# Patient Record
Sex: FEMALE | Race: WHITE | NOT HISPANIC OR LATINO | Employment: FULL TIME | URBAN - METROPOLITAN AREA
[De-identification: names, ages, dates, MRNs, and addresses within clinical notes are randomized per-mention and may not be internally consistent; named-entity substitution may affect disease eponyms.]

---

## 2017-09-25 ENCOUNTER — TELEPHONE (OUTPATIENT)
Dept: FAMILY MEDICINE | Facility: CLINIC | Age: 32
End: 2017-09-25

## 2017-09-25 NOTE — TELEPHONE ENCOUNTER
----- Message from Terese Ochoa sent at 9/25/2017  1:52 PM CDT -----  Contact: self. call [phone]  New patient who need to come in anytime after 3:00 pm for her physical. She said she knows you.

## 2017-09-25 NOTE — TELEPHONE ENCOUNTER
Left message that annual was scheduled 11/16/17 at 3:40 pm and if this did not work Please call to reschedule.

## 2017-11-16 ENCOUNTER — PATIENT MESSAGE (OUTPATIENT)
Dept: ADMINISTRATIVE | Facility: OTHER | Age: 32
End: 2017-11-16

## 2017-11-16 ENCOUNTER — OFFICE VISIT (OUTPATIENT)
Dept: FAMILY MEDICINE | Facility: CLINIC | Age: 32
End: 2017-11-16
Payer: COMMERCIAL

## 2017-11-16 VITALS
WEIGHT: 155.44 LBS | HEART RATE: 84 BPM | BODY MASS INDEX: 27.54 KG/M2 | SYSTOLIC BLOOD PRESSURE: 104 MMHG | TEMPERATURE: 98 F | HEIGHT: 63 IN | DIASTOLIC BLOOD PRESSURE: 60 MMHG

## 2017-11-16 DIAGNOSIS — E66.3 OVERWEIGHT (BMI 25.0-29.9): ICD-10-CM

## 2017-11-16 DIAGNOSIS — J98.9 REACTIVE AIRWAY DISEASE THAT IS NOT ASTHMA: ICD-10-CM

## 2017-11-16 DIAGNOSIS — R12 HEARTBURN: ICD-10-CM

## 2017-11-16 DIAGNOSIS — B00.1 RECURRENT COLD SORES: ICD-10-CM

## 2017-11-16 DIAGNOSIS — Z00.00 ANNUAL PHYSICAL EXAM: Primary | ICD-10-CM

## 2017-11-16 PROCEDURE — 99385 PREV VISIT NEW AGE 18-39: CPT | Mod: S$GLB,,, | Performed by: INTERNAL MEDICINE

## 2017-11-16 PROCEDURE — 99999 PR PBB SHADOW E&M-EST. PATIENT-LVL III: CPT | Mod: PBBFAC,,, | Performed by: INTERNAL MEDICINE

## 2017-11-16 RX ORDER — ETONOGESTREL AND ETHINYL ESTRADIOL .12; .015 MG/D; MG/D
0.12 INSERT, EXTENDED RELEASE VAGINAL
Refills: 7 | COMMUNITY
Start: 2017-09-14 | End: 2019-05-17

## 2017-11-16 RX ORDER — VALACYCLOVIR HYDROCHLORIDE 500 MG/1
500 TABLET, FILM COATED ORAL DAILY
Refills: 8 | COMMUNITY
Start: 2017-11-07 | End: 2020-10-12

## 2017-11-16 RX ORDER — ALBUTEROL SULFATE 90 UG/1
1-2 AEROSOL, METERED RESPIRATORY (INHALATION) EVERY 6 HOURS PRN
Qty: 18 G | Refills: 3 | Status: SHIPPED | OUTPATIENT
Start: 2017-11-16 | End: 2019-05-17

## 2017-11-16 NOTE — PROGRESS NOTES
Subjective:        Patient ID: Deneen Jay is a 32 y.o. female.    Chief Complaint: Annual Exam    HPI   Deneen Jay presents for annual exam and to establish care.    Review of Systems   Constitutional: Positive for fatigue and unexpected weight change (weight gain). Negative for activity change (regular exercise).   HENT: Negative for hearing loss and trouble swallowing.    Eyes: Negative for visual disturbance (glasses).   Respiratory: Negative for chest tightness and shortness of breath.    Cardiovascular: Negative for chest pain, palpitations and leg swelling (only after working long shifts).   Gastrointestinal: Negative for abdominal pain, constipation and diarrhea.        - heartburn, worse with EtOH, tomato sauce   Genitourinary: Negative for menstrual problem.   Musculoskeletal: Negative for arthralgias and back pain.   Skin: Negative for rash.   Allergic/Immunologic: Positive for environmental allergies.   Neurological: Negative for dizziness and headaches.   Hematological: Does not bruise/bleed easily.   Psychiatric/Behavioral: Negative for dysphoric mood and sleep disturbance. The patient is not nervous/anxious.            Objective:        Vitals:    11/16/17 1542   BP: 104/60   Pulse: 84   Temp: 97.8 °F (36.6 °C)     Physical Exam   Constitutional: She is oriented to person, place, and time. She appears well-developed and well-nourished. No distress.   HENT:   Head: Normocephalic and atraumatic.   Right Ear: External ear normal.   Left Ear: External ear normal.   Nose: Nose normal.   Mouth/Throat: Oropharynx is clear and moist.   - bilateral ear canals clear, tympanic membranes visualized - normal color and light reflex   Eyes: Conjunctivae and EOM are normal.   Neck: Normal range of motion. Neck supple. No thyromegaly present.   Cardiovascular: Normal rate, regular rhythm and normal heart sounds.    No murmur heard.  Pulmonary/Chest: Effort normal and breath sounds normal. No respiratory distress.    Abdominal: Soft. Bowel sounds are normal. She exhibits no distension and no mass. There is no tenderness. There is no guarding.   Musculoskeletal: She exhibits no edema.   Neurological: She is alert and oriented to person, place, and time.   Skin: Skin is warm and dry. No rash noted. No erythema.   Psychiatric: She has a normal mood and affect. Her behavior is normal. Judgment and thought content normal.   Vitals reviewed.          Assessment:         1. Annual physical exam    2. Overweight (BMI 25.0-29.9)    3. Recurrent cold sores    4. Heartburn    5. Reactive airway disease that is not asthma              Plan:         Deneen was seen today for annual exam.    Diagnoses and all orders for this visit:    Annual physical exam  - pt sees outside gyn, pap smear up to date  - return for fasting labs  -     Comprehensive metabolic panel; Future  -     CBC auto differential; Future  -     Hemoglobin A1c; Future  -     Lipid panel; Future  -     TSH; Future    Overweight (BMI 25.0-29.9): Pt exercises almost daily, weight training, works with [employer].  Generally healthy diet, EtOH only on weekends.  Check thyroid and A1c.  Referral to nutrition.  Discussed medication options including Metformin, Qsymia, Contrave.  Further management pending lab results.  -     Ambulatory consult to Nutrition Services    Recurrent cold sores: Valacyclovir qd    Heartburn: Generally controlled w Ranitidine daily.  Follow up if worse or alarm sx, will get EGD.    Reactive airway disease that is not asthma: No acute issues; occasionally sx if URI or exercising in cold weather.  -     albuterol 90 mcg/actuation inhaler; Inhale 1-2 puffs into the lungs every 6 (six) hours as needed for Wheezing.        Follow up pending lab results.

## 2017-11-20 ENCOUNTER — PATIENT MESSAGE (OUTPATIENT)
Dept: FAMILY MEDICINE | Facility: CLINIC | Age: 32
End: 2017-11-20

## 2017-11-20 ENCOUNTER — LAB VISIT (OUTPATIENT)
Dept: LAB | Facility: HOSPITAL | Age: 32
End: 2017-11-20
Attending: INTERNAL MEDICINE
Payer: COMMERCIAL

## 2017-11-20 DIAGNOSIS — Z00.00 ANNUAL PHYSICAL EXAM: ICD-10-CM

## 2017-11-20 DIAGNOSIS — E66.3 OVERWEIGHT (BMI 25.0-29.9): Primary | ICD-10-CM

## 2017-11-20 LAB
ALBUMIN SERPL BCP-MCNC: 3.7 G/DL
ALP SERPL-CCNC: 60 U/L
ALT SERPL W/O P-5'-P-CCNC: 13 U/L
ANION GAP SERPL CALC-SCNC: 8 MMOL/L
AST SERPL-CCNC: 13 U/L
BASOPHILS # BLD AUTO: 0.02 K/UL
BASOPHILS NFR BLD: 0.3 %
BILIRUB SERPL-MCNC: 0.3 MG/DL
BUN SERPL-MCNC: 9 MG/DL
CALCIUM SERPL-MCNC: 9.2 MG/DL
CHLORIDE SERPL-SCNC: 110 MMOL/L
CHOLEST SERPL-MCNC: 165 MG/DL
CHOLEST/HDLC SERPL: 2.1 {RATIO}
CO2 SERPL-SCNC: 22 MMOL/L
CREAT SERPL-MCNC: 0.8 MG/DL
DIFFERENTIAL METHOD: ABNORMAL
EOSINOPHIL # BLD AUTO: 0.1 K/UL
EOSINOPHIL NFR BLD: 1.9 %
ERYTHROCYTE [DISTWIDTH] IN BLOOD BY AUTOMATED COUNT: 11.7 %
EST. GFR  (AFRICAN AMERICAN): >60 ML/MIN/1.73 M^2
EST. GFR  (NON AFRICAN AMERICAN): >60 ML/MIN/1.73 M^2
ESTIMATED AVG GLUCOSE: 88 MG/DL
GLUCOSE SERPL-MCNC: 96 MG/DL
HBA1C MFR BLD HPLC: 4.7 %
HCT VFR BLD AUTO: 41.1 %
HDLC SERPL-MCNC: 78 MG/DL
HDLC SERPL: 47.3 %
HGB BLD-MCNC: 14.3 G/DL
LDLC SERPL CALC-MCNC: 72 MG/DL
LYMPHOCYTES # BLD AUTO: 2.5 K/UL
LYMPHOCYTES NFR BLD: 36.3 %
MCH RBC QN AUTO: 33.8 PG
MCHC RBC AUTO-ENTMCNC: 34.8 G/DL
MCV RBC AUTO: 97 FL
MONOCYTES # BLD AUTO: 0.8 K/UL
MONOCYTES NFR BLD: 11.3 %
NEUTROPHILS # BLD AUTO: 3.4 K/UL
NEUTROPHILS NFR BLD: 50.1 %
NONHDLC SERPL-MCNC: 87 MG/DL
PLATELET # BLD AUTO: 260 K/UL
PMV BLD AUTO: 10.3 FL
POTASSIUM SERPL-SCNC: 4.3 MMOL/L
PROT SERPL-MCNC: 6.9 G/DL
RBC # BLD AUTO: 4.23 M/UL
SODIUM SERPL-SCNC: 140 MMOL/L
TRIGL SERPL-MCNC: 75 MG/DL
TSH SERPL DL<=0.005 MIU/L-ACNC: 2.32 UIU/ML
WBC # BLD AUTO: 6.81 K/UL

## 2017-11-20 PROCEDURE — 84443 ASSAY THYROID STIM HORMONE: CPT

## 2017-11-20 PROCEDURE — 80061 LIPID PANEL: CPT

## 2017-11-20 PROCEDURE — 83036 HEMOGLOBIN GLYCOSYLATED A1C: CPT

## 2017-11-20 PROCEDURE — 80053 COMPREHEN METABOLIC PANEL: CPT

## 2017-11-20 PROCEDURE — 36415 COLL VENOUS BLD VENIPUNCTURE: CPT

## 2017-11-20 PROCEDURE — 85025 COMPLETE CBC W/AUTO DIFF WBC: CPT

## 2017-12-18 ENCOUNTER — PATIENT MESSAGE (OUTPATIENT)
Dept: FAMILY MEDICINE | Facility: CLINIC | Age: 32
End: 2017-12-18

## 2017-12-18 DIAGNOSIS — E66.3 OVERWEIGHT (BMI 25.0-29.9): ICD-10-CM

## 2018-01-08 ENCOUNTER — OFFICE VISIT (OUTPATIENT)
Dept: FAMILY MEDICINE | Facility: CLINIC | Age: 33
End: 2018-01-08
Payer: COMMERCIAL

## 2018-01-08 VITALS
DIASTOLIC BLOOD PRESSURE: 72 MMHG | BODY MASS INDEX: 26.9 KG/M2 | SYSTOLIC BLOOD PRESSURE: 116 MMHG | TEMPERATURE: 98 F | WEIGHT: 149.5 LBS

## 2018-01-08 DIAGNOSIS — E66.3 OVERWEIGHT (BMI 25.0-29.9): Primary | ICD-10-CM

## 2018-01-08 PROCEDURE — 99213 OFFICE O/P EST LOW 20 MIN: CPT | Mod: S$GLB,,, | Performed by: INTERNAL MEDICINE

## 2018-01-08 PROCEDURE — 99999 PR PBB SHADOW E&M-EST. PATIENT-LVL III: CPT | Mod: PBBFAC,,, | Performed by: INTERNAL MEDICINE

## 2018-01-10 ENCOUNTER — PATIENT MESSAGE (OUTPATIENT)
Dept: FAMILY MEDICINE | Facility: CLINIC | Age: 33
End: 2018-01-10

## 2018-01-10 DIAGNOSIS — E66.3 OVERWEIGHT (BMI 25.0-29.9): ICD-10-CM

## 2018-01-11 ENCOUNTER — PATIENT MESSAGE (OUTPATIENT)
Dept: ADMINISTRATIVE | Facility: HOSPITAL | Age: 33
End: 2018-01-11

## 2018-04-10 ENCOUNTER — OFFICE VISIT (OUTPATIENT)
Dept: FAMILY MEDICINE | Facility: CLINIC | Age: 33
End: 2018-04-10
Payer: COMMERCIAL

## 2018-04-10 VITALS
WEIGHT: 141.75 LBS | HEIGHT: 63 IN | SYSTOLIC BLOOD PRESSURE: 102 MMHG | BODY MASS INDEX: 25.12 KG/M2 | DIASTOLIC BLOOD PRESSURE: 64 MMHG | TEMPERATURE: 98 F

## 2018-04-10 DIAGNOSIS — E66.3 OVERWEIGHT (BMI 25.0-29.9): Primary | ICD-10-CM

## 2018-04-10 DIAGNOSIS — G43.109 OCULAR MIGRAINE: ICD-10-CM

## 2018-04-10 PROCEDURE — 99999 PR PBB SHADOW E&M-EST. PATIENT-LVL III: CPT | Mod: PBBFAC,,, | Performed by: INTERNAL MEDICINE

## 2018-04-10 PROCEDURE — 99213 OFFICE O/P EST LOW 20 MIN: CPT | Mod: S$GLB,,, | Performed by: INTERNAL MEDICINE

## 2018-05-08 ENCOUNTER — PATIENT MESSAGE (OUTPATIENT)
Dept: FAMILY MEDICINE | Facility: CLINIC | Age: 33
End: 2018-05-08

## 2018-07-17 ENCOUNTER — OFFICE VISIT (OUTPATIENT)
Dept: FAMILY MEDICINE | Facility: CLINIC | Age: 33
End: 2018-07-17
Payer: COMMERCIAL

## 2018-07-17 VITALS
TEMPERATURE: 97 F | DIASTOLIC BLOOD PRESSURE: 74 MMHG | RESPIRATION RATE: 16 BRPM | BODY MASS INDEX: 23.48 KG/M2 | SYSTOLIC BLOOD PRESSURE: 110 MMHG | WEIGHT: 132.5 LBS | HEIGHT: 63 IN

## 2018-07-17 DIAGNOSIS — L30.9 DERMATITIS: Primary | ICD-10-CM

## 2018-07-17 DIAGNOSIS — R63.4 WEIGHT LOSS: ICD-10-CM

## 2018-07-17 PROBLEM — E66.3 OVERWEIGHT (BMI 25.0-29.9): Status: RESOLVED | Noted: 2017-11-16 | Resolved: 2018-07-17

## 2018-07-17 PROCEDURE — 99214 OFFICE O/P EST MOD 30 MIN: CPT | Mod: S$GLB,,, | Performed by: INTERNAL MEDICINE

## 2018-07-17 PROCEDURE — 99999 PR PBB SHADOW E&M-EST. PATIENT-LVL III: CPT | Mod: PBBFAC,,, | Performed by: INTERNAL MEDICINE

## 2018-07-17 PROCEDURE — 3008F BODY MASS INDEX DOCD: CPT | Mod: CPTII,S$GLB,, | Performed by: INTERNAL MEDICINE

## 2018-07-17 NOTE — PROGRESS NOTES
Subjective:        Patient ID: Deneen Jay is a 33 y.o. female.    Chief Complaint: Weight Check and Rash (right leg)    HPI   Deneen Jay presents for the followin. Rash: Pt c/o rash on front of right ankle/lower leg.  Rash appeared suddenly, red with small bumps, a little itchy.  There is no swelling, pain or vesicles/pustules.  Pt applied OTC hydrocortisone cream.  She cannot think of a new topical or exposure to any new environment or substances.    2. Weight loss: Pt stopped taking Qsymia 3.75/23 a few days ago.  She has reached her goal weight and is going to try maintaining current weight with lifestyle.  She has started intermittent fasting, 2 days per week.  Feels good afterwards.  Continues to exercise regularly.  Checks daily weight at home.  Anxiety level has decreased since discontinuing the medication.    Review of Systems   Constitutional: Negative for activity change and unexpected weight change.   HENT: Negative for hearing loss, rhinorrhea and trouble swallowing.    Eyes: Negative for discharge and visual disturbance.   Respiratory: Negative for chest tightness and wheezing.    Cardiovascular: Negative for chest pain and palpitations.   Gastrointestinal: Negative for blood in stool, constipation, diarrhea and vomiting.   Endocrine: Negative for polydipsia and polyuria.   Genitourinary: Negative for difficulty urinating, dysuria, hematuria and menstrual problem.   Musculoskeletal: Negative for arthralgias, joint swelling and neck pain.   Neurological: Negative for weakness and headaches.   Psychiatric/Behavioral: Negative for confusion and dysphoric mood.           Objective:        Vitals:    18 1438   BP: 110/74   Resp: 16   Temp: 97.3 °F (36.3 °C)     Physical Exam   Constitutional: She is oriented to person, place, and time. She appears well-developed and well-nourished. No distress.   Neurological: She is alert and oriented to person, place, and time.   Skin: Skin is warm.   L  anterior lower leg: blanching erythematous papular rash with mild excoriations; skin intact; no edema or induration; no vesicles or pustules; rash stops at the top of pt's ankle socks   Psychiatric: She has a normal mood and affect. Her behavior is normal.   Vitals reviewed.          Assessment:         1. Dermatitis    2. Weight loss              Plan:         Deneen was seen today for weight check and rash.    Diagnoses and all orders for this visit:    Dermatitis: Does not appear c/w infection.  Recommend continuing topical steroids, avoid any other irritants or new topicals until rash resolves.    Weight loss: Pt is happy with the weight she is at, BMI WNL.  Continue off medication and maintain with lifestyle.  Pt will f/u if she has weight gain.        Follow-up in about 5 months (around 12/17/2018) for annual exam or sooner as needed.

## 2019-02-11 ENCOUNTER — PATIENT MESSAGE (OUTPATIENT)
Dept: FAMILY MEDICINE | Facility: CLINIC | Age: 34
End: 2019-02-11

## 2019-02-11 DIAGNOSIS — M54.12 CERVICAL RADICULITIS: Primary | ICD-10-CM

## 2019-02-12 ENCOUNTER — PATIENT MESSAGE (OUTPATIENT)
Dept: FAMILY MEDICINE | Facility: CLINIC | Age: 34
End: 2019-02-12

## 2019-02-18 ENCOUNTER — TELEPHONE (OUTPATIENT)
Dept: FAMILY MEDICINE | Facility: CLINIC | Age: 34
End: 2019-02-18

## 2019-02-18 ENCOUNTER — HOSPITAL ENCOUNTER (OUTPATIENT)
Dept: RADIOLOGY | Facility: OTHER | Age: 34
Discharge: HOME | End: 2019-02-18
Attending: INTERNAL MEDICINE
Payer: COMMERCIAL

## 2019-02-18 DIAGNOSIS — M25.422 SWELLING OF LEFT ELBOW: ICD-10-CM

## 2019-02-18 DIAGNOSIS — Z00.00 ROUTINE GENERAL MEDICAL EXAMINATION AT A HEALTH CARE FACILITY: Primary | ICD-10-CM

## 2019-02-18 DIAGNOSIS — G56.22 CUBITAL TUNNEL SYNDROME ON LEFT: Primary | ICD-10-CM

## 2019-02-18 DIAGNOSIS — M54.12 CERVICAL RADICULITIS: ICD-10-CM

## 2019-02-18 PROCEDURE — 72141 MRI NECK SPINE W/O DYE: CPT | Mod: 26,,, | Performed by: RADIOLOGY

## 2019-02-18 PROCEDURE — 72141 MRI NECK SPINE W/O DYE: CPT | Mod: TC

## 2019-02-18 PROCEDURE — 72141 MRI CERVICAL SPINE WITHOUT CONTRAST: ICD-10-PCS | Mod: 26,,, | Performed by: RADIOLOGY

## 2019-02-18 NOTE — TELEPHONE ENCOUNTER
Called and reviewed MRI results with patient.  Shoulder symptoms are resolved.  Pain now localized around the elbow and radiating into the pinky.  There is mild swelling of the elbow.  She has stopped doing upper body weight training since sx started 2 weeks ago.  Things are very gradually getting better but she has been taking Ibuprofen 800mg TID.  Recommend referral to ortho to discuss steroid injection of cubital tunnel.  Pt has hx of poor reaction to oral steroids.

## 2019-04-16 ENCOUNTER — TELEPHONE (OUTPATIENT)
Dept: FAMILY MEDICINE | Facility: CLINIC | Age: 34
End: 2019-04-16

## 2019-04-16 NOTE — TELEPHONE ENCOUNTER
----- Message from Suni Rock sent at 4/16/2019  3:06 PM CDT -----  Doctor appointment and lab have been scheduled.  Please link lab orders to the lab appointment.  Date of doctor appointment:  5/17  Date of lab appointment:  5/13  Physical or EP: Physical   Comments:

## 2019-05-13 ENCOUNTER — LAB VISIT (OUTPATIENT)
Dept: LAB | Facility: OTHER | Age: 34
End: 2019-05-13
Payer: COMMERCIAL

## 2019-05-13 DIAGNOSIS — Z00.00 ROUTINE GENERAL MEDICAL EXAMINATION AT A HEALTH CARE FACILITY: ICD-10-CM

## 2019-05-13 LAB
ALBUMIN SERPL BCP-MCNC: 4.3 G/DL (ref 3.5–5.2)
ALP SERPL-CCNC: 47 U/L (ref 55–135)
ALT SERPL W/O P-5'-P-CCNC: 21 U/L (ref 10–44)
ANION GAP SERPL CALC-SCNC: 5 MMOL/L (ref 8–16)
AST SERPL-CCNC: 17 U/L (ref 10–40)
BASOPHILS # BLD AUTO: 0.02 K/UL (ref 0–0.2)
BASOPHILS NFR BLD: 0.4 % (ref 0–1.9)
BILIRUB SERPL-MCNC: 0.8 MG/DL (ref 0.1–1)
BUN SERPL-MCNC: 8 MG/DL (ref 6–20)
CALCIUM SERPL-MCNC: 9.4 MG/DL (ref 8.7–10.5)
CHLORIDE SERPL-SCNC: 107 MMOL/L (ref 95–110)
CHOLEST SERPL-MCNC: 171 MG/DL (ref 120–199)
CHOLEST/HDLC SERPL: 1.9 {RATIO} (ref 2–5)
CO2 SERPL-SCNC: 30 MMOL/L (ref 23–29)
CREAT SERPL-MCNC: 0.7 MG/DL (ref 0.5–1.4)
DIFFERENTIAL METHOD: ABNORMAL
EOSINOPHIL # BLD AUTO: 0.1 K/UL (ref 0–0.5)
EOSINOPHIL NFR BLD: 1.9 % (ref 0–8)
ERYTHROCYTE [DISTWIDTH] IN BLOOD BY AUTOMATED COUNT: 12.1 % (ref 11.5–14.5)
EST. GFR  (AFRICAN AMERICAN): >60 ML/MIN/1.73 M^2
EST. GFR  (NON AFRICAN AMERICAN): >60 ML/MIN/1.73 M^2
GLUCOSE SERPL-MCNC: 89 MG/DL (ref 70–110)
HCT VFR BLD AUTO: 38.7 % (ref 37–48.5)
HDLC SERPL-MCNC: 91 MG/DL (ref 40–75)
HDLC SERPL: 53.2 % (ref 20–50)
HGB BLD-MCNC: 13.4 G/DL (ref 12–16)
LDLC SERPL CALC-MCNC: 70.2 MG/DL (ref 63–159)
LYMPHOCYTES # BLD AUTO: 2 K/UL (ref 1–4.8)
LYMPHOCYTES NFR BLD: 37.5 % (ref 18–48)
MCH RBC QN AUTO: 34.9 PG (ref 27–31)
MCHC RBC AUTO-ENTMCNC: 34.6 G/DL (ref 32–36)
MCV RBC AUTO: 101 FL (ref 82–98)
MONOCYTES # BLD AUTO: 0.6 K/UL (ref 0.3–1)
MONOCYTES NFR BLD: 10.3 % (ref 4–15)
NEUTROPHILS # BLD AUTO: 2.7 K/UL (ref 1.8–7.7)
NEUTROPHILS NFR BLD: 49.7 % (ref 38–73)
NONHDLC SERPL-MCNC: 80 MG/DL
PLATELET # BLD AUTO: 264 K/UL (ref 150–350)
PMV BLD AUTO: 9.9 FL (ref 9.2–12.9)
POTASSIUM SERPL-SCNC: 4.3 MMOL/L (ref 3.5–5.1)
PROT SERPL-MCNC: 7 G/DL (ref 6–8.4)
RBC # BLD AUTO: 3.84 M/UL (ref 4–5.4)
SODIUM SERPL-SCNC: 142 MMOL/L (ref 136–145)
TRIGL SERPL-MCNC: 49 MG/DL (ref 30–150)
WBC # BLD AUTO: 5.34 K/UL (ref 3.9–12.7)

## 2019-05-13 PROCEDURE — 80053 COMPREHEN METABOLIC PANEL: CPT

## 2019-05-13 PROCEDURE — 36415 COLL VENOUS BLD VENIPUNCTURE: CPT

## 2019-05-13 PROCEDURE — 85025 COMPLETE CBC W/AUTO DIFF WBC: CPT

## 2019-05-13 PROCEDURE — 80061 LIPID PANEL: CPT

## 2019-05-17 ENCOUNTER — OFFICE VISIT (OUTPATIENT)
Dept: PRIMARY CARE CLINIC | Facility: CLINIC | Age: 34
End: 2019-05-17
Payer: COMMERCIAL

## 2019-05-17 ENCOUNTER — TELEPHONE (OUTPATIENT)
Dept: PRIMARY CARE CLINIC | Facility: CLINIC | Age: 34
End: 2019-05-17

## 2019-05-17 VITALS
WEIGHT: 138.69 LBS | TEMPERATURE: 99 F | HEIGHT: 63 IN | BODY MASS INDEX: 24.57 KG/M2 | HEART RATE: 77 BPM | DIASTOLIC BLOOD PRESSURE: 68 MMHG | SYSTOLIC BLOOD PRESSURE: 100 MMHG

## 2019-05-17 DIAGNOSIS — B00.1 RECURRENT COLD SORES: ICD-10-CM

## 2019-05-17 DIAGNOSIS — J98.9 REACTIVE AIRWAY DISEASE THAT IS NOT ASTHMA: ICD-10-CM

## 2019-05-17 DIAGNOSIS — R12 HEARTBURN: ICD-10-CM

## 2019-05-17 DIAGNOSIS — Z00.00 ANNUAL PHYSICAL EXAM: Primary | ICD-10-CM

## 2019-05-17 PROCEDURE — 99999 PR PBB SHADOW E&M-EST. PATIENT-LVL III: ICD-10-PCS | Mod: PBBFAC,,, | Performed by: INTERNAL MEDICINE

## 2019-05-17 PROCEDURE — 99999 PR PBB SHADOW E&M-EST. PATIENT-LVL III: CPT | Mod: PBBFAC,,, | Performed by: INTERNAL MEDICINE

## 2019-05-17 PROCEDURE — 99395 PREV VISIT EST AGE 18-39: CPT | Mod: S$GLB,,, | Performed by: INTERNAL MEDICINE

## 2019-05-17 PROCEDURE — 99395 PR PREVENTIVE VISIT,EST,18-39: ICD-10-PCS | Mod: S$GLB,,, | Performed by: INTERNAL MEDICINE

## 2019-05-17 NOTE — TELEPHONE ENCOUNTER
----- Message from Falguni Alva sent at 5/17/2019 12:13 PM CDT -----  Contact: self   Patient would like to come in earlier today if a slot becomes available

## 2019-05-17 NOTE — TELEPHONE ENCOUNTER
Left message that patient could come earlier and we can try to work here in but I can't promise she will be seen earlier than her appointment time.

## 2019-05-17 NOTE — PROGRESS NOTES
Subjective:        Patient ID: Deneen Jay is a 34 y.o. female.    Chief Complaint: Annual Exam    HPI   Deneen Jay presents for annual exam.    Doing well, no specific concerns today.  Exercising regularly.  She has been able to maintain her weight loss.  GERD has essentially resolved with weight loss, only flares 2/2 certain foods like red sauce.    DCed Nuvaring 2 months ago; she and her  are planning to try to get pregnant later this year.    Cubital tunnel syndrome resolved.  Wears a L elbow brace at night when sleeping to keep arm straight.    Review of Systems   Constitutional: Negative for activity change and unexpected weight change.   HENT: Negative for hearing loss, rhinorrhea and trouble swallowing.    Eyes: Negative for discharge and visual disturbance.   Respiratory: Negative for chest tightness and wheezing.    Cardiovascular: Negative for chest pain and palpitations.   Gastrointestinal: Negative for abdominal pain, blood in stool, constipation, diarrhea and vomiting.   Endocrine: Negative for polydipsia and polyuria.   Genitourinary: Negative for difficulty urinating, dysuria, hematuria and menstrual problem.   Musculoskeletal: Negative for arthralgias, joint swelling and neck pain.   Skin: Negative for rash.   Neurological: Negative for weakness and headaches.   Psychiatric/Behavioral: Negative for confusion and dysphoric mood.           Objective:        Vitals:    05/17/19 1518   BP: 100/68   Pulse: 77   Temp: 98.5 °F (36.9 °C)     Physical Exam   Constitutional: She is oriented to person, place, and time. She appears well-developed and well-nourished. No distress.   HENT:   Head: Normocephalic and atraumatic.   Right Ear: External ear normal.   Left Ear: External ear normal.   Nose: Nose normal.   - bilateral ear canals clear, tympanic membranes visualized - normal color and light reflex   Eyes: Conjunctivae and EOM are normal.   Neck: Normal range of motion. Neck supple. No  thyromegaly present.   Cardiovascular: Normal rate, regular rhythm and normal heart sounds.   No murmur heard.  Pulmonary/Chest: Effort normal and breath sounds normal. No respiratory distress.   Abdominal: Soft. She exhibits no distension and no mass. There is no tenderness. There is no guarding.   Musculoskeletal: She exhibits no edema.   Lymphadenopathy:     She has no cervical adenopathy.   Neurological: She is alert and oriented to person, place, and time.   Skin: Skin is warm and dry.   Psychiatric: She has a normal mood and affect. Her behavior is normal. Judgment and thought content normal.   Vitals reviewed.        Most recent lab results reviewed with patient.    Assessment:         1. Annual physical exam    2. Recurrent cold sores    3. Heartburn    4. Reactive airway disease that is not asthma              Plan:         Deneen was seen today for annual exam.    Diagnoses and all orders for this visit:    Annual physical exam  - had pap smear, hpv this year that were normal    Recurrent cold sores: valtrex daily suppressive therapy; f/u derm    Heartburn: only occasionally now, zantac prn    Reactive airway disease that is not asthma: has not been an issue, has not needed albuterol           Follow up in about 1 year (around 5/17/2020) for annual exam or sooner as needed.

## 2019-08-12 ENCOUNTER — TELEPHONE (OUTPATIENT)
Dept: OBSTETRICS AND GYNECOLOGY | Facility: CLINIC | Age: 34
End: 2019-08-12

## 2019-08-12 DIAGNOSIS — Z34.90 PREGNANCY, UNSPECIFIED GESTATIONAL AGE: Primary | ICD-10-CM

## 2019-08-13 ENCOUNTER — PATIENT MESSAGE (OUTPATIENT)
Dept: OBSTETRICS AND GYNECOLOGY | Facility: CLINIC | Age: 34
End: 2019-08-13

## 2019-08-17 ENCOUNTER — PATIENT MESSAGE (OUTPATIENT)
Dept: OBSTETRICS AND GYNECOLOGY | Facility: CLINIC | Age: 34
End: 2019-08-17

## 2019-08-17 ENCOUNTER — HOSPITAL ENCOUNTER (EMERGENCY)
Facility: OTHER | Age: 34
Discharge: HOME | End: 2019-08-17
Attending: EMERGENCY MEDICINE
Payer: COMMERCIAL

## 2019-08-17 VITALS
OXYGEN SATURATION: 97 % | TEMPERATURE: 98 F | DIASTOLIC BLOOD PRESSURE: 65 MMHG | SYSTOLIC BLOOD PRESSURE: 105 MMHG | RESPIRATION RATE: 16 BRPM | HEART RATE: 76 BPM | WEIGHT: 138.69 LBS | BODY MASS INDEX: 24.57 KG/M2 | HEIGHT: 63 IN

## 2019-08-17 DIAGNOSIS — O03.9 SPONTANEOUS MISCARRIAGE: Primary | ICD-10-CM

## 2019-08-17 LAB
ABO GROUP BLD: NORMAL
ALBUMIN SERPL BCP-MCNC: 4.1 G/DL (ref 3.5–5.2)
ALP SERPL-CCNC: 47 U/L (ref 55–135)
ALT SERPL W/O P-5'-P-CCNC: 12 U/L (ref 10–44)
ANION GAP SERPL CALC-SCNC: 9 MMOL/L (ref 8–16)
AST SERPL-CCNC: 14 U/L (ref 10–40)
B-HCG UR QL: NEGATIVE
BACTERIA #/AREA URNS HPF: ABNORMAL /HPF
BASOPHILS # BLD AUTO: 0.04 K/UL (ref 0–0.2)
BASOPHILS NFR BLD: 0.5 % (ref 0–1.9)
BILIRUB SERPL-MCNC: 0.5 MG/DL (ref 0.1–1)
BILIRUB UR QL STRIP: NEGATIVE
BUN SERPL-MCNC: 10 MG/DL (ref 6–20)
CALCIUM SERPL-MCNC: 9.1 MG/DL (ref 8.7–10.5)
CHLORIDE SERPL-SCNC: 109 MMOL/L (ref 95–110)
CLARITY UR: ABNORMAL
CO2 SERPL-SCNC: 24 MMOL/L (ref 23–29)
COLOR UR: ABNORMAL
CREAT SERPL-MCNC: 0.7 MG/DL (ref 0.5–1.4)
CTP QC/QA: YES
DIFFERENTIAL METHOD: ABNORMAL
EOSINOPHIL # BLD AUTO: 0.1 K/UL (ref 0–0.5)
EOSINOPHIL NFR BLD: 1.2 % (ref 0–8)
ERYTHROCYTE [DISTWIDTH] IN BLOOD BY AUTOMATED COUNT: 11.4 % (ref 11.5–14.5)
EST. GFR  (AFRICAN AMERICAN): >60 ML/MIN/1.73 M^2
EST. GFR  (NON AFRICAN AMERICAN): >60 ML/MIN/1.73 M^2
GLUCOSE SERPL-MCNC: 94 MG/DL (ref 70–110)
GLUCOSE UR QL STRIP: NEGATIVE
HCG INTACT+B SERPL-ACNC: 4.7 MIU/ML
HCT VFR BLD AUTO: 43.4 % (ref 37–48.5)
HGB BLD-MCNC: 15.2 G/DL (ref 12–16)
HGB UR QL STRIP: ABNORMAL
HYALINE CASTS #/AREA URNS LPF: 0 /LPF
IMM GRANULOCYTES # BLD AUTO: 0.02 K/UL (ref 0–0.04)
IMM GRANULOCYTES NFR BLD AUTO: 0.3 % (ref 0–0.5)
KETONES UR QL STRIP: NEGATIVE
LEUKOCYTE ESTERASE UR QL STRIP: NEGATIVE
LYMPHOCYTES # BLD AUTO: 1.6 K/UL (ref 1–4.8)
LYMPHOCYTES NFR BLD: 21.1 % (ref 18–48)
MCH RBC QN AUTO: 35.3 PG (ref 27–31)
MCHC RBC AUTO-ENTMCNC: 35 G/DL (ref 32–36)
MCV RBC AUTO: 101 FL (ref 82–98)
MICROSCOPIC COMMENT: ABNORMAL
MONOCYTES # BLD AUTO: 0.6 K/UL (ref 0.3–1)
MONOCYTES NFR BLD: 8.6 % (ref 4–15)
NEUTROPHILS # BLD AUTO: 5.1 K/UL (ref 1.8–7.7)
NEUTROPHILS NFR BLD: 68.3 % (ref 38–73)
NITRITE UR QL STRIP: NEGATIVE
NRBC BLD-RTO: 0 /100 WBC
PH UR STRIP: 7 [PH] (ref 5–8)
PLATELET # BLD AUTO: 248 K/UL (ref 150–350)
PMV BLD AUTO: 9.6 FL (ref 9.2–12.9)
POTASSIUM SERPL-SCNC: 4.5 MMOL/L (ref 3.5–5.1)
PROT SERPL-MCNC: 6.7 G/DL (ref 6–8.4)
PROT UR QL STRIP: ABNORMAL
RBC # BLD AUTO: 4.31 M/UL (ref 4–5.4)
RBC #/AREA URNS HPF: >100 /HPF (ref 0–4)
RH BLD: NORMAL
SODIUM SERPL-SCNC: 142 MMOL/L (ref 136–145)
SP GR UR STRIP: 1.01 (ref 1–1.03)
SQUAMOUS #/AREA URNS HPF: 1 /HPF
URN SPEC COLLECT METH UR: ABNORMAL
UROBILINOGEN UR STRIP-ACNC: NEGATIVE EU/DL
WBC # BLD AUTO: 7.44 K/UL (ref 3.9–12.7)
WBC #/AREA URNS HPF: 5 /HPF (ref 0–5)

## 2019-08-17 PROCEDURE — 99284 EMERGENCY DEPT VISIT MOD MDM: CPT | Mod: 25

## 2019-08-17 PROCEDURE — 96361 HYDRATE IV INFUSION ADD-ON: CPT

## 2019-08-17 PROCEDURE — 86901 BLOOD TYPING SEROLOGIC RH(D): CPT

## 2019-08-17 PROCEDURE — 85025 COMPLETE CBC W/AUTO DIFF WBC: CPT

## 2019-08-17 PROCEDURE — 81000 URINALYSIS NONAUTO W/SCOPE: CPT

## 2019-08-17 PROCEDURE — 63600175 PHARM REV CODE 636 W HCPCS: Performed by: EMERGENCY MEDICINE

## 2019-08-17 PROCEDURE — 80053 COMPREHEN METABOLIC PANEL: CPT

## 2019-08-17 PROCEDURE — 84702 CHORIONIC GONADOTROPIN TEST: CPT

## 2019-08-17 PROCEDURE — 81025 URINE PREGNANCY TEST: CPT | Performed by: EMERGENCY MEDICINE

## 2019-08-17 PROCEDURE — 96360 HYDRATION IV INFUSION INIT: CPT

## 2019-08-17 RX ADMIN — SODIUM CHLORIDE 1000 ML: 0.9 INJECTION, SOLUTION INTRAVENOUS at 07:08

## 2019-08-17 NOTE — ED NOTES
"Pt to ED via spouse with c/o possible miscarriage. Pt reporting positive UPT at home at beginning of the week. Pt now c/o heavy intermittent vaginal bleeding and lower abdominal cramping this AM. Pt describes s/s as a "more severe period". Bowel sounds present and normoactive x4 quadrants, TTP to bilateral lower abdomen. Pt denies SOB, dyspnea with minimal exertion, dizziness, weakness, CP, N/V/D. Pt connected to continuous cardiac monitoring, pulse ox, BP cycled. Will continue to monitor.     Two patient identifiers have been checked and are correct.      Appearance: Pt awake, alert & oriented to person, place & time. Pt in no acute distress at present time. Pt is clean and well groomed with clothes appropriately fastened.   Skin: Skin warm, dry & intact. Color consistent with ethnicity. Mucous membranes moist. No breakdown or brusing noted.   Musculoskeletal: Patient moving all extremities well, no obvious swelling or deformities noted.   Respiratory: Respirations spontaneous, even, and non-labored. Visible chest rise noted. Airway is open and patent. No accessory muscle use noted.   Neurologic: Sensation is intact. Speech is clear and appropriate. Eyes open spontaneously, behavior appropriate to situation, follows commands, facial expression symmetrical, bilateral hand grasp equal and even, purposeful motor response noted.  Cardiac: All peripheral pulses present. No Bilateral lower extremity edema. Cap refill is <3 seconds.  Abdomen: SEE HPI.    : Pt reports no dysuria or hematuria.            "

## 2019-08-17 NOTE — ED PROVIDER NOTES
Encounter Date: 2019    SCRIBE #1 NOTE: I, Melinda Avina, am scribing for, and in the presence of,  Dr. Starr. I have scribed the entire note.       History     Chief Complaint   Patient presents with    Miscarriage     35y/o F to ED for possible miscarriage with heavy bleeding mostly this morning. denies dizziness or weakness.      Time patient was seen by the provider: 7:17 AM      The patient is a 34 y.o. female  with no known past medical history who presents to the ED with a complaint of a miscarriage. She states that she found out she was pregnant from 2 home tests on Monday and last night noticed some red vaginal discharge. This morning she states she had bleeding similar in flow to menstrual period. She also reports some right sided pelvic pain which feels like menstrual cramps. Her last menstrual period was on July 10 and was normal.  Denies urinary symptoms or problems with bowel movements.  Nothing makes the pain better or worse.  The pain is mild. Patient has established OB care but has not had 1st appointment yet.  Patient has been attempting to get pregnant.  She is on prenatal vitamins.  Of note, patient is an anesthesiologist.  She denies any other symptoms.      The history is provided by the patient.     Review of patient's allergies indicates:  No Known Allergies  Past Medical History:   Diagnosis Date    Overweight (BMI 25.0-29.9) 2017    Recurrent cold sores 2017     No past surgical history on file.  Family History   Problem Relation Age of Onset    Parkinsonism Father     Hypertension Maternal Grandmother     Cancer Maternal Grandfather         mouth    Diabetes Mellitus Paternal Grandmother     Hypertension Paternal Grandfather      Social History     Tobacco Use    Smoking status: Never Smoker    Smokeless tobacco: Never Used   Substance Use Topics    Alcohol use: Yes    Drug use: Not on file     Review of Systems   Constitutional: Negative for chills and  fever.   HENT: Negative for nosebleeds and sore throat.    Eyes: Negative for photophobia and visual disturbance.   Respiratory: Negative for cough and shortness of breath.    Cardiovascular: Negative for chest pain.   Gastrointestinal: Negative for nausea and vomiting.   Genitourinary: Positive for pelvic pain and vaginal bleeding. Negative for difficulty urinating and dysuria.   Musculoskeletal: Negative for back pain.   Skin: Negative for color change, rash and wound.   Neurological: Negative for weakness.   Hematological: Does not bruise/bleed easily.   All other systems reviewed and are negative.      Physical Exam     Initial Vitals [08/17/19 0652]   BP Pulse Resp Temp SpO2   126/79 78 16 98.1 °F (36.7 °C) 98 %      MAP       --         Physical Exam    Nursing note and vitals reviewed.  Constitutional: She appears well-developed and well-nourished. No distress.   HENT:   Head: Normocephalic and atraumatic.   Right Ear: External ear normal.   Left Ear: External ear normal.   Eyes: Conjunctivae and EOM are normal. Pupils are equal, round, and reactive to light. Right eye exhibits no discharge. Left eye exhibits no discharge. No scleral icterus.   Neck: Normal range of motion. Neck supple.   Cardiovascular: Normal rate, regular rhythm and normal heart sounds. Exam reveals no gallop and no friction rub.    No murmur heard.  Pulmonary/Chest: Breath sounds normal. No stridor. No respiratory distress. She has no wheezes. She has no rhonchi. She has no rales.   Abdominal: Soft. Bowel sounds are normal. She exhibits no distension and no mass. There is no tenderness. There is no rebound and no guarding.   Musculoskeletal: She exhibits no edema.   Neurological: She is alert and oriented to person, place, and time. She has normal strength. No cranial nerve deficit or sensory deficit. GCS score is 15. GCS eye subscore is 4. GCS verbal subscore is 5. GCS motor subscore is 6.   Skin: Skin is warm and dry. Capillary refill  takes less than 2 seconds.   Psychiatric: She has a normal mood and affect. Her behavior is normal. Judgment and thought content normal.         ED Course   Procedures  Labs Reviewed   URINALYSIS, REFLEX TO URINE CULTURE - Abnormal; Notable for the following components:       Result Value    Color, UA Red (*)     Appearance, UA Cloudy (*)     Protein, UA 1+ (*)     Occult Blood UA 3+ (*)     All other components within normal limits    Narrative:     Preferred Collection Type->Urine, Clean Catch   CBC W/ AUTO DIFFERENTIAL - Abnormal; Notable for the following components:    Mean Corpuscular Volume 101 (*)     Mean Corpuscular Hemoglobin 35.3 (*)     RDW 11.4 (*)     All other components within normal limits   COMPREHENSIVE METABOLIC PANEL - Abnormal; Notable for the following components:    Alkaline Phosphatase 47 (*)     All other components within normal limits    Narrative:     Recoll. [id number] by [facility] at 2019 07:57, reason: hemolyzed   notified nany mcleod rn er @0756   URINALYSIS MICROSCOPIC - Abnormal; Notable for the following components:    RBC, UA >100 (*)     All other components within normal limits    Narrative:     Preferred Collection Type->Urine, Clean Catch   HCG, QUANTITATIVE, PREGNANCY    Narrative:     Recoll. [id number] by [facility] at 2019 07:57, reason: hemolyzed   notified nany mcleod rn er @0756   POCT URINE PREGNANCY   GROUP & RH          Imaging Results    None          Medical Decision Making:   History:   Old Medical Records: I decided to obtain old medical records.  Initial Assessment:   35 y/o F with early pregnancy, pelvic cramping and bleeding.  Plan labs, possible ultrasound, check Rh type  Differential Diagnosis:   Threatened , spontaneous , implantation bleeding, placental abnormality including placenta previa or abruptio, vaginitis, UTI, rectal bleeding    Independently Interpreted Test(s):   I have ordered and independently interpreted X-rays - see summary  below.  Clinical Tests:   Lab Tests: Ordered and Reviewed  Radiological Study: Ordered and Reviewed  ED Management:  Very low b-hcg.  Probable spontaneous ab of very early pregnancy.  Patient declined pelvic exam and ultrasound, will f/u with OB for further eval and testing.  Advised patient we usually track B-HCG to zero as no IUP was established.             Scribe Attestation:   Scribe #1: I performed the above scribed service and the documentation accurately describes the services I performed. I attest to the accuracy of the note.               Clinical Impression:       ICD-10-CM ICD-9-CM   1. Spontaneous miscarriage O03.9 634.90         Disposition:   Disposition: Discharged  Condition: Stable                        Jacki Starr MD  08/19/19 0787

## 2019-09-06 ENCOUNTER — OFFICE VISIT (OUTPATIENT)
Dept: OBSTETRICS AND GYNECOLOGY | Facility: CLINIC | Age: 34
End: 2019-09-06
Payer: COMMERCIAL

## 2019-09-06 VITALS — SYSTOLIC BLOOD PRESSURE: 120 MMHG | BODY MASS INDEX: 25.15 KG/M2 | WEIGHT: 142 LBS | DIASTOLIC BLOOD PRESSURE: 78 MMHG

## 2019-09-06 DIAGNOSIS — O02.81 CHEMICAL PREGNANCY: Primary | ICD-10-CM

## 2019-09-06 PROCEDURE — 99203 OFFICE O/P NEW LOW 30 MIN: CPT | Mod: S$GLB,,, | Performed by: NURSE PRACTITIONER

## 2019-09-06 PROCEDURE — 3008F PR BODY MASS INDEX (BMI) DOCUMENTED: ICD-10-PCS | Mod: CPTII,S$GLB,, | Performed by: NURSE PRACTITIONER

## 2019-09-06 PROCEDURE — 99203 PR OFFICE/OUTPT VISIT, NEW, LEVL III, 30-44 MIN: ICD-10-PCS | Mod: S$GLB,,, | Performed by: NURSE PRACTITIONER

## 2019-09-06 PROCEDURE — 3008F BODY MASS INDEX DOCD: CPT | Mod: CPTII,S$GLB,, | Performed by: NURSE PRACTITIONER

## 2019-09-06 PROCEDURE — 99999 PR PBB SHADOW E&M-EST. PATIENT-LVL III: CPT | Mod: PBBFAC,,, | Performed by: NURSE PRACTITIONER

## 2019-09-06 PROCEDURE — 99999 PR PBB SHADOW E&M-EST. PATIENT-LVL III: ICD-10-PCS | Mod: PBBFAC,,, | Performed by: NURSE PRACTITIONER

## 2019-09-06 NOTE — PROGRESS NOTES
CC: F/U missed AB    HPI: Pt is a 34 y.o.  female who presents for a f/u missed AB.  She went to the ED on 8/17/19 with vaginal bleeding and then passed all products of conception.  Reports cessation of vaginal bleeding has occurred.  Repots she is still experiencing some cramping.  Denies any fever or abdominal pain. Hcg level was 4.7.            ROS:  GENERAL: Feeling well overall. Denies fever or chills.   SKIN: Denies rash or lesions.   HEAD: Denies head injury or headache.   NODES: Denies enlarged lymph nodes.   CHEST: Denies chest pain or shortness of breath.   CARDIOVASCULAR: Denies palpitations or left sided chest pain.   ABDOMEN: No abdominal pain, constipation, diarrhea, nausea, vomiting or rectal bleeding.   URINARY: No dysuria, hematuria, or burning on urination.  REPRODUCTIVE: See HPI.   BREASTS: Denies pain, lumps, or nipple discharge.   HEMATOLOGIC: No easy bruisability or excessive bleeding.   MUSCULOSKELETAL: Denies joint pain or swelling.   NEUROLOGIC: Denies syncope or weakness.   PSYCHIATRIC: Denies depression, anxiety or mood swings.    PE:   APPEARANCE: Well nourished, well developed, female in no acute distress.  PELVIS: Deferred         Diagnosis:  1. Chemical pregnancy        Plan:   Discussed chemical pregnancy- early pregnancy loss prior to pregnancy implanting in the uterus  Discussed ok to try again now  Hcg level back to a pre-pregnancy state- no need to do f/u level at this time   Preconceptional counseling/folic acid recommendation/mentrual calendar/mid-cycle relations discussed             Follow-up PRN no resolution of symptoms.      DEEJAY Smith

## 2019-09-17 ENCOUNTER — LAB VISIT (OUTPATIENT)
Dept: LAB | Facility: OTHER | Age: 34
End: 2019-09-17
Payer: COMMERCIAL

## 2019-09-17 ENCOUNTER — PATIENT MESSAGE (OUTPATIENT)
Dept: OBSTETRICS AND GYNECOLOGY | Facility: CLINIC | Age: 34
End: 2019-09-17

## 2019-09-17 DIAGNOSIS — N91.2 AMENORRHEA: ICD-10-CM

## 2019-09-17 DIAGNOSIS — Z32.01 POSITIVE PREGNANCY TEST: Primary | ICD-10-CM

## 2019-09-17 DIAGNOSIS — Z32.01 POSITIVE PREGNANCY TEST: ICD-10-CM

## 2019-09-17 LAB — HCG INTACT+B SERPL-ACNC: 44 MIU/ML

## 2019-09-17 PROCEDURE — 84702 CHORIONIC GONADOTROPIN TEST: CPT

## 2019-09-17 PROCEDURE — 84144 ASSAY OF PROGESTERONE: CPT

## 2019-09-17 PROCEDURE — 36415 COLL VENOUS BLD VENIPUNCTURE: CPT

## 2019-09-18 ENCOUNTER — TELEPHONE (OUTPATIENT)
Dept: OBSTETRICS AND GYNECOLOGY | Facility: CLINIC | Age: 34
End: 2019-09-18

## 2019-09-18 LAB — PROGEST SERPL-MCNC: 22.3 NG/ML

## 2019-09-18 NOTE — TELEPHONE ENCOUNTER
Notified pt her of her hcg and progesterone results.  Discussed will repeat hcg level again 48 hours after the first lab draw for comparison. Discussed will scheduled confirmation and dating US at 8 WGA.  Pt verbalized understanding.

## 2019-09-19 ENCOUNTER — LAB VISIT (OUTPATIENT)
Dept: LAB | Facility: OTHER | Age: 34
End: 2019-09-19
Attending: NURSE PRACTITIONER
Payer: COMMERCIAL

## 2019-09-19 ENCOUNTER — PATIENT MESSAGE (OUTPATIENT)
Dept: OBSTETRICS AND GYNECOLOGY | Facility: CLINIC | Age: 34
End: 2019-09-19

## 2019-09-19 DIAGNOSIS — Z32.01 POSITIVE PREGNANCY TEST: ICD-10-CM

## 2019-09-19 DIAGNOSIS — N91.2 AMENORRHEA: ICD-10-CM

## 2019-09-19 LAB — HCG INTACT+B SERPL-ACNC: 116 MIU/ML

## 2019-09-19 PROCEDURE — 36415 COLL VENOUS BLD VENIPUNCTURE: CPT

## 2019-09-19 PROCEDURE — 84702 CHORIONIC GONADOTROPIN TEST: CPT

## 2019-09-20 ENCOUNTER — PATIENT MESSAGE (OUTPATIENT)
Dept: OBSTETRICS AND GYNECOLOGY | Facility: CLINIC | Age: 34
End: 2019-09-20

## 2019-09-25 ENCOUNTER — PATIENT MESSAGE (OUTPATIENT)
Dept: OBSTETRICS AND GYNECOLOGY | Facility: CLINIC | Age: 34
End: 2019-09-25

## 2019-09-26 ENCOUNTER — PATIENT MESSAGE (OUTPATIENT)
Dept: OBSTETRICS AND GYNECOLOGY | Facility: CLINIC | Age: 34
End: 2019-09-26

## 2019-09-26 ENCOUNTER — LAB VISIT (OUTPATIENT)
Dept: LAB | Facility: OTHER | Age: 34
End: 2019-09-26
Payer: COMMERCIAL

## 2019-09-26 ENCOUNTER — TELEPHONE (OUTPATIENT)
Dept: OBSTETRICS AND GYNECOLOGY | Facility: CLINIC | Age: 34
End: 2019-09-26

## 2019-09-26 DIAGNOSIS — R39.9 UTI SYMPTOMS: Primary | ICD-10-CM

## 2019-09-26 DIAGNOSIS — R39.9 UTI SYMPTOMS: ICD-10-CM

## 2019-09-26 LAB
BILIRUB UR QL STRIP: NEGATIVE
CLARITY UR: CLEAR
COLOR UR: YELLOW
GLUCOSE UR QL STRIP: NEGATIVE
HGB UR QL STRIP: NEGATIVE
KETONES UR QL STRIP: ABNORMAL
LEUKOCYTE ESTERASE UR QL STRIP: NEGATIVE
NITRITE UR QL STRIP: NEGATIVE
PH UR STRIP: 6 [PH] (ref 5–8)
PROT UR QL STRIP: NEGATIVE
SP GR UR STRIP: <=1.005 (ref 1–1.03)
URN SPEC COLLECT METH UR: ABNORMAL
UROBILINOGEN UR STRIP-ACNC: NEGATIVE EU/DL

## 2019-09-26 PROCEDURE — 87086 URINE CULTURE/COLONY COUNT: CPT

## 2019-09-26 PROCEDURE — 81003 URINALYSIS AUTO W/O SCOPE: CPT

## 2019-09-26 NOTE — TELEPHONE ENCOUNTER
Per patient, I think I have a UTI - I am having an increase in frequency and lower abdominal pain/cramping after urinating.

## 2019-09-27 ENCOUNTER — PATIENT MESSAGE (OUTPATIENT)
Dept: OBSTETRICS AND GYNECOLOGY | Facility: CLINIC | Age: 34
End: 2019-09-27

## 2019-09-28 LAB — BACTERIA UR CULT: NO GROWTH

## 2019-10-04 ENCOUNTER — PATIENT MESSAGE (OUTPATIENT)
Dept: OBSTETRICS AND GYNECOLOGY | Facility: CLINIC | Age: 34
End: 2019-10-04

## 2019-10-16 ENCOUNTER — OFFICE VISIT (OUTPATIENT)
Dept: OBSTETRICS AND GYNECOLOGY | Facility: CLINIC | Age: 34
End: 2019-10-16
Payer: COMMERCIAL

## 2019-10-16 ENCOUNTER — PROCEDURE VISIT (OUTPATIENT)
Dept: OBSTETRICS AND GYNECOLOGY | Facility: CLINIC | Age: 34
End: 2019-10-16
Payer: COMMERCIAL

## 2019-10-16 ENCOUNTER — LAB VISIT (OUTPATIENT)
Dept: LAB | Facility: OTHER | Age: 34
End: 2019-10-16
Attending: NURSE PRACTITIONER
Payer: COMMERCIAL

## 2019-10-16 VITALS
WEIGHT: 138 LBS | SYSTOLIC BLOOD PRESSURE: 106 MMHG | DIASTOLIC BLOOD PRESSURE: 68 MMHG | HEIGHT: 63 IN | BODY MASS INDEX: 24.45 KG/M2

## 2019-10-16 DIAGNOSIS — Z13.228 CYSTIC FIBROSIS SCREENING: ICD-10-CM

## 2019-10-16 DIAGNOSIS — Z32.01 POSITIVE PREGNANCY TEST: ICD-10-CM

## 2019-10-16 DIAGNOSIS — O98.311: ICD-10-CM

## 2019-10-16 DIAGNOSIS — O20.0 THREATENED ABORTION: ICD-10-CM

## 2019-10-16 DIAGNOSIS — N91.5 OLIGOMENORRHEA, UNSPECIFIED TYPE: Primary | ICD-10-CM

## 2019-10-16 DIAGNOSIS — Z34.90 PREGNANCY, UNSPECIFIED GESTATIONAL AGE: ICD-10-CM

## 2019-10-16 DIAGNOSIS — A63.0: ICD-10-CM

## 2019-10-16 DIAGNOSIS — O36.80X0 ENCOUNTER TO DETERMINE FETAL VIABILITY OF PREGNANCY, SINGLE OR UNSPECIFIED FETUS: ICD-10-CM

## 2019-10-16 LAB
B-HCG UR QL: POSITIVE
C TRACH DNA SPEC QL NAA+PROBE: NOT DETECTED
CTP QC/QA: YES
HCG INTACT+B SERPL-ACNC: NORMAL MIU/ML
N GONORRHOEA DNA SPEC QL NAA+PROBE: NOT DETECTED

## 2019-10-16 PROCEDURE — 84702 CHORIONIC GONADOTROPIN TEST: CPT

## 2019-10-16 PROCEDURE — 76801 PR US, OB <14WKS, TRANSABD, SINGLE GESTATION: ICD-10-PCS | Mod: S$GLB,,, | Performed by: OBSTETRICS & GYNECOLOGY

## 2019-10-16 PROCEDURE — 87086 URINE CULTURE/COLONY COUNT: CPT

## 2019-10-16 PROCEDURE — 99999 PR PBB SHADOW E&M-EST. PATIENT-LVL III: CPT | Mod: PBBFAC,,, | Performed by: NURSE PRACTITIONER

## 2019-10-16 PROCEDURE — 99999 PR PBB SHADOW E&M-EST. PATIENT-LVL III: ICD-10-PCS | Mod: PBBFAC,,, | Performed by: NURSE PRACTITIONER

## 2019-10-16 PROCEDURE — 57061 PR DESTRUCT,VAGINAL LESION(S),SIMPLE: ICD-10-PCS | Mod: S$GLB,,, | Performed by: NURSE PRACTITIONER

## 2019-10-16 PROCEDURE — 76801 OB US < 14 WKS SINGLE FETUS: CPT | Mod: S$GLB,,, | Performed by: OBSTETRICS & GYNECOLOGY

## 2019-10-16 PROCEDURE — 99214 PR OFFICE/OUTPT VISIT, EST, LEVL IV, 30-39 MIN: ICD-10-PCS | Mod: 25,S$GLB,, | Performed by: NURSE PRACTITIONER

## 2019-10-16 PROCEDURE — 87491 CHLMYD TRACH DNA AMP PROBE: CPT

## 2019-10-16 PROCEDURE — 81025 POCT URINE PREGNANCY: ICD-10-PCS | Mod: S$GLB,,, | Performed by: NURSE PRACTITIONER

## 2019-10-16 PROCEDURE — 57061 DESTRUCTION VAG LESIONS SMPL: CPT | Mod: S$GLB,,, | Performed by: NURSE PRACTITIONER

## 2019-10-16 PROCEDURE — 99214 OFFICE O/P EST MOD 30 MIN: CPT | Mod: 25,S$GLB,, | Performed by: NURSE PRACTITIONER

## 2019-10-16 PROCEDURE — 81025 URINE PREGNANCY TEST: CPT | Mod: S$GLB,,, | Performed by: NURSE PRACTITIONER

## 2019-10-16 PROCEDURE — 36415 COLL VENOUS BLD VENIPUNCTURE: CPT

## 2019-10-16 NOTE — PROGRESS NOTES
"  CC: Positive Pregnancy Test    HISTORY OF PRESENT ILLNESS:    Deneen Jay is a 34 y.o. female, No obstetric history on file.,  Presents today for a routine exam complaining of amenorrhea and positive home urine pregnancy test.  Patient's last menstrual period was 08/17/2019 (exact date).   She is not currently on any contraception.  Reports nausea. Reports breast tenderness. Denies vaginal bleeding and pelvic pain.  Prior chemical pregnancy X 1.  Medical h/o cold sores and genital warts.  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       ROS:  GENERAL: No weight changes. No swelling. No fatigue. No fever.  CARDIOVASCULAR: No chest pain. No shortness of breath. No leg cramps.   NEUROLOGICAL: No headaches. No vision changes.  BREASTS: No pain. No lumps. No discharge.  ABDOMEN: No pain. No diarrhea. No constipation.  REPRODUCTIVE: No abnormal bleeding.   VULVA: No pain. No lesions. No itching.  VAGINA: No relaxation. No itching. No odor. No discharge. No lesions.  URINARY: No incontinence. No nocturia. No frequency. No dysuria.    MEDICATIONS AND ALLERGIES:  Reviewed        COMPREHENSIVE GYN HISTORY:  PAP History: Denies abnormal Paps.  Infection History: Denies STDs. Denies PID.  Benign History: Denies uterine fibroids. Denies ovarian cysts. Denies endometriosis. Denies other conditions.  Cancer History: Denies cervical cancer. Denies uterine cancer or hyperplasia. Denies ovarian cancer. Denies vulvar cancer or pre-cancer. Denies vaginal cancer or pre-cancer. Denies breast cancer. Denies colon cancer.  Sexual Activity History: Reports currently being sexually active  Menstrual History: None.  Contraception: None    /68   Ht 5' 3" (1.6 m)   Wt 62.6 kg (138 lb 0.1 oz)   LMP 08/17/2019 (Exact Date)   BMI 24.45 kg/m²     PE:  AFFECT: Calm, alert and oriented X 3. Interactive during exam  GENERAL: Appears well-nourished, well-developed, in no acute distress.  HEAD: Normocephalic, atruamatic  TEETH: Good " dentition.  THYROID: No thyromegally   BREASTS: No masses, skin changes, nipple discharge or adenopathy bilaterally.  SKIN: Normal for race, warm, & dry. No lesions or rashes.  LUNGS: Easy and unlabored, clear to auscultation bilaterally.  HEART: Regular rate and rhythm   ABDOMEN: Soft and nontender without masses or organomegally.  VULVA: No lesions, masses or tenderness. + condyloma to right labia majora- treated with TCA  VAGINA: Moist and well rugated without lesions or discharge.  CERVIX: Moist and pink without lesions, discharge or tenderness.      UTERUS SIZE: 8 week size, nontender and without masses.  ADNEXA: No masses or tenderness.  ESTIMATE OF PELVIC CAPACITY: Adequate  EXTREMITIES: No cyanosis, clubbing or edema. No calf tenderness.  LYMPH NODES: No axillary or inguinal adenopathy.    PROCEDURES:  UPT Positive  Genprobe  Pap- deferred per pt last pap 2019 WNL      ASSESSMENT/PLAN:  Amenorrhea  Positive urine pregnancy test (FERNY: 19, EGA: 8w3d based on LMP)    -  Routine prenatal care    Nausea and vomiting in pregnancy    -  Education regarding lifestyle and dietary modifications    -  Advised use of B6/Unisom. Pt will notify us if no relief/worsening symptoms, will consider Zofran if needed.      1st TRIMESTER COUNSELING: Discussed all, booklet provided:  Common complaints of pregnancy  HIV and other routine prenatal tests including  genetic screening  Risk factors identified by prenatal history  Oriented to practice - discussed anticipated course of prenatal care & indications for Ultrasound  Childbirth classes/Hospital facilities   Nutrition and weight gain counseling  Toxoplasmosis precautions (Cats/Raw Meat)  Sexual activity and exercise  Environmental/Work hazards  Travel  Tobacco (Ask, Advise, Assess, Assist, and Arrange), as well as alcohol and drug use  Use of any medications (Including supplements, Vitamins, Herbs, or OTC Drugs)  Domestic violence  Seat belt use      TERATOLOGY  COUNSELING:   Discussed indications and options for aneuploidy screening - pamphlets given    -  Pt desires [id number] and info for [employer]  given to determine cost/ coverage  TCA applied to labia majora condyloma.   Dating US revealed an intrauterine gestational sac with a mean sac diameter of 16.8 mm is identified. A yolk sac is seen within the gestational sac. An embryo is not visible within the  gestational sac; however, at a mean sac diameter of less than 25 mm, the absence of a visible embryo is not pathologic.  Today's findings are consistent with a pregnancy of uncertain viability.  Repeat dating scan scheduled for 10 days  hcg today  ED precautions discussed - for pain, bleeding, fever and potent foul vaginal DC    FOLLOW-UP in 4 weeks with Dr. Mario Metz, [facility]    OB/GYN

## 2019-10-17 ENCOUNTER — PATIENT MESSAGE (OUTPATIENT)
Dept: OBSTETRICS AND GYNECOLOGY | Facility: CLINIC | Age: 34
End: 2019-10-17

## 2019-10-17 ENCOUNTER — TELEPHONE (OUTPATIENT)
Dept: OBSTETRICS AND GYNECOLOGY | Facility: CLINIC | Age: 34
End: 2019-10-17

## 2019-10-17 LAB — BACTERIA UR CULT: NO GROWTH

## 2019-10-17 NOTE — TELEPHONE ENCOUNTER
Notified pt her hcg level increased to 332[address] from 116 4 weeks ago.  Discussed will do a f/u hcg level again on 10/18/19- 48 hrs after last blood draw.  ED precautions reviewed. Pt verbalized understanding.

## 2019-10-18 ENCOUNTER — LAB VISIT (OUTPATIENT)
Dept: LAB | Facility: OTHER | Age: 34
End: 2019-10-18
Payer: COMMERCIAL

## 2019-10-18 DIAGNOSIS — O20.0 THREATENED ABORTION: ICD-10-CM

## 2019-10-18 LAB — HCG INTACT+B SERPL-ACNC: NORMAL MIU/ML

## 2019-10-18 PROCEDURE — 36415 COLL VENOUS BLD VENIPUNCTURE: CPT

## 2019-10-18 PROCEDURE — 84702 CHORIONIC GONADOTROPIN TEST: CPT

## 2019-10-19 ENCOUNTER — TELEPHONE (OUTPATIENT)
Dept: OBSTETRICS AND GYNECOLOGY | Facility: CLINIC | Age: 34
End: 2019-10-19

## 2019-10-19 ENCOUNTER — NURSE TRIAGE (OUTPATIENT)
Dept: ADMINISTRATIVE | Facility: CLINIC | Age: 34
End: 2019-10-19

## 2019-10-19 NOTE — TELEPHONE ENCOUNTER
Reason for Disposition   Lab result questions   [1] Follow-up call from patient regarding patient's clinical status AND [2] information urgent    Protocols used: INFORMATION ONLY CALL-A-AH, PCP CALL - NO TRIAGE-A-    Deneen requesting lab results for hcg test. She states it would be very hard for her to wait the weekend given the nature of the situation. Spoke to NP Sarah Moya who states it's okay to let the patient know the lab results and to tell her she is happy to see the level going up but she would like to have another lab drawn on Monday 10/21. Advised patient.

## 2019-10-19 NOTE — TELEPHONE ENCOUNTER
Called pt to discuss her hcg level increased 42027 form 27129 3 days ago.  Discussed level did increase - so will need to repeat again on Monday 10/21/19 for comparison.  ED precautions reviewed. Pt verbalized understanding.

## 2019-10-21 ENCOUNTER — PATIENT MESSAGE (OUTPATIENT)
Dept: OBSTETRICS AND GYNECOLOGY | Facility: CLINIC | Age: 34
End: 2019-10-21

## 2019-10-21 ENCOUNTER — LAB VISIT (OUTPATIENT)
Dept: LAB | Facility: OTHER | Age: 34
End: 2019-10-21
Attending: NURSE PRACTITIONER
Payer: COMMERCIAL

## 2019-10-21 DIAGNOSIS — O20.0 THREATENED ABORTION: ICD-10-CM

## 2019-10-21 LAB — HCG INTACT+B SERPL-ACNC: NORMAL MIU/ML

## 2019-10-21 PROCEDURE — 36415 COLL VENOUS BLD VENIPUNCTURE: CPT

## 2019-10-21 PROCEDURE — 84702 CHORIONIC GONADOTROPIN TEST: CPT

## 2019-10-21 NOTE — TELEPHONE ENCOUNTER
Called pt to discuss her hcg level is 43,420- which increased from 42,863 3 days ago.  Discussed concern for missed AB since level is not rising appropriately.  Discussed recommend to keep repeat dating scan as scheduled on 10/29/19 for viability. ED precaution reviewed.  Pt verbalized understanding.

## 2019-10-29 ENCOUNTER — PROCEDURE VISIT (OUTPATIENT)
Dept: OBSTETRICS AND GYNECOLOGY | Facility: CLINIC | Age: 34
End: 2019-10-29
Payer: COMMERCIAL

## 2019-10-29 ENCOUNTER — OFFICE VISIT (OUTPATIENT)
Dept: OBSTETRICS AND GYNECOLOGY | Facility: CLINIC | Age: 34
End: 2019-10-29
Payer: COMMERCIAL

## 2019-10-29 VITALS
SYSTOLIC BLOOD PRESSURE: 112 MMHG | DIASTOLIC BLOOD PRESSURE: 62 MMHG | WEIGHT: 137.81 LBS | BODY MASS INDEX: 24.42 KG/M2 | HEIGHT: 63 IN

## 2019-10-29 DIAGNOSIS — O02.1 MISSED ABORTION: Primary | ICD-10-CM

## 2019-10-29 DIAGNOSIS — O20.0 THREATENED ABORTION: ICD-10-CM

## 2019-10-29 DIAGNOSIS — O02.89 NONVIABLE PREGNANCY: ICD-10-CM

## 2019-10-29 PROCEDURE — 3008F PR BODY MASS INDEX (BMI) DOCUMENTED: ICD-10-PCS | Mod: CPTII,S$GLB,, | Performed by: OBSTETRICS & GYNECOLOGY

## 2019-10-29 PROCEDURE — 99999 PR PBB SHADOW E&M-EST. PATIENT-LVL III: CPT | Mod: PBBFAC,,, | Performed by: OBSTETRICS & GYNECOLOGY

## 2019-10-29 PROCEDURE — 99214 PR OFFICE/OUTPT VISIT, EST, LEVL IV, 30-39 MIN: ICD-10-PCS | Mod: S$GLB,,, | Performed by: OBSTETRICS & GYNECOLOGY

## 2019-10-29 PROCEDURE — 99214 OFFICE O/P EST MOD 30 MIN: CPT | Mod: S$GLB,,, | Performed by: OBSTETRICS & GYNECOLOGY

## 2019-10-29 PROCEDURE — 76817 PR US, OB, TRANSVAG APPROACH: ICD-10-PCS | Mod: S$GLB,,, | Performed by: OBSTETRICS & GYNECOLOGY

## 2019-10-29 PROCEDURE — 99999 PR PBB SHADOW E&M-EST. PATIENT-LVL III: ICD-10-PCS | Mod: PBBFAC,,, | Performed by: OBSTETRICS & GYNECOLOGY

## 2019-10-29 PROCEDURE — 76817 TRANSVAGINAL US OBSTETRIC: CPT | Mod: S$GLB,,, | Performed by: OBSTETRICS & GYNECOLOGY

## 2019-10-29 PROCEDURE — 3008F BODY MASS INDEX DOCD: CPT | Mod: CPTII,S$GLB,, | Performed by: OBSTETRICS & GYNECOLOGY

## 2019-10-29 RX ORDER — HYDROCODONE BITARTRATE AND ACETAMINOPHEN 5; 325 MG/1; MG/1
1 TABLET ORAL EVERY 6 HOURS PRN
Qty: 12 TABLET | Refills: 0 | Status: SHIPPED | OUTPATIENT
Start: 2019-10-29 | End: 2020-04-07

## 2019-10-29 RX ORDER — MISOPROSTOL 200 UG/1
TABLET ORAL
Qty: 8 TABLET | Refills: 0 | Status: SHIPPED | OUTPATIENT
Start: 2019-10-29 | End: 2020-04-07

## 2019-10-29 NOTE — PROGRESS NOTES
HISTORY OF PRESENT ILLNESS:    Deneen Jay is a 34 y.o. female, No obstetric history on file., No LMP recorded.,  presents for a problem visit, complaining of MISSED AB ON USG TODAY:  10/29/2019  There is no evidence of a yolk sac or an embryo within the gestational sac. A small hyperechoic structure is seen within the gestational sac, but its appearance is  not typical for an embryo, and no cardiac activity is seen within this structure.  Per time-based criteria, findings are diagnostic of a nonviable IUP.  DISCUSSED MGMT OPTIONS AND PREFERS CYTOTEC; WILL ALSO RX A FEW VICODIN.  A LITTLE CRAMPING LAST WEEKEND, NO BLEEDING.  PLANS TO TRY AGAIN AT THE BEGINNING OF THE NEW YEAR.  CONTINUING PNV  HERE TODAY WITH , WORKS FILM AND TV  REFERRED BY CURT AVALOS, COWORKER AND ANOTHER ANESTH    LAST VISIT NP 10/2019:  Deneen Jay is a 34 y.o. female, No obstetric history on file.,  Presents today for a routine exam complaining of amenorrhea and positive home urine pregnancy test.  Patient's last menstrual period was 08/17/2019 (exact date).   She is not currently on any contraception.  Reports nausea. Reports breast tenderness. Denies vaginal bleeding and pelvic pain.  Prior chemical pregnancy X 1.  Medical h/o cold sores and genital warts.  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 No pertinent surgical history.    MEDICATIONS AND ALLERGIES:      Current Outpatient Medications:     HYDROcodone-acetaminophen (NORCO) 5-325 mg per tablet, Take 1 tablet by mouth every 6 (six) hours as needed for Pain., Disp: 12 tablet, Rfl: 0    miSOPROStol (CYTOTEC) 200 MCG Tab, INSERT 4 TABLETS VAGINALLY AND REPEAT DOSE IN 12 HOURS, Disp: 8 tablet, Rfl: 0    valACYclovir (VALTREX) 500 MG tablet, Take 500 mg by mouth once daily., Disp: , Rfl: 8    Review of patient's allergies  indicates:  No Known Allergies    Family History   Problem Relation Age of Onset    Parkinsonism Father     Hypertension Maternal Grandmother     Cancer Maternal Grandfather         mouth    Diabetes Mellitus Paternal Grandmother     Hypertension Paternal Grandfather        Social History     Socioeconomic History    Marital status:      Spouse name: Not on file    Number of children: Not on file    Years of education: Not on file    Highest education level: Not on file   Occupational History    Not on file   Social Needs    Financial resource strain: Not hard at all    Food insecurity:     Worry: Never true     Inability: Never true    Transportation needs:     Medical: No     Non-medical: No   Tobacco Use    Smoking status: Never Smoker    Smokeless tobacco: Never Used   Substance and Sexual Activity    Alcohol use: Not Currently    Drug use: Never    Sexual activity: Yes     Partners: Male     Birth control/protection: Inserts   Lifestyle    Physical activity:     Days per week: 6 days     Minutes per session: 30 min    Stress: Only a little   Relationships    Social connections:     Talks on phone: Three times a week     Gets together: Three times a week     Attends Congregational service: Not on file     Active member of club or organization: No     Attends meetings of clubs or organizations: More than 4 times per year     Relationship status:    Other Topics Concern    Not on file   Social History Narrative    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  Infection History: Denies STDs. Denies PID.  Benign History: Denies uterine fibroids. Denies ovarian cysts. Denies endometriosis. Denies other conditions.  Cancer History: Denies cervical cancer. Denies uterine cancer or hyperplasia. Denies ovarian cancer. Denies vulvar  "cancer or pre-cancer. Denies vaginal cancer or pre-cancer. Denies breast cancer. Denies colon cancer.    ROS:  GENERAL: No weight changes. No swelling. No fatigue. No fever.  CARDIOVASCULAR: No chest pain. No shortness of breath. No leg cramps.   NEUROLOGICAL: No headaches. No vision changes.  BREASTS: No pain. No lumps. No discharge.  ABDOMEN: No pain. No nausea. No vomiting. No diarrhea. No constipation.  REPRODUCTIVE: No abnormal bleeding.   VULVA: No pain. No lesions. No itching.  VAGINA: No relaxation. No itching. No odor. No discharge. No lesions.  URINARY: No incontinence. No nocturia. No frequency. No dysuria.    /62   Ht 5' 3" (1.6 m)   Wt 62.5 kg (137 lb 12.6 oz)   BMI 24.41 kg/m²     PE:  DEFERRED    PROCEDURES:    DIAGNOSIS:  1. Missed          LABS AND TESTS ORDERED:    MEDICATIONS PRESCRIBED:    COUNSELING:   The patient was counseled on the options for treatment of MISSED AB AS ABOVE    FOLLOW-UP with me routine visit.       "

## 2019-10-30 ENCOUNTER — TELEPHONE (OUTPATIENT)
Dept: OBSTETRICS AND GYNECOLOGY | Facility: CLINIC | Age: 34
End: 2019-10-30

## 2019-10-30 NOTE — TELEPHONE ENCOUNTER
----- Message from Rona Sun sent at 10/30/2019  8:51 AM CDT -----  Contact: CHAR GUZMAN [[id number]]  Name of Who is Calling: CHAR GUZMAN [[id number]]      What is the request in detail: patient would like a call back to discuss miSOPROStol (CYTOTEC) 200 MCG Tab medication. Please call     Can the clinic reply by [facility]: no      What Number to Call Back if not in [facility]: [phone]

## 2019-10-30 NOTE — TELEPHONE ENCOUNTER
Called patient back, all questions and concerns were addressed.. Patient will follow up with next scheduled visit..

## 2019-11-03 ENCOUNTER — TELEPHONE (OUTPATIENT)
Dept: OBSTETRICS AND GYNECOLOGY | Facility: HOSPITAL | Age: 34
End: 2019-11-03

## 2019-11-04 ENCOUNTER — TELEPHONE (OUTPATIENT)
Dept: OBSTETRICS AND GYNECOLOGY | Facility: CLINIC | Age: 34
End: 2019-11-04

## 2019-11-04 ENCOUNTER — HOSPITAL ENCOUNTER (OUTPATIENT)
Dept: RADIOLOGY | Facility: OTHER | Age: 34
Discharge: HOME | End: 2019-11-04
Attending: OBSTETRICS & GYNECOLOGY
Payer: COMMERCIAL

## 2019-11-04 ENCOUNTER — ROUTINE PRENATAL (OUTPATIENT)
Dept: OBSTETRICS AND GYNECOLOGY | Facility: CLINIC | Age: 34
End: 2019-11-04
Payer: COMMERCIAL

## 2019-11-04 DIAGNOSIS — O03.5: Primary | ICD-10-CM

## 2019-11-04 DIAGNOSIS — O03.5: ICD-10-CM

## 2019-11-04 PROCEDURE — 76817 TRANSVAGINAL US OBSTETRIC: CPT | Mod: 26,,, | Performed by: RADIOLOGY

## 2019-11-04 PROCEDURE — 76817 US OB LESS THAN 14 WKS WITH TRANSVAGINAL (XPD): ICD-10-PCS | Mod: 26,,, | Performed by: RADIOLOGY

## 2019-11-04 PROCEDURE — 76801 OB US < 14 WKS SINGLE FETUS: CPT | Mod: 26,,, | Performed by: RADIOLOGY

## 2019-11-04 PROCEDURE — 99999 PR PBB SHADOW E&M-EST. PATIENT-LVL II: ICD-10-PCS | Mod: PBBFAC,,, | Performed by: OBSTETRICS & GYNECOLOGY

## 2019-11-04 PROCEDURE — 99215 PR OFFICE/OUTPT VISIT, EST, LEVL V, 40-54 MIN: ICD-10-PCS | Mod: S$GLB,,, | Performed by: OBSTETRICS & GYNECOLOGY

## 2019-11-04 PROCEDURE — 76801 US OB LESS THAN 14 WKS WITH TRANSVAGINAL (XPD): ICD-10-PCS | Mod: 26,,, | Performed by: RADIOLOGY

## 2019-11-04 PROCEDURE — 76801 OB US < 14 WKS SINGLE FETUS: CPT | Mod: TC

## 2019-11-04 PROCEDURE — 99215 OFFICE O/P EST HI 40 MIN: CPT | Mod: S$GLB,,, | Performed by: OBSTETRICS & GYNECOLOGY

## 2019-11-04 PROCEDURE — 99999 PR PBB SHADOW E&M-EST. PATIENT-LVL II: CPT | Mod: PBBFAC,,, | Performed by: OBSTETRICS & GYNECOLOGY

## 2019-11-04 RX ORDER — AZITHROMYCIN 250 MG/1
TABLET, FILM COATED ORAL
Qty: 6 TABLET | Refills: 0 | Status: SHIPPED | OUTPATIENT
Start: 2019-11-04 | End: 2020-04-07

## 2019-11-04 RX ORDER — DOXYCYCLINE 100 MG/1
100 CAPSULE ORAL EVERY 12 HOURS
Qty: 20 CAPSULE | Refills: 1 | Status: SHIPPED | OUTPATIENT
Start: 2019-11-04 | End: 2019-11-14

## 2019-11-04 NOTE — TELEPHONE ENCOUNTER
----- Message from Ananya Rendon MA sent at 11/4/2019  4:11 PM CST -----      ----- Message -----  From: Ronal Bose  Sent: 11/4/2019   2:58 PM CST  To: Mario PLATT Staff    Please call pt she has a question [phone]

## 2019-11-04 NOTE — TELEPHONE ENCOUNTER
Patient called weekend emergency line to discuss continued abdominal pain and bleeding after treatment for missed AB with cytotec. The patient reports that she took vaginal Cytotec on 10/29 and believes that she passed POC that evening. She continued to have moderate to heavy vaginal bleeding for the next 36 hours which resolved on 10/31. On 11/1 she began to have more bleeding and cramping abdominal pain; this improved somewhat on 11/2.     Today, she reports worsening abdominal pain/tenderness. She has been bleeding through 1 pad every 2-3 hours. Explained that continued cramping with vaginal bleeding can be common after medical management. Amount of bleeding is appropriate and pain is controlled with scheduled Ibuprofen and Vicodin for BTP. The patient denies any fever or chills; also denies N/V, CP, or SOB. Very low suspicion for septic AB; however, I did explain that there is always a possibility for retained POC after medical management for missed AB. Patient advised to monitor closely for S/S of septic AB; advised to present to ED for any symptoms. All questions answered.     Message sent to Dr. Martin and clinic staff to schedule possible follow up/further assessment.    Brooke Santillan M.D.  OB/GYN PGY-1

## 2019-11-04 NOTE — PROGRESS NOTES
HISTORY OF PRESENT ILLNESS:    Deneen Jay is a 34 y.o. female, No obstetric history on file., No LMP recorded.,  presents for a problem visit, complaining of INCREASED CRAMPING, PAIN AFTER TAKING CYTOTEC AND PASSING TISSUE 10/29.  HAD FELT MUCH BETTER 10/30 AND 10/31 BUT THEN EXPERIENCED EPISODES OF SIGNIFICANTLY WORSE CRAMPING OVER THE WEEKEND.  HAD NOTED PAIN WITH BOWEL MOVEMENT AND URINATION.  ON EXAM SIGNIFICANT CERVICAL MOTION TENDERNESS WITH CLOSED OS.  ABDOMINAL EXAM WITH SIGNIFICANT GUARDING.  STAT ULTRASOUND NOW AND EMPIRIC TREATMENT FOR POSSIBLE ENDOMETRITIS WITH AZITHROMYCIN AND DOXYCYCLINE    LAST VISIT 10/29:  MISSED AB ON USG TODAY:  10/29/2019  There is no evidence of a yolk sac or an embryo within the gestational sac. A small hyperechoic structure is seen within the gestational sac, but its appearance is  not typical for an embryo, and no cardiac activity is seen within this structure.  Per time-based criteria, findings are diagnostic of a nonviable IUP.  DISCUSSED MGMT OPTIONS AND PREFERS CYTOTEC; WILL ALSO RX A FEW VICODIN.  A LITTLE CRAMPING LAST WEEKEND, NO BLEEDING.  PLANS TO TRY AGAIN AT THE BEGINNING OF THE NEW YEAR.  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 No pertinent surgical history.    MEDICATIONS AND ALLERGIES:      Current Outpatient Medications:     azithromycin (Z-JUAN) 250 MG tablet, Take 2 tablets by mouth on day 1; Take 1 tablet by mouth on days 2-5, Disp: 6 tablet, Rfl: 0    doxycycline (MONODOX) 100 MG capsule, Take 1 capsule (100 mg total) by mouth every 12 (twelve) hours. for 10 days, Disp: 20 capsule, Rfl: 1    HYDROcodone-acetaminophen (NORCO) 5-325 mg per tablet, Take 1 tablet by mouth every 6 (six) hours as needed for Pain., Disp: 12 tablet, Rfl: 0     miSOPROStol (CYTOTEC) 200 MCG Tab, INSERT 4 TABLETS VAGINALLY AND REPEAT DOSE IN 12 HOURS, Disp: 8 tablet, Rfl: 0    valACYclovir (VALTREX) 500 MG tablet, Take 500 mg by mouth once daily., Disp: , Rfl: 8    Review of patient's allergies indicates:  No Known Allergies    Family History   Problem Relation Age of Onset    Parkinsonism Father     Hypertension Maternal Grandmother     Cancer Maternal Grandfather         mouth    Diabetes Mellitus Paternal Grandmother     Hypertension Paternal Grandfather        Social History     Socioeconomic History    Marital status:      Spouse name: Not on file    Number of children: Not on file    Years of education: Not on file    Highest education level: Not on file   Occupational History    Not on file   Social Needs    Financial resource strain: Not hard at all    Food insecurity:     Worry: Never true     Inability: Never true    Transportation needs:     Medical: No     Non-medical: No   Tobacco Use    Smoking status: Never Smoker    Smokeless tobacco: Never Used   Substance and Sexual Activity    Alcohol use: Not Currently    Drug use: Never    Sexual activity: Yes     Partners: Male     Birth control/protection: Inserts   Lifestyle    Physical activity:     Days per week: 6 days     Minutes per session: 30 min    Stress: Only a little   Relationships    Social connections:     Talks on phone: Three times a week     Gets together: Three times a week     Attends Jainism service: Not on file     Active member of club or organization: No     Attends meetings of clubs or organizations: More than 4 times per year     Relationship status:    Other Topics Concern    Not on file   Social History Narrative    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  Infection History: Denies STDs.  Denies PID.  Benign History: Denies uterine fibroids. Denies ovarian cysts. Denies endometriosis. Denies other conditions.  Cancer History: Denies cervical cancer. Denies uterine cancer or hyperplasia. Denies ovarian cancer. Denies vulvar cancer or pre-cancer. Denies vaginal cancer or pre-cancer. Denies breast cancer. Denies colon cancer.    ROS:  GENERAL: No weight changes. No swelling. No fatigue. No fever.  CARDIOVASCULAR: No chest pain. No shortness of breath. No leg cramps.   NEUROLOGICAL: No headaches. No vision changes.  BREASTS: No pain. No lumps. No discharge.  ABDOMEN:  No nausea. No vomiting. No diarrhea. No constipation.  VULVA: No pain. No lesions. No itching.  VAGINA: No relaxation. No itching. No odor. No discharge. No lesions.  URINARY: No incontinence. No nocturia. No frequency. No dysuria.    There were no vitals taken for this visit.    PE:  APPEARANCE: Well nourished, well developed, in no acute distress.  ABDOMEN: Soft. No tenderness or masses. No hepatosplenomegaly. No hernias. +VOL GUARDING  BREASTS, FUNDOSCOPIC, RECTAL DEFERRED  PELVIC: External female genitalia without lesions.  Female hair distribution. Adequate perineal body, Normal urethral meatus. Vagina moist and well rugated without lesions or discharge.  No significant cystocele or rectocele present. Cervix pink without lesions, discharge or tenderness, CLOSED +CMT. Uterus is normal size, regular, mobile and nontender. Adnexa without masses or tenderness.  EXTREMITIES: No edema    PROCEDURES:    DIAGNOSIS:  1. Spontaneous  with endometritis  US Pelvis Comp with Transvag NON-OB (xpd       LABS AND TESTS ORDERED:    MEDICATIONS PRESCRIBED:    COUNSELING:   The patient was counseled on POSS RUPTURED OV CYST, POSS POST-AB ENDOMETRITIS AND BENEFIT OF EMPIRIC ABX.    FOLLOW-UP with me routine visit.

## 2019-11-04 NOTE — TELEPHONE ENCOUNTER
DISCUSSED 2 CM CLOT VERSUS EM/POC IN UTERUS - WOULD CONT EMPIRIC TX EMITIS AND IF MORE SIGNIF CRAMPING/BLEEDING CONSIDER D&C AT THAT TIME.  IF ENDOMETRITIS, TREATING EARLY AND AGGRESSIVELY, SO WOULD NOT ANTICIPATE SIGNIF PROBLEMS FROM IT IN THE FUTURE, AND WOULD JUST SEE HER EARLY IN PREGNANCY FOR USG TO CONFIRM VIABILITY

## 2019-11-04 NOTE — TELEPHONE ENCOUNTER
Called patient back, patient C/O heavy bleeding and a pelvic pain and would like to be seen today.. appt given to patient for today..

## 2019-11-08 ENCOUNTER — PATIENT MESSAGE (OUTPATIENT)
Dept: OBSTETRICS AND GYNECOLOGY | Facility: CLINIC | Age: 34
End: 2019-11-08

## 2019-11-13 ENCOUNTER — OFFICE VISIT (OUTPATIENT)
Dept: OBSTETRICS AND GYNECOLOGY | Facility: CLINIC | Age: 34
End: 2019-11-13
Payer: COMMERCIAL

## 2019-11-13 VITALS
WEIGHT: 134.5 LBS | DIASTOLIC BLOOD PRESSURE: 66 MMHG | BODY MASS INDEX: 23.83 KG/M2 | SYSTOLIC BLOOD PRESSURE: 106 MMHG | HEIGHT: 63 IN

## 2019-11-13 DIAGNOSIS — O03.4 INEVITABLE ABORTION: Primary | ICD-10-CM

## 2019-11-13 PROCEDURE — 99999 PR PBB SHADOW E&M-EST. PATIENT-LVL III: CPT | Mod: PBBFAC,,, | Performed by: OBSTETRICS & GYNECOLOGY

## 2019-11-13 PROCEDURE — 3008F BODY MASS INDEX DOCD: CPT | Mod: CPTII,S$GLB,, | Performed by: OBSTETRICS & GYNECOLOGY

## 2019-11-13 PROCEDURE — 99213 OFFICE O/P EST LOW 20 MIN: CPT | Mod: S$GLB,,, | Performed by: OBSTETRICS & GYNECOLOGY

## 2019-11-13 PROCEDURE — 3008F PR BODY MASS INDEX (BMI) DOCUMENTED: ICD-10-PCS | Mod: CPTII,S$GLB,, | Performed by: OBSTETRICS & GYNECOLOGY

## 2019-11-13 PROCEDURE — 99999 PR PBB SHADOW E&M-EST. PATIENT-LVL III: ICD-10-PCS | Mod: PBBFAC,,, | Performed by: OBSTETRICS & GYNECOLOGY

## 2019-11-13 PROCEDURE — 99213 PR OFFICE/OUTPT VISIT, EST, LEVL III, 20-29 MIN: ICD-10-PCS | Mod: S$GLB,,, | Performed by: OBSTETRICS & GYNECOLOGY

## 2019-11-13 NOTE — PROGRESS NOTES
"POSTOPERATIVE FOLLOW-UP:    The patient presents today following MISSED AB MANAGED WITH CYTOTEC.  There are no complaints.    LAST VISIT 11/4/2019:  complaining of INCREASED CRAMPING, PAIN AFTER TAKING CYTOTEC AND PASSING TISSUE 10/29.  HAD FELT MUCH BETTER 10/30 AND 10/31 BUT THEN EXPERIENCED EPISODES OF SIGNIFICANTLY WORSE CRAMPING OVER THE WEEKEND.  HAD NOTED PAIN WITH BOWEL MOVEMENT AND URINATION.  ON EXAM SIGNIFICANT CERVICAL MOTION TENDERNESS WITH CLOSED OS.  ABDOMINAL EXAM WITH SIGNIFICANT GUARDING.  STAT ULTRASOUND NOW AND EMPIRIC TREATMENT FOR POSSIBLE ENDOMETRITIS WITH AZITHROMYCIN AND DOXYCYCLINE  "ANESTH @ [facility], F/U USG 10/29 FOR ?VIABILITY WITH SLOW RISING BHCG  HX ORAL HSV.   O POS  10/29 CYTOTEC FOR INEV AB, AND SEEN AFTER WITH ?EMITIS - RX'D AZITH/DOXY"    PHYSICAL EXAM:  Appearance: Well developed, well nourished, in no acute distress.  Skin: Normal turgor and no visible lesions.  Abdomen:  Soft, non tender, non distended, without hepatosplenomegaly, No masses appreciated.    Pelvic: Normal female external genitalia without lesions.  Normal hair distribution.  Adequate perineal body and normal urethral meatus.  Vagina moist and well-ruga domingo.  Cervix pink and without lesions, EXTERNAL OS CLOSED AND NO SIGNIF CMT.  No significant cystocele or rectocele.  Bimanual examination shows uterus normal size, normal position, regular shape, mobile, and non tender.    ASSESSMENT:  Doing well following her procedure.  She is following the anticipated course of recovery, and was advised regarding future wound care, activity, and need for follow up.      PLAN:  Follow up next routine visit.  The patient is advised to call if she has fever greater than 101.0F, increased bleeding/discharge, new-onset or increased pain, deviation from the anticipated subsequent course of recovery, questions, or concerns.      "

## 2020-01-24 ENCOUNTER — TELEPHONE (OUTPATIENT)
Dept: OBSTETRICS AND GYNECOLOGY | Facility: CLINIC | Age: 35
End: 2020-01-24

## 2020-01-24 ENCOUNTER — LAB VISIT (OUTPATIENT)
Dept: LAB | Facility: OTHER | Age: 35
End: 2020-01-24
Attending: NURSE PRACTITIONER
Payer: COMMERCIAL

## 2020-01-24 DIAGNOSIS — R39.9 UTI SYMPTOMS: ICD-10-CM

## 2020-01-24 DIAGNOSIS — R39.9 UTI SYMPTOMS: Primary | ICD-10-CM

## 2020-01-24 LAB
BILIRUB UR QL STRIP: NEGATIVE
CLARITY UR: CLEAR
COLOR UR: YELLOW
GLUCOSE UR QL STRIP: NEGATIVE
HGB UR QL STRIP: NEGATIVE
KETONES UR QL STRIP: ABNORMAL
LEUKOCYTE ESTERASE UR QL STRIP: NEGATIVE
NITRITE UR QL STRIP: NEGATIVE
PH UR STRIP: 7 [PH] (ref 5–8)
PROT UR QL STRIP: NEGATIVE
SP GR UR STRIP: <=1.005 (ref 1–1.03)
URN SPEC COLLECT METH UR: ABNORMAL
UROBILINOGEN UR STRIP-ACNC: NEGATIVE EU/DL

## 2020-01-24 PROCEDURE — 81003 URINALYSIS AUTO W/O SCOPE: CPT

## 2020-01-24 PROCEDURE — 87086 URINE CULTURE/COLONY COUNT: CPT

## 2020-01-24 NOTE — TELEPHONE ENCOUNTER
----- Message from Pat Sorensen sent at 1/24/2020  8:20 AM CST -----  Contact: Pt     Name of Who is Calling:CHAR GUZMAN [[id number]]    What is the request in detail: Patient states she has a UTI and would like an antibiotics for uti called into pharmacy [id number] - [place], [place] - [address] Please contact to further discuss and advise      Can the clinic reply by [facility]: Yes    What Number to Call Back if not in [facility]: [phone]

## 2020-01-24 NOTE — TELEPHONE ENCOUNTER
Patient was contacted and advised to have a urine sample done at any of our [facility]. Orders were placed.

## 2020-01-26 ENCOUNTER — TELEPHONE (OUTPATIENT)
Dept: OBSTETRICS AND GYNECOLOGY | Facility: CLINIC | Age: 35
End: 2020-01-26

## 2020-01-26 LAB — BACTERIA UR CULT: NO GROWTH

## 2020-01-26 RX ORDER — NITROFURANTOIN 25; 75 MG/1; MG/1
100 CAPSULE ORAL 2 TIMES DAILY
Qty: 14 CAPSULE | Refills: 0 | Status: SHIPPED | OUTPATIENT
Start: 2020-01-26 | End: 2020-02-02

## 2020-01-26 NOTE — TELEPHONE ENCOUNTER
MACROBID TO PHARMACY    ----- Message from Ananya Rendon MA sent at 1/24/2020  8:59 AM CST -----  Contact: pt       ----- Message -----  From: Pat Sorensen  Sent: 1/24/2020   8:24 AM CST  To: Mario [facility] Staff      Name of Who is Calling:CHAR GUZMAN [[id number]]    What is the request in detail: Patient states she has a UTI and would like an antibiotics for uti called into pharmacy [id number] - [place], [place] - [address] Please contact to further discuss and advise      Can the clinic reply by [facility]: Yes    What Number to Call Back if not in [facility]: [phone]

## 2020-03-08 ENCOUNTER — PATIENT MESSAGE (OUTPATIENT)
Dept: OBSTETRICS AND GYNECOLOGY | Facility: CLINIC | Age: 35
End: 2020-03-08

## 2020-03-09 ENCOUNTER — TELEPHONE (OUTPATIENT)
Dept: OBSTETRICS AND GYNECOLOGY | Facility: CLINIC | Age: 35
End: 2020-03-09

## 2020-03-09 NOTE — TELEPHONE ENCOUNTER
----- Message from Kristal Farias sent at 3/9/2020  1:51 PM CDT -----  Contact: CRISTIAN FUENTES   Name of Who is Calling: CRISTIAN FUENTES       What is the request in detail: pt is requesting a call back requesting orders for a blood hcg test she states her last 02/09/20      Can the clinic reply by [facility]: no      What Number to Call Back if not in [facility]: [phone]

## 2020-03-09 NOTE — TELEPHONE ENCOUNTER
Patient consulted with another provider, patient was advised through Dr. Martin's medical assistant.

## 2020-03-10 ENCOUNTER — TELEPHONE (OUTPATIENT)
Dept: OBSTETRICS AND GYNECOLOGY | Facility: CLINIC | Age: 35
End: 2020-03-10

## 2020-03-10 ENCOUNTER — LAB VISIT (OUTPATIENT)
Dept: LAB | Facility: OTHER | Age: 35
End: 2020-03-10
Attending: NURSE PRACTITIONER
Payer: COMMERCIAL

## 2020-03-10 DIAGNOSIS — O20.0 THREATENED ABORTION: ICD-10-CM

## 2020-03-10 DIAGNOSIS — Z36.3 ENCOUNTER FOR ANTENATAL SCREENING FOR MALFORMATION USING ULTRASOUND: Primary | ICD-10-CM

## 2020-03-10 LAB — HCG INTACT+B SERPL-ACNC: 199 MIU/ML

## 2020-03-10 PROCEDURE — 36415 COLL VENOUS BLD VENIPUNCTURE: CPT

## 2020-03-10 PROCEDURE — 84702 CHORIONIC GONADOTROPIN TEST: CPT

## 2020-03-12 ENCOUNTER — LAB VISIT (OUTPATIENT)
Dept: LAB | Facility: OTHER | Age: 35
End: 2020-03-12
Attending: NURSE PRACTITIONER
Payer: COMMERCIAL

## 2020-03-12 DIAGNOSIS — Z32.01 POSITIVE PREGNANCY TEST: ICD-10-CM

## 2020-03-12 DIAGNOSIS — N91.2 AMENORRHEA: ICD-10-CM

## 2020-03-12 LAB — HCG INTACT+B SERPL-ACNC: 489 MIU/ML

## 2020-03-12 PROCEDURE — 84702 CHORIONIC GONADOTROPIN TEST: CPT

## 2020-03-12 PROCEDURE — 36415 COLL VENOUS BLD VENIPUNCTURE: CPT

## 2020-03-16 ENCOUNTER — PATIENT MESSAGE (OUTPATIENT)
Dept: OBSTETRICS AND GYNECOLOGY | Facility: CLINIC | Age: 35
End: 2020-03-16

## 2020-03-17 ENCOUNTER — TELEPHONE (OUTPATIENT)
Dept: OBSTETRICS AND GYNECOLOGY | Facility: CLINIC | Age: 35
End: 2020-03-17

## 2020-03-17 NOTE — TELEPHONE ENCOUNTER
----- Message from Kristal Farias sent at 3/17/2020  2:16 PM CDT -----  Contact: CRISTIAN FUENTES   Name of Who is Calling: CRISTIAN FUENTES       What is the request in detail: Patient is requesting orders for her dating ultrasound she states no one paula called her back yet its been over a week        Can the clinic reply by [facility]: no      What Number to Call Back if not in [facility]: [phone]

## 2020-03-19 ENCOUNTER — TELEPHONE (OUTPATIENT)
Dept: OBSTETRICS AND GYNECOLOGY | Facility: CLINIC | Age: 35
End: 2020-03-19

## 2020-03-19 RX ORDER — NITROFURANTOIN 25; 75 MG/1; MG/1
100 CAPSULE ORAL 2 TIMES DAILY
Qty: 14 CAPSULE | Refills: 0 | Status: SHIPPED | OUTPATIENT
Start: 2020-03-19 | End: 2020-03-26

## 2020-03-20 NOTE — TELEPHONE ENCOUNTER
CALLED TO TOUCH BASE - DISCUSSED EXPOSURES OF HER INTUBATING COVID POSITIVE PATIENTS, DATA FOR PREGNANT PTS AND PEDIATRIC PATIENTS.  WILL LIMIT EXPOSURE AS MUCH AS IS POSSIBLE (ON-CALL TONIGHT).  CONTINUED CRAMPING WITH FULL BLADDER, AND NO OTHER SPECIFIC UTI SX.  DISCUSSED POCT URINE DIP, IF AVAILABLE, BUT EMPIRIC TX WITH MACROBID ALSO REASONABLE.  RXN TO PHARMACY. HAS APPT FOR INITIAL EVAL AND DATING [facility].

## 2020-03-23 ENCOUNTER — PATIENT MESSAGE (OUTPATIENT)
Dept: OBSTETRICS AND GYNECOLOGY | Facility: CLINIC | Age: 35
End: 2020-03-23

## 2020-03-23 RX ORDER — DOXYLAMINE SUCCINATE AND PYRIDOXINE HYDROCHLORIDE, DELAYED RELEASE TABLETS 10 MG/10 MG 10; 10 MG/1; MG/1
2 TABLET, DELAYED RELEASE ORAL NIGHTLY
Qty: 120 TABLET | Refills: 1 | Status: SHIPPED | OUTPATIENT
Start: 2020-03-23 | End: 2020-10-12

## 2020-03-25 ENCOUNTER — PATIENT MESSAGE (OUTPATIENT)
Dept: OBSTETRICS AND GYNECOLOGY | Facility: CLINIC | Age: 35
End: 2020-03-25

## 2020-04-02 ENCOUNTER — TELEPHONE (OUTPATIENT)
Dept: OBSTETRICS AND GYNECOLOGY | Facility: CLINIC | Age: 35
End: 2020-04-02

## 2020-04-02 NOTE — TELEPHONE ENCOUNTER
Tried contacting patient to inform provider will not be in. Patient will need to change to virtual visit, or reschedule. Please have patient speak with Eugenie GALAVIZ.

## 2020-04-07 ENCOUNTER — TELEPHONE (OUTPATIENT)
Dept: OBSTETRICS AND GYNECOLOGY | Facility: CLINIC | Age: 35
End: 2020-04-07

## 2020-04-07 ENCOUNTER — PROCEDURE VISIT (OUTPATIENT)
Dept: OBSTETRICS AND GYNECOLOGY | Facility: CLINIC | Age: 35
End: 2020-04-07
Payer: COMMERCIAL

## 2020-04-07 ENCOUNTER — OFFICE VISIT (OUTPATIENT)
Dept: OBSTETRICS AND GYNECOLOGY | Facility: CLINIC | Age: 35
End: 2020-04-07
Attending: OBSTETRICS & GYNECOLOGY
Payer: COMMERCIAL

## 2020-04-07 ENCOUNTER — LAB VISIT (OUTPATIENT)
Dept: LAB | Facility: OTHER | Age: 35
End: 2020-04-07
Attending: OBSTETRICS & GYNECOLOGY
Payer: COMMERCIAL

## 2020-04-07 VITALS
BODY MASS INDEX: 24.45 KG/M2 | HEIGHT: 63 IN | DIASTOLIC BLOOD PRESSURE: 68 MMHG | SYSTOLIC BLOOD PRESSURE: 112 MMHG | WEIGHT: 138 LBS

## 2020-04-07 DIAGNOSIS — N91.4 SECONDARY OLIGOMENORRHEA: Primary | ICD-10-CM

## 2020-04-07 DIAGNOSIS — Z36.3 ENCOUNTER FOR ANTENATAL SCREENING FOR MALFORMATION USING ULTRASOUND: ICD-10-CM

## 2020-04-07 DIAGNOSIS — Z12.4 PAP SMEAR FOR CERVICAL CANCER SCREENING: ICD-10-CM

## 2020-04-07 DIAGNOSIS — Z32.01 PREGNANCY TEST POSITIVE: ICD-10-CM

## 2020-04-07 DIAGNOSIS — N91.4 SECONDARY OLIGOMENORRHEA: ICD-10-CM

## 2020-04-07 LAB
ABO + RH BLD: NORMAL
ANION GAP SERPL CALC-SCNC: 10 MMOL/L (ref 8–16)
BLD GP AB SCN CELLS X3 SERPL QL: NORMAL
BUN SERPL-MCNC: 9 MG/DL (ref 6–20)
CALCIUM SERPL-MCNC: 9.4 MG/DL (ref 8.7–10.5)
CHLORIDE SERPL-SCNC: 104 MMOL/L (ref 95–110)
CO2 SERPL-SCNC: 22 MMOL/L (ref 23–29)
CREAT SERPL-MCNC: 0.6 MG/DL (ref 0.5–1.4)
EST. GFR  (AFRICAN AMERICAN): >60 ML/MIN/1.73 M^2
EST. GFR  (NON AFRICAN AMERICAN): >60 ML/MIN/1.73 M^2
GLUCOSE SERPL-MCNC: 92 MG/DL (ref 70–110)
POTASSIUM SERPL-SCNC: 3.5 MMOL/L (ref 3.5–5.1)
SODIUM SERPL-SCNC: 136 MMOL/L (ref 136–145)

## 2020-04-07 PROCEDURE — 99499 UNLISTED E&M SERVICE: CPT | Mod: S$GLB,,, | Performed by: PEDIATRICS

## 2020-04-07 PROCEDURE — 76817 TRANSVAGINAL US OBSTETRIC: CPT | Mod: S$GLB,,, | Performed by: PEDIATRICS

## 2020-04-07 PROCEDURE — 76817 PR US, OB, TRANSVAG APPROACH: ICD-10-PCS | Mod: S$GLB,,, | Performed by: PEDIATRICS

## 2020-04-07 PROCEDURE — 99214 OFFICE O/P EST MOD 30 MIN: CPT | Mod: S$GLB,,, | Performed by: OBSTETRICS & GYNECOLOGY

## 2020-04-07 PROCEDURE — 86703 HIV-1/HIV-2 1 RESULT ANTBDY: CPT

## 2020-04-07 PROCEDURE — 87340 HEPATITIS B SURFACE AG IA: CPT

## 2020-04-07 PROCEDURE — 86592 SYPHILIS TEST NON-TREP QUAL: CPT

## 2020-04-07 PROCEDURE — 99999 PR PBB SHADOW E&M-EST. PATIENT-LVL III: CPT | Mod: PBBFAC,,, | Performed by: OBSTETRICS & GYNECOLOGY

## 2020-04-07 PROCEDURE — 99499 NO LOS: ICD-10-PCS | Mod: S$GLB,,, | Performed by: PEDIATRICS

## 2020-04-07 PROCEDURE — 86762 RUBELLA ANTIBODY: CPT

## 2020-04-07 PROCEDURE — 3008F PR BODY MASS INDEX (BMI) DOCUMENTED: ICD-10-PCS | Mod: CPTII,S$GLB,, | Performed by: OBSTETRICS & GYNECOLOGY

## 2020-04-07 PROCEDURE — 36415 COLL VENOUS BLD VENIPUNCTURE: CPT

## 2020-04-07 PROCEDURE — 80048 BASIC METABOLIC PNL TOTAL CA: CPT

## 2020-04-07 PROCEDURE — 87086 URINE CULTURE/COLONY COUNT: CPT

## 2020-04-07 PROCEDURE — 3008F BODY MASS INDEX DOCD: CPT | Mod: CPTII,S$GLB,, | Performed by: OBSTETRICS & GYNECOLOGY

## 2020-04-07 PROCEDURE — 99999 PR PBB SHADOW E&M-EST. PATIENT-LVL III: ICD-10-PCS | Mod: PBBFAC,,, | Performed by: OBSTETRICS & GYNECOLOGY

## 2020-04-07 PROCEDURE — 99214 PR OFFICE/OUTPT VISIT, EST, LEVL IV, 30-39 MIN: ICD-10-PCS | Mod: S$GLB,,, | Performed by: OBSTETRICS & GYNECOLOGY

## 2020-04-07 PROCEDURE — 86850 RBC ANTIBODY SCREEN: CPT

## 2020-04-07 PROCEDURE — 88175 CYTOPATH C/V AUTO FLUID REDO: CPT

## 2020-04-07 PROCEDURE — 87624 HPV HI-RISK TYP POOLED RSLT: CPT

## 2020-04-07 PROCEDURE — 81220 CFTR GENE COM VARIANTS: CPT

## 2020-04-07 NOTE — PROGRESS NOTES
Chief Complaint   Patient presents with    Possible Pregnancy       HPI:  Gisella Smith is a 35 y.o. year old  female who presents today reporting no menses for the past 8 weeks with a positive home UPT.  She reports fatigue and nausea for which she is using Diclegis with improvement.  No bleeding.  No pain.  She has a history of miscarriage 2019.   Prior history of cryocautery to cervix.  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  Plans to follow-up with Dr. Martin for OB care.  Patient's last menstrual period was 2020.    Past Medical History:   Diagnosis Date    Overweight (BMI 25.0-29.9) 2017    Recurrent cold sores 2017       History reviewed. No pertinent surgical history.    OB History        1    Para        Term                AB        Living           SAB        TAB        Ectopic        Multiple        Live Births                     ROS:  GENERAL: Reports fatigue.   SKIN: Denies rash or lesions.   HEAD: Denies head injury or headache.   NODES: Denies enlarged lymph nodes.   CHEST: Denies chest pain or shortness of breath.   CARDIOVASCULAR: Denies palpitations or left sided chest pain.   ABDOMEN: Reports nausea, but no vomiting.  No abdominal pain or rectal bleeding.   URINARY: No dysuria or hematuria.  REPRODUCTIVE: See HPI.   BREASTS: Denies pain, lumps, or nipple discharge.   HEMATOLOGIC: No easy bruisability or excessive bleeding.   MUSCULOSKELETAL: Denies joint pain or swelling.   NEUROLOGIC: Denies syncope or weakness.   PSYCHIATRIC: Denies depression.    PE:  (chaperone present during entire exam)  APPEARANCE: Well nourished, well developed, in no acute distress.  ABDOMEN: Soft. No tenderness or masses. No hernias. No CVA tenderness.  VULVA: No lesions. Normal female genitalia.  URETHRAL MEATUS: Normal size and location, no lesions, no prolapse.  URETHRA: No masses, tenderness, prolapse or scarring.  VAGINA: Moist and well rugated, no abnormal discharge, no  significant cystocele or rectocele.  CERVIX: No lesions and discharge. Closed. PAP done.  UTERUS: 8 week size, non-tender, bladder base nontender.  ADNEXA: No masses, tenderness or CDS nodularity.  ANUS PERINEUM: Normal.    UPT: positive    Diagnosis:  1. Secondary oligomenorrhea    2. Pregnancy test positive    3. Pap smear for cervical cancer screening    LMP 2/9/20 at 8.2 weeks, EDC 11/15/20    Sono today:  Verbal report- IUP at 8.1 weeks, EDC 11/16/20    PLAN:    Orders Placed This Encounter    Urine culture    C. trachomatis/N. gonorrhoeae by AMP DNA    HPV High Risk Genotypes, PCR    HIV-1 and HIV-2 antibodies    RPR    Hepatitis B surface antigen    Rubella antibody, IgG    CBC auto differential    Basic metabolic panel    Type & Screen    Liquid-Based Pap Smear, Screening       Patient was counseled today on pregnancy: T1 talk.  IOB labs.  She will be 35 at the time of delivery- we reviewed genetic screening options - we consider and let us know.      Follow-up in 4 weeks with Dr. Martin.

## 2020-04-07 NOTE — TELEPHONE ENCOUNTER
----- Message from Yasmani Brothers sent at 4/7/2020  8:38 AM CDT -----  Contact: CRISTIAN FUENTES [[id number]]   Name of Who is Calling:     What is the request in detail:  Request call back in reference to  Questions/concerns about  scheduled appointment Please contact to further discuss and advise      Can the clinic reply by [facility]: no    What Number to Call Back if not in [facility]:  [phone]

## 2020-04-08 ENCOUNTER — LAB VISIT (OUTPATIENT)
Dept: LAB | Facility: HOSPITAL | Age: 35
End: 2020-04-08
Attending: OBSTETRICS & GYNECOLOGY
Payer: COMMERCIAL

## 2020-04-08 ENCOUNTER — TELEPHONE (OUTPATIENT)
Dept: OBSTETRICS AND GYNECOLOGY | Facility: CLINIC | Age: 35
End: 2020-04-08

## 2020-04-08 DIAGNOSIS — N91.4 SECONDARY OLIGOMENORRHEA: ICD-10-CM

## 2020-04-08 DIAGNOSIS — Z32.01 PREGNANCY TEST POSITIVE: ICD-10-CM

## 2020-04-08 DIAGNOSIS — Z32.01 PREGNANCY TEST POSITIVE: Primary | ICD-10-CM

## 2020-04-08 LAB
HBV SURFACE AG SERPL QL IA: NEGATIVE
HIV 1+2 AB+HIV1 P24 AG SERPL QL IA: NEGATIVE
RUBV IGG SER-ACNC: 16.2 IU/ML
RUBV IGG SER-IMP: REACTIVE

## 2020-04-08 PROCEDURE — 87491 CHLMYD TRACH DNA AMP PROBE: CPT

## 2020-04-08 NOTE — TELEPHONE ENCOUNTER
----- Message from Mariluz Smith MA sent at 4/7/2020  5:04 PM CDT -----  Contact: sree SINCLAIR /[phone]/out patient lab       ----- Message -----  From: Yasmani Brothers  Sent: 4/7/2020   4:47 PM CDT  To: Ashok KIDD Staff     Name of Who is Calling:     What is the request in detail:  Request call back in reference to cbc order being put in again ,,,patient has  To retake test Please contact to further discuss and advise      Can the clinic reply by [facility]:     What Number [facility]:  sree SINCLAIR /[phone]/out patient lab

## 2020-04-08 NOTE — TELEPHONE ENCOUNTER
Spoke to pt to let her know we will need her to come back in to do CBC due to lab error. Pt verbalized understanding and booked appt for 4/23/20

## 2020-04-08 NOTE — TELEPHONE ENCOUNTER
I have re-entered order for CBC.  Why was this not drawn with her other initial OB labs?  If not drawn, she will need to return for this lab.  Thanks.

## 2020-04-09 ENCOUNTER — PATIENT MESSAGE (OUTPATIENT)
Dept: OBSTETRICS AND GYNECOLOGY | Facility: CLINIC | Age: 35
End: 2020-04-09

## 2020-04-09 LAB
C TRACH DNA SPEC QL NAA+PROBE: NOT DETECTED
N GONORRHOEA DNA SPEC QL NAA+PROBE: NOT DETECTED
RPR SER QL: NORMAL

## 2020-04-10 LAB — BACTERIA UR CULT: NORMAL

## 2020-04-13 ENCOUNTER — PATIENT MESSAGE (OUTPATIENT)
Dept: OBSTETRICS AND GYNECOLOGY | Facility: CLINIC | Age: 35
End: 2020-04-13

## 2020-04-13 LAB
CFTR MUT ANL BLD/T: NORMAL
FINAL PATHOLOGIC DIAGNOSIS: NORMAL
Lab: NORMAL

## 2020-04-16 ENCOUNTER — LAB VISIT (OUTPATIENT)
Dept: LAB | Facility: OTHER | Age: 35
End: 2020-04-16
Attending: OBSTETRICS & GYNECOLOGY
Payer: COMMERCIAL

## 2020-04-16 DIAGNOSIS — N91.4 SECONDARY OLIGOMENORRHEA: ICD-10-CM

## 2020-04-16 DIAGNOSIS — Z32.01 PREGNANCY TEST POSITIVE: ICD-10-CM

## 2020-04-16 LAB
BASOPHILS # BLD AUTO: 0.04 K/UL (ref 0–0.2)
BASOPHILS NFR BLD: 0.5 % (ref 0–1.9)
DIFFERENTIAL METHOD: ABNORMAL
EOSINOPHIL # BLD AUTO: 0.1 K/UL (ref 0–0.5)
EOSINOPHIL NFR BLD: 0.9 % (ref 0–8)
ERYTHROCYTE [DISTWIDTH] IN BLOOD BY AUTOMATED COUNT: 11.3 % (ref 11.5–14.5)
HCT VFR BLD AUTO: 34.8 % (ref 37–48.5)
HGB BLD-MCNC: 12.2 G/DL (ref 12–16)
HPV HR 12 DNA SPEC QL NAA+PROBE: NEGATIVE
HPV16 AG SPEC QL: NEGATIVE
HPV18 DNA SPEC QL NAA+PROBE: NEGATIVE
IMM GRANULOCYTES # BLD AUTO: 0.03 K/UL (ref 0–0.04)
IMM GRANULOCYTES NFR BLD AUTO: 0.4 % (ref 0–0.5)
LYMPHOCYTES # BLD AUTO: 2.2 K/UL (ref 1–4.8)
LYMPHOCYTES NFR BLD: 25.9 % (ref 18–48)
MCH RBC QN AUTO: 34.2 PG (ref 27–31)
MCHC RBC AUTO-ENTMCNC: 35.1 G/DL (ref 32–36)
MCV RBC AUTO: 98 FL (ref 82–98)
MONOCYTES # BLD AUTO: 0.7 K/UL (ref 0.3–1)
MONOCYTES NFR BLD: 8.6 % (ref 4–15)
NEUTROPHILS # BLD AUTO: 5.4 K/UL (ref 1.8–7.7)
NEUTROPHILS NFR BLD: 63.7 % (ref 38–73)
NRBC BLD-RTO: 0 /100 WBC
PLATELET # BLD AUTO: 261 K/UL (ref 150–350)
PMV BLD AUTO: 9.8 FL (ref 9.2–12.9)
RBC # BLD AUTO: 3.57 M/UL (ref 4–5.4)
WBC # BLD AUTO: 8.52 K/UL (ref 3.9–12.7)

## 2020-04-16 PROCEDURE — 85025 COMPLETE CBC W/AUTO DIFF WBC: CPT

## 2020-04-16 PROCEDURE — 36415 COLL VENOUS BLD VENIPUNCTURE: CPT

## 2020-04-27 ENCOUNTER — INITIAL PRENATAL (OUTPATIENT)
Dept: OBSTETRICS AND GYNECOLOGY | Facility: CLINIC | Age: 35
End: 2020-04-27
Payer: COMMERCIAL

## 2020-04-27 ENCOUNTER — PATIENT MESSAGE (OUTPATIENT)
Dept: ADMINISTRATIVE | Facility: OTHER | Age: 35
End: 2020-04-27

## 2020-04-27 VITALS
BODY MASS INDEX: 25.11 KG/M2 | WEIGHT: 141.75 LBS | DIASTOLIC BLOOD PRESSURE: 70 MMHG | SYSTOLIC BLOOD PRESSURE: 114 MMHG

## 2020-04-27 DIAGNOSIS — Z3A.11 PREGNANCY WITH 11 COMPLETED WEEKS GESTATION: Primary | ICD-10-CM

## 2020-04-27 PROCEDURE — 0500F PR INITIAL PRENATAL CARE VISIT: ICD-10-PCS | Mod: S$GLB,,, | Performed by: OBSTETRICS & GYNECOLOGY

## 2020-04-27 PROCEDURE — 99999 PR PBB SHADOW E&M-EST. PATIENT-LVL II: ICD-10-PCS | Mod: PBBFAC,,, | Performed by: OBSTETRICS & GYNECOLOGY

## 2020-04-27 PROCEDURE — 99999 PR PBB SHADOW E&M-EST. PATIENT-LVL II: CPT | Mod: PBBFAC,,, | Performed by: OBSTETRICS & GYNECOLOGY

## 2020-04-27 PROCEDURE — 99212 OFFICE O/P EST SF 10 MIN: CPT | Mod: PBBFAC | Performed by: OBSTETRICS & GYNECOLOGY

## 2020-04-27 PROCEDURE — 0500F INITIAL PRENATAL CARE VISIT: CPT | Mod: S$GLB,,, | Performed by: OBSTETRICS & GYNECOLOGY

## 2020-04-27 NOTE — PROGRESS NOTES
NONE YET FM, NO UC AND NO PV LOSS.  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  REQUESTS AND WILL REFER FOR SELF-PAY [employer] (PT HAS PHONED THEM).  CONNECTED.  ORDER ANATOMY [facility].  DISCUSSED DOXYLAMINE AND COMFORT MEASURES MILD NAUSEA, AND FIRST TRIM INSTRUCTIONS.

## 2020-05-07 ENCOUNTER — PATIENT MESSAGE (OUTPATIENT)
Dept: OBSTETRICS AND GYNECOLOGY | Facility: CLINIC | Age: 35
End: 2020-05-07

## 2020-05-14 ENCOUNTER — PATIENT MESSAGE (OUTPATIENT)
Dept: OBSTETRICS AND GYNECOLOGY | Facility: CLINIC | Age: 35
End: 2020-05-14

## 2020-05-14 ENCOUNTER — PATIENT MESSAGE (OUTPATIENT)
Dept: OBSTETRICS AND GYNECOLOGY | Facility: HOSPITAL | Age: 35
End: 2020-05-14

## 2020-05-25 ENCOUNTER — ROUTINE PRENATAL (OUTPATIENT)
Dept: OBSTETRICS AND GYNECOLOGY | Facility: CLINIC | Age: 35
End: 2020-05-25
Payer: COMMERCIAL

## 2020-05-25 VITALS
DIASTOLIC BLOOD PRESSURE: 62 MMHG | SYSTOLIC BLOOD PRESSURE: 112 MMHG | BODY MASS INDEX: 25.38 KG/M2 | WEIGHT: 143.31 LBS

## 2020-05-25 DIAGNOSIS — Z3A.15 PREGNANCY WITH 15 COMPLETED WEEKS GESTATION: Primary | ICD-10-CM

## 2020-05-25 PROCEDURE — 0502F SUBSEQUENT PRENATAL CARE: CPT | Mod: CPTII,S$GLB,, | Performed by: OBSTETRICS & GYNECOLOGY

## 2020-05-25 PROCEDURE — 0502F PR SUBSEQUENT PRENATAL CARE: ICD-10-PCS | Mod: CPTII,S$GLB,, | Performed by: OBSTETRICS & GYNECOLOGY

## 2020-05-25 PROCEDURE — 99999 PR PBB SHADOW E&M-EST. PATIENT-LVL II: CPT | Mod: PBBFAC,,, | Performed by: OBSTETRICS & GYNECOLOGY

## 2020-05-25 PROCEDURE — 99999 PR PBB SHADOW E&M-EST. PATIENT-LVL II: ICD-10-PCS | Mod: PBBFAC,,, | Performed by: OBSTETRICS & GYNECOLOGY

## 2020-05-25 NOTE — PROGRESS NOTES
QUESTIONABLE FM, NO UC AND NO PV LOSS.  APPT 6/23 ANATOMY [facility].  COMFORT MEASURES HEARTBURN, IMPROVED NAUSEA BUT STILL TAKES DOXYLAMINE AFTER DINNER TO CONTROL SX.  DERM HAS TREATED LABIAL CONDY WITH CRYO IN THE PAST AND NOTES RECURRENCE DURING RECENT MOLE CHECK.  OK CRYO BUT IF NOT CONTROLLED IN 1-2 TREATMENTS, I WILL NEED TO EXAMINE.  SKIP 20W AND F/U 24W VISIT

## 2020-05-26 ENCOUNTER — PATIENT MESSAGE (OUTPATIENT)
Dept: OBSTETRICS AND GYNECOLOGY | Facility: CLINIC | Age: 35
End: 2020-05-26

## 2020-06-09 ENCOUNTER — PATIENT MESSAGE (OUTPATIENT)
Dept: OBSTETRICS AND GYNECOLOGY | Facility: CLINIC | Age: 35
End: 2020-06-09

## 2020-06-09 DIAGNOSIS — Z3A.17 PREGNANCY WITH 17 COMPLETED WEEKS GESTATION: Primary | ICD-10-CM

## 2020-06-11 ENCOUNTER — LAB VISIT (OUTPATIENT)
Dept: LAB | Facility: OTHER | Age: 35
End: 2020-06-11
Attending: OBSTETRICS & GYNECOLOGY
Payer: COMMERCIAL

## 2020-06-11 DIAGNOSIS — Z3A.17 PREGNANCY WITH 17 COMPLETED WEEKS GESTATION: ICD-10-CM

## 2020-06-11 PROCEDURE — 82105 ALPHA-FETOPROTEIN SERUM: CPT

## 2020-06-11 PROCEDURE — 36415 COLL VENOUS BLD VENIPUNCTURE: CPT

## 2020-06-16 LAB
# FETUSES US: NORMAL
AFP INTERPRETATION: NORMAL
AFP MOM SERPL: 0.89
AFP SERPL-MCNC: 35.3 NG/ML
AFP SERPL-MCNC: NEGATIVE NG/ML
AGE AT DELIVERY: 34
GA (DAYS): 3 D
GA (WEEKS): 17 WK
GESTATIONAL AGE METHOD: NORMAL
IDDM PATIENT QL: NORMAL
SMOKING STATUS FTND: NO

## 2020-06-23 ENCOUNTER — PROCEDURE VISIT (OUTPATIENT)
Dept: MATERNAL FETAL MEDICINE | Facility: CLINIC | Age: 35
End: 2020-06-23
Payer: COMMERCIAL

## 2020-06-23 ENCOUNTER — ROUTINE PRENATAL (OUTPATIENT)
Dept: OBSTETRICS AND GYNECOLOGY | Facility: CLINIC | Age: 35
End: 2020-06-23
Payer: COMMERCIAL

## 2020-06-23 VITALS — SYSTOLIC BLOOD PRESSURE: 98 MMHG | WEIGHT: 142.19 LBS | BODY MASS INDEX: 25.19 KG/M2 | DIASTOLIC BLOOD PRESSURE: 62 MMHG

## 2020-06-23 DIAGNOSIS — Z3A.11 PREGNANCY WITH 11 COMPLETED WEEKS GESTATION: ICD-10-CM

## 2020-06-23 DIAGNOSIS — Z36.89 ENCOUNTER FOR FETAL ANATOMIC SURVEY: ICD-10-CM

## 2020-06-23 DIAGNOSIS — Z36.9 ENCOUNTER FOR FETAL ULTRASOUND: Primary | ICD-10-CM

## 2020-06-23 DIAGNOSIS — Z3A.19 19 WEEKS GESTATION OF PREGNANCY: Primary | ICD-10-CM

## 2020-06-23 PROCEDURE — 99999 PR PBB SHADOW E&M-EST. PATIENT-LVL III: ICD-10-PCS | Mod: PBBFAC,,, | Performed by: NURSE PRACTITIONER

## 2020-06-23 PROCEDURE — 0502F SUBSEQUENT PRENATAL CARE: CPT | Mod: CPTII,S$GLB,, | Performed by: NURSE PRACTITIONER

## 2020-06-23 PROCEDURE — 76805 OB US >/= 14 WKS SNGL FETUS: CPT | Mod: S$GLB,,, | Performed by: OBSTETRICS & GYNECOLOGY

## 2020-06-23 PROCEDURE — 0502F PR SUBSEQUENT PRENATAL CARE: ICD-10-PCS | Mod: CPTII,S$GLB,, | Performed by: NURSE PRACTITIONER

## 2020-06-23 PROCEDURE — 76805 PR US, OB 14+WKS, TRANSABD, SINGLE GESTATION: ICD-10-PCS | Mod: S$GLB,,, | Performed by: OBSTETRICS & GYNECOLOGY

## 2020-06-23 PROCEDURE — 99999 PR PBB SHADOW E&M-EST. PATIENT-LVL III: CPT | Mod: PBBFAC,,, | Performed by: NURSE PRACTITIONER

## 2020-06-23 NOTE — PROGRESS NOTES
"Here for routine OB appt at 19w2d, with no complaints. Just came from [facility] for her anatomy scan, report not yet available for viewing in Epic. Told she has an anterior placenta, needs f/u scan in Sept for possible previa? 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 Still having nausea, no vomiting. Takes Diclegis daily but cannot the days she is on call. Discussed trying just B6 q 8 hours on those days, declines Zofran. Having RLP and discomfort sleeping. Still working out with a  and on [employer]. Travelling to [place] to see parents in 2 weeks, requesting COVID testing if possible since father is "high risk".  Having a GIRL. Feels occ flutters. Denies VB and cramping.  Pain, bleeding, and PTL precautions given. Prefers in-office visits versus virtual, lives close to [facility].  F/U scheduled 4 weeks  GTT/CBC next visit  "

## 2020-06-23 NOTE — PATIENT INSTRUCTIONS
LABOR AND DELIVERY PHONE NUMBER, [phone] (OPEN 24/7, LOCATED ON 6TH FLOOR OF HOSPITAL)  [address] PHONE NUMBER, [phone] (OPEN MON-FRI, 8a-5p)

## 2020-06-26 PROBLEM — O44.40 LOW-LYING PLACENTA: Status: ACTIVE | Noted: 2020-06-26

## 2020-06-29 ENCOUNTER — LAB VISIT (OUTPATIENT)
Dept: PRIMARY CARE CLINIC | Facility: CLINIC | Age: 35
End: 2020-06-29
Payer: COMMERCIAL

## 2020-06-29 DIAGNOSIS — Z3A.19 19 WEEKS GESTATION OF PREGNANCY: ICD-10-CM

## 2020-06-29 PROCEDURE — U0003 INFECTIOUS AGENT DETECTION BY NUCLEIC ACID (DNA OR RNA); SEVERE ACUTE RESPIRATORY SYNDROME CORONAVIRUS 2 (SARS-COV-2) (CORONAVIRUS DISEASE [COVID-19]), AMPLIFIED PROBE TECHNIQUE, MAKING USE OF HIGH THROUGHPUT TECHNOLOGIES AS DESCRIBED BY CMS-2020-01-R: HCPCS

## 2020-07-02 LAB — SARS-COV-2 RNA RESP QL NAA+PROBE: NOT DETECTED

## 2020-07-22 ENCOUNTER — LAB VISIT (OUTPATIENT)
Dept: LAB | Facility: OTHER | Age: 35
End: 2020-07-22
Attending: OBSTETRICS & GYNECOLOGY
Payer: COMMERCIAL

## 2020-07-22 ENCOUNTER — ROUTINE PRENATAL (OUTPATIENT)
Dept: OBSTETRICS AND GYNECOLOGY | Facility: CLINIC | Age: 35
End: 2020-07-22
Payer: COMMERCIAL

## 2020-07-22 VITALS — SYSTOLIC BLOOD PRESSURE: 112 MMHG | DIASTOLIC BLOOD PRESSURE: 60 MMHG | BODY MASS INDEX: 26.2 KG/M2 | WEIGHT: 147.94 LBS

## 2020-07-22 DIAGNOSIS — Z3A.23 PREGNANCY WITH 23 COMPLETED WEEKS GESTATION: Primary | ICD-10-CM

## 2020-07-22 DIAGNOSIS — O26.892 PREGNANCY RELATED HIP PAIN IN SECOND TRIMESTER, ANTEPARTUM: ICD-10-CM

## 2020-07-22 DIAGNOSIS — Z3A.19 19 WEEKS GESTATION OF PREGNANCY: ICD-10-CM

## 2020-07-22 DIAGNOSIS — M25.559 PREGNANCY RELATED HIP PAIN IN SECOND TRIMESTER, ANTEPARTUM: ICD-10-CM

## 2020-07-22 LAB
BASOPHILS # BLD AUTO: 0.03 K/UL (ref 0–0.2)
BASOPHILS NFR BLD: 0.4 % (ref 0–1.9)
DIFFERENTIAL METHOD: ABNORMAL
EOSINOPHIL # BLD AUTO: 0.1 K/UL (ref 0–0.5)
EOSINOPHIL NFR BLD: 0.7 % (ref 0–8)
ERYTHROCYTE [DISTWIDTH] IN BLOOD BY AUTOMATED COUNT: 12.2 % (ref 11.5–14.5)
GLUCOSE SERPL-MCNC: 113 MG/DL (ref 70–140)
HCT VFR BLD AUTO: 27.8 % (ref 37–48.5)
HGB BLD-MCNC: 10.2 G/DL (ref 12–16)
IMM GRANULOCYTES # BLD AUTO: 0.02 K/UL (ref 0–0.04)
IMM GRANULOCYTES NFR BLD AUTO: 0.3 % (ref 0–0.5)
LYMPHOCYTES # BLD AUTO: 1.6 K/UL (ref 1–4.8)
LYMPHOCYTES NFR BLD: 22.6 % (ref 18–48)
MCH RBC QN AUTO: 36.2 PG (ref 27–31)
MCHC RBC AUTO-ENTMCNC: 36.7 G/DL (ref 32–36)
MCV RBC AUTO: 99 FL (ref 82–98)
MONOCYTES # BLD AUTO: 0.6 K/UL (ref 0.3–1)
MONOCYTES NFR BLD: 7.8 % (ref 4–15)
NEUTROPHILS # BLD AUTO: 5 K/UL (ref 1.8–7.7)
NEUTROPHILS NFR BLD: 68.2 % (ref 38–73)
NRBC BLD-RTO: 0 /100 WBC
PLATELET # BLD AUTO: 228 K/UL (ref 150–350)
PMV BLD AUTO: 9.9 FL (ref 9.2–12.9)
RBC # BLD AUTO: 2.82 M/UL (ref 4–5.4)
WBC # BLD AUTO: 7.27 K/UL (ref 3.9–12.7)

## 2020-07-22 PROCEDURE — 85025 COMPLETE CBC W/AUTO DIFF WBC: CPT

## 2020-07-22 PROCEDURE — 99999 PR PBB SHADOW E&M-EST. PATIENT-LVL III: ICD-10-PCS | Mod: PBBFAC,,, | Performed by: OBSTETRICS & GYNECOLOGY

## 2020-07-22 PROCEDURE — 99999 PR PBB SHADOW E&M-EST. PATIENT-LVL III: CPT | Mod: PBBFAC,,, | Performed by: OBSTETRICS & GYNECOLOGY

## 2020-07-22 PROCEDURE — 82950 GLUCOSE TEST: CPT

## 2020-07-22 PROCEDURE — 0502F SUBSEQUENT PRENATAL CARE: CPT | Mod: CPTII,S$GLB,, | Performed by: OBSTETRICS & GYNECOLOGY

## 2020-07-22 PROCEDURE — 36415 COLL VENOUS BLD VENIPUNCTURE: CPT

## 2020-07-22 PROCEDURE — 0502F PR SUBSEQUENT PRENATAL CARE: ICD-10-PCS | Mod: CPTII,S$GLB,, | Performed by: OBSTETRICS & GYNECOLOGY

## 2020-07-22 NOTE — PROGRESS NOTES
GOOD FM, NO UC AND NO PV LOSS.  SEVERE HIP PAIN AND NOT SO MUCH SYMPHYSEAL PAIN ON EXAM, A LITTLE BETTER WITH MOVEMENT BUT INTERRUPTING SLEEP MOST NIGHTS - REF PT.  LABS TODAY AND TDAP NEXT.

## 2020-07-23 ENCOUNTER — CLINICAL SUPPORT (OUTPATIENT)
Dept: REHABILITATION | Facility: OTHER | Age: 35
End: 2020-07-23
Attending: OBSTETRICS & GYNECOLOGY
Payer: COMMERCIAL

## 2020-07-23 DIAGNOSIS — O26.892 PREGNANCY RELATED HIP PAIN IN SECOND TRIMESTER, ANTEPARTUM: ICD-10-CM

## 2020-07-23 DIAGNOSIS — M25.559 PREGNANCY RELATED HIP PAIN IN SECOND TRIMESTER, ANTEPARTUM: ICD-10-CM

## 2020-07-23 PROCEDURE — 97161 PT EVAL LOW COMPLEX 20 MIN: CPT | Mod: PN | Performed by: PHYSICAL THERAPIST

## 2020-07-23 PROCEDURE — 97110 THERAPEUTIC EXERCISES: CPT | Mod: PN | Performed by: PHYSICAL THERAPIST

## 2020-07-23 NOTE — PLAN OF CARE
[facility]  Physical Therapy Initial Evaluation    Date: 7/23/2020   Name: Gisella Smith  Clinic Number: [id number]    Therapy Diagnosis:   Encounter Diagnosis   Name Primary?    Pregnancy related hip pain in second trimester, antepartum      Physician: Deanna Martin MD    Physician Orders: PT Eval and Treat Outpatient Therapy Comment - BILAT HIP PAIN PREGNANCY   Medical Diagnosis from Referral: O26.892,M25.559 (ICD-10-CM) - Pregnancy related hip pain in second trimester, antepartum   Evaluation Date: 7/23/2020  Authorization Period Expiration: 12/31/2020  Plan of Care Expiration: 10/18/2020  Visit # / Visits authorized: 1/ 30    Time In: 3:15pm  Time Out: 4:15pm  Total Appointment Time (timed & untimed codes): 60 minutes    Precautions: Standard and 2nd trimester pregnancy     Subjective   Date of onset: x 1 month  History of current condition - Gisella reports: Onset of B hip pain during second trimester of pregnancy. This is her third pregnancy with 2 miscarriages last year. She's been working with a  twice per week, riding her [employer], and very active prior to her pregnancy. She is not having any pain with working out, more of a tightness in her pelvic area when doing dying bugs or deep squats. No Bowel/ bladder issues other than increased frequency. Pain is only at night when lying on the affected side and with scooting/ rolling in bed. Pain disturbs sleep at night. 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 She climbs 3 flights of stairs daily for work without any pain.      Medical History:   Past Medical History:   Diagnosis Date    Overweight (BMI 25.0-29.9) 11/16/2017    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       Surgical History:   Gisella Smith  has no past surgical history on file.    Medications:   Gisella has a current medication list which includes the following  prescription(s): doxylamine-pyridoxine (vit b6) and valacyclovir.    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[employer]  Prior Level of Function: independent with ADL's, weight lifting, working out with , peloton   Current Level of Function: independent with ADL's, continued physical fitness routine with some modifications, difficulty deep squatting, disturbed sleep at night    Pain:  Current 0/10, worst 7/10, best 0/10   Location: bilateral lateral hips, upper buttocks   Description: Sharp  Aggravating Factors: Night Time and side lying  Easing Factors: activity    Patients goals: To be able to get a good night's rest and healthy pregnancy     Objective       Posture:  Increased anterior pelvic tilt, hyperextension B knees  -R posteriorly rotated innominate noted    [place] screen for joint hypermobility on a 9-point scale:    Knuckle of both little/fifth/pinky fingers: <90 deg B  Base of both thumbs: negative B  Elbows: hyperextended B  Knees: hyperextended B  Spine: Negative, unable to achieve palms flat to floor with multisegmental flexion      Lumbar Range of Motion:    percentage Pain   Flexion 50%   Tightness in hips reported        Extension 75%   -        Left Side Bending 100% -        Right Side Bending 100% -             Lower Extremity Strength  Right LE  Left LE    Knee extension: 5/5 Knee extension: 5/5   Knee flexion: 5/5 Knee flexion: 5/5   Hip flexion: 4/5 Hip flexion: 4/5   Hip abduction: 3-/5* Hip abduction: 3+/5*   Hip adduction: 4+/5 Hip adduction 4+/5   Hip external rotation: 4/5 Hip external rotation: 4/5   Hip Internal Rotation: 4-/5 Hip Internal Rotation: 3+/5         Special Tests:  -active SLR: positive B  -BHASKAR: negative B  -FADIR: Positive B for reproduction of symptoms  -OH Squat: dysfunctional, non painful (tightness in  pelvic floor reported)    Palpation: TTP TFL, greater trochanter, gluteus minimus/ medius     Flexibility:    Jered's test: R = + ; L = +   SLR: 70 deg bilateral      Limitation/Restriction for FOTO hip Survey    Therapist reviewed FOTO scores for Gisella Smith on 7/23/2020.   FOTO documents entered into [employer] - see Media section.    Limitation Score: 47%         TREATMENT   Treatment Time In: 4:00pm  Treatment Time Out: 4:15pm  Total Treatment time (time-based codes) separate from Evaluation: 15 minutes    Gisella received therapeutic exercises to develop posture and core stabilization for 15 minutes including:  Pelvic tilts on stability ball x 20  Pelvic rolls on stability ball CW/CCW x 10 ea  Marching on stability ball with TrA x 10  Dying bug on stability ball with TrA x 10 ea    Home Exercises and Patient Education Provided    Education provided:   - Educated on the benefits and risks of dry needling, pt would like to try next visit  -Educated on use of maternity belt and demonstration of use with in clinic model  -education on diastasis recti prevention and avoidance of sit ups in the gym    Written Home Exercises Provided: yes.  Exercises were reviewed and Gisella was able to demonstrate them prior to the end of the session.  Gisella demonstrated good  understanding of the education provided.     See EMR under Patient Instructions for exercises provided 7/23/2020.    Assessment   Gisella is a 35 y.o. female referred to outpatient Physical Therapy with a medical diagnosis of hip pain during pregnancy. Patient presents with decreased B hip flexion/ internal rotation, gluteal weakness, decreased motor control/ core stability, decreased flexibility IT band, and myofascial trigger points through posterior lateral hip. Pt also reports tightness in pelvic region and recommended consult with pelvic floor PT.     Patient prognosis is Good.   Patientt will benefit from skilled outpatient Physical Therapy  to address the deficits stated above and in the chart below, provide patient /family education, and to maximize patientt's level of independence.     Plan of care discussed with patient: Yes  Patient's spiritual, cultural and educational needs considered and patient is agreeable to the plan of care and goals as stated below:     Anticipated Barriers for therapy: none    Medical Necessity is demonstrated by the following  History  Co-morbidities and personal factors that may impact the plan of care Co-morbidities:   none    Personal Factors:   no deficits     low   Examination  Body Structures and Functions, activity limitations and participation restrictions that may impact the plan of care Body Regions:   lower extremities  trunk    Body Systems:    ROM  strength  balance  gait  motor control    Participation Restrictions:   sleep    Activity limitations:   Learning and applying knowledge  no deficits    General Tasks and Commands  no deficits    Communication  no deficits    Mobility  lifting and carrying objects    Self care  no deficits    Domestic Life  no deficits    Interactions/Relationships  no deficits    Life Areas  employment    Community and Social Life  recreation and leisure         high   Clinical Presentation stable and uncomplicated low   Decision Making/ Complexity Score: low     Goals:  Short Term Goals: 5 weeks   1. Patient to demonstrate independence with log roll technique for supine <>sit transfers and avoidance of valsalva maneuver for prevention of diastasis recti  2. Patient to report decreased intensity B hip pain at night by 40% or greater  3. Patient to be able to isolate transverse abdominus and hold contraction 30 sec or greater for improved lumbopelvic stability      Long Term Goals: 10 weeks   1. Patient to be independent with home exercise program for improved self management of condition  2. Patient to have decreased subjective report of disability as noted by a score of <40% on  the FOTO questionnaire   3. Patient to demonstrate independence in proper body mechanics and lifting techniques for improved pain and in preparation for caring for infant       Plan   Plan of care Certification: 7/23/2020 to 10/18/2020.    Outpatient Physical Therapy 1 times weekly for 10 weeks to include the following interventions: Manual Therapy, Moist Heat/ Ice, Neuromuscular Re-ed, Patient Education, Therapeutic Activites, Therapeutic Exercise and dry needling .     Kirstin Frausto, PT

## 2020-07-28 ENCOUNTER — CLINICAL SUPPORT (OUTPATIENT)
Dept: REHABILITATION | Facility: OTHER | Age: 35
End: 2020-07-28
Attending: OBSTETRICS & GYNECOLOGY
Payer: COMMERCIAL

## 2020-07-28 DIAGNOSIS — M25.551 PAIN OF BOTH HIP JOINTS: ICD-10-CM

## 2020-07-28 DIAGNOSIS — M25.552 PAIN OF BOTH HIP JOINTS: ICD-10-CM

## 2020-07-28 PROCEDURE — 97110 THERAPEUTIC EXERCISES: CPT | Mod: PN | Performed by: PHYSICAL THERAPIST

## 2020-07-28 PROCEDURE — 97140 MANUAL THERAPY 1/> REGIONS: CPT | Mod: PN | Performed by: PHYSICAL THERAPIST

## 2020-07-28 NOTE — PROGRESS NOTES
"  Physical Therapy Treatment Note     Name: Gisella Smith  Clinic Number: [id number]    Therapy Diagnosis:   Encounter Diagnosis   Name Primary?    Pain of both hip joints      Physician: Deanna Martin MD    Visit Date: 7/28/2020    Physician Orders: PT Eval and Treat Outpatient Therapy Comment - BILAT HIP PAIN PREGNANCY   Medical Diagnosis from Referral: O26.892,M25.559 (ICD-10-CM) - Pregnancy related hip pain in second trimester, antepartum   Evaluation Date: 7/23/2020  Authorization Period Expiration: 12/31/2020  Plan of Care Expiration: 10/18/2020  Visit # / Visits authorized: 2/ 30    Time In: 10:00am  Time Out: 10:45am  Total Billable Time: 45 minutes    Precautions: Standard and pregnancy 2nd trimester    Subjective     Pt reports: Pt reports having some relief when lying on the couch at a slight angle. Pain worse when her hips are directly stacked. Interested in the dry needling this session.   She was compliant with home exercise program.  Response to previous treatment: none  Functional change: none    Pain: 4/10  Location: bilateral Hips      Objective     Gisella received therapeutic exercises to develop flexibility and core stabilization for 10 minutes including:    Supine IT Band str with strap 30" x 3  Transverse abdominus activation and instruction     Gisella received the following manual therapy techniques: Joint mobilizations and Myofacial release were applied to the: B hips for 30 minutes, including:    Application of FDN: Pt educated on benefits and potential side effects of dry needling. Educated pt on benefits, precautions, side effects followign IDN. Educated pt to use heat following treatment sessions if pt is experiencing pain or soreness. Pt verbalized good understanding of education.  Pt signed written consent to dry needling Rx. Pt gave verbal consent for DN    Pt received dry needling to the below listed muscles using 75mm needles.  Tensor fascia latae B  Gluteus medius/ " minimus B  Vastus lateralis B      Home Exercises Provided and Patient Education Provided     Education provided:   - proper sitting posture/ workplace ergonomics     Written Home Exercises Provided: yes.  Exercises were reviewed and Gisella was able to demonstrate them prior to the end of the session.  Gisella demonstrated good  understanding of the education provided.     See EMR under Media for exercises provided prior visit.    Assessment     Good tolerance to functional dry needling without adverse effects. Improvements in deep overhead squat following application.   Gisella is progressing well towards her goals.   Pt prognosis is Good.     Pt will continue to benefit from skilled outpatient physical therapy to address the deficits listed in the problem list box on initial evaluation, provide pt/family education and to maximize pt's level of independence in the home and community environment.     Pt's spiritual, cultural and educational needs considered and pt agreeable to plan of care and goals.     Anticipated barriers to physical therapy: none    Goals:     Short Term Goals: 5 weeks   1. Patient to demonstrate independence with log roll technique for supine <>sit transfers and avoidance of valsalva maneuver for prevention of diastasis recti- in progress, not met  2. Patient to report decreased intensity B hip pain at night by 40% or greater - in progress, not met  3. Patient to be able to isolate transverse abdominus and hold contraction 30 sec or greater for improved lumbopelvic stability  - in progress, not met     Long Term Goals: 10 weeks   1. Patient to be independent with home exercise program for improved self management of condition - in progress, not met  2. Patient to have decreased subjective report of disability as noted by a score of <40% on the FOTO questionnaire  - in progress, not met  3. Patient to demonstrate independence in proper body mechanics and lifting techniques for improved  pain and in preparation for caring for infant  - in progress, not met     Plan     Continue per POC and plan to progress core stabilization exercises as tolerated    Kirstin Frausto, PT

## 2020-08-05 NOTE — PROGRESS NOTES
"  Physical Therapy Treatment Note     Name: Gisella Smith  Clinic Number: [id number]    Therapy Diagnosis:   Encounter Diagnosis   Name Primary?    Pain of both hip joints Yes     Physician: Deanna Martin MD    Visit Date: 8/6/2020    Physician Orders: PT Eval and Treat Outpatient Therapy Comment - BILAT HIP PAIN PREGNANCY   Medical Diagnosis from Referral: O26.892,M25.559 (ICD-10-CM) - Pregnancy related hip pain in second trimester, antepartum   Evaluation Date: 7/23/2020  Authorization Period Expiration: 12/31/2020  Plan of Care Expiration: 10/18/2020  Visit # / Visits authorized: 3/ 30    Time In: 12:00am  Time Out: 12:45am  Total Billable Time: 45 minutes    Precautions: Standard and pregnancy 2nd trimester    Subjective     Pt reports: hips are feeling much better after the dry needling. Able to sleep on her side without pain or sleep disruption. Feeling pressure in the lower abdominals. Has tried the belly binder that Louise Frausto, PT, suggested but says that it just feels uncomfortable when attempting to use at work.    She was compliant with home exercise program.  Response to previous treatment: none  Functional change: none    Pain: 1/10  Location: bilateral Hips      Objective     Gisella received therapeutic exercises to develop flexibility and core stabilization for 45 minutes including:    Supine IT Band str with strap 30" x 3  Transverse abdominus activation and instruction for Kegel: 10 x 10"  TA with march -attempted but UA 2* to c/o lower abdominal pain  TA with BKFO 20x  TA with heel slides 20x  domi pose stretch 1 x 30"  Bird dogs 15 x 5"  Qped fire hydrant 15x  Qped donkey kicks 5 x 10" pulses at endrange    Gisella received the following manual therapy techniques: Joint mobilizations and Myofacial release were applied to the: B hips for 00 minutes, including:    Application of FDN: Pt educated on benefits and potential side effects of dry needling. Educated pt on benefits, " precautions, side effects followign IDN. Educated pt to use heat following treatment sessions if pt is experiencing pain or soreness. Pt verbalized good understanding of education.  Pt signed written consent to dry needling Rx. Pt gave verbal consent for DN    Pt received dry needling to the below listed muscles using 75mm needles.  Tensor fascia latae B  Gluteus medius/ minimus B  Vastus lateralis B      Home Exercises Provided and Patient Education Provided     Education provided:   - proper sitting posture/ workplace ergonomics     Written Home Exercises Provided: yes.  Exercises were reviewed and Gisella was able to demonstrate them prior to the end of the session.  Gisella demonstrated good  understanding of the education provided.     See EMR under Media for exercises provided prior visit.    Assessment     Defer dry needling today due to improvements in symptoms. Able to progress core activation and strength for dynamic stability. Good TA activation with simultaneous Kegel. Difficulty with L glute activation with simultaneous glute strengthening.      Gisella is progressing well towards her goals.   Pt prognosis is Good.     Pt will continue to benefit from skilled outpatient physical therapy to address the deficits listed in the problem list box on initial evaluation, provide pt/family education and to maximize pt's level of independence in the home and community environment.     Pt's spiritual, cultural and educational needs considered and pt agreeable to plan of care and goals.     Anticipated barriers to physical therapy: none    Goals:     Short Term Goals: 5 weeks   1. Patient to demonstrate independence with log roll technique for supine <>sit transfers and avoidance of valsalva maneuver for prevention of diastasis recti- in progress, not met  2. Patient to report decreased intensity B hip pain at night by 40% or greater - in progress, not met  3. Patient to be able to isolate transverse  abdominus and hold contraction 30 sec or greater for improved lumbopelvic stability  - in progress, not met     Long Term Goals: 10 weeks   1. Patient to be independent with home exercise program for improved self management of condition - in progress, not met  2. Patient to have decreased subjective report of disability as noted by a score of <40% on the FOTO questionnaire  - in progress, not met  3. Patient to demonstrate independence in proper body mechanics and lifting techniques for improved pain and in preparation for caring for infant  - in progress, not met     Plan     Continue per POC and plan to progress core stabilization exercises as tolerated    Rosita Lira, PT

## 2020-08-06 ENCOUNTER — CLINICAL SUPPORT (OUTPATIENT)
Dept: REHABILITATION | Facility: OTHER | Age: 35
End: 2020-08-06
Attending: OBSTETRICS & GYNECOLOGY
Payer: COMMERCIAL

## 2020-08-06 DIAGNOSIS — M25.551 PAIN OF BOTH HIP JOINTS: Primary | ICD-10-CM

## 2020-08-06 DIAGNOSIS — M25.552 PAIN OF BOTH HIP JOINTS: Primary | ICD-10-CM

## 2020-08-06 PROCEDURE — 97110 THERAPEUTIC EXERCISES: CPT | Mod: PN

## 2020-08-11 ENCOUNTER — CLINICAL SUPPORT (OUTPATIENT)
Dept: REHABILITATION | Facility: OTHER | Age: 35
End: 2020-08-11
Attending: OBSTETRICS & GYNECOLOGY
Payer: COMMERCIAL

## 2020-08-11 DIAGNOSIS — M25.552 PAIN OF BOTH HIP JOINTS: ICD-10-CM

## 2020-08-11 DIAGNOSIS — M25.551 PAIN OF BOTH HIP JOINTS: ICD-10-CM

## 2020-08-11 PROCEDURE — 97112 NEUROMUSCULAR REEDUCATION: CPT | Mod: PN

## 2020-08-11 NOTE — PROGRESS NOTES
"  Physical Therapy Treatment Note     Name: Gisella Smith  Clinic Number: [id number]    Therapy Diagnosis:   Encounter Diagnosis   Name Primary?    Pain of both hip joints      Physician: Deanna Martin MD    Visit Date: 8/11/2020    Physician Orders: PT Eval and Treat Outpatient Therapy Comment - BILAT HIP PAIN PREGNANCY   Medical Diagnosis from Referral: O26.892,M25.559 (ICD-10-CM) - Pregnancy related hip pain in second trimester, antepartum   Evaluation Date: 7/23/2020  Authorization Period Expiration: 12/31/2020  Plan of Care Expiration: 10/18/2020  Visit # / Visits authorized: 4/ 30    Time In: 2:30  Time Out: 3:30  Total Billable Time: 60 minutes    Precautions: Standard and pregnancy 2nd trimester    Subjective     Pt reports:  Constant feeling like she has a UTI despite negative tests (pre-pregnancy)  Slight (B) SI joint pain (pre-pregnancy)  Noted limited hip ROM in early pregnancy, especially while working out. Has "pulling" at pubic symphysis and pain in pelvis with turning over in bed. General pelvic pain with walking.     Sometimes takes a few minutes to relax enough to initiate peeing   Mild constipation due to starting iron (recent)  No pain with sex but sometimes has a little trouble relaxing during sex     She was compliant with home exercise program.  Response to previous treatment: none  Functional change: none    Pain: 1/10  Location: bilateral Hips      Objective     Gisella received neuromuscular re-education for down-training and stabilization for 60 minutes including:  - PFM + TrA contraction 1-2 sets of 10   - SKTC stretch + diaphragmatic breathing 3x5 breaths   - modified happy baby + diaphragmatic breathing 3x5 breaths   - adductor ball squeeze   - education regarding proper bearing down and labor positions    Gisella received therapeutic exercises to develop flexibility and core stabilization for 00 minutes including:    Supine IT Band str with strap 30" x 3  Transverse " "abdominus activation and instruction for Kegel: 10 x 10"  TA with march -attempted but UA 2* to c/o lower abdominal pain  TA with BKFO 20x  TA with heel slides 20x  domi pose stretch 1 x 30"  Bird dogs 15 x 5"  Qped fire hydrant 15x  Qped donkey kicks 5 x 10" pulses at endrange    Gisella received the following manual therapy techniques: Joint mobilizations and Myofacial release were applied to the: B hips for 00 minutes, including:      Home Exercises Provided and Patient Education Provided     Education provided:   - anatomy/physiology of pelvic floor   - labor positions     Written Home Exercises Provided: yes.  Exercises were reviewed and Gisella was able to demonstrate them prior to the end of the session.  Gisella demonstrated good  understanding of the education provided.     See EMR under Media for exercises provided prior visit.    Assessment     Education provided regarding proper PFM/TrA contraction and deep breathing/stretches for PFM down-training. No internal exam due to low anterior placenta. HEP updated, see patient instructions. Patient reported feeling well upon departure.         Gisella is progressing well towards her goals.   Pt prognosis is Good.     Pt will continue to benefit from skilled outpatient physical therapy to address the deficits listed in the problem list box on initial evaluation, provide pt/family education and to maximize pt's level of independence in the home and community environment.     Pt's spiritual, cultural and educational needs considered and pt agreeable to plan of care and goals.     Anticipated barriers to physical therapy: none    Goals:     Short Term Goals: 5 weeks   1. Patient to demonstrate independence with log roll technique for supine <>sit transfers and avoidance of valsalva maneuver for prevention of diastasis recti- in progress, not met  2. Patient to report decreased intensity B hip pain at night by 40% or greater - in progress, not met  3. " Patient to be able to isolate transverse abdominus and hold contraction 30 sec or greater for improved lumbopelvic stability  - in progress, not met     Long Term Goals: 10 weeks   1. Patient to be independent with home exercise program for improved self management of condition - in progress, not met  2. Patient to have decreased subjective report of disability as noted by a score of <40% on the FOTO questionnaire  - in progress, not met  3. Patient to demonstrate independence in proper body mechanics and lifting techniques for improved pain and in preparation for caring for infant  - in progress, not met     Plan     Pt to continue seeing PFPT and ortho PT for core stabilization and pain management as well as prepare for birth     Vandana Keita, PT, DPT

## 2020-08-11 NOTE — PATIENT INSTRUCTIONS
"Abdominal Bracing with Pelvic Floor Muscle Contraction    Start by taking a deep breath in, then as you exhale (think about gently blowing out birthday candles) draw your pelvic floor and abdomen in. You should feel a firming about your mid-section and in your pelvic floor. (Do not think about sucking in like you're fitting into a pair of skinny jeans).   Be sure to fully relax in between each repetition.     Hold for 5 seconds. Perform 1-2 sets of 10 repetitions. Perform daily before doing down-training stretches.    "single knee to chest" - lay on your back, use your hands to pull your left knee to your chest, keeping the right leg straight on the bed. Hold this pose while you incorporate your diaphragmatic breathing. Repeat on the opposite side. Complete 3 sets of 5 breaths on both legs.   As you get further into your pregnancy, do this while laying on your side     "Modified Happy Baby" - lay on your back, use your hands to pull your knees to your chest, then bring them out wide (about even with your shoulders). Hold this pose while you incorporate your diaphragmatic breathing. Complete 3 sets of 5 breaths.       Inner thigh ball squeeze - seated with rolled up towel between your knees; think about "50%" effort as you squeeze your legs together. Do 2 sets of 10 reps     "

## 2020-08-18 ENCOUNTER — CLINICAL SUPPORT (OUTPATIENT)
Dept: REHABILITATION | Facility: OTHER | Age: 35
End: 2020-08-18
Attending: OBSTETRICS & GYNECOLOGY
Payer: COMMERCIAL

## 2020-08-18 DIAGNOSIS — M25.551 PAIN OF BOTH HIP JOINTS: ICD-10-CM

## 2020-08-18 DIAGNOSIS — M25.552 PAIN OF BOTH HIP JOINTS: ICD-10-CM

## 2020-08-18 PROCEDURE — 97110 THERAPEUTIC EXERCISES: CPT | Mod: PN | Performed by: PHYSICAL THERAPIST

## 2020-08-18 PROCEDURE — 97140 MANUAL THERAPY 1/> REGIONS: CPT | Mod: PN | Performed by: PHYSICAL THERAPIST

## 2020-08-18 NOTE — PROGRESS NOTES
Physical Therapy Treatment Note     Name: Gisella Smith  Clinic Number: [id number]    Therapy Diagnosis:   Encounter Diagnosis   Name Primary?    Pain of both hip joints      Physician: Deanna Martin MD    Visit Date: 8/18/2020    Physician Orders: PT Eval and Treat Outpatient Therapy Comment - BILAT HIP PAIN PREGNANCY   Medical Diagnosis from Referral: O26.892,M25.559 (ICD-10-CM) - Pregnancy related hip pain in second trimester, antepartum   Evaluation Date: 7/23/2020  Authorization Period Expiration: 12/31/2020  Plan of Care Expiration: 10/18/2020  Visit # / Visits authorized: 5/ 30    Time In: 3:15pm  Time Out: 4:00pm  Total Billable Time: 45 minutes    Precautions: Standard and pregnancy 2nd trimester    Subjective     Pt reports: Pt reports the stretches given to her to relax pelvic floor have been helping. She has been incorporating the hip strengthening into her personal training sessions. She has noticed that she cannot lie down flat for any length of time. L hip has started hurting again. The dry needling gave her immediate release last time.   She was compliant with home exercise program.  Response to previous treatment: none  Functional change: none    Pain: 4/10  Location: bilateral Hips      Objective     Gisella received therapeutic exercises to develop flexibility and core stabilization for 20 minutes including:    - pelvic tilts seated on PB x 10  -TrA on PB with alt arm flexion x 5 ea  - modified happy baby + diaphragmatic breathing 3x5 breaths   - adductor ball squeeze 5 sec x 20      Gisella received the following manual therapy techniques: Joint mobilizations and Myofacial release were applied to the: B hips for 25 minutes, including:    Application of FDN: Pt educated on benefits and potential side effects of dry needling. Educated pt on benefits, precautions, side effects followign IDN. Educated pt to use heat following treatment sessions if pt is experiencing pain or  soreness. Pt verbalized good understanding of education.  Pt signed written consent to dry needling Rx. Pt gave verbal consent for DN    Pt received dry needling to the below listed muscles using 60 mm, 75mm needles.  Tensor fascia latae B  Gluteus medius/ minimus B  Piriformis B  Vastus lateralis B      Home Exercises Provided and Patient Education Provided     Education provided:   - education on prevention of diastasis recti and post partum recovery process     Written Home Exercises Provided: yes.  Exercises were reviewed and Gisella was able to demonstrate them prior to the end of the session.  Gisella demonstrated good  understanding of the education provided.     See EMR under Media for exercises provided prior visit.    Assessment     Good tolerance to functional dry needling without adverse effects.   Gisella is progressing well towards her goals.   Pt prognosis is Good.     Pt will continue to benefit from skilled outpatient physical therapy to address the deficits listed in the problem list box on initial evaluation, provide pt/family education and to maximize pt's level of independence in the home and community environment.     Pt's spiritual, cultural and educational needs considered and pt agreeable to plan of care and goals.     Anticipated barriers to physical therapy: none    Goals:     Short Term Goals: 5 weeks   1. Patient to demonstrate independence with log roll technique for supine <>sit transfers and avoidance of valsalva maneuver for prevention of diastasis recti- in progress, not met  2. Patient to report decreased intensity B hip pain at night by 40% or greater - in progress, not met  3. Patient to be able to isolate transverse abdominus and hold contraction 30 sec or greater for improved lumbopelvic stability  - in progress, not met     Long Term Goals: 10 weeks   1. Patient to be independent with home exercise program for improved self management of condition - in progress, not  met  2. Patient to have decreased subjective report of disability as noted by a score of <40% on the FOTO questionnaire  - in progress, not met  3. Patient to demonstrate independence in proper body mechanics and lifting techniques for improved pain and in preparation for caring for infant  - in progress, not met     Plan     Continue per POC and plan to progress core stabilization exercises as tolerated    Kirstin Frausto, PT

## 2020-08-24 ENCOUNTER — ROUTINE PRENATAL (OUTPATIENT)
Dept: OBSTETRICS AND GYNECOLOGY | Facility: CLINIC | Age: 35
End: 2020-08-24
Payer: COMMERCIAL

## 2020-08-24 ENCOUNTER — CLINICAL SUPPORT (OUTPATIENT)
Dept: OBSTETRICS AND GYNECOLOGY | Facility: CLINIC | Age: 35
End: 2020-08-24
Payer: COMMERCIAL

## 2020-08-24 ENCOUNTER — CLINICAL SUPPORT (OUTPATIENT)
Dept: REHABILITATION | Facility: OTHER | Age: 35
End: 2020-08-24
Attending: OBSTETRICS & GYNECOLOGY
Payer: COMMERCIAL

## 2020-08-24 VITALS
DIASTOLIC BLOOD PRESSURE: 62 MMHG | SYSTOLIC BLOOD PRESSURE: 112 MMHG | WEIGHT: 149.94 LBS | BODY MASS INDEX: 26.56 KG/M2

## 2020-08-24 DIAGNOSIS — M25.551 PAIN OF BOTH HIP JOINTS: ICD-10-CM

## 2020-08-24 DIAGNOSIS — Z3A.28 PREGNANCY WITH 28 COMPLETED WEEKS GESTATION: Primary | ICD-10-CM

## 2020-08-24 DIAGNOSIS — Z23 NEED FOR DIPHTHERIA-TETANUS-PERTUSSIS (TDAP) VACCINE: Primary | ICD-10-CM

## 2020-08-24 DIAGNOSIS — M25.552 PAIN OF BOTH HIP JOINTS: ICD-10-CM

## 2020-08-24 PROCEDURE — 99999 PR PBB SHADOW E&M-EST. PATIENT-LVL I: CPT | Mod: PBBFAC,,,

## 2020-08-24 PROCEDURE — 90471 TDAP VACCINE GREATER THAN OR EQUAL TO 7YO IM: ICD-10-PCS | Mod: S$GLB,,, | Performed by: OBSTETRICS & GYNECOLOGY

## 2020-08-24 PROCEDURE — 99999 PR PBB SHADOW E&M-EST. PATIENT-LVL II: CPT | Mod: PBBFAC,,, | Performed by: OBSTETRICS & GYNECOLOGY

## 2020-08-24 PROCEDURE — 99999 PR PBB SHADOW E&M-EST. PATIENT-LVL I: ICD-10-PCS | Mod: PBBFAC,,,

## 2020-08-24 PROCEDURE — 97110 THERAPEUTIC EXERCISES: CPT | Mod: PN | Performed by: PHYSICAL THERAPIST

## 2020-08-24 PROCEDURE — 0502F PR SUBSEQUENT PRENATAL CARE: ICD-10-PCS | Mod: CPTII,S$GLB,, | Performed by: OBSTETRICS & GYNECOLOGY

## 2020-08-24 PROCEDURE — 90471 IMMUNIZATION ADMIN: CPT | Mod: S$GLB,,, | Performed by: OBSTETRICS & GYNECOLOGY

## 2020-08-24 PROCEDURE — 0502F SUBSEQUENT PRENATAL CARE: CPT | Mod: CPTII,S$GLB,, | Performed by: OBSTETRICS & GYNECOLOGY

## 2020-08-24 PROCEDURE — 99999 PR PBB SHADOW E&M-EST. PATIENT-LVL II: ICD-10-PCS | Mod: PBBFAC,,, | Performed by: OBSTETRICS & GYNECOLOGY

## 2020-08-24 PROCEDURE — 97140 MANUAL THERAPY 1/> REGIONS: CPT | Mod: PN | Performed by: PHYSICAL THERAPIST

## 2020-08-24 PROCEDURE — 90715 TDAP VACCINE 7 YRS/> IM: CPT | Mod: S$GLB,,, | Performed by: OBSTETRICS & GYNECOLOGY

## 2020-08-24 PROCEDURE — 90715 TDAP VACCINE GREATER THAN OR EQUAL TO 7YO IM: ICD-10-PCS | Mod: S$GLB,,, | Performed by: OBSTETRICS & GYNECOLOGY

## 2020-08-24 NOTE — PROGRESS NOTES
Here for TDAP injection. Patient without complaint of pain at this time, Injection given. Tolerated well. No pain noted after injection.  Advised to wait in lobby 15 minutes and report any adverse reactions.     Site: [facility]    Baby Friendly Handout Given to Patient

## 2020-08-24 NOTE — PROGRESS NOTES
GOOD FM, NO UC AND NO PV LOSS.  TDAP TODAY, NEXT USG PLAC LOCATION AND GROWTH 9/22, AND HAS ONGOING PELVIC FLOOR PT VISITS SCHEDULED.  DISCUSSED IMPORTANCE FE SUPPLEMENTATION!  VISIT 4 WEEKS (CONNECTED SKIPS 30W)   Transient left-sided sensory changes and facial droop with dysarthric speech and ongoing hyper reflexia:  Lisa Wilkinson presents for an initial follow-up evaluation with regard to a recent episode when she experienced a marching abnormal sensation in her left upper extremity accompanied by left facial asymmetry and a facial droop  The symptoms overall lasted for approximately 5 minutes  I agree that transient ischemic attack is the most likely diagnosis however considering the March of her symptoms a focal sensory seizure with preserved awareness remains a consideration  Furthermore, on exam in the office today she does have left upper and lower extremity jorge hyper reflexia  This may be indicative of cervical spine stenosis which could produce similar symptoms  She reports that she is taking her medication overall, and she did not endorse any bleeding or bruising issues but does have some abnormal sensations depending on the amount of sugar she eats  She is relating that to her atorvastatin   -for ongoing stroke prevention she should continue her current combination of aspirin and appropriate blood pressure and glycemic control  -we will defer to the good judgment of her primary care team for monitoring of her cholesterol panel and blood sugar numbers  We would suggest targeting a blood pressure of less than 130/80 on a regular basis  -we will do a 2 week statin holiday  If she notices any significant change in the abnormal sensations related to sugar intake she should let our office know and we will transition to a different formulation of statin medications  Otherwise if she notices no change in her symptoms then she should restart her atorvastatin and follow-up with her primary care team   -considering the possibility that this may have represented some type of a focal seizure I will request a 1 time EEG    In order to investigate any contribution of cervical spine stenosis I will also request an MRI of the cervical spine  -pending the testing above, I would anticipate continuing with stroke prophylaxis  At this point I do not have a strong enough indication that her PFO was the cause of her symptoms  I would not recommend a transesophageal echocardiogram a potential PFO closure at this point  I do think that ongoing medical therapy is quite indicated  I will plan to be in touch with her with regard to the results of her test as soon as they are available and her to return to the office to see us in 4 months time although we would be happy to see her sooner if the need should arise  If she has any symptoms concerning for TIA or stroke such as sudden painless loss of vision or double vision, difficulty speaking or swallowing, vertigo/room spinning that does not quickly resolve, or weakness/numbness affecting 1 side of the face or body she should proceed by ambulance to the nearest emergency room immediately  We did discuss some of her additional plans in the office today  From my standpoint she has no flying restrictions  With regard to an anticipated elective surgical procedure I would suggest that this should wait for 3-6 months from the time of her transient ischemic attack

## 2020-08-24 NOTE — PROGRESS NOTES
"  Physical Therapy Treatment Note     Name: Gisella Smith  Clinic Number: [id number]    Therapy Diagnosis:   Encounter Diagnosis   Name Primary?    Pain of both hip joints      Physician: Deanna Martin MD    Visit Date: 8/24/2020    Physician Orders: PT Eval and Treat Outpatient Therapy Comment - BILAT HIP PAIN PREGNANCY   Medical Diagnosis from Referral: O26.892,M25.559 (ICD-10-CM) - Pregnancy related hip pain in second trimester, antepartum   Evaluation Date: 7/23/2020  Authorization Period Expiration: 12/31/2020  Plan of Care Expiration: 10/18/2020  Visit # / Visits authorized: 6/ 30    Time In: 1:00 pm  Time Out: 1: 45 pm  Total Billable Time: 45 minutes    Precautions: Standard and pregnancy 2nd trimester    Subjective     Pt reports: Having her 28 wk check up today and everything going well. She got her TDap vaccination today. Having some increased pain L hip today. She is doing the pelvic floor exercises at night and incorporating the TrA and hip stabilization into her personal training exercises.   She was compliant with home exercise program.  Response to previous treatment: none  Functional change: none    Pain: 4/10  Location: bilateral Hips  L>R    Objective     Gisella received therapeutic exercises to develop flexibility and core stabilization for 10 minutes including:    -piriformis str HL with HOB elevated 30" x 2 (over moist heat pillow case and towel layer)  - adductor ball squeeze with TrA 5 sec x 20      Gisella received the following manual therapy techniques: Joint mobilizations and Myofacial release were applied to the: B hips for 30 minutes, including:    Application of FDN: Pt educated on benefits and potential side effects of dry needling. Educated pt on benefits, precautions, side effects followign IDN. Educated pt to use heat following treatment sessions if pt is experiencing pain or soreness. Pt verbalized good understanding of education.  Pt signed written consent to dry " needling Rx. Pt gave verbal consent for DN    Pt received dry needling to the below listed muscles using 60 mm, 75mm needles.  Tensor fascia latae B  Gluteus medius/ minimus B  Piriformis B  Vastus lateralis B  Adductor longus B  Adductor aida B    Home Exercises Provided and Patient Education Provided     Education provided:   - education in self STM with rolling stick    Written Home Exercises Provided: yes.  Exercises were reviewed and Gisella was able to demonstrate them prior to the end of the session.  Gisella demonstrated good  understanding of the education provided.     See EMR under Media for exercises provided prior visit.    Assessment     Large twitch response and increased grasp noted through TFL and adductor aida. Improvements in tolerance to side lying position following FDN. No adverse effects    Gisella is progressing well towards her goals.   Pt prognosis is Good.     Pt will continue to benefit from skilled outpatient physical therapy to address the deficits listed in the problem list box on initial evaluation, provide pt/family education and to maximize pt's level of independence in the home and community environment.     Pt's spiritual, cultural and educational needs considered and pt agreeable to plan of care and goals.     Anticipated barriers to physical therapy: none    Goals:     Short Term Goals: 5 weeks   1. Patient to demonstrate independence with log roll technique for supine <>sit transfers and avoidance of valsalva maneuver for prevention of diastasis recti- in progress, not met  2. Patient to report decreased intensity B hip pain at night by 40% or greater - in progress, not met  3. Patient to be able to isolate transverse abdominus and hold contraction 30 sec or greater for improved lumbopelvic stability  - in progress, not met     Long Term Goals: 10 weeks   1. Patient to be independent with home exercise program for improved self management of condition - in progress,  not met  2. Patient to have decreased subjective report of disability as noted by a score of <40% on the FOTO questionnaire  - in progress, not met  3. Patient to demonstrate independence in proper body mechanics and lifting techniques for improved pain and in preparation for caring for infant  - in progress, not met     Plan     Continue per POC and plan to progress core stabilization exercises as tolerated    Kirstin Frausto, PT

## 2020-08-25 ENCOUNTER — CLINICAL SUPPORT (OUTPATIENT)
Dept: REHABILITATION | Facility: OTHER | Age: 35
End: 2020-08-25
Attending: OBSTETRICS & GYNECOLOGY
Payer: COMMERCIAL

## 2020-08-25 DIAGNOSIS — M25.551 PAIN OF BOTH HIP JOINTS: ICD-10-CM

## 2020-08-25 DIAGNOSIS — M25.552 PAIN OF BOTH HIP JOINTS: ICD-10-CM

## 2020-08-25 PROCEDURE — 97140 MANUAL THERAPY 1/> REGIONS: CPT | Mod: PN

## 2020-08-25 NOTE — PROGRESS NOTES
"  Physical Therapy Treatment Note     Name: Gisella Smith  Clinic Number: [id number]    Therapy Diagnosis:   Encounter Diagnosis   Name Primary?    Pain of both hip joints      Physician: Deanna Martin MD    Visit Date: 8/25/2020    Physician Orders: PT Eval and Treat Outpatient Therapy Comment - BILAT HIP PAIN PREGNANCY   Medical Diagnosis from Referral: O26.892,M25.559 (ICD-10-M) - Pregnancy related hip pain in second trimester, antepartum   Evaluation Date: 7/23/2020  Authorization Period Expiration: 12/31/2020  Plan of Care Expiration: 10/18/2020  Visit # / Visits authorized: 7/ 30    Time In: 2:30 pm  Time Out: 3:30 pm  Total Billable Time: 60 minutes    Precautions: Standard and pregnancy 2nd trimester    Subjective     Pt reports: "pretty sore from the needling yesterday." her 28 week check went well, and due to her Connected M.O.M. program, she doesn't have to go back until 32 weeks.     She was compliant with home exercise program.  Response to previous treatment: none  Functional change: none    Pain: 4/10  Location: bilateral Hips  L>R    Objective     Gisella received therapeutic exercises to develop flexibility and core stabilization for 00 minutes including:    -piriformis str HL with HOB elevated 30" x 2 (over moist heat pillow case and towel layer)  - adductor ball squeeze with TrA 5 sec x 20      Gisella received the following manual therapy techniques: Joint mobilizations and Myofacial release were applied to the: B hips for 60 minutes, including:    STM: (B) adductor Mm   Time includes discussion of timeline with PFPT and post-partum care, expectations for PFPT later in pregnancy.     Home Exercises Provided and Patient Education Provided     Education provided:   - education in self STM with rolling stick    Written Home Exercises Provided: yes.  Exercises were reviewed and Gisella was able to demonstrate them prior to the end of the session.  Gisella demonstrated good  " understanding of the education provided.     See EMR under Media for exercises provided prior visit.    Assessment     Significant TTP with sup/mod tx-depth for STM to adductor Mm. Discussed care for later in pregnancy (breathing/bearing down, perineal massage). Patient reported feeling well upon departure.       Gisella is progressing well towards her goals.   Pt prognosis is Good.     Pt will continue to benefit from skilled outpatient physical therapy to address the deficits listed in the problem list box on initial evaluation, provide pt/family education and to maximize pt's level of independence in the home and community environment.     Pt's spiritual, cultural and educational needs considered and pt agreeable to plan of care and goals.     Anticipated barriers to physical therapy: none    Goals:     Short Term Goals: 5 weeks   1. Patient to demonstrate independence with log roll technique for supine <>sit transfers and avoidance of valsalva maneuver for prevention of diastasis recti- in progress, not met  2. Patient to report decreased intensity B hip pain at night by 40% or greater - in progress, not met  3. Patient to be able to isolate transverse abdominus and hold contraction 30 sec or greater for improved lumbopelvic stability  - in progress, not met     Long Term Goals: 10 weeks   1. Patient to be independent with home exercise program for improved self management of condition - in progress, not met  2. Patient to have decreased subjective report of disability as noted by a score of <40% on the FOTO questionnaire  - in progress, not met  3. Patient to demonstrate independence in proper body mechanics and lifting techniques for improved pain and in preparation for caring for infant  - in progress, not met     Plan     Continue per POC and plan to progress core stabilization exercises as tolerated    Vandana Keita, PT, DPT

## 2020-09-08 ENCOUNTER — CLINICAL SUPPORT (OUTPATIENT)
Dept: REHABILITATION | Facility: OTHER | Age: 35
End: 2020-09-08
Attending: OBSTETRICS & GYNECOLOGY
Payer: COMMERCIAL

## 2020-09-08 DIAGNOSIS — M25.551 PAIN OF BOTH HIP JOINTS: ICD-10-CM

## 2020-09-08 DIAGNOSIS — M25.552 PAIN OF BOTH HIP JOINTS: ICD-10-CM

## 2020-09-08 PROCEDURE — 97140 MANUAL THERAPY 1/> REGIONS: CPT | Mod: PN

## 2020-09-08 PROCEDURE — 97112 NEUROMUSCULAR REEDUCATION: CPT | Mod: PN

## 2020-09-08 NOTE — PROGRESS NOTES
"  Physical Therapy Treatment Note     Name: Gisella Smith  Clinic Number: [id number]    Therapy Diagnosis:   Encounter Diagnosis   Name Primary?    Pain of both hip joints      Physician: Deanna Martin MD    Visit Date: 9/8/2020    Physician Orders: PT Eval and Treat Outpatient Therapy Comment - BILAT HIP PAIN PREGNANCY   Medical Diagnosis from Referral: O26.892,M25.559 (ICD-10-M) - Pregnancy related hip pain in second trimester, antepartum   Evaluation Date: 7/23/2020  Authorization Period Expiration: 12/31/2020  Plan of Care Expiration: 10/18/2020  Visit # / Visits authorized: 9/ 30    Time In: 2:30 pm  Time Out: 3:30 pm  Total Billable Time: 60 minutes    Precautions: Standard and pregnancy 3rd trimester    Subjective     Pt reports: currently 30 weeks. Going back to see Dr. Martin in 2 weeks, also getting another ultrasound to check the position of her placenta.     She was compliant with home exercise program.  Response to previous treatment: none  Functional change: none    Pain: 4/10  Location: bilateral Hips  L>R    Objective     Gisella received therapeutic exercises to develop flexibility and core stabilization for 00 minutes including:  -piriformis str HL with HOB elevated 30" x 2 (over moist heat pillow case and towel layer)  - adductor ball squeeze with TrA 5 sec x 20      Gisella received the following manual therapy techniques: Joint mobilizations and Myofacial release were applied to the: B hips for 25 minutes, including:    FDN: (B) glute med/min (set-up and assessment, not billed treatment)   Time includes discussion of timeline with PFPT and post-partum care, expectations for PFPT later in pregnancy.     Neuro Re-EDU x35 min  - front plank 6x15 seconds  Time includes discussion of exercise modifications for core strengthening during pregnancy, instruction on PFM+TA co-contraction with core work, doming and how to assess/avoid.     Home Exercises Provided and Patient Education " Provided     Education provided:   - education in self STM with rolling stick    Written Home Exercises Provided: yes.  Exercises were reviewed and Gisella was able to demonstrate them prior to the end of the session.  Gisella demonstrated good  understanding of the education provided.     See EMR under Media for exercises provided prior visit.    Assessment     FDN for glute med/min produced deep ache response in all areas. Discussed modifications for core strengthening, doming, and PFM + TA co-contraction. Patient reported feeling well upon departure.       Gisella is progressing well towards her goals.   Pt prognosis is Good.     Pt will continue to benefit from skilled outpatient physical therapy to address the deficits listed in the problem list box on initial evaluation, provide pt/family education and to maximize pt's level of independence in the home and community environment.     Pt's spiritual, cultural and educational needs considered and pt agreeable to plan of care and goals.     Anticipated barriers to physical therapy: none    Goals:     Short Term Goals: 5 weeks   1. Patient to demonstrate independence with log roll technique for supine <>sit transfers and avoidance of valsalva maneuver for prevention of diastasis recti- in progress, not met  2. Patient to report decreased intensity B hip pain at night by 40% or greater - in progress, not met  3. Patient to be able to isolate transverse abdominus and hold contraction 30 sec or greater for improved lumbopelvic stability  - in progress, not met     Long Term Goals: 10 weeks   1. Patient to be independent with home exercise program for improved self management of condition - in progress, not met  2. Patient to have decreased subjective report of disability as noted by a score of <40% on the FOTO questionnaire  - in progress, not met  3. Patient to demonstrate independence in proper body mechanics and lifting techniques for improved pain and in  preparation for caring for infant  - in progress, not met     Plan     Breathing/bearing down and perineal massage next session.     Vandana Keita, PT, DPT

## 2020-09-13 ENCOUNTER — CLINICAL SUPPORT (OUTPATIENT)
Dept: URGENT CARE | Facility: CLINIC | Age: 35
End: 2020-09-13
Payer: COMMERCIAL

## 2020-09-13 ENCOUNTER — LAB VISIT (OUTPATIENT)
Dept: URGENT CARE | Facility: CLINIC | Age: 35
End: 2020-09-13
Payer: COMMERCIAL

## 2020-09-13 DIAGNOSIS — Z01.84 ENCOUNTER FOR ANTIBODY RESPONSE EXAMINATION: ICD-10-CM

## 2020-09-13 DIAGNOSIS — Z11.9 SCREENING EXAMINATION FOR UNSPECIFIED INFECTIOUS DISEASE: Primary | ICD-10-CM

## 2020-09-13 LAB
CTP QC/QA: YES
SARS-COV-2 IGG SERPLBLD QL IA.RAPID: NEGATIVE
SARS-COV-2 RDRP RESP QL NAA+PROBE: NEGATIVE

## 2020-09-13 PROCEDURE — U0002: ICD-10-PCS | Mod: QW,S$GLB,, | Performed by: NURSE PRACTITIONER

## 2020-09-13 PROCEDURE — 86769 SARS-COV-2 COVID-19 ANTIBODY: CPT

## 2020-09-13 PROCEDURE — U0002 COVID-19 LAB TEST NON-CDC: HCPCS | Mod: QW,S$GLB,, | Performed by: NURSE PRACTITIONER

## 2020-09-13 PROCEDURE — 99211 OFF/OP EST MAY X REQ PHY/QHP: CPT | Mod: S$GLB,,, | Performed by: NURSE PRACTITIONER

## 2020-09-13 PROCEDURE — 99211 PR OFFICE/OUTPT VISIT, EST, LEVL I: ICD-10-PCS | Mod: S$GLB,,, | Performed by: NURSE PRACTITIONER

## 2020-09-14 ENCOUNTER — TELEPHONE (OUTPATIENT)
Dept: URGENT CARE | Facility: CLINIC | Age: 35
End: 2020-09-14

## 2020-09-14 NOTE — TELEPHONE ENCOUNTER
I called patient and notified them that their antibody test was negative.      Individuals with a negative antibody test should be aware that they have not been infected by the virus or developed antibodies to COVID-19.   Social distancing, good hand hygiene and wearing face mask in public are all still encouraged, regardless of the test result.

## 2020-09-21 ENCOUNTER — ROUTINE PRENATAL (OUTPATIENT)
Dept: OBSTETRICS AND GYNECOLOGY | Facility: CLINIC | Age: 35
End: 2020-09-21
Payer: COMMERCIAL

## 2020-09-21 VITALS
DIASTOLIC BLOOD PRESSURE: 66 MMHG | SYSTOLIC BLOOD PRESSURE: 116 MMHG | WEIGHT: 152.56 LBS | BODY MASS INDEX: 27.02 KG/M2

## 2020-09-21 DIAGNOSIS — Z3A.32 PREGNANCY WITH 32 COMPLETED WEEKS GESTATION: Primary | ICD-10-CM

## 2020-09-21 PROCEDURE — 99999 PR PBB SHADOW E&M-EST. PATIENT-LVL II: CPT | Mod: PBBFAC,,, | Performed by: OBSTETRICS & GYNECOLOGY

## 2020-09-21 PROCEDURE — 99999 PR PBB SHADOW E&M-EST. PATIENT-LVL II: ICD-10-PCS | Mod: PBBFAC,,, | Performed by: OBSTETRICS & GYNECOLOGY

## 2020-09-21 PROCEDURE — 0502F PR SUBSEQUENT PRENATAL CARE: ICD-10-PCS | Mod: CPTII,S$GLB,, | Performed by: OBSTETRICS & GYNECOLOGY

## 2020-09-21 PROCEDURE — 0502F SUBSEQUENT PRENATAL CARE: CPT | Mod: CPTII,S$GLB,, | Performed by: OBSTETRICS & GYNECOLOGY

## 2020-09-21 NOTE — PROGRESS NOTES
GOOD FM, OCCAS TIGHT UC AND NO PV LOSS. SOME SI JOINT PAIN.  JUST BACK [place] TO VISIT MOTHER AND DRIVE-BY BABY SHOWER.  APPT 2 WEEKS.  HAS RECEIVED FLU VACCINE AT WORK. APPT TOMORROW 9/22 [facility].

## 2020-09-22 ENCOUNTER — PROCEDURE VISIT (OUTPATIENT)
Dept: MATERNAL FETAL MEDICINE | Facility: CLINIC | Age: 35
End: 2020-09-22
Payer: COMMERCIAL

## 2020-09-22 DIAGNOSIS — O44.40 LOW-LYING PLACENTA: ICD-10-CM

## 2020-09-22 DIAGNOSIS — Z36.9 ENCOUNTER FOR FETAL ULTRASOUND: ICD-10-CM

## 2020-09-22 PROCEDURE — 76817 TRANSVAGINAL US OBSTETRIC: CPT | Mod: S$GLB,,, | Performed by: OBSTETRICS & GYNECOLOGY

## 2020-09-22 PROCEDURE — 76816 OB US FOLLOW-UP PER FETUS: CPT | Mod: S$GLB,,, | Performed by: OBSTETRICS & GYNECOLOGY

## 2020-09-22 PROCEDURE — 76816 PR  US,PREGNANT UTERUS,F/U,TRANSABD APP: ICD-10-PCS | Mod: S$GLB,,, | Performed by: OBSTETRICS & GYNECOLOGY

## 2020-09-22 PROCEDURE — 76817 PR US, OB, TRANSVAG APPROACH: ICD-10-PCS | Mod: S$GLB,,, | Performed by: OBSTETRICS & GYNECOLOGY

## 2020-09-26 PROBLEM — O44.40 LOW-LYING PLACENTA: Status: RESOLVED | Noted: 2020-06-26 | Resolved: 2020-09-26

## 2020-09-29 ENCOUNTER — PATIENT MESSAGE (OUTPATIENT)
Dept: OBSTETRICS AND GYNECOLOGY | Facility: CLINIC | Age: 35
End: 2020-09-29

## 2020-10-08 ENCOUNTER — TELEPHONE (OUTPATIENT)
Dept: OBSTETRICS AND GYNECOLOGY | Facility: CLINIC | Age: 35
End: 2020-10-08

## 2020-10-08 NOTE — TELEPHONE ENCOUNTER
----- Message from Marge Everett sent at 10/7/2020  4:35 PM CDT -----  Patient is having some cramping and tightness over the past 20-30 minutes. Pain is lower part of her belly.     Patient can be reached at

## 2020-10-08 NOTE — TELEPHONE ENCOUNTER
Called patient back, patient was advised to increase fluids and rest,patient is out of town for her fathers  at this time and will follow up if pains continue.   ER precautions were discussed.

## 2020-10-12 ENCOUNTER — ROUTINE PRENATAL (OUTPATIENT)
Dept: OBSTETRICS AND GYNECOLOGY | Facility: CLINIC | Age: 35
End: 2020-10-12
Payer: COMMERCIAL

## 2020-10-12 ENCOUNTER — HOSPITAL ENCOUNTER (OUTPATIENT)
Dept: PERINATAL CARE | Facility: OTHER | Age: 35
Discharge: HOME | End: 2020-10-12
Attending: OBSTETRICS & GYNECOLOGY
Payer: COMMERCIAL

## 2020-10-12 ENCOUNTER — PATIENT MESSAGE (OUTPATIENT)
Dept: OBSTETRICS AND GYNECOLOGY | Facility: CLINIC | Age: 35
End: 2020-10-12

## 2020-10-12 VITALS
DIASTOLIC BLOOD PRESSURE: 78 MMHG | SYSTOLIC BLOOD PRESSURE: 122 MMHG | BODY MASS INDEX: 27.34 KG/M2 | WEIGHT: 154.31 LBS

## 2020-10-12 DIAGNOSIS — O36.8390 ABNORMAL FETAL HEART RATE AFFECTING PREGNANCY: ICD-10-CM

## 2020-10-12 DIAGNOSIS — Z3A.35 PREGNANCY WITH 35 COMPLETED WEEKS GESTATION: Primary | ICD-10-CM

## 2020-10-12 PROCEDURE — 99999 PR PBB SHADOW E&M-EST. PATIENT-LVL II: CPT | Mod: PBBFAC,,, | Performed by: OBSTETRICS & GYNECOLOGY

## 2020-10-12 PROCEDURE — 87081 CULTURE SCREEN ONLY: CPT

## 2020-10-12 PROCEDURE — 0502F SUBSEQUENT PRENATAL CARE: CPT | Mod: CPTII,S$GLB,, | Performed by: OBSTETRICS & GYNECOLOGY

## 2020-10-12 PROCEDURE — 59025 PR FETAL 2N-STRESS TEST: ICD-10-PCS | Mod: 26,,, | Performed by: OBSTETRICS & GYNECOLOGY

## 2020-10-12 PROCEDURE — 99999 PR PBB SHADOW E&M-EST. PATIENT-LVL II: ICD-10-PCS | Mod: PBBFAC,,, | Performed by: OBSTETRICS & GYNECOLOGY

## 2020-10-12 PROCEDURE — 0502F PR SUBSEQUENT PRENATAL CARE: ICD-10-PCS | Mod: CPTII,S$GLB,, | Performed by: OBSTETRICS & GYNECOLOGY

## 2020-10-12 PROCEDURE — 59025 FETAL NON-STRESS TEST: CPT

## 2020-10-12 PROCEDURE — 59025 FETAL NON-STRESS TEST: CPT | Mod: 26,,, | Performed by: OBSTETRICS & GYNECOLOGY

## 2020-10-12 RX ORDER — VALACYCLOVIR HYDROCHLORIDE 500 MG/1
TABLET, FILM COATED ORAL
Qty: 60 TABLET | Refills: 2 | Status: ON HOLD | OUTPATIENT
Start: 2020-10-12 | End: 2020-11-02 | Stop reason: HOSPADM

## 2020-10-12 NOTE — PROGRESS NOTES
GOOD FM, NO UC AND NO PV LOSS.  FATHER'S  LAST WEEK, BACK FROM [place] FOR REMAINDER OF PREG. IN OFFICE TODAY FHR LOWER THAN NORM - NST.  CLOSED/ 40%/ -3, MED, VERY POST.    [facility] RXN, GBS AND LABS.  LABOR INSTRUCTIONS, WARNING SIGNS.  HAS RECEIVED FLU VACCINE.  1 WEEK

## 2020-10-14 ENCOUNTER — LAB VISIT (OUTPATIENT)
Dept: LAB | Facility: OTHER | Age: 35
End: 2020-10-14
Attending: OBSTETRICS & GYNECOLOGY
Payer: COMMERCIAL

## 2020-10-14 DIAGNOSIS — Z3A.35 PREGNANCY WITH 35 COMPLETED WEEKS GESTATION: ICD-10-CM

## 2020-10-14 LAB
BASOPHILS # BLD AUTO: 0.04 K/UL (ref 0–0.2)
BASOPHILS NFR BLD: 0.4 % (ref 0–1.9)
DIFFERENTIAL METHOD: ABNORMAL
EOSINOPHIL # BLD AUTO: 0.1 K/UL (ref 0–0.5)
EOSINOPHIL NFR BLD: 0.5 % (ref 0–8)
ERYTHROCYTE [DISTWIDTH] IN BLOOD BY AUTOMATED COUNT: 12.4 % (ref 11.5–14.5)
HCT VFR BLD AUTO: 32.7 % (ref 37–48.5)
HGB BLD-MCNC: 11.6 G/DL (ref 12–16)
IMM GRANULOCYTES # BLD AUTO: 0.04 K/UL (ref 0–0.04)
IMM GRANULOCYTES NFR BLD AUTO: 0.4 % (ref 0–0.5)
LYMPHOCYTES # BLD AUTO: 1.9 K/UL (ref 1–4.8)
LYMPHOCYTES NFR BLD: 19 % (ref 18–48)
MCH RBC QN AUTO: 35 PG (ref 27–31)
MCHC RBC AUTO-ENTMCNC: 35.5 G/DL (ref 32–36)
MCV RBC AUTO: 99 FL (ref 82–98)
MONOCYTES # BLD AUTO: 0.9 K/UL (ref 0.3–1)
MONOCYTES NFR BLD: 8.7 % (ref 4–15)
NEUTROPHILS # BLD AUTO: 7 K/UL (ref 1.8–7.7)
NEUTROPHILS NFR BLD: 71 % (ref 38–73)
NRBC BLD-RTO: 0 /100 WBC
PLATELET # BLD AUTO: 206 K/UL (ref 150–350)
PMV BLD AUTO: 10.5 FL (ref 9.2–12.9)
RBC # BLD AUTO: 3.31 M/UL (ref 4–5.4)
WBC # BLD AUTO: 9.86 K/UL (ref 3.9–12.7)

## 2020-10-14 PROCEDURE — 85025 COMPLETE CBC W/AUTO DIFF WBC: CPT

## 2020-10-14 PROCEDURE — 86592 SYPHILIS TEST NON-TREP QUAL: CPT

## 2020-10-14 PROCEDURE — 36415 COLL VENOUS BLD VENIPUNCTURE: CPT

## 2020-10-14 PROCEDURE — 86703 HIV-1/HIV-2 1 RESULT ANTBDY: CPT

## 2020-10-15 LAB
BACTERIA SPEC AEROBE CULT: NORMAL
HIV 1+2 AB+HIV1 P24 AG SERPL QL IA: NEGATIVE
RPR SER QL: NORMAL

## 2020-10-19 ENCOUNTER — HOSPITAL ENCOUNTER (EMERGENCY)
Facility: OTHER | Age: 35
Discharge: HOME | End: 2020-10-19
Attending: OBSTETRICS & GYNECOLOGY
Payer: COMMERCIAL

## 2020-10-19 VITALS
TEMPERATURE: 99 F | HEART RATE: 83 BPM | OXYGEN SATURATION: 96 % | DIASTOLIC BLOOD PRESSURE: 74 MMHG | RESPIRATION RATE: 18 BRPM | SYSTOLIC BLOOD PRESSURE: 118 MMHG

## 2020-10-19 DIAGNOSIS — O16.3 ELEVATED BLOOD PRESSURE AFFECTING PREGNANCY IN THIRD TRIMESTER, ANTEPARTUM: ICD-10-CM

## 2020-10-19 DIAGNOSIS — Z3A.36 36 WEEKS GESTATION OF PREGNANCY: Primary | ICD-10-CM

## 2020-10-19 LAB
ALBUMIN SERPL BCP-MCNC: 3.1 G/DL (ref 3.5–5.2)
ALP SERPL-CCNC: 90 U/L (ref 55–135)
ALT SERPL W/O P-5'-P-CCNC: 13 U/L (ref 10–44)
ANION GAP SERPL CALC-SCNC: 10 MMOL/L (ref 8–16)
AST SERPL-CCNC: 14 U/L (ref 10–40)
BASOPHILS # BLD AUTO: 0.05 K/UL (ref 0–0.2)
BASOPHILS NFR BLD: 0.5 % (ref 0–1.9)
BILIRUB SERPL-MCNC: 0.3 MG/DL (ref 0.1–1)
BUN SERPL-MCNC: 7 MG/DL (ref 6–20)
CALCIUM SERPL-MCNC: 9.5 MG/DL (ref 8.7–10.5)
CHLORIDE SERPL-SCNC: 107 MMOL/L (ref 95–110)
CO2 SERPL-SCNC: 20 MMOL/L (ref 23–29)
CREAT SERPL-MCNC: 0.8 MG/DL (ref 0.5–1.4)
CREAT UR-MCNC: 65.4 MG/DL (ref 15–325)
DIFFERENTIAL METHOD: ABNORMAL
EOSINOPHIL # BLD AUTO: 0.1 K/UL (ref 0–0.5)
EOSINOPHIL NFR BLD: 0.7 % (ref 0–8)
ERYTHROCYTE [DISTWIDTH] IN BLOOD BY AUTOMATED COUNT: 12.1 % (ref 11.5–14.5)
EST. GFR  (AFRICAN AMERICAN): >60 ML/MIN/1.73 M^2
EST. GFR  (NON AFRICAN AMERICAN): >60 ML/MIN/1.73 M^2
GLUCOSE SERPL-MCNC: 83 MG/DL (ref 70–110)
HCT VFR BLD AUTO: 32.4 % (ref 37–48.5)
HGB BLD-MCNC: 11.5 G/DL (ref 12–16)
IMM GRANULOCYTES # BLD AUTO: 0.04 K/UL (ref 0–0.04)
IMM GRANULOCYTES NFR BLD AUTO: 0.4 % (ref 0–0.5)
LYMPHOCYTES # BLD AUTO: 1.9 K/UL (ref 1–4.8)
LYMPHOCYTES NFR BLD: 19.6 % (ref 18–48)
MCH RBC QN AUTO: 34.8 PG (ref 27–31)
MCHC RBC AUTO-ENTMCNC: 35.5 G/DL (ref 32–36)
MCV RBC AUTO: 98 FL (ref 82–98)
MONOCYTES # BLD AUTO: 0.8 K/UL (ref 0.3–1)
MONOCYTES NFR BLD: 8.5 % (ref 4–15)
NEUTROPHILS # BLD AUTO: 6.7 K/UL (ref 1.8–7.7)
NEUTROPHILS NFR BLD: 70.3 % (ref 38–73)
NRBC BLD-RTO: 0 /100 WBC
PLATELET # BLD AUTO: 192 K/UL (ref 150–350)
PMV BLD AUTO: 10.2 FL (ref 9.2–12.9)
POTASSIUM SERPL-SCNC: 3.3 MMOL/L (ref 3.5–5.1)
PROT SERPL-MCNC: 6.5 G/DL (ref 6–8.4)
PROT UR-MCNC: 9 MG/DL (ref 0–15)
PROT/CREAT UR: 0.14 MG/G{CREAT} (ref 0–0.2)
RBC # BLD AUTO: 3.3 M/UL (ref 4–5.4)
SODIUM SERPL-SCNC: 137 MMOL/L (ref 136–145)
WBC # BLD AUTO: 9.51 K/UL (ref 3.9–12.7)

## 2020-10-19 PROCEDURE — 80053 COMPREHEN METABOLIC PANEL: CPT

## 2020-10-19 PROCEDURE — 99284 EMERGENCY DEPT VISIT MOD MDM: CPT | Mod: 25,,, | Performed by: OBSTETRICS & GYNECOLOGY

## 2020-10-19 PROCEDURE — 59025 FETAL NON-STRESS TEST: CPT

## 2020-10-19 PROCEDURE — 99284 PR EMERGENCY DEPT VISIT,LEVEL IV: ICD-10-PCS | Mod: 25,,, | Performed by: OBSTETRICS & GYNECOLOGY

## 2020-10-19 PROCEDURE — 85025 COMPLETE CBC W/AUTO DIFF WBC: CPT

## 2020-10-19 PROCEDURE — 59025 PR FETAL 2N-STRESS TEST: ICD-10-PCS | Mod: 26,,, | Performed by: OBSTETRICS & GYNECOLOGY

## 2020-10-19 PROCEDURE — 84156 ASSAY OF PROTEIN URINE: CPT

## 2020-10-19 PROCEDURE — 99284 EMERGENCY DEPT VISIT MOD MDM: CPT | Mod: 25

## 2020-10-19 PROCEDURE — 25000003 PHARM REV CODE 250: Performed by: STUDENT IN AN ORGANIZED HEALTH CARE EDUCATION/TRAINING PROGRAM

## 2020-10-19 PROCEDURE — 59025 FETAL NON-STRESS TEST: CPT | Mod: 26,,, | Performed by: OBSTETRICS & GYNECOLOGY

## 2020-10-19 RX ORDER — ACETAMINOPHEN 500 MG
1000 TABLET ORAL ONCE
Status: COMPLETED | OUTPATIENT
Start: 2020-10-19 | End: 2020-10-19

## 2020-10-19 RX ADMIN — ACETAMINOPHEN 1000 MG: 500 TABLET, FILM COATED ORAL at 12:10

## 2020-10-19 NOTE — ED PROVIDER NOTES
Encounter Date: 10/19/2020       History   No chief complaint on file.    Gisella Smith is a 35 y.o. F at 36w1d presents complaining of elevated pressures at home on her connected mom cuff. Patient states her baseline blood pressures are usually much lower than they have been this week. She had pressures on her connected mom cuff in the 140s/90s. She does have a headache currently - but would not rate it as severe. She denies chest pain, shortness of breath, spots in vision, RUQ pain.  Patient denies contractions, denies vaginal bleeding, denies LOF.   Fetal Movement: normal.        Review of patient's allergies indicates:  No Known Allergies  Past Medical History:   Diagnosis Date    Overweight (BMI 25.0-29.9) 2017    Recurrent cold sores 2017     No past surgical history on file.  Family History   Problem Relation Age of Onset    Parkinsonism Father     Hypertension Maternal Grandmother     Cancer Maternal Grandfather         mouth    Diabetes Mellitus Paternal Grandmother     Hypertension Paternal Grandfather      Social History     Tobacco Use    Smoking status: Never Smoker    Smokeless tobacco: Never Used   Substance Use Topics    Alcohol use: Not Currently    Drug use: Never     Review of Systems   Constitutional: Negative for chills and fever.   HENT: Negative for facial swelling.    Eyes: Negative for visual disturbance.   Respiratory: Negative for chest tightness and shortness of breath.    Cardiovascular: Negative for chest pain.   Gastrointestinal: Negative for abdominal pain, nausea and vomiting.   Genitourinary: Negative for dysuria, vaginal bleeding and vaginal discharge.   Skin: Negative.    Neurological: Positive for headaches.   Psychiatric/Behavioral: Negative.        Physical Exam     Initial Vitals   BP Pulse Resp Temp SpO2   10/19/20 1209 10/19/20 1209 10/19/20 1225 10/19/20 1209 10/19/20 1209   133/89 94 16 98.5 °F (36.9 °C) 98 %      MAP       --                 Physical Exam    Vitals reviewed.  Constitutional: She appears well-developed and well-nourished.   HENT:   Head: Normocephalic and atraumatic.   Eyes: EOM are normal.   Neck: Normal range of motion. Neck supple.   Cardiovascular: Normal rate and regular rhythm.   Pulmonary/Chest: No respiratory distress.   Abdominal: Soft. There is no abdominal tenderness.   Musculoskeletal: Normal range of motion.   Neurological: She is alert and oriented to person, place, and time.   Skin: Skin is warm and dry.   Psychiatric: She has a normal mood and affect.         ED Course   Obtain Fetal nonstress test (NST)    Date/Time: 10/19/2020 12:28 PM  Performed by: Estefania Desouza MD  Authorized by: Estefania Desouza MD     Nonstress Test:     Variability:  6-25 BPM    Accelerations:  15 bpm    Baseline:  130    Contractions:  Not present  Biophysical Profile:     Nonstress Test Interpretation: reactive      Overall Impression:  Reassuring  Post-procedure:     Patient tolerance:  Patient tolerated the procedure well with no immediate complications      Labs Reviewed   CBC W/ AUTO DIFFERENTIAL - Abnormal; Notable for the following components:       Result Value    RBC 3.30 (*)     Hemoglobin 11.5 (*)     Hematocrit 32.4 (*)     Mean Corpuscular Hemoglobin 34.8 (*)     All other components within normal limits   COMPREHENSIVE METABOLIC PANEL - Abnormal; Notable for the following components:    Potassium 3.3 (*)     CO2 20 (*)     Albumin 3.1 (*)     All other components within normal limits   PROTEIN / CREATININE RATIO, URINE    Narrative:     Specimen Source->Urine          Imaging Results    None          Medical Decision Making:   ED Management:  NST reactive  Serial blood pressures - all wnl  CBC, CMP, P/C ratio collected- all negative  1g Tylenol for headache with relief  Discharged home in stable condition  Other:   I have discussed this case with another health care provider.       <> Summary of the Discussion: Dr. Elias                              Clinical Impression:     ICD-10-CM ICD-9-CM   1. 36 weeks gestation of pregnancy  Z3A.36 V22.2   2. Elevated blood pressure affecting pregnancy in third trimester, antepartum  O16.3 642.33                                  Estefania Desouza M.D.  [facility] PGY-2             Marilee Young MD  Resident  10/19/20 [id number]

## 2020-10-21 ENCOUNTER — ROUTINE PRENATAL (OUTPATIENT)
Dept: OBSTETRICS AND GYNECOLOGY | Facility: CLINIC | Age: 35
End: 2020-10-21
Payer: COMMERCIAL

## 2020-10-21 VITALS
DIASTOLIC BLOOD PRESSURE: 80 MMHG | BODY MASS INDEX: 27.26 KG/M2 | SYSTOLIC BLOOD PRESSURE: 124 MMHG | WEIGHT: 153.88 LBS

## 2020-10-21 DIAGNOSIS — O13.3 PREGNANCY-INDUCED HYPERTENSION IN THIRD TRIMESTER: ICD-10-CM

## 2020-10-21 DIAGNOSIS — Z3A.36 PREGNANCY WITH 36 COMPLETED WEEKS GESTATION: Primary | ICD-10-CM

## 2020-10-21 PROCEDURE — 0502F PR SUBSEQUENT PRENATAL CARE: ICD-10-PCS | Mod: CPTII,S$GLB,, | Performed by: OBSTETRICS & GYNECOLOGY

## 2020-10-21 PROCEDURE — 0502F SUBSEQUENT PRENATAL CARE: CPT | Mod: CPTII,S$GLB,, | Performed by: OBSTETRICS & GYNECOLOGY

## 2020-10-21 PROCEDURE — 99999 PR PBB SHADOW E&M-EST. PATIENT-LVL II: CPT | Mod: PBBFAC,,, | Performed by: OBSTETRICS & GYNECOLOGY

## 2020-10-21 PROCEDURE — 99999 PR PBB SHADOW E&M-EST. PATIENT-LVL II: ICD-10-PCS | Mod: PBBFAC,,, | Performed by: OBSTETRICS & GYNECOLOGY

## 2020-10-21 NOTE — PROGRESS NOTES
GOOD FM, NO UC AND NO PV LOSS.  WAS EVAL 2 DAYS AGO 10/19 WITH INCR BP AT HOME CUFF, SENT HOME OB ED BUT PCR .14.  24 HOUR URINE COLLECTION ORDERED.  NO H/A, BLURRY VISION AND DISCUSSED WARNING SIGNS; WILL BRING CUFF TO CALIBRATE NEXT VISIT.   LAST WEEK WAS STILL CLOSED AND NO SIGNIF CX CHANGE

## 2020-10-30 ENCOUNTER — ROUTINE PRENATAL (OUTPATIENT)
Dept: OBSTETRICS AND GYNECOLOGY | Facility: CLINIC | Age: 35
End: 2020-10-30
Payer: COMMERCIAL

## 2020-10-30 ENCOUNTER — ANESTHESIA (OUTPATIENT)
Dept: OBSTETRICS AND GYNECOLOGY | Facility: OTHER | Age: 35
End: 2020-10-30
Payer: COMMERCIAL

## 2020-10-30 ENCOUNTER — HOSPITAL ENCOUNTER (INPATIENT)
Facility: OTHER | Age: 35
LOS: 3 days | Discharge: HOME | End: 2020-11-02
Attending: OBSTETRICS & GYNECOLOGY | Admitting: OBSTETRICS & GYNECOLOGY
Payer: COMMERCIAL

## 2020-10-30 ENCOUNTER — ANESTHESIA EVENT (OUTPATIENT)
Dept: OBSTETRICS AND GYNECOLOGY | Facility: OTHER | Age: 35
End: 2020-10-30
Payer: COMMERCIAL

## 2020-10-30 VITALS
BODY MASS INDEX: 28.12 KG/M2 | WEIGHT: 158.75 LBS | SYSTOLIC BLOOD PRESSURE: 132 MMHG | DIASTOLIC BLOOD PRESSURE: 90 MMHG

## 2020-10-30 DIAGNOSIS — Z34.93 SUPERVISION OF LOW-RISK PREGNANCY, THIRD TRIMESTER: Primary | ICD-10-CM

## 2020-10-30 DIAGNOSIS — O13.9 GESTATIONAL HYPERTENSION: ICD-10-CM

## 2020-10-30 DIAGNOSIS — O16.9 ELEVATED BLOOD PRESSURE AFFECTING PREGNANCY, ANTEPARTUM: ICD-10-CM

## 2020-10-30 PROBLEM — O13.3 GESTATIONAL HYPERTENSION, THIRD TRIMESTER: Status: ACTIVE | Noted: 2020-10-30

## 2020-10-30 LAB
ABO + RH BLD: NORMAL
ALBUMIN SERPL BCP-MCNC: 3.1 G/DL (ref 3.5–5.2)
ALP SERPL-CCNC: 100 U/L (ref 55–135)
ALT SERPL W/O P-5'-P-CCNC: 10 U/L (ref 10–44)
ANION GAP SERPL CALC-SCNC: 13 MMOL/L (ref 8–16)
AST SERPL-CCNC: 16 U/L (ref 10–40)
BASOPHILS # BLD AUTO: 0.05 K/UL (ref 0–0.2)
BASOPHILS NFR BLD: 0.5 % (ref 0–1.9)
BILIRUB SERPL-MCNC: 0.3 MG/DL (ref 0.1–1)
BLD GP AB SCN CELLS X3 SERPL QL: NORMAL
BUN SERPL-MCNC: 6 MG/DL (ref 6–20)
CALCIUM SERPL-MCNC: 9 MG/DL (ref 8.7–10.5)
CHLORIDE SERPL-SCNC: 110 MMOL/L (ref 95–110)
CO2 SERPL-SCNC: 15 MMOL/L (ref 23–29)
CREAT SERPL-MCNC: 0.7 MG/DL (ref 0.5–1.4)
DIFFERENTIAL METHOD: ABNORMAL
EOSINOPHIL # BLD AUTO: 0.1 K/UL (ref 0–0.5)
EOSINOPHIL NFR BLD: 0.9 % (ref 0–8)
ERYTHROCYTE [DISTWIDTH] IN BLOOD BY AUTOMATED COUNT: 12.1 % (ref 11.5–14.5)
EST. GFR  (AFRICAN AMERICAN): >60 ML/MIN/1.73 M^2
EST. GFR  (NON AFRICAN AMERICAN): >60 ML/MIN/1.73 M^2
GLUCOSE SERPL-MCNC: 95 MG/DL (ref 70–110)
HCT VFR BLD AUTO: 31.5 % (ref 37–48.5)
HGB BLD-MCNC: 11.4 G/DL (ref 12–16)
IMM GRANULOCYTES # BLD AUTO: 0.03 K/UL (ref 0–0.04)
IMM GRANULOCYTES NFR BLD AUTO: 0.3 % (ref 0–0.5)
LYMPHOCYTES # BLD AUTO: 2.4 K/UL (ref 1–4.8)
LYMPHOCYTES NFR BLD: 25.3 % (ref 18–48)
MCH RBC QN AUTO: 35.3 PG (ref 27–31)
MCHC RBC AUTO-ENTMCNC: 36.2 G/DL (ref 32–36)
MCV RBC AUTO: 98 FL (ref 82–98)
MONOCYTES # BLD AUTO: 0.8 K/UL (ref 0.3–1)
MONOCYTES NFR BLD: 8.8 % (ref 4–15)
NEUTROPHILS # BLD AUTO: 6 K/UL (ref 1.8–7.7)
NEUTROPHILS NFR BLD: 64.2 % (ref 38–73)
NRBC BLD-RTO: 0 /100 WBC
PLATELET # BLD AUTO: 188 K/UL (ref 150–350)
PMV BLD AUTO: 10.3 FL (ref 9.2–12.9)
POTASSIUM SERPL-SCNC: 3.2 MMOL/L (ref 3.5–5.1)
PROT SERPL-MCNC: 6.6 G/DL (ref 6–8.4)
RBC # BLD AUTO: 3.23 M/UL (ref 4–5.4)
SARS-COV-2 RDRP RESP QL NAA+PROBE: NEGATIVE
SODIUM SERPL-SCNC: 138 MMOL/L (ref 136–145)
WBC # BLD AUTO: 9.36 K/UL (ref 3.9–12.7)

## 2020-10-30 PROCEDURE — 25000003 PHARM REV CODE 250: Performed by: ANESTHESIOLOGY

## 2020-10-30 PROCEDURE — 80053 COMPREHEN METABOLIC PANEL: CPT

## 2020-10-30 PROCEDURE — 85025 COMPLETE CBC W/AUTO DIFF WBC: CPT

## 2020-10-30 PROCEDURE — 59400 PRA FULL ROUT OBSTE CARE,VAGINAL DELIV: ICD-10-PCS | Mod: QY,,, | Performed by: ANESTHESIOLOGY

## 2020-10-30 PROCEDURE — 62326 NJX INTERLAMINAR LMBR/SAC: CPT | Performed by: ANESTHESIOLOGY

## 2020-10-30 PROCEDURE — 99999 PR PBB SHADOW E&M-EST. PATIENT-LVL II: ICD-10-PCS | Mod: PBBFAC,,, | Performed by: OBSTETRICS & GYNECOLOGY

## 2020-10-30 PROCEDURE — 27200710 HC EPIDURAL INFUSION PUMP SET: Performed by: ANESTHESIOLOGY

## 2020-10-30 PROCEDURE — 25000003 PHARM REV CODE 250: Performed by: STUDENT IN AN ORGANIZED HEALTH CARE EDUCATION/TRAINING PROGRAM

## 2020-10-30 PROCEDURE — U0002 COVID-19 LAB TEST NON-CDC: HCPCS

## 2020-10-30 PROCEDURE — 63600175 PHARM REV CODE 636 W HCPCS: Performed by: STUDENT IN AN ORGANIZED HEALTH CARE EDUCATION/TRAINING PROGRAM

## 2020-10-30 PROCEDURE — 0502F SUBSEQUENT PRENATAL CARE: CPT | Mod: CPTII,S$GLB,, | Performed by: OBSTETRICS & GYNECOLOGY

## 2020-10-30 PROCEDURE — 99999 PR PBB SHADOW E&M-EST. PATIENT-LVL II: CPT | Mod: PBBFAC,,, | Performed by: OBSTETRICS & GYNECOLOGY

## 2020-10-30 PROCEDURE — 0502F PR SUBSEQUENT PRENATAL CARE: ICD-10-PCS | Mod: CPTII,S$GLB,, | Performed by: OBSTETRICS & GYNECOLOGY

## 2020-10-30 PROCEDURE — 86850 RBC ANTIBODY SCREEN: CPT

## 2020-10-30 PROCEDURE — 59400 OBSTETRICAL CARE: CPT | Mod: QY,,, | Performed by: ANESTHESIOLOGY

## 2020-10-30 PROCEDURE — 51702 INSERT TEMP BLADDER CATH: CPT

## 2020-10-30 PROCEDURE — 72100002 HC LABOR CARE, 1ST 8 HOURS

## 2020-10-30 PROCEDURE — 11000001 HC ACUTE MED/SURG PRIVATE ROOM

## 2020-10-30 PROCEDURE — C1751 CATH, INF, PER/CENT/MIDLINE: HCPCS | Performed by: ANESTHESIOLOGY

## 2020-10-30 RX ORDER — FENTANYL/BUPIVACAINE/NS/PF 2MCG/ML-.1
PLASTIC BAG, INJECTION (ML) INJECTION
Status: COMPLETED
Start: 2020-10-30 | End: 2020-10-30

## 2020-10-30 RX ORDER — FENTANYL/BUPIVACAINE/NS/PF 2MCG/ML-.1
PLASTIC BAG, INJECTION (ML) INJECTION CONTINUOUS PRN
Status: DISCONTINUED | OUTPATIENT
Start: 2020-10-30 | End: 2020-10-31

## 2020-10-30 RX ORDER — OXYTOCIN/RINGER'S LACTATE 30/500 ML
95 PLASTIC BAG, INJECTION (ML) INTRAVENOUS ONCE
Status: COMPLETED | OUTPATIENT
Start: 2020-10-30 | End: 2020-10-31

## 2020-10-30 RX ORDER — SODIUM CHLORIDE, SODIUM LACTATE, POTASSIUM CHLORIDE, CALCIUM CHLORIDE 600; 310; 30; 20 MG/100ML; MG/100ML; MG/100ML; MG/100ML
INJECTION, SOLUTION INTRAVENOUS CONTINUOUS
Status: DISCONTINUED | OUTPATIENT
Start: 2020-10-30 | End: 2020-10-31

## 2020-10-30 RX ORDER — FENTANYL CITRATE 50 UG/ML
INJECTION, SOLUTION INTRAMUSCULAR; INTRAVENOUS
Status: COMPLETED
Start: 2020-10-30 | End: 2020-10-30

## 2020-10-30 RX ORDER — BUPIVACAINE HYDROCHLORIDE 2.5 MG/ML
INJECTION, SOLUTION EPIDURAL; INFILTRATION; INTRACAUDAL
Status: DISPENSED
Start: 2020-10-30 | End: 2020-10-31

## 2020-10-30 RX ORDER — FENTANYL CITRATE 50 UG/ML
INJECTION, SOLUTION INTRAMUSCULAR; INTRAVENOUS
Status: DISCONTINUED | OUTPATIENT
Start: 2020-10-30 | End: 2020-10-31

## 2020-10-30 RX ORDER — SODIUM CHLORIDE 9 MG/ML
INJECTION, SOLUTION INTRAVENOUS
Status: DISCONTINUED | OUTPATIENT
Start: 2020-10-30 | End: 2020-10-31

## 2020-10-30 RX ORDER — CALCIUM CARBONATE 200(500)MG
500 TABLET,CHEWABLE ORAL 3 TIMES DAILY PRN
Status: DISCONTINUED | OUTPATIENT
Start: 2020-10-30 | End: 2020-10-31

## 2020-10-30 RX ORDER — OXYTOCIN/RINGER'S LACTATE 30/500 ML
334 PLASTIC BAG, INJECTION (ML) INTRAVENOUS ONCE
Status: COMPLETED | OUTPATIENT
Start: 2020-10-30 | End: 2020-10-31

## 2020-10-30 RX ORDER — LIDOCAINE HYDROCHLORIDE AND EPINEPHRINE 15; 5 MG/ML; UG/ML
INJECTION, SOLUTION EPIDURAL
Status: DISCONTINUED | OUTPATIENT
Start: 2020-10-30 | End: 2020-10-31

## 2020-10-30 RX ORDER — SIMETHICONE 80 MG
1 TABLET,CHEWABLE ORAL 4 TIMES DAILY PRN
Status: DISCONTINUED | OUTPATIENT
Start: 2020-10-30 | End: 2020-10-31

## 2020-10-30 RX ORDER — CEFAZOLIN SODIUM 2 G/50ML
2 SOLUTION INTRAVENOUS ONCE AS NEEDED
Status: DISCONTINUED | OUTPATIENT
Start: 2020-10-30 | End: 2020-10-31

## 2020-10-30 RX ORDER — ONDANSETRON 8 MG/1
8 TABLET, ORALLY DISINTEGRATING ORAL EVERY 8 HOURS PRN
Status: DISCONTINUED | OUTPATIENT
Start: 2020-10-30 | End: 2020-10-31

## 2020-10-30 RX ADMIN — Medication 5 ML: at 09:10

## 2020-10-30 RX ADMIN — MISOPROSTOL 50 MCG: 100 TABLET ORAL at 08:10

## 2020-10-30 RX ADMIN — SODIUM CHLORIDE, SODIUM LACTATE, POTASSIUM CHLORIDE, AND CALCIUM CHLORIDE: .6; .31; .03; .02 INJECTION, SOLUTION INTRAVENOUS at 08:10

## 2020-10-30 RX ADMIN — LIDOCAINE HYDROCHLORIDE,EPINEPHRINE BITARTRATE 3 ML: 15; .005 INJECTION, SOLUTION EPIDURAL; INFILTRATION; INTRACAUDAL; PERINEURAL at 09:10

## 2020-10-30 RX ADMIN — FENTANYL CITRATE 100 MCG: 50 INJECTION, SOLUTION INTRAMUSCULAR; INTRAVENOUS at 09:10

## 2020-10-30 RX ADMIN — Medication 10 ML/HR: at 09:10

## 2020-10-30 NOTE — PROGRESS NOTES
Good FM, no LOF, Ctx, VB.   Denies HA, change in vision, RUQ pain, edema. BP elevated again today. Had documented elevated BP at home with connected mom cuff. Has cuff here with her today and BP also elevated. Will send to L&D for induction of labor for GHTN vs PreE. Discussed with Dr. Martin. Dr. Crabtree notified. Pt signed consents in clinic today.

## 2020-10-31 PROCEDURE — 63600175 PHARM REV CODE 636 W HCPCS: Performed by: STUDENT IN AN ORGANIZED HEALTH CARE EDUCATION/TRAINING PROGRAM

## 2020-10-31 PROCEDURE — 51702 INSERT TEMP BLADDER CATH: CPT

## 2020-10-31 PROCEDURE — 59200 INSERT CERVICAL DILATOR: CPT

## 2020-10-31 PROCEDURE — 72100003 HC LABOR CARE, EA. ADDL. 8 HRS

## 2020-10-31 PROCEDURE — 72100002 HC LABOR CARE, 1ST 8 HOURS

## 2020-10-31 PROCEDURE — 59400 OBSTETRICAL CARE: CPT | Mod: AT,,, | Performed by: OBSTETRICS & GYNECOLOGY

## 2020-10-31 PROCEDURE — 25000003 PHARM REV CODE 250: Performed by: STUDENT IN AN ORGANIZED HEALTH CARE EDUCATION/TRAINING PROGRAM

## 2020-10-31 PROCEDURE — 25000003 PHARM REV CODE 250: Performed by: ANESTHESIOLOGY

## 2020-10-31 PROCEDURE — 59400 PR FULL ROUT OBSTE CARE,VAGINAL DELIV: ICD-10-PCS | Mod: AT,,, | Performed by: OBSTETRICS & GYNECOLOGY

## 2020-10-31 PROCEDURE — 63600175 PHARM REV CODE 636 W HCPCS: Performed by: ANESTHESIOLOGY

## 2020-10-31 PROCEDURE — 72200005 HC VAGINAL DELIVERY LEVEL II

## 2020-10-31 PROCEDURE — 11000001 HC ACUTE MED/SURG PRIVATE ROOM

## 2020-10-31 RX ORDER — ACETAMINOPHEN 325 MG/1
650 TABLET ORAL EVERY 6 HOURS PRN
Status: DISCONTINUED | OUTPATIENT
Start: 2020-10-31 | End: 2020-11-02 | Stop reason: HOSPADM

## 2020-10-31 RX ORDER — HYDROCODONE BITARTRATE AND ACETAMINOPHEN 10; 325 MG/1; MG/1
1 TABLET ORAL EVERY 4 HOURS PRN
Status: DISCONTINUED | OUTPATIENT
Start: 2020-10-31 | End: 2020-11-02 | Stop reason: HOSPADM

## 2020-10-31 RX ORDER — FAMOTIDINE 10 MG/ML
20 INJECTION INTRAVENOUS ONCE
Status: COMPLETED | OUTPATIENT
Start: 2020-10-31 | End: 2020-10-31

## 2020-10-31 RX ORDER — FAMOTIDINE 10 MG/ML
20 INJECTION INTRAVENOUS DAILY
Status: DISCONTINUED | OUTPATIENT
Start: 2020-10-31 | End: 2020-10-31

## 2020-10-31 RX ORDER — DOCUSATE SODIUM 100 MG/1
200 CAPSULE, LIQUID FILLED ORAL 2 TIMES DAILY PRN
Status: DISCONTINUED | OUTPATIENT
Start: 2020-10-31 | End: 2020-11-02 | Stop reason: HOSPADM

## 2020-10-31 RX ORDER — ONDANSETRON 2 MG/ML
8 INJECTION INTRAMUSCULAR; INTRAVENOUS ONCE
Status: DISCONTINUED | OUTPATIENT
Start: 2020-10-31 | End: 2020-10-31

## 2020-10-31 RX ORDER — IBUPROFEN 600 MG/1
600 TABLET ORAL EVERY 6 HOURS
Status: DISCONTINUED | OUTPATIENT
Start: 2020-10-31 | End: 2020-11-02 | Stop reason: HOSPADM

## 2020-10-31 RX ORDER — DIPHENHYDRAMINE HYDROCHLORIDE 50 MG/ML
25 INJECTION INTRAMUSCULAR; INTRAVENOUS EVERY 4 HOURS PRN
Status: DISCONTINUED | OUTPATIENT
Start: 2020-10-31 | End: 2020-11-02 | Stop reason: HOSPADM

## 2020-10-31 RX ORDER — ONDANSETRON 2 MG/ML
INJECTION INTRAMUSCULAR; INTRAVENOUS
Status: DISPENSED
Start: 2020-10-31 | End: 2020-10-31

## 2020-10-31 RX ORDER — FENTANYL/BUPIVACAINE/NS/PF 2MCG/ML-.1
PLASTIC BAG, INJECTION (ML) INJECTION CONTINUOUS
Status: DISCONTINUED | OUTPATIENT
Start: 2020-10-31 | End: 2020-10-31

## 2020-10-31 RX ORDER — DIPHENHYDRAMINE HCL 25 MG
25 CAPSULE ORAL EVERY 4 HOURS PRN
Status: DISCONTINUED | OUTPATIENT
Start: 2020-10-31 | End: 2020-11-02 | Stop reason: HOSPADM

## 2020-10-31 RX ORDER — SIMETHICONE 80 MG
1 TABLET,CHEWABLE ORAL EVERY 6 HOURS PRN
Status: DISCONTINUED | OUTPATIENT
Start: 2020-10-31 | End: 2020-11-02 | Stop reason: HOSPADM

## 2020-10-31 RX ORDER — HYDROCODONE BITARTRATE AND ACETAMINOPHEN 5; 325 MG/1; MG/1
1 TABLET ORAL EVERY 4 HOURS PRN
Status: DISCONTINUED | OUTPATIENT
Start: 2020-10-31 | End: 2020-11-02 | Stop reason: HOSPADM

## 2020-10-31 RX ORDER — PRENATAL WITH FERROUS FUM AND FOLIC ACID 3080; 920; 120; 400; 22; 1.84; 3; 20; 10; 1; 12; 200; 27; 25; 2 [IU]/1; [IU]/1; MG/1; [IU]/1; MG/1; MG/1; MG/1; MG/1; MG/1; MG/1; UG/1; MG/1; MG/1; MG/1; MG/1
1 TABLET ORAL DAILY
Status: DISCONTINUED | OUTPATIENT
Start: 2020-10-31 | End: 2020-11-02 | Stop reason: HOSPADM

## 2020-10-31 RX ORDER — OXYTOCIN/RINGER'S LACTATE 30/500 ML
95 PLASTIC BAG, INJECTION (ML) INTRAVENOUS ONCE
Status: DISCONTINUED | OUTPATIENT
Start: 2020-10-31 | End: 2020-11-02 | Stop reason: HOSPADM

## 2020-10-31 RX ORDER — IBUPROFEN 600 MG/1
600 TABLET ORAL EVERY 6 HOURS PRN
Status: DISCONTINUED | OUTPATIENT
Start: 2020-10-31 | End: 2020-11-02 | Stop reason: HOSPADM

## 2020-10-31 RX ORDER — OXYTOCIN/RINGER'S LACTATE 30/500 ML
2 PLASTIC BAG, INJECTION (ML) INTRAVENOUS CONTINUOUS
Status: DISCONTINUED | OUTPATIENT
Start: 2020-10-31 | End: 2020-10-31

## 2020-10-31 RX ORDER — ONDANSETRON 8 MG/1
8 TABLET, ORALLY DISINTEGRATING ORAL EVERY 8 HOURS PRN
Status: DISCONTINUED | OUTPATIENT
Start: 2020-10-31 | End: 2020-11-02 | Stop reason: HOSPADM

## 2020-10-31 RX ORDER — BUPIVACAINE HYDROCHLORIDE 2.5 MG/ML
INJECTION, SOLUTION EPIDURAL; INFILTRATION; INTRACAUDAL
Status: DISPENSED
Start: 2020-10-31 | End: 2020-10-31

## 2020-10-31 RX ORDER — SODIUM CITRATE AND CITRIC ACID MONOHYDRATE 334; 500 MG/5ML; MG/5ML
30 SOLUTION ORAL ONCE
Status: DISCONTINUED | OUTPATIENT
Start: 2020-10-31 | End: 2020-10-31

## 2020-10-31 RX ORDER — FAMOTIDINE 10 MG/ML
INJECTION INTRAVENOUS
Status: DISPENSED
Start: 2020-10-31 | End: 2020-10-31

## 2020-10-31 RX ORDER — HYDROCORTISONE 25 MG/G
CREAM TOPICAL 3 TIMES DAILY PRN
Status: DISCONTINUED | OUTPATIENT
Start: 2020-10-31 | End: 2020-11-02 | Stop reason: HOSPADM

## 2020-10-31 RX ORDER — SODIUM CITRATE AND CITRIC ACID MONOHYDRATE 334; 500 MG/5ML; MG/5ML
30 SOLUTION ORAL ONCE
Status: COMPLETED | OUTPATIENT
Start: 2020-10-31 | End: 2020-10-31

## 2020-10-31 RX ORDER — FENTANYL CITRATE 50 UG/ML
INJECTION, SOLUTION INTRAMUSCULAR; INTRAVENOUS
Status: COMPLETED
Start: 2020-10-31 | End: 2020-10-31

## 2020-10-31 RX ADMIN — SODIUM CITRATE AND CITRIC ACID MONOHYDRATE 30 ML: 500; 334 SOLUTION ORAL at 08:10

## 2020-10-31 RX ADMIN — DOCUSATE SODIUM 200 MG: 100 CAPSULE, LIQUID FILLED ORAL at 08:10

## 2020-10-31 RX ADMIN — FAMOTIDINE 20 MG: 10 INJECTION INTRAVENOUS at 10:10

## 2020-10-31 RX ADMIN — Medication 250 ML: at 10:10

## 2020-10-31 RX ADMIN — FAMOTIDINE 20 MG: 10 INJECTION INTRAVENOUS at 04:10

## 2020-10-31 RX ADMIN — SODIUM CHLORIDE, SODIUM LACTATE, POTASSIUM CHLORIDE, AND CALCIUM CHLORIDE: .6; .31; .03; .02 INJECTION, SOLUTION INTRAVENOUS at 07:10

## 2020-10-31 RX ADMIN — Medication 95 MILLI-UNITS/MIN: at 01:10

## 2020-10-31 RX ADMIN — ONDANSETRON 8 MG: 8 TABLET, ORALLY DISINTEGRATING ORAL at 01:10

## 2020-10-31 RX ADMIN — Medication 2 MILLI-UNITS/MIN: at 04:10

## 2020-10-31 RX ADMIN — FENTANYL CITRATE 100 MCG: 50 INJECTION, SOLUTION INTRAMUSCULAR; INTRAVENOUS at 10:10

## 2020-10-31 RX ADMIN — Medication 8 ML: at 09:10

## 2020-10-31 RX ADMIN — Medication 334 MILLI-UNITS/MIN: at 01:10

## 2020-10-31 RX ADMIN — IBUPROFEN 600 MG: 600 TABLET, FILM COATED ORAL at 11:10

## 2020-10-31 RX ADMIN — HYDROCODONE BITARTRATE AND ACETAMINOPHEN 1 TABLET: 5; 325 TABLET ORAL at 08:10

## 2020-10-31 RX ADMIN — IBUPROFEN 600 MG: 600 TABLET, FILM COATED ORAL at 04:10

## 2020-10-31 NOTE — ANESTHESIA PREPROCEDURE EVALUATION
[facility]  Anesthesia Pre-Operative Evaluation         Patient Name: Gisella Smith  YOB: 1985  MRN: [id number]    10/30/2020      Gisella Smith is a 35 y.o. female  @ 37w5d who presents for IOL due to elevated blood pressure. Patient is an anesthesiologist. Denies additional PMH.    OB History    Para Term  AB Living   1             SAB TAB Ectopic Multiple Live Births                  # Outcome Date GA Lbr René/2nd Weight Sex Delivery Anes PTL Lv   1 Current                Review of patient's allergies indicates:  No Known Allergies    Wt Readings from Last 1 Encounters:   10/30/20 1545 72 kg (158 lb 11.7 oz)       BP Readings from Last 3 Encounters:   10/30/20 (!) 132/90   10/21/20 124/80   10/19/20 118/74       Patient Active Problem List   Diagnosis    Recurrent cold sores    Heartburn    Reactive airway disease that is not asthma    Ocular migraine    Pain of both hip joints    Gestational hypertension, third trimester    Gestational hypertension       No past surgical history on file.    Social History     Socioeconomic History    Marital status:      Spouse name: Not on file    Number of children: Not on file    Years of education: Not on file    Highest education level: Not on file   Occupational History    Not on file   Social Needs    Financial resource strain: Not hard at all    Food insecurity     Worry: Never true     Inability: Never true    Transportation needs     Medical: No     Non-medical: No   Tobacco Use    Smoking status: Never Smoker    Smokeless tobacco: Never Used   Substance and Sexual Activity    Alcohol use: Not Currently    Drug use: Never    Sexual activity: Yes     Partners: Male   Lifestyle    Physical activity     Days per week: 6 days     Minutes per session: 30 min    Stress: Only a little   Relationships    Social connections     Talks on phone: Three times a week     Gets together:  Three times a week     Attends Gnosticist service: Not on file     Active member of club or organization: No     Attends meetings of clubs or organizations: More than 4 times per year     Relationship status:    Other Topics Concern    Not on file   Social History Narrative    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); no children    Regular exercise: [place], works with     Outside GYN         Chemistry        Component Value Date/Time     10/19/2020 1247    K 3.3 (L) 10/19/2020 1247     10/19/2020 1247    CO2 20 (L) 10/19/2020 1247    BUN 7 10/19/2020 1247    CREATININE 0.8 10/19/2020 1247    GLU 83 10/19/2020 1247        Component Value Date/Time    CALCIUM 9.5 10/19/2020 1247    ALKPHOS 90 10/19/2020 1247    AST 14 10/19/2020 1247    ALT 13 10/19/2020 1247    BILITOT 0.3 10/19/2020 1247    ESTGFRAFRICA >60 10/19/2020 1247    EGFRNONAA >60 10/19/2020 1247            Lab Results   Component Value Date    WBC 9.36 10/30/2020    HGB 11.4 (L) 10/30/2020    HCT 31.5 (L) 10/30/2020    MCV 98 10/30/2020     10/30/2020       No results for input(s): PT, INR, PROTIME, APTT in the last 72 hours.          Anesthesia Evaluation    I have reviewed the Patient Summary Reports.         Review of Systems  Anesthesia Hx:  No previous Anesthesia  Neg history of prior surgery. Denies Family Hx of Anesthesia complications.    Social:  Non-Smoker    Hematology/Oncology:  Hematology Normal        Cardiovascular:   Hypertension    Pulmonary:  Pulmonary Normal    Hepatic/GI:  Hepatic/GI Normal    Musculoskeletal:  Musculoskeletal Normal    Neurological:  Neurology Normal        Physical Exam  General:  Well nourished    Airway/Jaw/Neck:  Airway Findings: Mouth Opening: Normal Tongue: Normal  General Airway Assessment: Adult  Mallampati: II  TM Distance: Normal, at least 6 cm  Jaw/Neck Findings:  Neck ROM: Normal ROM      Dental:  Dental Findings: In tact    Chest/Lungs:  Chest/Lungs  Findings: Clear to auscultation, Normal Respiratory Rate     Heart/Vascular:  Heart Findings: Rate: Normal  Rhythm: Regular Rhythm        Mental Status:  Mental Status Findings:  Cooperative, Alert and Oriented         Anesthesia Plan  Type of Anesthesia, risks & benefits discussed:  Anesthesia Type:  CSE, epidural, general, spinal  Patient's Preference:   Intra-op Monitoring Plan: standard ASA monitors  Intra-op Monitoring Plan Comments:   Post Op Pain Control Plan:   Post Op Pain Control Plan Comments:   Induction:    Beta Blocker:         Informed Consent:  Anesthesia consent signed with patient.  ASA Score: 2     Day of Surgery Review of History & Physical:    H&P update referred to the surgeon.         Ready For Surgery From Anesthesia Perspective.

## 2020-10-31 NOTE — ANESTHESIA PROCEDURE NOTES
Epidural    Patient location during procedure: OB   Reason for block: primary anesthetic   Diagnosis: IUP   Start time: 10/30/2020 9:12 AM  Timeout: 10/30/2020 9:10 AM  End time: 10/30/2020 9:17 AM  Surgery related to: Vaginal Delivery    Staffing  Performing Provider: Audrey Chatman MD  Authorizing Provider: Audrey Chatman MD        Preanesthetic Checklist  Completed: patient identified, site marked, surgical consent, pre-op evaluation, timeout performed, IV checked, risks and benefits discussed, monitors and equipment checked, anesthesia consent given, hand hygiene performed and patient being monitored  Preparation  Patient position: sitting  Prep: ChloraPrep  Patient monitoring: Pulse Ox  Epidural  Skin Anesthetic: lidocaine 1%  Skin Wheal: 3 mL  Administration type: continuous  Approach: midline  Interspace: L4-5    Injection technique: NORMAN saline  Needle and Epidural Catheter  Needle type: Tuohy   Needle gauge: 17  Needle length: 3.5 inches  Needle insertion depth: 5 cm  Catheter type: springwound  Catheter size: 19 G  Catheter at skin depth: 9 cm  Test dose: 3 mL of lidocaine 1.5% with Epi 1-to-200,000  Additional Documentation: incremental injection, negative aspiration for heme and CSF, no paresthesia on injection, no signs/symptoms of IV or SA injection, no significant pain on injection and no significant complaints from patient  Needle localization: anatomical landmarks  Medications:  Volume per aspiration: 5 mL  Time between aspirations: 5 minutes  Assessment  Ease of block: easy  Patient's tolerance of the procedure: comfortable throughout block and no complaints  Additional Notes  First attempt at L3-L4- patient had persistent paresthesia upon catheter placement. Next attempt at L4-L5, patient with brief paresthesia that immediately subsided after catheter pulled back to appropriate level at skin.

## 2020-10-31 NOTE — L&D DELIVERY NOTE
[facility]  Vaginal Delivery   Operative Note    SUMMARY      cephalic after approximately 10 minutes of maternal pushing.  Under epidural anesthesia.  Infant delivered OA over perineum.  Female infant also tolerated the delivery well and was placed on mothers abdomen for skin to skin and bulb suctioning performed.  Cord clamped and cut.  Umbilical venous blood obtained.  Second degree laceration noted and repaired in the usual fashion with 2-0 and 3-0 vicryl. Bilateral periurethral and posterior vaginal abrasion found to be hemostatic.  Placenta delivered spontaneously and IV pitocin given.  Uterine tone noted. No cervical lacerations.  Patient tolerated delivery well.  EBL 300cc  Staff present for entire procedure.  S/L/N counts correct x2.    Eda Morgan MD   PGY-1, OB-GYN     I WAS SCRUBBED AND PRESENT THROUGHOUT    Indications:  (spontaneous vaginal delivery)  Pregnancy complicated by:   Patient Active Problem List   Diagnosis    Recurrent cold sores    Heartburn    Reactive airway disease that is not asthma    Ocular migraine    Pain of both hip joints    Gestational hypertension, third trimester    Gestational hypertension     (spontaneous vaginal delivery)     Admitting GA: 37w6d    Delivery Information for Jackelin Smith    Birth information:  YOB: 2019   Time of birth: 1:15 PM   Sex: female   Head Delivery Date/Time: 10/31/2020  1:15 PM   Delivery type: Vaginal, Spontaneous   Gestational Age: 37w6d    Delivery Providers    Delivering clinician: Deanna Martin MD   Provider Role    MD Ny Maldonado, RN     Nikky Mora, RN             Measurements    Weight:   Length:          Apgars    Living status: Living  Apgars:  1 min.:  5 min.:  10 min.:  15 min.:  20 min.:    Skin color:  1  1       Heart rate:  2  2       Reflex irritability:  2  2       Muscle tone:  2  2       Respiratory effort:  2  2       Total:  9   9       Apgars assigned by: HENRRY LEHMAN RN         Operative Delivery    Forceps attempted?: No  Vacuum extractor attempted?: No         Shoulder Dystocia    Shoulder dystocia present?: No           Presentation    Presentation: Vertex  Position: Occiput Anterior           Interventions/Resuscitation         Cord    Vessels: 3 vessels  Complications: None  Delayed Cord Clamping?: No  Cord Clamped Date/Time: 10/31/2020  1:15 PM  Cord Blood Disposition: Sent with Baby  Gases Sent?: No  Stem Cell Collection (by MD): No       Placenta    Placenta delivery date/time: 10/31/2020 1320  Placenta removal: Spontaneous  Placenta appearance: Intact  Placenta disposition: discarded           Labor Events:       labor: No     Labor Onset Date/Time:         Dilation Complete Date/Time: 10/31/2020 12:25     Start Pushing Date/Time: 10/31/2020 13:05       Start Pushing Date/Time: 10/31/2020 13:05     Rupture Date/Time: 10/31/20  0817         Rupture type:          Fluid Amount:       Fluid Color: Clear      Fluid Odor:       Membrane Status: ARM (Artificial Rupture)               steroids: None     Antibiotics given for GBS: No     Induction: misoprostol     Indications for induction:  Hypertension     Augmentation: amniotomy;oxytocin     Indications for augmentation: Ineffective Contraction Pattern     Labor complications: None     Additional complications:          Cervical ripening:                     Delivery:      Episiotomy: None     Indication for Episiotomy:       Perineal Lacerations: 2nd Repaired:  Yes   Periurethral Laceration:   Repaired:     Labial Laceration:   Repaired:     Sulcus Laceration:   Repaired:     Vaginal Laceration:   Repaired:     Cervical Laceration:   Repaired:     Repair suture:       Repair # of packets: 2     Last Value - EBL - Nursing (mL):       Sum - EBL - Nursing (mL): 0     Last Value - EBL - Anesthesia (mL):      Calculated QBL (mL):       Vaginal Sweep Performed: Yes      Surgicount Correct: Yes       Other providers:       Anesthesia    Method: Epidural          Details (if applicable):  Trial of Labor      Categorization:      Priority:     Indications for :     Incision Type:       Additional  information:  Forceps:    Vacuum:    Breech:    Observed anomalies    Other (Comments):

## 2020-10-31 NOTE — PROGRESS NOTES
"LABOR NOTE    S:  Complaints: No.  Epidural working:  yes  MD to bedside for evaluation of late deceleration.     O: /78   Pulse 75   Temp 97.5 °F (36.4 °C) (Temporal)   Resp 18   Ht 5' 3" (1.6 m)   Wt 72 kg (158 lb 11.7 oz)   LMP 02/09/2020   SpO2 96%   Breastfeeding No   BMI 28.12 kg/m²     FHT: 120 bpm, moderate variability, +accels, + isolated late decel Cat 1 (reassuring)  CTX: q 2 minutes  SVE: 5/80/-2, FSE in place     TIMELINE:  2011: cytotec #1  2330: 1/70/-2, balloon in place  0415: 4/80/-3, balloon out, start pitocin   0815: 4/80/-2, AROM clr, IUPC placed  0845: 5/80/-2, FSE placed     PLAN:    Continue Close Maternal/Fetal Monitoring  Pitocin Augmentation per protocol  Recheck 2 hours or PRN    Eda Morgan MD   PGY-1, OB-GYN          "

## 2020-10-31 NOTE — PROGRESS NOTES
MD to bedside as patient feeling pressure. SVE unchanged at 5/80/-2, FHTs 130s category 1. Feels OP, complaining of back labor. Will place on peanut ball. FSE removed as external monitor working well.     Hafsa Quezada M.D.  Obstetrics and Gynecology   PGY-4

## 2020-10-31 NOTE — H&P
HISTORY AND PHYSICAL                                                OBSTETRICS          Subjective:       Gisella Smith is a 35 y.o.  female with IUP at 37w5d weeks gestation who presents for scheduled induction of labor secondary to gestational hypertension. This was a new diagnosis in clinic today. Patient is asymptomatic and overall without complaint     Patient denies contractions, denies vaginal bleeding, denies LOF.   Fetal Movement: normal.    This IUP is complicated by new diagnosis of gestational hypertension.    Review of Systems   Constitutional: Negative for activity change, appetite change, chills and fever.   Eyes: Negative for visual disturbance.   Respiratory: Negative for cough and shortness of breath.    Cardiovascular: Negative for chest pain.   Gastrointestinal: Negative for abdominal pain, constipation, nausea and vomiting.   Genitourinary: Negative for dysuria, frequency, hematuria, pelvic pain, urgency, vaginal bleeding, vaginal discharge and vaginal odor.   Musculoskeletal: Negative for myalgias.   Integumentary:  Negative for rash.   Neurological: Negative for headaches.   Psychiatric/Behavioral: Negative for depression and sleep disturbance. The patient is not nervous/anxious.           PMHx:   Past Medical History:   Diagnosis Date    Overweight (BMI 25.0-29.9) 2017    Recurrent cold sores 2017       PSHx: No past surgical history on file.    All: Review of patient's allergies indicates:  No Known Allergies    Meds:   Medications Prior to Admission   Medication Sig Dispense Refill Last Dose    valACYclovir (VALTREX) 500 MG tablet 1 TABLET BY MOUTH TWICE DAILY 60 tablet 2        SH:   Social History     Socioeconomic History    Marital status:      Spouse name: Not on file    Number of children: Not on file    Years of education: Not on file    Highest education level: Not on file   Occupational History    Not on file   Social Needs    Financial  resource strain: Not hard at all    Food insecurity     Worry: Never true     Inability: Never true    Transportation needs     Medical: No     Non-medical: No   Tobacco Use    Smoking status: Never Smoker    Smokeless tobacco: Never Used   Substance and Sexual Activity    Alcohol use: Not Currently    Drug use: Never    Sexual activity: Yes     Partners: Male   Lifestyle    Physical activity     Days per week: 6 days     Minutes per session: 30 min    Stress: Only a little   Relationships    Social connections     Talks on phone: Three times a week     Gets together: Three times a week     Attends [employer] service: Not on file     Active member of club or organization: No     Attends meetings of clubs or organizations: More than 4 times per year     Relationship status:    Other Topics Concern    Not on file   Social History Narrative    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); no children    Regular exercise: percy, works with     Outside GYN       FH:   Family History   Problem Relation Age of Onset    Parkinsonism Father     Hypertension Maternal Grandmother     Cancer Maternal Grandfather         mouth    Diabetes Mellitus Paternal Grandmother     Hypertension Paternal Grandfather        OBHx:   OB History    Para Term  AB Living   1 0 0 0 0 0   SAB TAB Ectopic Multiple Live Births   0 0 0 0 0      # Outcome Date GA Lbr René/2nd Weight Sex Delivery Anes PTL Lv   1 Current                Objective:       LMP 2020     There were no vitals filed for this visit.    General:   alert, appears stated age and cooperative, no apparent distress   HENT:  normocephalic, atraumatic   Eyes:  extraocular movements and conjunctivae normal   Neck:  supple, range of motion normal, no thyromegaly   Lungs:   no respiratory distress   Heart:   regular rate   Abdomen:  soft, non-tender, non-distended but gravid, no rebound or guarding    Extremities negative edema,  negative erythema   FHT: 140, moderate BTBV, +accels, -decels;  Cat 2 (reassuring)                 TOCO: quiet   Presentations: cephalic by ultrasound   Cervix:     Dilation: 1cm    Effacement: 25%    Station:  -3    Consistency: medium    Position: middle       EFW by Leopold's: 6    Recent Growth Scan: n/a    Lab Review  Blood Type O POS  GBBS: negative  Rubella: Immune  RPR: NR  HIV: negative  HepB: negative       Assessment:       37w5d weeks gestation with gestational hypertension     Active Hospital Problems    Diagnosis  POA    Gestational hypertension, third trimester [O13.3]  Unknown    Gestational hypertension [O13.9]  Yes      Resolved Hospital Problems   No resolved problems to display.          Plan:   1. Induction of labor    Risks, benefits, alternatives and possible complications have been discussed in detail with the patient.   - Consents signed and to chart  - Admit to Labor and Delivery unit  - cervix 1 cm -- cytotec for cervical ripening and place balloon when possible    - Epidural per Anesthesia  - Draw CBC, T&S  - Notify Staff  - Recheck in 4 hrs or PRN  - EFW 6    2. Gestational hypertension   - asymptomatic   - CMP and p/c ratio pending   - BP: (132)/(90) 132/90  - continue to monitor blood pressures closely and monitor for symptoms       Post-Partum Hemorrhage risk - low    Luh Crabtree MD  OBGYN, PGY-3    I have reviewed and concur with the resident's history, physical assessment and plan.     Mickie Arnett MD  Obstetrics and Gynecology  [facility]

## 2020-10-31 NOTE — PROGRESS NOTES
"LABOR NOTE    S:  Complaints: No.  Epidural working:  yes  MD to bedside for routine cervical exam.    O: /72   Pulse 92   Temp 96.5 °F (35.8 °C) (Temporal)   Resp 18   Ht 5' 3" (1.6 m)   Wt 72 kg (158 lb 11.7 oz)   LMP 02/09/2020   SpO2 95%   Breastfeeding No   BMI 28.12 kg/m²     FHT: 120 bpm, moderate variability, +accels, -decel Cat 1 (reassuring)  CTX: q 2 minutes  SVE: 4/80/-3, balloon out    TIMELINE:  2011: cytotec #1  2330: 1/70/-2, balloon in place  0415: 4/80/-3, balloon out, start pitocin     PLAN:    Continue Close Maternal/Fetal Monitoring  Pitocin Augmentation per protocol  Recheck 2 hours or PRN    Luh Crabtree MD  OBGYN, PGY-3        "

## 2020-10-31 NOTE — PROGRESS NOTES
"LABOR NOTE    S:  Complaints: No.  Epidural working:  yes  MD to bedside for routine cervical exam.    O: /78   Pulse 75   Temp 97.5 °F (36.4 °C) (Temporal)   Resp 18   Ht 5' 3" (1.6 m)   Wt 72 kg (158 lb 11.7 oz)   LMP 02/09/2020   SpO2 96%   Breastfeeding No   BMI 28.12 kg/m²     FHT: 120 bpm, moderate variability, +accels, -decel Cat 1 (reassuring)  CTX: q 3 minutes  SVE: 4/80/-2, AROM clear, IUPC placed     TIMELINE:  2011: cytotec #1  2330: 1/70/-2, balloon in place  0415: 4/80/-3, balloon out, start pitocin   0815: 4/80/-2, AROM clr, IUPC placed    PLAN:    Continue Close Maternal/Fetal Monitoring  Pitocin Augmentation per protocol  Recheck 2 hours or PRN    Eda Morgan MD   PGY-1, OB-GYN          "

## 2020-10-31 NOTE — LACTATION NOTE
10/31/20 1715   Maternal Assessment   Breast Shape Bilateral:;round   Breast Density Bilateral:;soft   Areola Bilateral:;elastic   Nipples Bilateral:;everted   Maternal Infant Feeding   Maternal Emotional State assist needed   Infant Positioning clutch/football   Signs of Milk Transfer audible swallow;infant jaw motion present   Latch Assistance yes   assisted pt with position and latch. Baby able to latch to breast easily. Good tugs and pulls observed. Basic breastfeeding education provided questions answered. Pt hs bruise to right areola from incorrect latch. Discuss proper latch to prevent increase bruising.Encouraged skin to skin.

## 2020-10-31 NOTE — PROGRESS NOTES
"LABOR NOTE    S:  Complaints: No.  Epidural working:  yes  MD to bedside for routine cervical exam.    O: /87   Pulse 74   Temp 96.5 °F (35.8 °C) (Temporal)   Resp 18   Ht 5' 3" (1.6 m)   Wt 72 kg (158 lb 11.7 oz)   LMP 02/09/2020   SpO2 96%   Breastfeeding No   BMI 28.12 kg/m²     FHT: 120 bpm, moderate variability, +accels, -decel Cat 1 (reassuring)  CTX: q 2 minutes  SVE: 1/70/-2, balloon in place    TIMELINE:  2011: cytotec #1  2330: 1/70/-2, balloon in place      PLAN:    Continue Close Maternal/Fetal Monitoring  Pitocin Augmentation per protocol  Recheck 2 hours or PRN    Leeann Bourne MD/MPH  OB/GYN PGY1      "

## 2020-11-01 LAB
BASOPHILS # BLD AUTO: 0.05 K/UL (ref 0–0.2)
BASOPHILS # BLD AUTO: 0.05 K/UL (ref 0–0.2)
BASOPHILS NFR BLD: 0.3 % (ref 0–1.9)
BASOPHILS NFR BLD: 0.3 % (ref 0–1.9)
DIFFERENTIAL METHOD: ABNORMAL
DIFFERENTIAL METHOD: ABNORMAL
EOSINOPHIL # BLD AUTO: 0.1 K/UL (ref 0–0.5)
EOSINOPHIL # BLD AUTO: 0.1 K/UL (ref 0–0.5)
EOSINOPHIL NFR BLD: 0.3 % (ref 0–8)
EOSINOPHIL NFR BLD: 0.3 % (ref 0–8)
ERYTHROCYTE [DISTWIDTH] IN BLOOD BY AUTOMATED COUNT: 12.3 % (ref 11.5–14.5)
ERYTHROCYTE [DISTWIDTH] IN BLOOD BY AUTOMATED COUNT: 12.3 % (ref 11.5–14.5)
HCT VFR BLD AUTO: 29.5 % (ref 37–48.5)
HCT VFR BLD AUTO: 29.5 % (ref 37–48.5)
HGB BLD-MCNC: 10.4 G/DL (ref 12–16)
HGB BLD-MCNC: 10.4 G/DL (ref 12–16)
IMM GRANULOCYTES # BLD AUTO: 0.07 K/UL (ref 0–0.04)
IMM GRANULOCYTES # BLD AUTO: 0.07 K/UL (ref 0–0.04)
IMM GRANULOCYTES NFR BLD AUTO: 0.4 % (ref 0–0.5)
IMM GRANULOCYTES NFR BLD AUTO: 0.4 % (ref 0–0.5)
LYMPHOCYTES # BLD AUTO: 2.4 K/UL (ref 1–4.8)
LYMPHOCYTES # BLD AUTO: 2.4 K/UL (ref 1–4.8)
LYMPHOCYTES NFR BLD: 12.9 % (ref 18–48)
LYMPHOCYTES NFR BLD: 12.9 % (ref 18–48)
MCH RBC QN AUTO: 35.1 PG (ref 27–31)
MCH RBC QN AUTO: 35.1 PG (ref 27–31)
MCHC RBC AUTO-ENTMCNC: 35.3 G/DL (ref 32–36)
MCHC RBC AUTO-ENTMCNC: 35.3 G/DL (ref 32–36)
MCV RBC AUTO: 100 FL (ref 82–98)
MCV RBC AUTO: 100 FL (ref 82–98)
MONOCYTES # BLD AUTO: 1.3 K/UL (ref 0.3–1)
MONOCYTES # BLD AUTO: 1.3 K/UL (ref 0.3–1)
MONOCYTES NFR BLD: 6.8 % (ref 4–15)
MONOCYTES NFR BLD: 6.8 % (ref 4–15)
NEUTROPHILS # BLD AUTO: 14.6 K/UL (ref 1.8–7.7)
NEUTROPHILS # BLD AUTO: 14.6 K/UL (ref 1.8–7.7)
NEUTROPHILS NFR BLD: 79.3 % (ref 38–73)
NEUTROPHILS NFR BLD: 79.3 % (ref 38–73)
NRBC BLD-RTO: 0 /100 WBC
NRBC BLD-RTO: 0 /100 WBC
PLATELET # BLD AUTO: 180 K/UL (ref 150–350)
PLATELET # BLD AUTO: 180 K/UL (ref 150–350)
PMV BLD AUTO: 10.4 FL (ref 9.2–12.9)
PMV BLD AUTO: 10.4 FL (ref 9.2–12.9)
RBC # BLD AUTO: 2.96 M/UL (ref 4–5.4)
RBC # BLD AUTO: 2.96 M/UL (ref 4–5.4)
WBC # BLD AUTO: 18.44 K/UL (ref 3.9–12.7)
WBC # BLD AUTO: 18.44 K/UL (ref 3.9–12.7)

## 2020-11-01 PROCEDURE — 85025 COMPLETE CBC W/AUTO DIFF WBC: CPT

## 2020-11-01 PROCEDURE — 25000003 PHARM REV CODE 250: Performed by: STUDENT IN AN ORGANIZED HEALTH CARE EDUCATION/TRAINING PROGRAM

## 2020-11-01 PROCEDURE — 99024 PR POST-OP FOLLOW-UP VISIT: ICD-10-PCS | Mod: ,,, | Performed by: OBSTETRICS & GYNECOLOGY

## 2020-11-01 PROCEDURE — 99024 POSTOP FOLLOW-UP VISIT: CPT | Mod: ,,, | Performed by: OBSTETRICS & GYNECOLOGY

## 2020-11-01 PROCEDURE — 11000001 HC ACUTE MED/SURG PRIVATE ROOM

## 2020-11-01 PROCEDURE — 36415 COLL VENOUS BLD VENIPUNCTURE: CPT

## 2020-11-01 RX ORDER — FAMOTIDINE 20 MG/1
20 TABLET, FILM COATED ORAL 2 TIMES DAILY PRN
Status: DISCONTINUED | OUTPATIENT
Start: 2020-11-01 | End: 2020-11-02 | Stop reason: HOSPADM

## 2020-11-01 RX ADMIN — DOCUSATE SODIUM 200 MG: 100 CAPSULE, LIQUID FILLED ORAL at 09:11

## 2020-11-01 RX ADMIN — IBUPROFEN 600 MG: 600 TABLET, FILM COATED ORAL at 01:11

## 2020-11-01 RX ADMIN — PRENATAL VIT W/ FE FUMARATE-FA TAB 27-0.8 MG 1 TABLET: 27-0.8 TAB at 09:11

## 2020-11-01 RX ADMIN — HYDROCODONE BITARTRATE AND ACETAMINOPHEN 1 TABLET: 5; 325 TABLET ORAL at 02:11

## 2020-11-01 RX ADMIN — IBUPROFEN 600 MG: 600 TABLET, FILM COATED ORAL at 07:11

## 2020-11-01 RX ADMIN — HYDROCODONE BITARTRATE AND ACETAMINOPHEN 1 TABLET: 5; 325 TABLET ORAL at 10:11

## 2020-11-01 RX ADMIN — HYDROCODONE BITARTRATE AND ACETAMINOPHEN 1 TABLET: 5; 325 TABLET ORAL at 04:11

## 2020-11-01 RX ADMIN — HYDROCODONE BITARTRATE AND ACETAMINOPHEN 1 TABLET: 5; 325 TABLET ORAL at 09:11

## 2020-11-01 RX ADMIN — IBUPROFEN 600 MG: 600 TABLET, FILM COATED ORAL at 05:11

## 2020-11-01 RX ADMIN — FAMOTIDINE 20 MG: 20 TABLET ORAL at 02:11

## 2020-11-01 NOTE — PROGRESS NOTES
POSTPARTUM PROGRESS NOTE     Gisella Smith is a 35 y.o. female PPD #1 status post Spontaneous vaginal delivery at 37w6d in a pregnancy complicated by gHTN. Patient is doing well this morning. She denies nausea, vomiting, fever or chills.  Patient reports mild abdominal pain that is adequately relieved by oral pain medications. Lochia is mild to moderate  and stable. Patient is voiding without difficulty and ambulating with no difficulty. She has passed flatus.  Patient does plan to breast feed. Per primary OB for contraception.     Objective:       Temp:  [97.5 °F (36.4 °C)-99 °F (37.2 °C)] 97.7 °F (36.5 °C)  Pulse:  [] 92  Resp:  [16-18] 18  SpO2:  [95 %-99 %] 98 %  BP: (100-155)/(55-93) 100/55    General:   alert, appears stated age and cooperative   Lungs:   Non-labored respirations    Heart:   regular rate and rhythm   Abdomen:  Soft, nondistended    Uterus:  firm located at the umblicus.        Extremities: no pedal edema noted     Lab Review  No results found for this or any previous visit (from the past 4 hour(s)).    I/O    Intake/Output Summary (Last 24 hours) at 2020 0431  Last data filed at 10/31/2020 2200  Gross per 24 hour   Intake --   Output 3942 ml   Net -3942 ml        Assessment:     Patient Active Problem List   Diagnosis    Recurrent cold sores    Heartburn    Reactive airway disease that is not asthma    Ocular migraine    Pain of both hip joints    Gestational hypertension, third trimester    Gestational hypertension     (spontaneous vaginal delivery)        Plan:   1. Postpartum care:  - Patient doing well. Continue routine management and advances.  - Continue PO pain meds. Pain well controlled.  - Heme: H/h  >>> pending   - Encourage ambulation  - Contraception to be discussed at 6 week pp visit  - Lactation consult PRN  - Rh +    2. gHTN   - BP: (100-155)/(55-93) 100/55  -Asymptomatic   -PreE labs on admission wnl   -24 hr urine protein TLTC            Dispo: As patient meets milestones, will plan to discharge PPD 1-2.    Hafsa Quezada M.D.  Obstetrics and Gynecology   PGY-4

## 2020-11-01 NOTE — PLAN OF CARE
Lactation note:  To room to assist with breastfeeding. Mom nursing baby at this time. Infant slightly turned out from mom, so assisted with turning baby in more in football hold. Infant nursing effectively with stimulation. Mom reports using lanolin for soreness and now has hydrogels from RN. Instructed on use/care of hydrogels. Mom to alternate with lanolin. Encouraged nursing infant 8 or more times in 24 hours with cues until content. LC phone number available on board for more assistance.

## 2020-11-01 NOTE — LACTATION NOTE
"   11/01/20 1050   Maternal Assessment   Breast Shape Bilateral:;round   Breast Density Bilateral:;soft   Areola Bilateral:;elastic   Nipples Bilateral:;everted   Left Nipple Symptoms bruised  (per mom)   Maternal Infant Feeding   Maternal Emotional State assist needed  (verbal)   Infant Positioning clutch/football   Signs of Milk Transfer audible swallow;infant jaw motion present   Pain with Feeding yes  (initially "tender" then goes to "0")   Pain Location nipple, right   Pain Description soreness   Nipple Shape After Feeding, Right round   Latch Assistance no   Lactation note:  To room to assist with breastfeeding. Mom nursing infant and only provided minimal verbal assistance to make position more comfortable for baby. Infant nursing effectively with stimulation. Reviewed normal day 2 expectations for feedings. Mom to feed baby 8 or more times in 24 hours with cues until content.  phone number on board for further assistance as needed.  "

## 2020-11-01 NOTE — ANESTHESIA POSTPROCEDURE EVALUATION
Anesthesia Post Evaluation    Patient: Gisella Smith    Procedure(s) Performed: * No procedures listed *    Final Anesthesia Type: epidural    Patient location during evaluation: floor  Patient participation: Yes- Able to Participate  Level of consciousness: oriented and awake and alert  Post-procedure vital signs: reviewed and stable  Pain management: adequate  Airway patency: patent    PONV status at discharge: No PONV  Anesthetic complications: no      Cardiovascular status: blood pressure returned to baseline and hemodynamically stable  Respiratory status: unassisted  Hydration status: euvolemic  Follow-up not needed.          Vitals Value Taken Time   /74 11/01/20 1201   Temp 36.8 °C (98.2 °F) 11/01/20 1201   Pulse 83 11/01/20 1201   Resp 18 11/01/20 1201   SpO2 97 % 11/01/20 0733         No case tracking events are documented in the log.      Pain/Tereso Score: Pain Rating Prior to Med Admin: 6 (11/1/2020  1:19 PM)  Pain Rating Post Med Admin: 3 (11/1/2020 11:40 AM)

## 2020-11-02 VITALS
HEIGHT: 63 IN | HEART RATE: 81 BPM | OXYGEN SATURATION: 98 % | WEIGHT: 158.75 LBS | RESPIRATION RATE: 18 BRPM | TEMPERATURE: 98 F | SYSTOLIC BLOOD PRESSURE: 120 MMHG | BODY MASS INDEX: 28.13 KG/M2 | DIASTOLIC BLOOD PRESSURE: 80 MMHG

## 2020-11-02 PROCEDURE — 25000003 PHARM REV CODE 250: Performed by: STUDENT IN AN ORGANIZED HEALTH CARE EDUCATION/TRAINING PROGRAM

## 2020-11-02 RX ORDER — DOCUSATE SODIUM 100 MG/1
200 CAPSULE, LIQUID FILLED ORAL 2 TIMES DAILY PRN
Qty: 60 CAPSULE | Refills: 0 | Status: SHIPPED | OUTPATIENT
Start: 2020-11-02 | End: 2022-02-01

## 2020-11-02 RX ORDER — SIMETHICONE 80 MG
80 TABLET,CHEWABLE ORAL EVERY 6 HOURS PRN
Refills: 0 | COMMUNITY
Start: 2020-11-02 | End: 2020-12-07

## 2020-11-02 RX ORDER — IBUPROFEN 600 MG/1
600 TABLET ORAL EVERY 6 HOURS
Qty: 90 TABLET | Refills: 1 | Status: SHIPPED | OUTPATIENT
Start: 2020-11-02 | End: 2021-12-08

## 2020-11-02 RX ADMIN — IBUPROFEN 600 MG: 600 TABLET, FILM COATED ORAL at 01:11

## 2020-11-02 RX ADMIN — FAMOTIDINE 20 MG: 20 TABLET ORAL at 08:11

## 2020-11-02 RX ADMIN — IBUPROFEN 600 MG: 600 TABLET, FILM COATED ORAL at 08:11

## 2020-11-02 RX ADMIN — DOCUSATE SODIUM 200 MG: 100 CAPSULE, LIQUID FILLED ORAL at 08:11

## 2020-11-02 NOTE — PROGRESS NOTES
POSTPARTUM PROGRESS NOTE     Gisella Smith is a 35 y.o. female PPD #2 status post Spontaneous vaginal delivery at 37w6d in a pregnancy complicated by gHTN.    Patient is doing well this morning. She denies nausea, vomiting, fever or chills. Patient reports mild abdominal pain that is adequately relieved by oral pain medications. Lochia is mild to moderate and stable. Patient is voiding without difficulty and ambulating with no difficulty. She has passed flatus.    Patient does plan to breast feed. Per primary OB for contraception.     Objective:       Temp:  [97.5 °F (36.4 °C)-98.6 °F (37 °C)] 97.5 °F (36.4 °C)  Pulse:  [68-86] 68  Resp:  [16-18] 16  SpO2:  [96 %-98 %] 98 %  BP: (106-124)/(56-78) 120/71    General:   alert, appears stated age and cooperative   Lungs:   Non-labored respirations    Heart:   regular rate and rhythm   Abdomen:  Soft, nondistended    Uterus:  firm located at the umbilicus.    Extremities: no pedal edema noted     Lab Review  No results found for this or any previous visit (from the past 4 hour(s)).    I/O  No intake or output data in the 24 hours ending 20 0637     Assessment:     Patient Active Problem List   Diagnosis    Recurrent cold sores    Heartburn    Reactive airway disease that is not asthma    Ocular migraine    Pain of both hip joints    Gestational hypertension, third trimester    Gestational hypertension     (spontaneous vaginal delivery)        Plan:   1. Postpartum care:  - Patient doing well. Continue routine management and advances.  - Continue PO pain meds. Pain well controlled.  - Heme: H/h  >>> 10.4/29.5  - Encourage ambulation  - Contraception to be discussed at 6 week pp visit  - Lactation consult PRN  - Rh +    2. gHTN   - BP: (106-124)/(56-78) 120/71  -Asymptomatic   -PreE labs on admission wnl   -24 hr urine protein TLTC   - Will need 1 week postpartum BP check      Dispo: As patient meets milestones, will plan to discharge  today.      Maine Garcia M.D.  OB/GYN PGY-1

## 2020-11-02 NOTE — LACTATION NOTE
11/02/20 1005   Maternal Infant Feeding   Maternal Emotional State relaxed   Latch Assistance no  (encouraged to call for latch assessment)   Lactation Referrals   Lactation Referrals other (see comments)  (lactation warmline)   Discharge lactation education reviewed with breastfeeding guide. Mother states she has blisters on tips of her nipples, using hydrogels for healing and latch improving. Reviewed hydrogel use, care, and when to discard. Aware OB can order APNO if continues to have nipple breakdown. Discussed deep, proper latch. Encouraged to wake baby for feedings if sleepy due to being 37 weeks. Pt has a pump from [employer] and [employer] hands free pump as well as [employer]. LC number on board, encouraged to call for latch assessment before discharge.

## 2020-11-02 NOTE — DISCHARGE SUMMARY
Delivery Discharge Summary  Obstetrics      Primary OB Clinician: Deanna Martin MD      Admission date: 10/30/2020  Discharge date: 2020    Disposition: To home, self care    Discharge Diagnosis List:      Patient Active Problem List   Diagnosis    Recurrent cold sores    Heartburn    Reactive airway disease that is not asthma    Ocular migraine    Pain of both hip joints    Gestational hypertension, third trimester    Gestational hypertension     (spontaneous vaginal delivery)       Procedure:     Hospital Course:  Gisella Smith is a 35 y.o. now , PPD #2 who was admitted on 10/30/2020 at 37w5d for scheduled induction of labor secondary to gestational hypertension. Patient was subsequently admitted to labor and delivery unit with signed consents.     Labor course wasuncomplicated and resulted in  without complications.     Please see delivery note for further details. Her postpartum course was uncomplicated. On discharge day, patient's pain is controlled with oral pain medications. Pt is tolerating ambulation without SOB or CP, and regular diet without N/V. Reports lochia is mild. Denies any HA, vision changes, F/C, LE swelling. Denies any breast pain/soreness.    Pt in stable condition and ready for discharge. She has been instructed to start and/or continue medications and follow up with her obstetrics provider as listed below.    Pertinent studies:  CBC  Recent Labs   Lab 10/30/20  1931 20  0504   WBC 9.36 18.44*  18.44*   HGB 11.4* 10.4*  10.4*   HCT 31.5* 29.5*  29.5*   MCV 98 100*  100*    180  180          Immunization History   Administered Date(s) Administered    Tdap 2020        Delivery:    Episiotomy: None   Lacerations: 2nd   Repair suture:     Repair # of packets: 2   Blood loss (ml):       Birth information:  YOB: 2019   Time of birth: 1:15 PM   Sex: female   Delivery type: Vaginal, Spontaneous   Gestational Age:  37w6d    Delivery Clinician:      Other providers:       Additional  information:  Forceps:    Vacuum:    Breech:    Observed anomalies      Living?:           APGARS  One minute Five minutes Ten minutes   Skin color:         Heart rate:         Grimace:         Muscle tone:         Breathing:         Totals: 9  9        Placenta: Delivered:       appearance      Patient Instructions:   Current Discharge Medication List      START taking these medications    Details   benzocaine-lanolin (DERMOPLAST) 20-0.5 % Aero Apply topically continuous prn.  Qty:        docusate sodium (COLACE) 100 MG capsule Take 2 capsules (200 mg total) by mouth 2 (two) times daily as needed for Constipation.  Qty: 60 capsule, Refills: 0      ibuprofen (ADVIL,MOTRIN) 600 MG tablet Take 1 tablet (600 mg total) by mouth every 6 (six) hours.  Qty: 90 tablet, Refills: 1      lanolin Crea cream Apply topically as needed for Dry Skin (to nipples).  Qty:  , Refills: 0      simethicone (MYLICON) 80 MG chewable tablet Take 1 tablet (80 mg total) by mouth every 6 (six) hours as needed for Flatulence.  Qty:  , Refills: 0         STOP taking these medications       valACYclovir (VALTREX) 500 MG tablet Comments:   Reason for Stopping:               Discharge Procedure Orders   Diet Adult Regular     No driving until:   Order Comments: Until not taking narcotic pain medication.     Notify your health care provider if you experience any of the following:  temperature >100.4     Notify your health care provider if you experience any of the following:  persistent nausea and vomiting or diarrhea     Notify your health care provider if you experience any of the following:  severe uncontrolled pain     Notify your health care provider if you experience any of the following:  redness, tenderness, or signs of infection (pain, swelling, redness, odor or green/yellow discharge around incision site)     Notify your health care provider if you experience any of the  following:  difficulty breathing or increased cough     Notify your health care provider if you experience any of the following:  severe persistent headache     Notify your health care provider if you experience any of the following:  worsening rash     Notify your health care provider if you experience any of the following:  persistent dizziness, light-headedness, or visual disturbances     Notify your health care provider if you experience any of the following:  increased confusion or weakness     Notify your health care provider if you experience any of the following:   Order Comments: Heavy vaginal bleeding soaking through 2 pads/hr for 2 consecutive hours.     Pelvic Rest   Order Comments: Pelvic rest for at least 6 weeks. Nothing in vagina - no sex, tampons, or douching for 6 weeks     Activity as tolerated       Follow-up Information     Deanna Martin MD In 1 week.    Specialties: Obstetrics, Obstetrics and Gynecology  Why: BP check  Contact information:  [address] [id number] [phone]             Deanna Martin MD In 6 weeks.    Specialties: Obstetrics, Obstetrics and Gynecology  Why: Postpartum visit  Contact information:  [address] [address] [phone]                    Maine Garcia M.D.  OB/GYN PGY-1

## 2020-11-02 NOTE — PROGRESS NOTES
HTN education given with handout, pt states understanding and will send BP through [facility] in 3-5 days

## 2020-11-02 NOTE — PROGRESS NOTES
RN reviewed d/c instructions with pt-pt stated understanding. BP check appt to be made in 1 week. Follow up appt in 6 weeks to be made with OB. Pt received prescriptions/ meds. Stable condition. Ok to d/c home today

## 2020-11-02 NOTE — DISCHARGE INSTRUCTIONS
Breastfeeding Discharge Instructions       Feed the baby at the earliest sign of hunger or comfort  o Hands to mouth, sucking motions  o Rooting or searching for something to suck on  o Dont wait for crying - it is a sign of distress     The feedings may be 8-12 times per 24hrs and will not follow a schedule   Avoid pacifiers and bottles for the first 4 weeks   Alternate the breast you start the feeding with, or start with the breast that feels the fullest   Switch breasts when the baby takes himself off the breast or falls asleep   Keep offering breasts until the baby looks full, no longer gives hunger signs, and stays asleep when placed on his back in the crib   If the baby is sleepy and wont wake for a feeding, put the baby skin-to-skin dressed in a diaper against the mothers bare chest   Sleep near your baby   The baby should be positioned and latched on to the breast correctly  o Chest-to-chest, chin in the breast  o Babys lips are flipped outward  o Babys mouth is stretched open wide like a shout  o Babys sucking should feel like tugging to the mother  - The baby should be drinking at the breast:  o You should hear swallowing or gulping throughout the feeding  o You should see milk on the babys lips when he comes off the breast  o Your breasts should be softer when the baby is finished feeding  o The baby should look relaxed at the end of feedings  o After the 4th day and your milk is in:  o The babys poop should turn bright yellow and be loose, watery, and seedy  o The baby should have at least 3-4 poops the size of the palm of your hand per day  o The baby should have at least 5-6 wet diapers per day  o The urine should be light yellow in color  You should drink when you are thirsty and eat a healthy diet when you are    hungry.     Take naps to get the rest you need.   Take medications and/or drink alcohol only with permission of your obstetrician    or the babys pediatrician.  You can  also call the [facility],   ([phone]), Monday-Friday, 8am-5pm Central time, to get the most   up-to-date evidence-based information on the use of medications during   pregnancy and breastfeeding.      The baby should be examined by a pediatrician at 3-5 days of age.  Once your   milk comes in, the baby should be gaining at least ½ - 1oz each day and should be back to birthweight no later than 10-14 days of age.          Community Resources    [facility] Breastfeeding Warmline: [phone]   Local [facility] clinics: provide incentives and breastpumps to eligible mothers  [employer] (LLLI):  mother-to-mother support group website        www.[employer]ly[employer]  Local La Leche League mother-to-mother support groups:        www.MMIT        La Leche League [facility]   Dr. Darwin Arriaga website for latch videos and general information:        www.breastfeedinginc.ca  [facility] is a call center that provides information about the safety of taking medications while breastfeeding.  Call 1-967.621.6638, M-F, 8am-5pm, [place].  International Lactation Consultant Association provides resources for assistance:        www.ilca.org  [place] Breastfeeding Coalition provides informationand resources for parents  and the community    www.LaBreastfeedingSupport.org     Mariya Perez is a mom-to-mom support group:                             www.[employer].com//breastfeedng-support/  [employer] for Healthy Babies:  1-071-167-BABY(3191)  [employer]: a breastfeeding support group for women of color, [phone]

## 2020-11-13 ENCOUNTER — HOSPITAL ENCOUNTER (OUTPATIENT)
Facility: OTHER | Age: 35
Discharge: HOME | End: 2020-11-14
Attending: OBSTETRICS & GYNECOLOGY | Admitting: OBSTETRICS & GYNECOLOGY
Payer: COMMERCIAL

## 2020-11-13 ENCOUNTER — TELEPHONE (OUTPATIENT)
Dept: OBSTETRICS AND GYNECOLOGY | Facility: CLINIC | Age: 35
End: 2020-11-13

## 2020-11-13 DIAGNOSIS — N71.9 ENDOMETRITIS: Primary | ICD-10-CM

## 2020-11-13 PROBLEM — O13.9 GESTATIONAL HYPERTENSION: Status: RESOLVED | Noted: 2020-10-30 | Resolved: 2020-11-13

## 2020-11-13 LAB
BACTERIA #/AREA URNS HPF: ABNORMAL /HPF
BASOPHILS # BLD AUTO: 0.05 K/UL (ref 0–0.2)
BASOPHILS NFR BLD: 0.4 % (ref 0–1.9)
BILIRUB UR QL STRIP: NEGATIVE
CLARITY UR: ABNORMAL
COLOR UR: YELLOW
DIFFERENTIAL METHOD: ABNORMAL
EOSINOPHIL # BLD AUTO: 0.2 K/UL (ref 0–0.5)
EOSINOPHIL NFR BLD: 1.4 % (ref 0–8)
ERYTHROCYTE [DISTWIDTH] IN BLOOD BY AUTOMATED COUNT: 11.7 % (ref 11.5–14.5)
GLUCOSE UR QL STRIP: NEGATIVE
HCT VFR BLD AUTO: 34.7 % (ref 37–48.5)
HGB BLD-MCNC: 12.1 G/DL (ref 12–16)
HGB UR QL STRIP: ABNORMAL
IMM GRANULOCYTES # BLD AUTO: 0.03 K/UL (ref 0–0.04)
IMM GRANULOCYTES NFR BLD AUTO: 0.3 % (ref 0–0.5)
KETONES UR QL STRIP: NEGATIVE
LEUKOCYTE ESTERASE UR QL STRIP: ABNORMAL
LYMPHOCYTES # BLD AUTO: 2.3 K/UL (ref 1–4.8)
LYMPHOCYTES NFR BLD: 19.9 % (ref 18–48)
MCH RBC QN AUTO: 34.6 PG (ref 27–31)
MCHC RBC AUTO-ENTMCNC: 34.9 G/DL (ref 32–36)
MCV RBC AUTO: 99 FL (ref 82–98)
MICROSCOPIC COMMENT: ABNORMAL
MONOCYTES # BLD AUTO: 0.6 K/UL (ref 0.3–1)
MONOCYTES NFR BLD: 5.1 % (ref 4–15)
NEUTROPHILS # BLD AUTO: 8.3 K/UL (ref 1.8–7.7)
NEUTROPHILS NFR BLD: 72.9 % (ref 38–73)
NITRITE UR QL STRIP: NEGATIVE
NRBC BLD-RTO: 0 /100 WBC
PH UR STRIP: 7 [PH] (ref 5–8)
PLATELET # BLD AUTO: 346 K/UL (ref 150–350)
PMV BLD AUTO: 8.6 FL (ref 9.2–12.9)
PROT UR QL STRIP: NEGATIVE
RBC # BLD AUTO: 3.5 M/UL (ref 4–5.4)
RBC #/AREA URNS HPF: 12 /HPF (ref 0–4)
SARS-COV-2 RDRP RESP QL NAA+PROBE: NEGATIVE
SP GR UR STRIP: 1.02 (ref 1–1.03)
URN SPEC COLLECT METH UR: ABNORMAL
UROBILINOGEN UR STRIP-ACNC: NEGATIVE EU/DL
WBC # BLD AUTO: 11.37 K/UL (ref 3.9–12.7)
WBC #/AREA URNS HPF: 25 /HPF (ref 0–5)

## 2020-11-13 PROCEDURE — 87086 URINE CULTURE/COLONY COUNT: CPT

## 2020-11-13 PROCEDURE — 85025 COMPLETE CBC W/AUTO DIFF WBC: CPT

## 2020-11-13 PROCEDURE — 81000 URINALYSIS NONAUTO W/SCOPE: CPT

## 2020-11-13 PROCEDURE — 99285 EMERGENCY DEPT VISIT HI MDM: CPT

## 2020-11-13 PROCEDURE — 63600175 PHARM REV CODE 636 W HCPCS: Performed by: STUDENT IN AN ORGANIZED HEALTH CARE EDUCATION/TRAINING PROGRAM

## 2020-11-13 PROCEDURE — U0002 COVID-19 LAB TEST NON-CDC: HCPCS

## 2020-11-13 PROCEDURE — 25000003 PHARM REV CODE 250: Performed by: STUDENT IN AN ORGANIZED HEALTH CARE EDUCATION/TRAINING PROGRAM

## 2020-11-13 RX ORDER — IBUPROFEN 600 MG/1
600 TABLET ORAL EVERY 6 HOURS PRN
Status: DISCONTINUED | OUTPATIENT
Start: 2020-11-13 | End: 2020-11-14 | Stop reason: HOSPADM

## 2020-11-13 RX ORDER — SIMETHICONE 80 MG
1 TABLET,CHEWABLE ORAL EVERY 6 HOURS PRN
Status: DISCONTINUED | OUTPATIENT
Start: 2020-11-13 | End: 2020-11-14 | Stop reason: HOSPADM

## 2020-11-13 RX ORDER — CLINDAMYCIN PHOSPHATE 900 MG/50ML
900 INJECTION, SOLUTION INTRAVENOUS
Status: DISCONTINUED | OUTPATIENT
Start: 2020-11-13 | End: 2020-11-14 | Stop reason: HOSPADM

## 2020-11-13 RX ORDER — DIPHENHYDRAMINE HCL 25 MG
25 CAPSULE ORAL EVERY 4 HOURS PRN
Status: DISCONTINUED | OUTPATIENT
Start: 2020-11-13 | End: 2020-11-14 | Stop reason: HOSPADM

## 2020-11-13 RX ORDER — DOCUSATE SODIUM 100 MG/1
200 CAPSULE, LIQUID FILLED ORAL 2 TIMES DAILY PRN
Status: DISCONTINUED | OUTPATIENT
Start: 2020-11-13 | End: 2020-11-14 | Stop reason: HOSPADM

## 2020-11-13 RX ORDER — ACETAMINOPHEN 325 MG/1
650 TABLET ORAL EVERY 6 HOURS PRN
Status: DISCONTINUED | OUTPATIENT
Start: 2020-11-13 | End: 2020-11-14 | Stop reason: HOSPADM

## 2020-11-13 RX ORDER — HYDROCORTISONE 25 MG/G
CREAM TOPICAL 3 TIMES DAILY PRN
Status: DISCONTINUED | OUTPATIENT
Start: 2020-11-13 | End: 2020-11-14 | Stop reason: HOSPADM

## 2020-11-13 RX ORDER — ONDANSETRON 8 MG/1
8 TABLET, ORALLY DISINTEGRATING ORAL EVERY 8 HOURS PRN
Status: DISCONTINUED | OUTPATIENT
Start: 2020-11-13 | End: 2020-11-14 | Stop reason: HOSPADM

## 2020-11-13 RX ORDER — PRENATAL WITH FERROUS FUM AND FOLIC ACID 3080; 920; 120; 400; 22; 1.84; 3; 20; 10; 1; 12; 200; 27; 25; 2 [IU]/1; [IU]/1; MG/1; [IU]/1; MG/1; MG/1; MG/1; MG/1; MG/1; MG/1; UG/1; MG/1; MG/1; MG/1; MG/1
1 TABLET ORAL DAILY
Status: DISCONTINUED | OUTPATIENT
Start: 2020-11-14 | End: 2020-11-14 | Stop reason: HOSPADM

## 2020-11-13 RX ORDER — SODIUM CHLORIDE, SODIUM LACTATE, POTASSIUM CHLORIDE, CALCIUM CHLORIDE 600; 310; 30; 20 MG/100ML; MG/100ML; MG/100ML; MG/100ML
INJECTION, SOLUTION INTRAVENOUS SEE ADMIN INSTRUCTIONS
Status: DISCONTINUED | OUTPATIENT
Start: 2020-11-13 | End: 2020-11-14 | Stop reason: HOSPADM

## 2020-11-13 RX ORDER — DIPHENHYDRAMINE HYDROCHLORIDE 50 MG/ML
25 INJECTION INTRAMUSCULAR; INTRAVENOUS EVERY 4 HOURS PRN
Status: DISCONTINUED | OUTPATIENT
Start: 2020-11-13 | End: 2020-11-14 | Stop reason: HOSPADM

## 2020-11-13 RX ADMIN — IBUPROFEN 600 MG: 600 TABLET, FILM COATED ORAL at 07:11

## 2020-11-13 RX ADMIN — CLINDAMYCIN IN 5 PERCENT DEXTROSE 900 MG: 18 INJECTION, SOLUTION INTRAVENOUS at 07:11

## 2020-11-13 RX ADMIN — SODIUM CHLORIDE, SODIUM LACTATE, POTASSIUM CHLORIDE, AND CALCIUM CHLORIDE: .6; .31; .03; .02 INJECTION, SOLUTION INTRAVENOUS at 07:11

## 2020-11-13 RX ADMIN — GENTAMICIN SULFATE 301.2 MG: 40 INJECTION, SOLUTION INTRAMUSCULAR; INTRAVENOUS at 09:11

## 2020-11-13 NOTE — ED PROVIDER NOTES
Encounter Date: 2020       History     Chief Complaint   Patient presents with    Pelvic Pain     Gisella Smith is a 35 y.o. [id number] who is PPD#13 s/p  who presents complaining of pelvic pain. Patient states that she started having severe lower abdominal pain yesterday morning that progressively worsened throughout the day. She reports abdominal tenderness and worsening of pain with urination. She also endorses bilateral lower back pain, but thinks this may be related to breastfeeding/positioning. She denies fever, chills, abnormal vaginal discharge, nausea, and vomiting. She reports endometritis last year after experiencing a missed AB and states that this feels similarly. She is breastfeeding and denies breast pain or erythema. Her pregnancy was also complicated by gHTN in the third trimester and she PreE symptoms.        Review of patient's allergies indicates:  No Known Allergies  Past Medical History:   Diagnosis Date    Overweight (BMI 25.0-29.9) 2017    Recurrent cold sores 2017     No past surgical history on file.  Family History   Problem Relation Age of Onset    Parkinsonism Father     Hypertension Maternal Grandmother     Cancer Maternal Grandfather         mouth    Diabetes Mellitus Paternal Grandmother     Hypertension Paternal Grandfather      Social History     Tobacco Use    Smoking status: Never Smoker    Smokeless tobacco: Never Used   Substance Use Topics    Alcohol use: Not Currently    Drug use: Never     Review of Systems   Constitutional: Negative for activity change, appetite change, chills, fatigue and fever.   HENT: Negative for rhinorrhea.    Eyes: Negative for visual disturbance.   Respiratory: Negative for shortness of breath.    Cardiovascular: Negative for chest pain and palpitations.   Gastrointestinal: Positive for abdominal pain. Negative for diarrhea, nausea and vomiting.   Genitourinary: Positive for pelvic pain and vaginal bleeding.  Negative for dysuria and vaginal discharge.   Musculoskeletal: Positive for back pain.   Skin: Negative for rash.   Neurological: Negative for headaches.   Psychiatric/Behavioral: Negative for agitation.       Physical Exam     Initial Vitals   BP Pulse Resp Temp SpO2   11/13/20 1800 11/13/20 1756 11/13/20 1800 11/13/20 1750 11/13/20 1756   135/77 89 18 98.1 °F (36.7 °C) 100 %      MAP       --                Physical Exam    Vitals reviewed.  Constitutional: She appears well-developed and well-nourished. No distress.   Cardiovascular: Normal rate, regular rhythm and normal heart sounds.   Pulmonary/Chest: No respiratory distress.   Abdominal: Soft. There is abdominal tenderness (very tender to light palpation over suprapubic region). There is no rebound and no guarding.   Genitourinary:    Pelvic exam was performed with patient supine.   There is no rash, tenderness, lesion or injury on the right labia. There is no rash, tenderness, lesion or injury on the left labia. Uterus is tender. Cervix exhibits motion tenderness.    Vaginal bleeding present.      No vaginal discharge.   There is bleeding in the vagina.    There is a foreign body (sutures present at site of 2nd degree repair, healing well) in the vagina.     Musculoskeletal: Normal range of motion. No tenderness or edema.   Neurological: She is oriented to person, place, and time.   Skin: Skin is warm and dry.   Psychiatric: She has a normal mood and affect. Her behavior is normal. Judgment and thought content normal.         ED Course   Procedures  Labs Reviewed   CBC W/ AUTO DIFFERENTIAL - Abnormal; Notable for the following components:       Result Value    RBC 3.50 (*)     Hematocrit 34.7 (*)     MCV 99 (*)     MCH 34.6 (*)     MPV 8.6 (*)     Gran # (ANC) 8.3 (*)     All other components within normal limits   SARS-COV-2 RNA AMPLIFICATION, QUAL          Imaging Results    None          Medical Decision Making:   ED Management:  - VSS, temp 98.1  - Very  tender to palpation over fundus, +CMT  - No abnormal discharge on speculum exam  - WBC 11  - Plan to admit to outpatient for gent/clinda in setting of endometritis                               Clinical Impression:     ICD-10-CM ICD-9-CM   1. Endometritis  N71.9 615.9                                               Ana Murray MD  Resident  11/13/20 1939

## 2020-11-13 NOTE — TELEPHONE ENCOUNTER
Called patient, patient complaining of lower abdominal pain and dysuria. Patient stated she is 2 weeks postpartum and is concerned. Informed patient Dr. Martin is not in the office but I could get her an appointment at the walk in clinic. Patient stated tomorrow would work best. Appointment scheduled for 11/14/20 at 9:40am with the walk-in clinic. Patient verbalized understanding.

## 2020-11-13 NOTE — TELEPHONE ENCOUNTER
----- Message from Zohra Kumar MA sent at 11/13/2020 11:58 AM CST -----  Dr. Martin pt  ----- Message -----  From: Marge Everett  Sent: 11/13/2020  11:10 AM CST  To: Jeansonne Julie Staff    Patient is having lower abdominal pain she is pp 2 weeks please contact her at   [phone]        Nikky DIAZ

## 2020-11-14 VITALS
DIASTOLIC BLOOD PRESSURE: 59 MMHG | OXYGEN SATURATION: 95 % | HEIGHT: 63 IN | HEART RATE: 89 BPM | BODY MASS INDEX: 28.13 KG/M2 | RESPIRATION RATE: 16 BRPM | WEIGHT: 158.75 LBS | SYSTOLIC BLOOD PRESSURE: 101 MMHG | TEMPERATURE: 98 F

## 2020-11-14 PROCEDURE — 99225 PR SUBSEQUENT OBSERVATION CARE,LEVEL II: CPT | Mod: ,,, | Performed by: OBSTETRICS & GYNECOLOGY

## 2020-11-14 PROCEDURE — 25000003 PHARM REV CODE 250: Performed by: STUDENT IN AN ORGANIZED HEALTH CARE EDUCATION/TRAINING PROGRAM

## 2020-11-14 PROCEDURE — 99225 PR SUBSEQUENT OBSERVATION CARE,LEVEL II: ICD-10-PCS | Mod: ,,, | Performed by: OBSTETRICS & GYNECOLOGY

## 2020-11-14 RX ORDER — METRONIDAZOLE 500 MG/1
500 TABLET ORAL EVERY 12 HOURS
Qty: 14 TABLET | Refills: 0 | Status: SHIPPED | OUTPATIENT
Start: 2020-11-14 | End: 2020-11-21

## 2020-11-14 RX ADMIN — IBUPROFEN 600 MG: 600 TABLET, FILM COATED ORAL at 04:11

## 2020-11-14 RX ADMIN — CLINDAMYCIN IN 5 PERCENT DEXTROSE 900 MG: 18 INJECTION, SOLUTION INTRAVENOUS at 11:11

## 2020-11-14 RX ADMIN — CLINDAMYCIN IN 5 PERCENT DEXTROSE 900 MG: 18 INJECTION, SOLUTION INTRAVENOUS at 04:11

## 2020-11-14 RX ADMIN — PRENATAL VIT W/ FE FUMARATE-FA TAB 27-0.8 MG 1 TABLET: 27-0.8 TAB at 08:11

## 2020-11-14 NOTE — SUBJECTIVE & OBJECTIVE
Interval History:   Patient states that she is feeling much better this morning. She continue to have lower abdominal tenderness and pain with urination but states that this is significantly improved. She denies fever, chills, and nausea/vomiting.    Scheduled Meds:   clindamycin (CLEOCIN) IVPB  900 mg Intravenous Q8H    gentamicin  5 mg/kg (Adjusted) Intravenous Q24H    prenatal vitamin  1 tablet Oral Daily     Continuous Infusions:   benzocaine-lanolin       PRN Meds:acetaminophen, benzocaine-lanolin, diphenhydrAMINE, diphenhydrAMINE, docusate sodium, hydrocortisone, ibuprofen, lactated ringers, lanolin, ondansetron, promethazine (PHENERGAN) IVPB, simethicone    Review of patient's allergies indicates:  No Known Allergies    Objective:     Vital Signs (Most Recent):  Temp: 98 °F (36.7 °C) (11/14/20 0414)  Pulse: 83 (11/14/20 0414)  Resp: 20 (11/14/20 0414)  BP: 104/68 (11/14/20 0414)  SpO2: 96 % (11/14/20 0414) Vital Signs (24h Range):  Temp:  [97.8 °F (36.6 °C)-98.5 °F (36.9 °C)] 98 °F (36.7 °C)  Pulse:  [71-89] 83  Resp:  [17-20] 20  SpO2:  [95 %-100 %] 96 %  BP: (104-135)/(68-83) 104/68     Weight: 72 kg (158 lb 11.7 oz)  Body mass index is 28.12 kg/m².  Patient's last menstrual period was 02/09/2020.    I&O (Last 24H):    Intake/Output Summary (Last 24 hours) at 11/14/2020 0517  Last data filed at 11/14/2020 0419  Gross per 24 hour   Intake --   Output 1400 ml   Net -1400 ml       Physical Exam:   Constitutional: She is oriented to person, place, and time. She appears well-developed and well-nourished. No distress.    HENT:   Head: Normocephalic and atraumatic.      Cardiovascular: Normal rate.     Pulmonary/Chest: Effort normal.        Abdominal: Soft. There is abdominal tenderness (fundal tenderness noted on deep palpation,much improved from exam yesterday). There is no rebound and no guarding.             Musculoskeletal: Normal range of motion. No tenderness or edema.       Neurological: She is alert  and oriented to person, place, and time.    Skin: Skin is warm and dry.    Psychiatric: She has a normal mood and affect.

## 2020-11-14 NOTE — HPI
Gisella Smith is a 35 y.o. [id number] who is PPD#13 s/p  who presents complaining of pelvic pain. Patient states that she started having severe lower abdominal pain yesterday morning that progressively worsened throughout the day. She reports abdominal tenderness and worsening of pain with urination. She also endorses bilateral lower back pain, but thinks this may be related to breastfeeding/positioning. She denies fever, chills, abnormal vaginal discharge, nausea, and vomiting. She reports endometritis last year after experiencing a missed AB and states that this feels similarly. She is breastfeeding and denies breast pain or erythema. Her pregnancy was also complicated by gHTN in the third trimester and she PreE symptoms.    In the CAL[facility], patient was found to have exquisite fundal tenderness and CMT on exam. She was admitted to observation for antibiotics in the setting of endometritis.

## 2020-11-14 NOTE — ASSESSMENT & PLAN NOTE
- VSS, afebrile  - CBC 11/12/34.7/346  - UA (cath) positive for blood, 1+ leuks  - Uclx pending  - Fundal tenderness still noted on exam this morning, but much improved compared to exam yesterday  - Continue gentamicin/clindamycin x24 hrs  - Ibuprofen prn for pelvic pain     Dispo: Anticipate discharge home later this evening after 24hrs of antibiotics.

## 2020-11-14 NOTE — H&P
[facility]  Obstetrics & Gynecology  History & Physical    Patient Name: Gisella Smith  MRN: [id number]  Admission Date: 2020  Primary Care Provider: Ana Mark MD (Inactive)    Subjective:     Chief Complaint/Reason for Admission: Endometritis    History of Present Illness:  Gisella Smith is a 35 y.o. [id number] who is PPD#13 s/p  who presents complaining of pelvic pain. Patient states that she started having severe lower abdominal pain yesterday morning that progressively worsened throughout the day. She reports abdominal tenderness and worsening of pain with urination. She also endorses bilateral lower back pain, but thinks this may be related to breastfeeding/positioning. She denies fever, chills, abnormal vaginal discharge, nausea, and vomiting. She reports endometritis last year after experiencing a missed AB and states that this feels similarly. She is breastfeeding and denies breast pain or erythema. Her pregnancy was also complicated by gHTN in the third trimester and she PreE symptoms.    In the CAL[facility], patient was found to have exquisite fundal tenderness and CMT on exam. She was admitted to observation for antibiotics in the setting of endometritis.        OB History    Para Term  AB Living   1 1 1 0 0 1   SAB TAB Ectopic Multiple Live Births   0 0 0 0 1      # Outcome Date GA Lbr René/2nd Weight Sex Delivery Anes PTL Lv   1 Term 10/31/20 37w6d / 00:50 2.95 kg (6 lb 8.1 oz) F Vag-Spont EPI N TRACI      Name: MARLYS SMITH      Apgar1: 9  Apgar5: 9     Past Medical History:   Diagnosis Date    Overweight (BMI 25.0-29.9) 2017    Recurrent cold sores 2017     No past surgical history on file.    PTA Medications   Medication Sig    benzocaine-lanolin (DERMOPLAST) 20-0.5 % Aero Apply topically continuous prn.    docusate sodium (COLACE) 100 MG capsule Take 2 capsules (200 mg total) by mouth 2 (two) times daily as needed for  Constipation.    ibuprofen (ADVIL,MOTRIN) 600 MG tablet Take 1 tablet (600 mg total) by mouth every 6 (six) hours.    lanolin Crea cream Apply topically as needed for Dry Skin (to nipples).    simethicone (MYLICON) 80 MG chewable tablet Take 1 tablet (80 mg total) by mouth every 6 (six) hours as needed for Flatulence.       Review of patient's allergies indicates:  No Known Allergies     Family History     Problem Relation (Age of Onset)    Cancer Maternal Grandfather    Diabetes Mellitus Paternal Grandmother    Hypertension Maternal Grandmother, Paternal Grandfather    Parkinsonism Father        Tobacco Use    Smoking status: Never Smoker    Smokeless tobacco: Never Used   Substance and Sexual Activity    Alcohol use: Not Currently    Drug use: Never    Sexual activity: Yes     Partners: Male     Review of Systems   Constitutional: Negative for chills and fever.   Eyes: Negative for visual disturbance.   Respiratory: Negative for shortness of breath.    Cardiovascular: Negative for chest pain.   Gastrointestinal: Positive for abdominal pain. Negative for nausea and vomiting.   Endocrine: Negative for diabetes.   Genitourinary: Positive for frequency, pelvic pain and vaginal bleeding. Negative for dysuria and vaginal discharge.   Musculoskeletal: Positive for back pain.   Integumentary:  Negative for rash, breast mass, nipple discharge, breast skin changes and breast tenderness.   Neurological: Negative for headaches.   Psychiatric/Behavioral: The patient is not nervous/anxious.    Breast: Negative for mass, mastodynia, nipple discharge, skin changes and tenderness     Objective:     Vital Signs (Most Recent):  Temp: 98.5 °F (36.9 °C) (11/13/20 2011)  Pulse: 76 (11/13/20 2011)  Resp: 20 (11/13/20 2011)  BP: 110/74 (11/13/20 2011)  SpO2: 96 % (11/13/20 2011) Vital Signs (24h Range):  Temp:  [98.1 °F (36.7 °C)-98.5 °F (36.9 °C)] 98.5 °F (36.9 °C)  Pulse:  [76-89] 76  Resp:  [17-20] 20  SpO2:  [96 %-100 %] 96  %  BP: (110-135)/(74-83) 110/74     Weight: 72 kg (158 lb 11.7 oz)  Body mass index is 28.12 kg/m².    Patient's last menstrual period was 02/09/2020.    Physical Exam:   Constitutional: She is oriented to person, place, and time. She appears well-developed and well-nourished. No distress.    HENT:   Head: Normocephalic and atraumatic.      Cardiovascular: Normal rate.     Pulmonary/Chest: Effort normal. No respiratory distress.        Abdominal: She exhibits no mass. There is abdominal tenderness (very tender to light and deep palpation over suprapubic region).     Genitourinary:    Pelvic exam was performed with patient supine.   There is no rash, tenderness, lesion or injury on the right labia. There is no rash, tenderness, lesion or injury on the left labia. Uterus is tender (fundal tenderness). Uterus is not enlarged. Cervix is normal. There is tenderness and bleeding (moderate amount of dark red blood in vaginal vault) in the vagina.    There is a foreign body (2nd degree laceration healing well, sutures present) in the vagina.   Cervix exhibits motion tenderness.    Genitourinary Comments: External genitalia: normal  Urethra: Non-tender, no masses  Urethral meatus: normal  Bladder: Non-tender, no masses   negative for vaginal discharge          Musculoskeletal: Normal range of motion. No tenderness or edema.       Neurological: She is alert and oriented to person, place, and time.    Skin: Skin is warm and dry.    Psychiatric: She has a normal mood and affect.       Laboratory:  CBC:   Recent Labs   Lab 11/13/20  1842   WBC 11.37   RBC 3.50*   HGB 12.1   HCT 34.7*      MCV 99*   MCH 34.6*   MCHC 34.9     Assessment/Plan:     * Endometritis  - VSS, afebrile  - Udip positive for blood, 2+ leuks  - CBC 11/12/34.7/346  - Fundal tenderness and CMT noted on exam  - Start gentamicin/clindamycin  - Ibuprofen prn for pelvic pain       Ana Murray MD  Obstetrics & Gynecology  [facility]

## 2020-11-14 NOTE — ASSESSMENT & PLAN NOTE
- VSS, afebrile  - Udip positive for blood, 2+ leuks  - CBC 11/12/34.7/346  - Fundal tenderness and CMT noted on exam  - Start gentamicin/clindamycin  - Ibuprofen prn for pelvic pain

## 2020-11-14 NOTE — DISCHARGE SUMMARY
Discharge Summary  Gynecology      Admit Date: 2020    Discharge Date and Time: 2020 1:58 PM    Attending Physician: Deanna Martin MD    Principal Diagnoses: Endometritis    Active Hospital Problems    Diagnosis  POA    *Endometritis [N71.9]  Yes      Resolved Hospital Problems    Diagnosis Date Resolved POA    Gestational hypertension [O13.9] 2020 Yes       Procedures: * No surgery found *    Discharged Condition: good    Hospital Course:   Gisella Smith is a 35 y.o. y.o.  female who presented on 2020 (PPD#13) with pelvic pain. Pt was treated for endometritis with gentamycin and clindamycin with improvement in symptoms.   On day of discharge, patient was urinating, ambulating, and tolerating a regular diet without difficulty. Pain was well controlled on PO medication. She was discharged home on HD#1 in stable condition with instructions to follow up with Dr. Martin in 4 weeks.     Consults: None    Significant Diagnostic Studies:  Recent Labs   Lab 20  1842   WBC 11.37   HGB 12.1   HCT 34.7*   MCV 99*           Treatments:   1. IV antibiotics    Disposition: Home or Self Care    Patient Instructions:   Current Discharge Medication List      START taking these medications    Details   metroNIDAZOLE (FLAGYL) 500 MG tablet Take 1 tablet (500 mg total) by mouth every 12 (twelve) hours. for 7 days  Qty: 14 tablet, Refills: 0         CONTINUE these medications which have NOT CHANGED    Details   benzocaine-lanolin (DERMOPLAST) 20-0.5 % Aero Apply topically continuous prn.  Qty:        docusate sodium (COLACE) 100 MG capsule Take 2 capsules (200 mg total) by mouth 2 (two) times daily as needed for Constipation.  Qty: 60 capsule, Refills: 0      ibuprofen (ADVIL,MOTRIN) 600 MG tablet Take 1 tablet (600 mg total) by mouth every 6 (six) hours.  Qty: 90 tablet, Refills: 1      lanolin Crea cream Apply topically as needed for Dry Skin (to nipples).  Qty:  , Refills: 0       simethicone (MYLICON) 80 MG chewable tablet Take 1 tablet (80 mg total) by mouth every 6 (six) hours as needed for Flatulence.  Qty:  , Refills: 0             Discharge Procedure Orders   Diet Adult Regular     Pelvic Rest   Order Comments: Pelvic rest until 6 weeks after discharge. Nothing in vagina -no sex, tampons, douching, etc.     Notify your health care provider if you experience any of the following:  temperature >100.4     Notify your health care provider if you experience any of the following:  persistent nausea and vomiting or diarrhea     Notify your health care provider if you experience any of the following:  severe uncontrolled pain     Notify your health care provider if you experience any of the following:  difficulty breathing or increased cough     Notify your health care provider if you experience any of the following:  severe persistent headache     Notify your health care provider if you experience any of the following:  worsening rash     Notify your health care provider if you experience any of the following:  persistent dizziness, light-headedness, or visual disturbances     Notify your health care provider if you experience any of the following:  increased confusion or weakness     Notify your health care provider if you experience any of the following:   Order Comments: Heavy vaginal bleeding saturating more than 1 pad per hr for at least consecutive 2 hrs.     Activity as tolerated       Follow-up Information     Deanna Martin MD. Schedule an appointment as soon as possible for a visit in 4 weeks.    Specialties: Obstetrics, Obstetrics and Gynecology  Why: Postpartum check  Contact information:  [address] [address]  [phone]                 Leeann Bourne MD/MPH  OB/GYN PGY1

## 2020-11-14 NOTE — HOSPITAL COURSE
11/13/2020: Admitted for antibiotics in setting of endometritis.   11/14/2020: Patient feeling much better this morning. Continue gent/clinda x24 hrs. Remains afebrile.

## 2020-11-14 NOTE — PROGRESS NOTES
[facility]  Obstetrics & Gynecology  Progress Note    Patient Name: Gisella Smith  MRN: [id number]  Admission Date: 2020  Primary Care Provider: Ana Mark MD (Inactive)  Principal Problem: Endometritis    Subjective:     HPI:  Gisella Smith is a 35 y.o. [id number] who is PPD#13 s/p  who presents complaining of pelvic pain. Patient states that she started having severe lower abdominal pain yesterday morning that progressively worsened throughout the day. She reports abdominal tenderness and worsening of pain with urination. She also endorses bilateral lower back pain, but thinks this may be related to breastfeeding/positioning. She denies fever, chills, abnormal vaginal discharge, nausea, and vomiting. She reports endometritis last year after experiencing a missed AB and states that this feels similarly. She is breastfeeding and denies breast pain or erythema. Her pregnancy was also complicated by gHTN in the third trimester and she PreE symptoms.    In the [facility], patient was found to have exquisite fundal tenderness and CMT on exam. She was admitted to observation for antibiotics in the setting of endometritis.    Interval History:   Patient states that she is feeling much better this morning. She continue to have lower abdominal tenderness and pain with urination but states that this is significantly improved. She denies fever, chills, and nausea/vomiting.    Scheduled Meds:   clindamycin (CLEOCIN) IVPB  900 mg Intravenous Q8H    gentamicin  5 mg/kg (Adjusted) Intravenous Q24H    prenatal vitamin  1 tablet Oral Daily     Continuous Infusions:   benzocaine-lanolin       PRN Meds:acetaminophen, benzocaine-lanolin, diphenhydrAMINE, diphenhydrAMINE, docusate sodium, hydrocortisone, ibuprofen, lactated ringers, lanolin, ondansetron, promethazine (PHENERGAN) IVPB, simethicone    Review of patient's allergies indicates:  No Known Allergies    Objective:     Vital  Signs (Most Recent):  Temp: 98 °F (36.7 °C) (11/14/20 0414)  Pulse: 83 (11/14/20 0414)  Resp: 20 (11/14/20 0414)  BP: 104/68 (11/14/20 0414)  SpO2: 96 % (11/14/20 0414) Vital Signs (24h Range):  Temp:  [97.8 °F (36.6 °C)-98.5 °F (36.9 °C)] 98 °F (36.7 °C)  Pulse:  [71-89] 83  Resp:  [17-20] 20  SpO2:  [95 %-100 %] 96 %  BP: (104-135)/(68-83) 104/68     Weight: 72 kg (158 lb 11.7 oz)  Body mass index is 28.12 kg/m².  Patient's last menstrual period was 02/09/2020.    I&O (Last 24H):    Intake/Output Summary (Last 24 hours) at 11/14/2020 0513  Last data filed at 11/14/2020 0415  Gross per 24 hour   Intake --   Output 1400 ml   Net -1400 ml       Physical Exam:   Constitutional: She is oriented to person, place, and time. She appears well-developed and well-nourished. No distress.    HENT:   Head: Normocephalic and atraumatic.      Cardiovascular: Normal rate.     Pulmonary/Chest: Effort normal.        Abdominal: Soft. There is abdominal tenderness (fundal tenderness noted on deep palpation,much improved from exam yesterday). There is no rebound and no guarding.             Musculoskeletal: Normal range of motion. No tenderness or edema.       Neurological: She is alert and oriented to person, place, and time.    Skin: Skin is warm and dry.    Psychiatric: She has a normal mood and affect.       Assessment/Plan:     * Endometritis  - VSS, afebrile  - CBC 11/12/34.7/346  - UA (cath) positive for blood, 1+ leuks  - Uclx pending  - Fundal tenderness still noted on exam this morning, but much improved compared to exam yesterday  - Continue gentamicin/clindamycin x24 hrs  - Ibuprofen prn for pelvic pain     Dispo: Anticipate discharge home later this evening after 24hrs of antibiotics.         Ana Murray MD  Obstetrics & Gynecology  [facility]

## 2020-11-14 NOTE — SUBJECTIVE & OBJECTIVE
OB History    Para Term  AB Living   1 1 1 0 0 1   SAB TAB Ectopic Multiple Live Births   0 0 0 0 1      # Outcome Date GA Lbr René/2nd Weight Sex Delivery Anes PTL Lv   1 Term 10/31/20 37w6d / 00:50 2.95 kg (6 lb 8.1 oz) F Vag-Spont EPI N TRACI      Name: MARLYS FUENTES      Apgar1: 9  Apgar5: 9     Past Medical History:   Diagnosis Date    Overweight (BMI 25.0-29.9) 2017    Recurrent cold sores 2017     No past surgical history on file.    PTA Medications   Medication Sig    benzocaine-lanolin (DERMOPLAST) 20-0.5 % Aero Apply topically continuous prn.    docusate sodium (COLACE) 100 MG capsule Take 2 capsules (200 mg total) by mouth 2 (two) times daily as needed for Constipation.    ibuprofen (ADVIL,MOTRIN) 600 MG tablet Take 1 tablet (600 mg total) by mouth every 6 (six) hours.    lanolin Crea cream Apply topically as needed for Dry Skin (to nipples).    simethicone (MYLICON) 80 MG chewable tablet Take 1 tablet (80 mg total) by mouth every 6 (six) hours as needed for Flatulence.       Review of patient's allergies indicates:  No Known Allergies     Family History     Problem Relation (Age of Onset)    Cancer Maternal Grandfather    Diabetes Mellitus Paternal Grandmother    Hypertension Maternal Grandmother, Paternal Grandfather    Parkinsonism Father        Tobacco Use    Smoking status: Never Smoker    Smokeless tobacco: Never Used   Substance and Sexual Activity    Alcohol use: Not Currently    Drug use: Never    Sexual activity: Yes     Partners: Male     Review of Systems   Constitutional: Negative for chills and fever.   Eyes: Negative for visual disturbance.   Respiratory: Negative for shortness of breath.    Cardiovascular: Negative for chest pain.   Gastrointestinal: Positive for abdominal pain. Negative for nausea and vomiting.   Endocrine: Negative for diabetes.   Genitourinary: Positive for frequency, pelvic pain and vaginal bleeding. Negative for dysuria  and vaginal discharge.   Musculoskeletal: Positive for back pain.   Integumentary:  Negative for rash, breast mass, nipple discharge, breast skin changes and breast tenderness.   Neurological: Negative for headaches.   Psychiatric/Behavioral: The patient is not nervous/anxious.    Breast: Negative for mass, mastodynia, nipple discharge, skin changes and tenderness     Objective:     Vital Signs (Most Recent):  Temp: 98.5 °F (36.9 °C) (11/13/20 2011)  Pulse: 76 (11/13/20 2011)  Resp: 20 (11/13/20 2011)  BP: 110/74 (11/13/20 2011)  SpO2: 96 % (11/13/20 2011) Vital Signs (24h Range):  Temp:  [98.1 °F (36.7 °C)-98.5 °F (36.9 °C)] 98.5 °F (36.9 °C)  Pulse:  [76-89] 76  Resp:  [17-20] 20  SpO2:  [96 %-100 %] 96 %  BP: (110-135)/(74-83) 110/74     Weight: 72 kg (158 lb 11.7 oz)  Body mass index is 28.12 kg/m².    Patient's last menstrual period was 02/09/2020.    Physical Exam:   Constitutional: She is oriented to person, place, and time. She appears well-developed and well-nourished. No distress.    HENT:   Head: Normocephalic and atraumatic.      Cardiovascular: Normal rate.     Pulmonary/Chest: Effort normal. No respiratory distress.        Abdominal: She exhibits no mass. There is abdominal tenderness (very tender to light and deep palpation over suprapubic region).     Genitourinary:    Pelvic exam was performed with patient supine.   There is no rash, tenderness, lesion or injury on the right labia. There is no rash, tenderness, lesion or injury on the left labia. Uterus is tender (fundal tenderness). Uterus is not enlarged. Cervix is normal. There is tenderness and bleeding (moderate amount of dark red blood in vaginal vault) in the vagina.    There is a foreign body (2nd degree laceration healing well, sutures present) in the vagina.   Cervix exhibits motion tenderness.    Genitourinary Comments: External genitalia: normal  Urethra: Non-tender, no masses  Urethral meatus: normal  Bladder: Non-tender, no masses    negative for vaginal discharge          Musculoskeletal: Normal range of motion. No tenderness or edema.       Neurological: She is alert and oriented to person, place, and time.    Skin: Skin is warm and dry.    Psychiatric: She has a normal mood and affect.       Laboratory:  CBC:   Recent Labs   Lab 11/13/20  1842   WBC 11.37   RBC 3.50*   HGB 12.1   HCT 34.7*      MCV 99*   MCH 34.6*   MCHC 34.9

## 2020-11-15 LAB — BACTERIA UR CULT: NO GROWTH

## 2020-11-19 ENCOUNTER — TELEPHONE (OUTPATIENT)
Dept: OBSTETRICS AND GYNECOLOGY | Facility: CLINIC | Age: 35
End: 2020-11-19

## 2020-11-19 NOTE — TELEPHONE ENCOUNTER
----- Message from Teresa Samson sent at 11/19/2020  9:37 AM CST -----  Regarding: Patient call back  Who called:CRISTIAN FUENTES [[id number]]    What is the request in detail: Patient is requesting a call back. She states she is ready to schedule her PP appt. Attempted to schedule, no appts available. She would like to further discuss.   Please advise.    Can the clinic reply by [facility]? No    Best call back number: [phone]    Additional Information: N/A

## 2020-11-19 NOTE — TELEPHONE ENCOUNTER
Called patient back, all questions and concerns were addressed, follow up PP has  Been scheduled patient is aware of time and location.

## 2020-12-07 ENCOUNTER — POSTPARTUM VISIT (OUTPATIENT)
Dept: OBSTETRICS AND GYNECOLOGY | Facility: CLINIC | Age: 35
End: 2020-12-07
Payer: COMMERCIAL

## 2020-12-07 VITALS
WEIGHT: 139.13 LBS | DIASTOLIC BLOOD PRESSURE: 70 MMHG | HEIGHT: 63 IN | SYSTOLIC BLOOD PRESSURE: 110 MMHG | BODY MASS INDEX: 24.65 KG/M2

## 2020-12-07 DIAGNOSIS — Z30.015 ENCOUNTER FOR INITIAL PRESCRIPTION OF VAGINAL RING HORMONAL CONTRACEPTIVE: ICD-10-CM

## 2020-12-07 PROCEDURE — 99999 PR PBB SHADOW E&M-EST. PATIENT-LVL III: CPT | Mod: PBBFAC,,, | Performed by: OBSTETRICS & GYNECOLOGY

## 2020-12-07 PROCEDURE — 99999 PR PBB SHADOW E&M-EST. PATIENT-LVL III: ICD-10-PCS | Mod: PBBFAC,,, | Performed by: OBSTETRICS & GYNECOLOGY

## 2020-12-07 PROCEDURE — 0503F POSTPARTUM CARE VISIT: CPT | Mod: S$GLB,,, | Performed by: OBSTETRICS & GYNECOLOGY

## 2020-12-07 PROCEDURE — 0503F PR POSTPARTUM CARE VISIT: ICD-10-PCS | Mod: S$GLB,,, | Performed by: OBSTETRICS & GYNECOLOGY

## 2020-12-07 RX ORDER — ETONOGESTREL AND ETHINYL ESTRADIOL VAGINAL RING .015; .12 MG/D; MG/D
1 RING VAGINAL
Qty: 3 EACH | Refills: 4 | Status: SHIPPED | OUTPATIENT
Start: 2020-12-07 | End: 2021-12-08 | Stop reason: SDUPTHER

## 2020-12-07 NOTE — PROGRESS NOTES
"Gisella Smith is a 35 y.o. female  presents for a postpartum visit.  She is status post  6 weeks ago.  Her hospitalization was not complicated.  She is breastfeeding.  She desires NuvaRing vaginal inserts for contraception - WAS READMITTED ENDOMETRITIS PP, SO NOT LOOKING FOR IUD AT THIS TIME.  She does not postpartum depression.  GIRL 10/31/2020 , SECOND DEGREE REPAIR  Her last pap was   "[facility] @ [facility]   HX ORAL HSV.   O POS  NIPT NL XX.  VULV CONDY  32W1D 39TH%ILE, LLP RESOLVED"    Past Medical History:   Diagnosis Date    Overweight (BMI 25.0-29.9) 2017    Recurrent cold sores 2017     History reviewed. No pertinent surgical history.  Review of patient's allergies indicates:  No Known Allergies    Current Outpatient Medications:     benzocaine-lanolin (DERMOPLAST) 20-0.5 % Aero, Apply topically continuous prn., Disp:  , Rfl:     docusate sodium (COLACE) 100 MG capsule, Take 2 capsules (200 mg total) by mouth 2 (two) times daily as needed for Constipation., Disp: 60 capsule, Rfl: 0    ibuprofen (ADVIL,MOTRIN) 600 MG tablet, Take 1 tablet (600 mg total) by mouth every 6 (six) hours., Disp: 90 tablet, Rfl: 1    lanolin Crea cream, Apply topically as needed for Dry Skin (to nipples)., Disp:  , Rfl: 0    simethicone (MYLICON) 80 MG chewable tablet, Take 1 tablet (80 mg total) by mouth every 6 (six) hours as needed for Flatulence., Disp:  , Rfl: 0      Vitals:    20 1339   BP: 110/70       ABDOMEN: Soft. No tenderness or masses. No hepatosplenomegaly. No hernias.  BREASTS: exam deferred  PELVIC: External female genitalia without lesions, well-healed.  Female hair distribution. Adequate perineal body without evident defect, Normal urethral meatus. Vagina moist and well rugated without lesions or discharge. Cervix pink without lesions, discharge or tenderness. Uterus is 4-6 week size, regular, mobile and nontender. Adnexa without masses or tenderness.    Assessment:  " Normal postpartum exam    Plan:  Routine follow up.

## 2021-02-01 PROBLEM — O13.3 GESTATIONAL HYPERTENSION, THIRD TRIMESTER: Status: RESOLVED | Noted: 2020-10-30 | Resolved: 2021-02-01

## 2021-11-29 ENCOUNTER — OFFICE VISIT (OUTPATIENT)
Dept: PAIN MEDICINE | Facility: CLINIC | Age: 36
End: 2021-11-29
Payer: COMMERCIAL

## 2021-11-29 VITALS — DIASTOLIC BLOOD PRESSURE: 78 MMHG | SYSTOLIC BLOOD PRESSURE: 117 MMHG | HEART RATE: 96 BPM

## 2021-11-29 DIAGNOSIS — S76.311A STRAIN OF RIGHT PIRIFORMIS MUSCLE, INITIAL ENCOUNTER: Primary | ICD-10-CM

## 2021-11-29 PROCEDURE — 99204 PR OFFICE/OUTPT VISIT, NEW, LEVL IV, 45-59 MIN: ICD-10-PCS | Mod: S$GLB,,, | Performed by: PAIN MEDICINE

## 2021-11-29 PROCEDURE — 99999 PR PBB SHADOW E&M-EST. PATIENT-LVL III: ICD-10-PCS | Mod: PBBFAC,,, | Performed by: PAIN MEDICINE

## 2021-11-29 PROCEDURE — 99999 PR PBB SHADOW E&M-EST. PATIENT-LVL III: CPT | Mod: PBBFAC,,, | Performed by: PAIN MEDICINE

## 2021-11-29 PROCEDURE — 99204 OFFICE O/P NEW MOD 45 MIN: CPT | Mod: S$GLB,,, | Performed by: PAIN MEDICINE

## 2021-11-29 RX ORDER — ACETAMINOPHEN 325 MG/1
325 TABLET ORAL 3 TIMES DAILY PRN
Refills: 0
Start: 2021-11-29 | End: 2022-02-01

## 2021-11-29 RX ORDER — DICLOFENAC SODIUM 75 MG/1
75 TABLET, DELAYED RELEASE ORAL 2 TIMES DAILY
Qty: 30 TABLET | Refills: 0 | Status: SHIPPED | OUTPATIENT
Start: 2021-11-29 | End: 2021-12-14

## 2021-12-01 ENCOUNTER — PATIENT MESSAGE (OUTPATIENT)
Dept: PAIN MEDICINE | Facility: CLINIC | Age: 36
End: 2021-12-01
Payer: COMMERCIAL

## 2021-12-02 ENCOUNTER — TELEPHONE (OUTPATIENT)
Dept: PAIN MEDICINE | Facility: CLINIC | Age: 36
End: 2021-12-02
Payer: COMMERCIAL

## 2021-12-02 DIAGNOSIS — M79.18 PIRIFORMIS MUSCLE PAIN: Primary | ICD-10-CM

## 2021-12-02 DIAGNOSIS — S76.311A STRAIN OF RIGHT PIRIFORMIS MUSCLE, INITIAL ENCOUNTER: ICD-10-CM

## 2021-12-02 RX ORDER — TIZANIDINE 4 MG/1
4-8 TABLET ORAL EVERY 6 HOURS PRN
Qty: 45 TABLET | Refills: 0 | Status: SHIPPED | OUTPATIENT
Start: 2021-12-02 | End: 2022-02-01

## 2021-12-02 RX ORDER — TRAMADOL HYDROCHLORIDE 50 MG/1
50 TABLET ORAL EVERY 8 HOURS PRN
Qty: 21 TABLET | Refills: 0 | Status: SHIPPED | OUTPATIENT
Start: 2021-12-02 | End: 2021-12-09

## 2021-12-03 ENCOUNTER — PATIENT MESSAGE (OUTPATIENT)
Dept: PAIN MEDICINE | Facility: OTHER | Age: 36
End: 2021-12-03
Payer: COMMERCIAL

## 2021-12-03 ENCOUNTER — TELEPHONE (OUTPATIENT)
Dept: PAIN MEDICINE | Facility: CLINIC | Age: 36
End: 2021-12-03
Payer: COMMERCIAL

## 2021-12-03 DIAGNOSIS — S76.311A STRAIN OF RIGHT PIRIFORMIS MUSCLE, INITIAL ENCOUNTER: ICD-10-CM

## 2021-12-03 DIAGNOSIS — M79.18 PIRIFORMIS MUSCLE PAIN: Primary | ICD-10-CM

## 2021-12-06 ENCOUNTER — HOSPITAL ENCOUNTER (OUTPATIENT)
Facility: OTHER | Age: 36
Discharge: HOME | End: 2021-12-06
Attending: ANESTHESIOLOGY | Admitting: ANESTHESIOLOGY
Payer: COMMERCIAL

## 2021-12-06 VITALS
RESPIRATION RATE: 16 BRPM | WEIGHT: 140 LBS | HEART RATE: 103 BPM | HEIGHT: 63 IN | TEMPERATURE: 84 F | DIASTOLIC BLOOD PRESSURE: 98 MMHG | SYSTOLIC BLOOD PRESSURE: 128 MMHG | OXYGEN SATURATION: 95 % | BODY MASS INDEX: 24.8 KG/M2

## 2021-12-06 DIAGNOSIS — G89.29 CHRONIC PAIN: ICD-10-CM

## 2021-12-06 DIAGNOSIS — S76.311A STRAIN OF RIGHT PIRIFORMIS MUSCLE, INITIAL ENCOUNTER: Primary | ICD-10-CM

## 2021-12-06 PROCEDURE — 20552 NJX 1/MLT TRIGGER POINT 1/2: CPT | Mod: ,,, | Performed by: ANESTHESIOLOGY

## 2021-12-06 PROCEDURE — 25000003 PHARM REV CODE 250: Performed by: ANESTHESIOLOGY

## 2021-12-06 PROCEDURE — 25500020 PHARM REV CODE 255: Performed by: ANESTHESIOLOGY

## 2021-12-06 PROCEDURE — 20552 PR INJECT TRIGGER POINT, 1 OR 2: ICD-10-PCS | Mod: ,,, | Performed by: ANESTHESIOLOGY

## 2021-12-06 PROCEDURE — 20552 NJX 1/MLT TRIGGER POINT 1/2: CPT | Performed by: ANESTHESIOLOGY

## 2021-12-06 RX ORDER — LIDOCAINE HYDROCHLORIDE 20 MG/ML
INJECTION, SOLUTION INFILTRATION; PERINEURAL
Status: DISCONTINUED | OUTPATIENT
Start: 2021-12-06 | End: 2021-12-06 | Stop reason: HOSPADM

## 2021-12-06 RX ORDER — SODIUM CHLORIDE 9 MG/ML
INJECTION, SOLUTION INTRAVENOUS CONTINUOUS
Status: DISCONTINUED | OUTPATIENT
Start: 2021-12-06 | End: 2021-12-06 | Stop reason: HOSPADM

## 2021-12-07 ENCOUNTER — PATIENT MESSAGE (OUTPATIENT)
Dept: OBSTETRICS AND GYNECOLOGY | Facility: CLINIC | Age: 36
End: 2021-12-07
Payer: COMMERCIAL

## 2021-12-08 ENCOUNTER — OFFICE VISIT (OUTPATIENT)
Dept: OBSTETRICS AND GYNECOLOGY | Facility: CLINIC | Age: 36
End: 2021-12-08
Payer: COMMERCIAL

## 2021-12-08 VITALS
BODY MASS INDEX: 23.74 KG/M2 | SYSTOLIC BLOOD PRESSURE: 130 MMHG | WEIGHT: 134 LBS | DIASTOLIC BLOOD PRESSURE: 72 MMHG | HEIGHT: 63 IN

## 2021-12-08 DIAGNOSIS — Z01.419 VISIT FOR GYNECOLOGIC EXAMINATION: Primary | ICD-10-CM

## 2021-12-08 PROCEDURE — 99999 PR PBB SHADOW E&M-EST. PATIENT-LVL III: CPT | Mod: PBBFAC,,, | Performed by: OBSTETRICS & GYNECOLOGY

## 2021-12-08 PROCEDURE — 99395 PREV VISIT EST AGE 18-39: CPT | Mod: S$GLB,,, | Performed by: OBSTETRICS & GYNECOLOGY

## 2021-12-08 PROCEDURE — 87624 HPV HI-RISK TYP POOLED RSLT: CPT | Performed by: OBSTETRICS & GYNECOLOGY

## 2021-12-08 PROCEDURE — 99999 PR PBB SHADOW E&M-EST. PATIENT-LVL III: ICD-10-PCS | Mod: PBBFAC,,, | Performed by: OBSTETRICS & GYNECOLOGY

## 2021-12-08 PROCEDURE — 99395 PR PREVENTIVE VISIT,EST,18-39: ICD-10-PCS | Mod: S$GLB,,, | Performed by: OBSTETRICS & GYNECOLOGY

## 2021-12-08 RX ORDER — ETONOGESTREL AND ETHINYL ESTRADIOL VAGINAL RING .015; .12 MG/D; MG/D
1 RING VAGINAL
Qty: 3 EACH | Refills: 4 | Status: SHIPPED | OUTPATIENT
Start: 2021-12-08 | End: 2022-07-05

## 2021-12-14 LAB
CYTOLOGIST CVX/VAG CYTO: NORMAL
CYTOLOGY CVX/VAG DOC CYTO: NORMAL
CYTOLOGY CVX/VAG DOC THIN PREP: NORMAL
CYTOLOGY THINPREP PAP COMMENT: NORMAL
HPV HR 12 DNA CVX QL NAA+PROBE: NEGATIVE
HPV16 DNA CVX QL NAA+PROBE: NEGATIVE
HPV18 DNA CVX QL NAA+PROBE: NEGATIVE
PAP NOTE: NORMAL
STAT OF ADQ CVX/VAG CYTO-IMP: NORMAL

## 2021-12-16 ENCOUNTER — PATIENT MESSAGE (OUTPATIENT)
Dept: OBSTETRICS AND GYNECOLOGY | Facility: CLINIC | Age: 36
End: 2021-12-16
Payer: COMMERCIAL

## 2022-02-01 ENCOUNTER — OFFICE VISIT (OUTPATIENT)
Dept: INTERNAL MEDICINE | Facility: CLINIC | Age: 37
End: 2022-02-01
Payer: COMMERCIAL

## 2022-02-01 VITALS
OXYGEN SATURATION: 100 % | SYSTOLIC BLOOD PRESSURE: 112 MMHG | BODY MASS INDEX: 25.32 KG/M2 | WEIGHT: 142.88 LBS | HEART RATE: 82 BPM | DIASTOLIC BLOOD PRESSURE: 72 MMHG | HEIGHT: 63 IN

## 2022-02-01 DIAGNOSIS — Z13.1 SCREENING FOR DIABETES MELLITUS: ICD-10-CM

## 2022-02-01 DIAGNOSIS — Z11.59 ENCOUNTER FOR HEPATITIS C SCREENING TEST FOR LOW RISK PATIENT: ICD-10-CM

## 2022-02-01 DIAGNOSIS — Z00.00 HEALTH MAINTENANCE EXAMINATION: Primary | ICD-10-CM

## 2022-02-01 PROCEDURE — 99999 PR PBB SHADOW E&M-EST. PATIENT-LVL III: CPT | Mod: PBBFAC,,, | Performed by: STUDENT IN AN ORGANIZED HEALTH CARE EDUCATION/TRAINING PROGRAM

## 2022-02-01 PROCEDURE — 99395 PREV VISIT EST AGE 18-39: CPT | Mod: S$GLB,,, | Performed by: STUDENT IN AN ORGANIZED HEALTH CARE EDUCATION/TRAINING PROGRAM

## 2022-02-01 PROCEDURE — 3078F DIAST BP <80 MM HG: CPT | Mod: CPTII,S$GLB,, | Performed by: STUDENT IN AN ORGANIZED HEALTH CARE EDUCATION/TRAINING PROGRAM

## 2022-02-01 PROCEDURE — 99999 PR PBB SHADOW E&M-EST. PATIENT-LVL III: ICD-10-PCS | Mod: PBBFAC,,, | Performed by: STUDENT IN AN ORGANIZED HEALTH CARE EDUCATION/TRAINING PROGRAM

## 2022-02-01 PROCEDURE — 3078F PR MOST RECENT DIASTOLIC BLOOD PRESSURE < 80 MM HG: ICD-10-PCS | Mod: CPTII,S$GLB,, | Performed by: STUDENT IN AN ORGANIZED HEALTH CARE EDUCATION/TRAINING PROGRAM

## 2022-02-01 PROCEDURE — 3074F PR MOST RECENT SYSTOLIC BLOOD PRESSURE < 130 MM HG: ICD-10-PCS | Mod: CPTII,S$GLB,, | Performed by: STUDENT IN AN ORGANIZED HEALTH CARE EDUCATION/TRAINING PROGRAM

## 2022-02-01 PROCEDURE — 1160F PR REVIEW ALL MEDS BY PRESCRIBER/CLIN PHARMACIST DOCUMENTED: ICD-10-PCS | Mod: CPTII,S$GLB,, | Performed by: STUDENT IN AN ORGANIZED HEALTH CARE EDUCATION/TRAINING PROGRAM

## 2022-02-01 PROCEDURE — 3074F SYST BP LT 130 MM HG: CPT | Mod: CPTII,S$GLB,, | Performed by: STUDENT IN AN ORGANIZED HEALTH CARE EDUCATION/TRAINING PROGRAM

## 2022-02-01 PROCEDURE — 1160F RVW MEDS BY RX/DR IN RCRD: CPT | Mod: CPTII,S$GLB,, | Performed by: STUDENT IN AN ORGANIZED HEALTH CARE EDUCATION/TRAINING PROGRAM

## 2022-02-01 PROCEDURE — 3008F BODY MASS INDEX DOCD: CPT | Mod: CPTII,S$GLB,, | Performed by: STUDENT IN AN ORGANIZED HEALTH CARE EDUCATION/TRAINING PROGRAM

## 2022-02-01 PROCEDURE — 1159F MED LIST DOCD IN RCRD: CPT | Mod: CPTII,S$GLB,, | Performed by: STUDENT IN AN ORGANIZED HEALTH CARE EDUCATION/TRAINING PROGRAM

## 2022-02-01 PROCEDURE — 99395 PR PREVENTIVE VISIT,EST,18-39: ICD-10-PCS | Mod: S$GLB,,, | Performed by: STUDENT IN AN ORGANIZED HEALTH CARE EDUCATION/TRAINING PROGRAM

## 2022-02-01 PROCEDURE — 1159F PR MEDICATION LIST DOCUMENTED IN MEDICAL RECORD: ICD-10-PCS | Mod: CPTII,S$GLB,, | Performed by: STUDENT IN AN ORGANIZED HEALTH CARE EDUCATION/TRAINING PROGRAM

## 2022-02-01 PROCEDURE — 3008F PR BODY MASS INDEX (BMI) DOCUMENTED: ICD-10-PCS | Mod: CPTII,S$GLB,, | Performed by: STUDENT IN AN ORGANIZED HEALTH CARE EDUCATION/TRAINING PROGRAM

## 2022-02-01 NOTE — PROGRESS NOTES
Subjective:       Patient ID: Gisella Smith is a 36 y.o. female.    Chief Complaint: Health maintenance examination [Z00.00]    Patient is new to me, here to establish care.    Health maintenance -   Denies family history of colorectal cancer.   Denies family history of breast or ovarian cancers.  Last pap performed DEC2021.   History of prior abnormal pap smears, cryo previously, normal since.  Family history of osteoporosis.  Family history of cardiac disease.  UTD on COVID19 primary and booster, Tdap, Influenza vaccinations  Never smoker.  Drinks alcohol 1-2 times per week, 1-2 drinks per sitting.   Denies drug use.  UTD on HIV screening.  Due for Hep C screening.  UTD on lipid screening.  : 05/13/2019  Lab Results       Component                Value               Date                       LDLCALC                  70.2                05/13/2019            Due for diabetes screening.  Eats both take out and home cooked meals.  Uses [employer] when able, works out with  once weekly     Has regular menstrual cycles once monthly.  Menstruation lasts for 4-5 days.  Began menarche at age 14.   Denies menorrhagia or requiring more than 5 pads or tampons in a single day.  3 total pregnancies. Daughter born 31OCT2020, gestational HTN, full term. 2 prior miscarriages.  Currently sexually active with one male partner.   Uses NuvaRing for contraception.         Review of Systems   Constitutional: Negative for appetite change, chills, fatigue, fever and unexpected weight change.   Respiratory: Negative for cough and shortness of breath.    Cardiovascular: Negative for chest pain, palpitations and leg swelling.   Gastrointestinal: Negative for abdominal pain, constipation, diarrhea, nausea and vomiting.   Skin: Negative for rash.   Neurological: Negative for dizziness, syncope, weakness and headaches.         Current Outpatient Medications   Medication Instructions    etonogestreL-ethinyl estradioL (NUVARING)  "0.12-0.015 mg/24 hr vaginal ring 1 each, Vaginal, Every 28 days    prenatal 25/iron fum/folic/dha (PRENATAL-1 ORAL) 1 tablet, Oral, Daily     Objective:      Vitals:    02/01/22 1331   BP: 112/72   Pulse: 82   SpO2: 100%   Weight: 64.8 kg (142 lb 13.7 oz)   Height: 5' 3" (1.6 m)   PainSc: 0-No pain     Body mass index is 25.31 kg/m².    Physical Exam  Vitals reviewed.   Constitutional:       General: She is not in acute distress.     Appearance: Normal appearance. She is not ill-appearing or diaphoretic.   HENT:      Head: Normocephalic and atraumatic.      Right Ear: Tympanic membrane, ear canal and external ear normal. There is no impacted cerumen.      Left Ear: Tympanic membrane, ear canal and external ear normal. There is no impacted cerumen.      Nose: Nose normal. No rhinorrhea.      Mouth/Throat:      Mouth: Mucous membranes are moist.      Pharynx: Oropharynx is clear. No oropharyngeal exudate or posterior oropharyngeal erythema.   Eyes:      General: No scleral icterus.        Right eye: No discharge.         Left eye: No discharge.      Conjunctiva/sclera: Conjunctivae normal.   Neck:      Thyroid: No thyromegaly or thyroid tenderness.      Trachea: Trachea normal.   Cardiovascular:      Rate and Rhythm: Normal rate and regular rhythm.      Heart sounds: Normal heart sounds. No murmur heard.  No friction rub. No gallop.    Pulmonary:      Effort: Pulmonary effort is normal. No respiratory distress.      Breath sounds: Normal breath sounds. No stridor. No wheezing, rhonchi or rales.   Chest:   Breasts:      Right: No supraclavicular adenopathy.      Left: No supraclavicular adenopathy.       Abdominal:      General: Bowel sounds are normal. There is no distension.      Palpations: Abdomen is soft.      Tenderness: There is no abdominal tenderness. There is no guarding or rebound.   Musculoskeletal:         General: No swelling or deformity.      Cervical back: Neck supple.   Lymphadenopathy:      Head:    "   Right side of head: No submandibular or posterior auricular adenopathy.      Left side of head: No submandibular or posterior auricular adenopathy.      Cervical: No cervical adenopathy.      Right cervical: No superficial, deep or posterior cervical adenopathy.     Left cervical: No superficial, deep or posterior cervical adenopathy.      Upper Body:      Right upper body: No supraclavicular adenopathy.      Left upper body: No supraclavicular adenopathy.   Skin:     General: Skin is warm and dry.   Neurological:      General: No focal deficit present.      Mental Status: She is alert. Mental status is at baseline.      Gait: Gait normal.   Psychiatric:         Mood and Affect: Mood normal.         Behavior: Behavior normal.         Assessment:       1. Health maintenance examination    2. Screening for diabetes mellitus    3. Encounter for hepatitis C screening test for low risk patient        Plan:       Health maintenance examination  Discussed the following:     Goal of at least 150 minutes aerobic exercise weekly.      Target heart rate while performing aerobic activity 157.      Organizing plate with half vegetables, quarter whole grain starches or starchy vegetables, and quarter lean protein.      Goal of at least 1 vegetarian meal weekly and emphasizing fresh vegetables and fruits in diet.       Increasing healthy based fats such as avocados, olive oil, nuts, and freshwater, cold-water fish.  RTC in 1 year for annual appointment, sooner PRN.  -     CBC Auto Differential; Future  -     Comprehensive Metabolic Panel; Future  -     TSH; Future    Screening for diabetes mellitus  -     Hemoglobin A1C; Future    Encounter for hepatitis C screening test for low risk patient  -     Hepatitis C Antibody; Future        Audrey Leyva MD  2/1/2022

## 2022-02-16 ENCOUNTER — LAB VISIT (OUTPATIENT)
Dept: LAB | Facility: OTHER | Age: 37
End: 2022-02-16
Attending: STUDENT IN AN ORGANIZED HEALTH CARE EDUCATION/TRAINING PROGRAM
Payer: COMMERCIAL

## 2022-02-16 DIAGNOSIS — Z13.1 SCREENING FOR DIABETES MELLITUS: ICD-10-CM

## 2022-02-16 DIAGNOSIS — Z11.59 ENCOUNTER FOR HEPATITIS C SCREENING TEST FOR LOW RISK PATIENT: ICD-10-CM

## 2022-02-16 DIAGNOSIS — Z00.00 HEALTH MAINTENANCE EXAMINATION: ICD-10-CM

## 2022-02-16 LAB
ALBUMIN SERPL BCP-MCNC: 4.1 G/DL (ref 3.5–5.2)
ALP SERPL-CCNC: 44 U/L (ref 55–135)
ALT SERPL W/O P-5'-P-CCNC: 13 U/L (ref 10–44)
ANION GAP SERPL CALC-SCNC: 8 MMOL/L (ref 8–16)
AST SERPL-CCNC: 15 U/L (ref 10–40)
BASOPHILS # BLD AUTO: 0.03 K/UL (ref 0–0.2)
BASOPHILS NFR BLD: 0.6 % (ref 0–1.9)
BILIRUB SERPL-MCNC: 0.5 MG/DL (ref 0.1–1)
BUN SERPL-MCNC: 11 MG/DL (ref 6–20)
CALCIUM SERPL-MCNC: 9.2 MG/DL (ref 8.7–10.5)
CHLORIDE SERPL-SCNC: 109 MMOL/L (ref 95–110)
CO2 SERPL-SCNC: 23 MMOL/L (ref 23–29)
CREAT SERPL-MCNC: 0.7 MG/DL (ref 0.5–1.4)
DIFFERENTIAL METHOD: ABNORMAL
EOSINOPHIL # BLD AUTO: 0.1 K/UL (ref 0–0.5)
EOSINOPHIL NFR BLD: 1 % (ref 0–8)
ERYTHROCYTE [DISTWIDTH] IN BLOOD BY AUTOMATED COUNT: 12.5 % (ref 11.5–14.5)
EST. GFR  (AFRICAN AMERICAN): >60 ML/MIN/1.73 M^2
EST. GFR  (NON AFRICAN AMERICAN): >60 ML/MIN/1.73 M^2
ESTIMATED AVG GLUCOSE: 80 MG/DL (ref 68–131)
GLUCOSE SERPL-MCNC: 96 MG/DL (ref 70–110)
HBA1C MFR BLD: 4.4 % (ref 4–5.6)
HCT VFR BLD AUTO: 39.1 % (ref 37–48.5)
HGB BLD-MCNC: 13.5 G/DL (ref 12–16)
IMM GRANULOCYTES # BLD AUTO: 0.01 K/UL (ref 0–0.04)
IMM GRANULOCYTES NFR BLD AUTO: 0.2 % (ref 0–0.5)
LYMPHOCYTES # BLD AUTO: 1.8 K/UL (ref 1–4.8)
LYMPHOCYTES NFR BLD: 34.8 % (ref 18–48)
MCH RBC QN AUTO: 34.4 PG (ref 27–31)
MCHC RBC AUTO-ENTMCNC: 34.5 G/DL (ref 32–36)
MCV RBC AUTO: 100 FL (ref 82–98)
MONOCYTES # BLD AUTO: 0.5 K/UL (ref 0.3–1)
MONOCYTES NFR BLD: 9.5 % (ref 4–15)
NEUTROPHILS # BLD AUTO: 2.8 K/UL (ref 1.8–7.7)
NEUTROPHILS NFR BLD: 53.9 % (ref 38–73)
NRBC BLD-RTO: 0 /100 WBC
PLATELET # BLD AUTO: 253 K/UL (ref 150–450)
PMV BLD AUTO: 9.3 FL (ref 9.2–12.9)
POTASSIUM SERPL-SCNC: 3.9 MMOL/L (ref 3.5–5.1)
PROT SERPL-MCNC: 7.1 G/DL (ref 6–8.4)
RBC # BLD AUTO: 3.93 M/UL (ref 4–5.4)
SODIUM SERPL-SCNC: 140 MMOL/L (ref 136–145)
TSH SERPL DL<=0.005 MIU/L-ACNC: 0.7 UIU/ML (ref 0.4–4)
WBC # BLD AUTO: 5.17 K/UL (ref 3.9–12.7)

## 2022-02-16 PROCEDURE — 80053 COMPREHEN METABOLIC PANEL: CPT | Performed by: STUDENT IN AN ORGANIZED HEALTH CARE EDUCATION/TRAINING PROGRAM

## 2022-02-16 PROCEDURE — 84443 ASSAY THYROID STIM HORMONE: CPT | Performed by: STUDENT IN AN ORGANIZED HEALTH CARE EDUCATION/TRAINING PROGRAM

## 2022-02-16 PROCEDURE — 86803 HEPATITIS C AB TEST: CPT | Performed by: STUDENT IN AN ORGANIZED HEALTH CARE EDUCATION/TRAINING PROGRAM

## 2022-02-16 PROCEDURE — 85025 COMPLETE CBC W/AUTO DIFF WBC: CPT | Performed by: STUDENT IN AN ORGANIZED HEALTH CARE EDUCATION/TRAINING PROGRAM

## 2022-02-16 PROCEDURE — 36415 COLL VENOUS BLD VENIPUNCTURE: CPT | Performed by: STUDENT IN AN ORGANIZED HEALTH CARE EDUCATION/TRAINING PROGRAM

## 2022-02-16 PROCEDURE — 83036 HEMOGLOBIN GLYCOSYLATED A1C: CPT | Performed by: STUDENT IN AN ORGANIZED HEALTH CARE EDUCATION/TRAINING PROGRAM

## 2022-02-17 ENCOUNTER — PATIENT MESSAGE (OUTPATIENT)
Dept: INTERNAL MEDICINE | Facility: CLINIC | Age: 37
End: 2022-02-17
Payer: COMMERCIAL

## 2022-02-17 DIAGNOSIS — D75.89 MACROCYTOSIS: Primary | ICD-10-CM

## 2022-02-17 LAB — HCV AB SERPL QL IA: NEGATIVE

## 2022-03-03 ENCOUNTER — LAB VISIT (OUTPATIENT)
Dept: LAB | Facility: OTHER | Age: 37
End: 2022-03-03
Attending: STUDENT IN AN ORGANIZED HEALTH CARE EDUCATION/TRAINING PROGRAM
Payer: COMMERCIAL

## 2022-03-03 DIAGNOSIS — D75.89 MACROCYTOSIS: ICD-10-CM

## 2022-03-03 LAB
BASOPHILS # BLD AUTO: 0.04 K/UL (ref 0–0.2)
BASOPHILS NFR BLD: 0.7 % (ref 0–1.9)
DIFFERENTIAL METHOD: ABNORMAL
EOSINOPHIL # BLD AUTO: 0.1 K/UL (ref 0–0.5)
EOSINOPHIL NFR BLD: 1.4 % (ref 0–8)
ERYTHROCYTE [DISTWIDTH] IN BLOOD BY AUTOMATED COUNT: 11.8 % (ref 11.5–14.5)
FOLATE SERPL-MCNC: 15.2 NG/ML (ref 4–24)
HCT VFR BLD AUTO: 40.4 % (ref 37–48.5)
HCYS SERPL-SCNC: 6 UMOL/L (ref 4–15.5)
HGB BLD-MCNC: 13.9 G/DL (ref 12–16)
IMM GRANULOCYTES # BLD AUTO: 0.01 K/UL (ref 0–0.04)
IMM GRANULOCYTES NFR BLD AUTO: 0.2 % (ref 0–0.5)
LYMPHOCYTES # BLD AUTO: 1.5 K/UL (ref 1–4.8)
LYMPHOCYTES NFR BLD: 25.9 % (ref 18–48)
MCH RBC QN AUTO: 34.1 PG (ref 27–31)
MCHC RBC AUTO-ENTMCNC: 34.4 G/DL (ref 32–36)
MCV RBC AUTO: 99 FL (ref 82–98)
MONOCYTES # BLD AUTO: 0.5 K/UL (ref 0.3–1)
MONOCYTES NFR BLD: 9 % (ref 4–15)
NEUTROPHILS # BLD AUTO: 3.6 K/UL (ref 1.8–7.7)
NEUTROPHILS NFR BLD: 62.8 % (ref 38–73)
NRBC BLD-RTO: 0 /100 WBC
PLATELET # BLD AUTO: 264 K/UL (ref 150–450)
PMV BLD AUTO: 9.7 FL (ref 9.2–12.9)
RBC # BLD AUTO: 4.08 M/UL (ref 4–5.4)
VIT B12 SERPL-MCNC: 271 PG/ML (ref 210–950)
WBC # BLD AUTO: 5.76 K/UL (ref 3.9–12.7)

## 2022-03-03 PROCEDURE — 36415 COLL VENOUS BLD VENIPUNCTURE: CPT | Performed by: STUDENT IN AN ORGANIZED HEALTH CARE EDUCATION/TRAINING PROGRAM

## 2022-03-03 PROCEDURE — 82746 ASSAY OF FOLIC ACID SERUM: CPT | Performed by: STUDENT IN AN ORGANIZED HEALTH CARE EDUCATION/TRAINING PROGRAM

## 2022-03-03 PROCEDURE — 83090 ASSAY OF HOMOCYSTEINE: CPT | Performed by: STUDENT IN AN ORGANIZED HEALTH CARE EDUCATION/TRAINING PROGRAM

## 2022-03-03 PROCEDURE — 83921 ORGANIC ACID SINGLE QUANT: CPT | Performed by: STUDENT IN AN ORGANIZED HEALTH CARE EDUCATION/TRAINING PROGRAM

## 2022-03-03 PROCEDURE — 85025 COMPLETE CBC W/AUTO DIFF WBC: CPT | Performed by: STUDENT IN AN ORGANIZED HEALTH CARE EDUCATION/TRAINING PROGRAM

## 2022-03-03 PROCEDURE — 82607 VITAMIN B-12: CPT | Performed by: STUDENT IN AN ORGANIZED HEALTH CARE EDUCATION/TRAINING PROGRAM

## 2022-03-08 DIAGNOSIS — E53.8 VITAMIN B12 DEFICIENCY: Primary | ICD-10-CM

## 2022-03-08 LAB — METHYLMALONATE SERPL-SCNC: 0.16 UMOL/L

## 2022-04-22 ENCOUNTER — PATIENT MESSAGE (OUTPATIENT)
Dept: INTERNAL MEDICINE | Facility: CLINIC | Age: 37
End: 2022-04-22
Payer: COMMERCIAL

## 2022-04-22 DIAGNOSIS — D75.89 MACROCYTOSIS: ICD-10-CM

## 2022-04-22 DIAGNOSIS — E53.8 VITAMIN B12 DEFICIENCY: Primary | ICD-10-CM

## 2022-04-22 NOTE — TELEPHONE ENCOUNTER
Gisella Smith Lauren, MD 1 hour ago (7:01 AM)           Hello!      Just touching base - Lucinda been taking the B12 supplement for almost 2 months now - should I repeat labs in May? I saw the order for B12 testing, should I get a CBC too?      Thanks! Hope youre doing well!     Gisella

## 2022-05-31 ENCOUNTER — PATIENT MESSAGE (OUTPATIENT)
Dept: INTERNAL MEDICINE | Facility: CLINIC | Age: 37
End: 2022-05-31
Payer: COMMERCIAL

## 2022-05-31 DIAGNOSIS — U07.1 COVID: Primary | ICD-10-CM

## 2022-06-01 ENCOUNTER — PATIENT MESSAGE (OUTPATIENT)
Dept: ADMINISTRATIVE | Facility: OTHER | Age: 37
End: 2022-06-01
Payer: COMMERCIAL

## 2022-06-02 ENCOUNTER — PATIENT MESSAGE (OUTPATIENT)
Dept: INTERNAL MEDICINE | Facility: CLINIC | Age: 37
End: 2022-06-02
Payer: COMMERCIAL

## 2022-06-24 ENCOUNTER — PATIENT MESSAGE (OUTPATIENT)
Dept: OBSTETRICS AND GYNECOLOGY | Facility: CLINIC | Age: 37
End: 2022-06-24
Payer: COMMERCIAL

## 2022-07-03 ENCOUNTER — PATIENT MESSAGE (OUTPATIENT)
Dept: OBSTETRICS AND GYNECOLOGY | Facility: CLINIC | Age: 37
End: 2022-07-03
Payer: COMMERCIAL

## 2022-07-05 ENCOUNTER — OFFICE VISIT (OUTPATIENT)
Dept: OBSTETRICS AND GYNECOLOGY | Facility: CLINIC | Age: 37
End: 2022-07-05
Payer: COMMERCIAL

## 2022-07-05 VITALS
SYSTOLIC BLOOD PRESSURE: 122 MMHG | BODY MASS INDEX: 26.24 KG/M2 | WEIGHT: 148.13 LBS | DIASTOLIC BLOOD PRESSURE: 80 MMHG

## 2022-07-05 DIAGNOSIS — R10.2 PELVIC PAIN: ICD-10-CM

## 2022-07-05 DIAGNOSIS — R30.0 DYSURIA: Primary | ICD-10-CM

## 2022-07-05 PROCEDURE — 1159F PR MEDICATION LIST DOCUMENTED IN MEDICAL RECORD: ICD-10-PCS | Mod: CPTII,S$GLB,, | Performed by: PHYSICIAN ASSISTANT

## 2022-07-05 PROCEDURE — 99999 PR PBB SHADOW E&M-EST. PATIENT-LVL III: ICD-10-PCS | Mod: PBBFAC,,, | Performed by: PHYSICIAN ASSISTANT

## 2022-07-05 PROCEDURE — 3008F PR BODY MASS INDEX (BMI) DOCUMENTED: ICD-10-PCS | Mod: CPTII,S$GLB,, | Performed by: PHYSICIAN ASSISTANT

## 2022-07-05 PROCEDURE — 3044F PR MOST RECENT HEMOGLOBIN A1C LEVEL <7.0%: ICD-10-PCS | Mod: CPTII,S$GLB,, | Performed by: PHYSICIAN ASSISTANT

## 2022-07-05 PROCEDURE — 3079F PR MOST RECENT DIASTOLIC BLOOD PRESSURE 80-89 MM HG: ICD-10-PCS | Mod: CPTII,S$GLB,, | Performed by: PHYSICIAN ASSISTANT

## 2022-07-05 PROCEDURE — 3044F HG A1C LEVEL LT 7.0%: CPT | Mod: CPTII,S$GLB,, | Performed by: PHYSICIAN ASSISTANT

## 2022-07-05 PROCEDURE — 1159F MED LIST DOCD IN RCRD: CPT | Mod: CPTII,S$GLB,, | Performed by: PHYSICIAN ASSISTANT

## 2022-07-05 PROCEDURE — 1160F RVW MEDS BY RX/DR IN RCRD: CPT | Mod: CPTII,S$GLB,, | Performed by: PHYSICIAN ASSISTANT

## 2022-07-05 PROCEDURE — 3074F SYST BP LT 130 MM HG: CPT | Mod: CPTII,S$GLB,, | Performed by: PHYSICIAN ASSISTANT

## 2022-07-05 PROCEDURE — 3074F PR MOST RECENT SYSTOLIC BLOOD PRESSURE < 130 MM HG: ICD-10-PCS | Mod: CPTII,S$GLB,, | Performed by: PHYSICIAN ASSISTANT

## 2022-07-05 PROCEDURE — 99213 OFFICE O/P EST LOW 20 MIN: CPT | Mod: S$GLB,,, | Performed by: PHYSICIAN ASSISTANT

## 2022-07-05 PROCEDURE — 99999 PR PBB SHADOW E&M-EST. PATIENT-LVL III: CPT | Mod: PBBFAC,,, | Performed by: PHYSICIAN ASSISTANT

## 2022-07-05 PROCEDURE — 3079F DIAST BP 80-89 MM HG: CPT | Mod: CPTII,S$GLB,, | Performed by: PHYSICIAN ASSISTANT

## 2022-07-05 PROCEDURE — 99213 PR OFFICE/OUTPT VISIT, EST, LEVL III, 20-29 MIN: ICD-10-PCS | Mod: S$GLB,,, | Performed by: PHYSICIAN ASSISTANT

## 2022-07-05 PROCEDURE — 87086 URINE CULTURE/COLONY COUNT: CPT | Performed by: PHYSICIAN ASSISTANT

## 2022-07-05 PROCEDURE — 3008F BODY MASS INDEX DOCD: CPT | Mod: CPTII,S$GLB,, | Performed by: PHYSICIAN ASSISTANT

## 2022-07-05 PROCEDURE — 1160F PR REVIEW ALL MEDS BY PRESCRIBER/CLIN PHARMACIST DOCUMENTED: ICD-10-PCS | Mod: CPTII,S$GLB,, | Performed by: PHYSICIAN ASSISTANT

## 2022-07-05 NOTE — PROGRESS NOTES
CC: Dysuria    HPI: Pt is a 37 y.o.  female who presents for evaluation of her UTI symptoms.  She developed pelvic pressure last week while on vacation in [place]. She took 7 days of macrobid and feels better at this time, but had continued discomfort during course of medication. She recently d/c her BC. She does report chronic pelvic pain following last birth and dyspareunia. The patient denies dysuria, pelvic pressure, frequency, urgency, flank pain, fever, nausea, vomiting, and hematuria. She denies frequent or recurrent UTIs.   Alleviating factors: macrobid     ROS:  GENERAL: Feeling well overall. Denies fever or chills.   SKIN: Denies rash or lesions.   HEAD: Denies head injury or headache.   NODES: Denies enlarged lymph nodes.   CHEST: Denies chest pain or shortness of breath.   CARDIOVASCULAR: Denies palpitations or left sided chest pain.   ABDOMEN: No abdominal pain, constipation, diarrhea, nausea, vomiting or rectal bleeding.   URINARY: No dysuria, hematuria, or burning on urination.  REPRODUCTIVE: See HPI.   BREASTS: Denies pain, lumps, or nipple discharge.   HEMATOLOGIC: No easy bruisability or excessive bleeding.   MUSCULOSKELETAL: Denies joint pain or swelling.   NEUROLOGIC: Denies syncope or weakness.   PSYCHIATRIC: Denies depression, anxiety or mood swings.    PE:   APPEARANCE: Well nourished, well developed, White female in no acute distress.  PELVIS: Deferred  ABDOMEN: No suprapubic tenderness  MUSCULOSKELETAL: No CVA tenderness      Diagnosis:  1. Dysuria    2. Pelvic pain        Plan:     Orders Placed This Encounter    Urine culture    Ambulatory referral/consult to Physical/Occupational Therapy    POCT urine dipstick without microscope       UTI Causes  Bladder infections are not contagious. You can't get one from someone else, from a toilet seat, or from sharing a bath.  The most common cause of bladder infections is bacteria from the bowels. The bacteria get onto the skin around the  opening of the urethra. From there, they can get into the urine and travel up to the bladder, causing inflammation and infection. This usually happens because of:  · Wiping improperly after urinating. Always wipe from front to back.  · Bowel incontinence  · Pregnancy  · Procedures such as having a catheter inserted  · Older age  · Not emptying your bladder. This can allow bacteria a chance to grow in your urine.  · Dehydration  · Constipation  · Sex  · Use of a diaphragm for birth control      UTI Care and prevention  These self-care steps can help prevent future infections:  · Drink plenty of fluids to prevent dehydration and flush out your bladder. Do this unless you must restrict fluids for other health reasons, or your doctor told you not to.  · Proper cleaning after going to the bathroom is important. Wipe from front to back after using the toilet to prevent the spread of bacteria.  · Establish regular bladder habits. Urinate more often. Don't try to hold urine in for a long time.  · Wear loose-fitting clothes and cotton underwear. Avoid tight-fitting pants.  · Avoid vulvovaginal irritants  · Improve your diet and prevent constipation. Eat more fresh fruit and vegetables, and fiber, and less junk and fatty foods.  · Avoid sex until your symptoms are gone.  · Increase fluid intake but avoid caffeine, alcohol, and spicy foods. These can irritate your bladder.  · Drink water prior to intercourse and urinate afterwards and avoid certain positions which could increase likelyhood of UTIs,  · If you use birth control pills and have frequent bladder infections, discuss it with your doctor.  · Signs of pylonephritis: Go to the ED if develops fever, chills, n/v, back pain, worsening dyuria, hematuria  · Pelvic rest until symptoms resolve    Bladder Irritants  Alcoholic beverages   Apples and apple juice  Cantaloupe    Carbonated beverages  Chili and spicy foods   Chocolate  Citrus fruit    Coffee (including  decaffeinated)  Cranberries and cranberry juice Grapes  Guava     Milk Products: milk, cheese, cottage cheese, yogurt, ice cream  Peaches    Pineapple  Plums     Strawberries  Sugar especially artificial sweeteners, saccharin, aspartame, corn sweeteners, honey, fructose, sucrose, lactose  Tea     Tomatoes and tomato juice  Vitamin B complex   Vinegar  *Most people are not sensitive to ALL of these products; your goal is to find the foods that make YOUR symptoms worse     Certain foods and drinks have been associated with worsening symptoms of urinary frequency, urgency, urge incontinence, or bladder pain. If you suffer from any of these conditions, you may wish to try eliminating one or more of these foods from your diet and see if your symptoms improve. If bladder symptoms are related to dietary factors, strict adherence to a diet that eliminates the food should bring marked relief in 10 days. Once you are feeling better, you can begin to add foods back into your diet, one at a time. If symptoms return, you will be able to identify the irritant. As you add foods back to your diet it is very important that you drink significant amounts of water. Low-acid fruit substitutions include apricots, papaya, pears and watermelon. Coffee drinkers can drink Kava or other lowacid instant drinks. Tea drinkers can substitute non-citrus herbal and sun brewed teas. Calcium carbonate co-buffered with calcium ascorbate can be substituted for Vitamin C. Prelief is a dietary supplement that works as an acid blocker for the bladder.       Follow-up PRN no resolution of symptoms.  Dolores Swartz PA-C

## 2022-07-07 LAB — BACTERIA UR CULT: NORMAL

## 2022-07-20 PROBLEM — M79.18 MYALGIA OF PELVIC FLOOR: Status: ACTIVE | Noted: 2022-07-20

## 2022-07-21 ENCOUNTER — OFFICE VISIT (OUTPATIENT)
Dept: URGENT CARE | Facility: CLINIC | Age: 37
End: 2022-07-21
Payer: COMMERCIAL

## 2022-07-21 VITALS
WEIGHT: 148 LBS | HEIGHT: 63 IN | SYSTOLIC BLOOD PRESSURE: 121 MMHG | TEMPERATURE: 98 F | HEART RATE: 93 BPM | OXYGEN SATURATION: 98 % | RESPIRATION RATE: 16 BRPM | DIASTOLIC BLOOD PRESSURE: 78 MMHG | BODY MASS INDEX: 26.22 KG/M2

## 2022-07-21 DIAGNOSIS — B34.9 VIRAL SYNDROME: Primary | ICD-10-CM

## 2022-07-21 LAB
CTP QC/QA: YES
CTP QC/QA: YES
MOLECULAR STREP A: NEGATIVE
POC MOLECULAR INFLUENZA A AGN: NEGATIVE
POC MOLECULAR INFLUENZA B AGN: NEGATIVE

## 2022-07-21 PROCEDURE — 3074F SYST BP LT 130 MM HG: CPT | Mod: CPTII,S$GLB,, | Performed by: PHYSICIAN ASSISTANT

## 2022-07-21 PROCEDURE — 3008F BODY MASS INDEX DOCD: CPT | Mod: CPTII,S$GLB,, | Performed by: PHYSICIAN ASSISTANT

## 2022-07-21 PROCEDURE — 3008F PR BODY MASS INDEX (BMI) DOCUMENTED: ICD-10-PCS | Mod: CPTII,S$GLB,, | Performed by: PHYSICIAN ASSISTANT

## 2022-07-21 PROCEDURE — 3044F PR MOST RECENT HEMOGLOBIN A1C LEVEL <7.0%: ICD-10-PCS | Mod: CPTII,S$GLB,, | Performed by: PHYSICIAN ASSISTANT

## 2022-07-21 PROCEDURE — 87502 INFLUENZA DNA AMP PROBE: CPT | Mod: QW,S$GLB,, | Performed by: PHYSICIAN ASSISTANT

## 2022-07-21 PROCEDURE — 1159F MED LIST DOCD IN RCRD: CPT | Mod: CPTII,S$GLB,, | Performed by: PHYSICIAN ASSISTANT

## 2022-07-21 PROCEDURE — 3044F HG A1C LEVEL LT 7.0%: CPT | Mod: CPTII,S$GLB,, | Performed by: PHYSICIAN ASSISTANT

## 2022-07-21 PROCEDURE — 3078F PR MOST RECENT DIASTOLIC BLOOD PRESSURE < 80 MM HG: ICD-10-PCS | Mod: CPTII,S$GLB,, | Performed by: PHYSICIAN ASSISTANT

## 2022-07-21 PROCEDURE — 87651 STREP A DNA AMP PROBE: CPT | Mod: QW,S$GLB,, | Performed by: PHYSICIAN ASSISTANT

## 2022-07-21 PROCEDURE — 87502 POCT INFLUENZA A/B MOLECULAR: ICD-10-PCS | Mod: QW,S$GLB,, | Performed by: PHYSICIAN ASSISTANT

## 2022-07-21 PROCEDURE — 1159F PR MEDICATION LIST DOCUMENTED IN MEDICAL RECORD: ICD-10-PCS | Mod: CPTII,S$GLB,, | Performed by: PHYSICIAN ASSISTANT

## 2022-07-21 PROCEDURE — 3078F DIAST BP <80 MM HG: CPT | Mod: CPTII,S$GLB,, | Performed by: PHYSICIAN ASSISTANT

## 2022-07-21 PROCEDURE — 3074F PR MOST RECENT SYSTOLIC BLOOD PRESSURE < 130 MM HG: ICD-10-PCS | Mod: CPTII,S$GLB,, | Performed by: PHYSICIAN ASSISTANT

## 2022-07-21 PROCEDURE — 1160F RVW MEDS BY RX/DR IN RCRD: CPT | Mod: CPTII,S$GLB,, | Performed by: PHYSICIAN ASSISTANT

## 2022-07-21 PROCEDURE — 87651 POCT STREP A MOLECULAR: ICD-10-PCS | Mod: QW,S$GLB,, | Performed by: PHYSICIAN ASSISTANT

## 2022-07-21 PROCEDURE — 1160F PR REVIEW ALL MEDS BY PRESCRIBER/CLIN PHARMACIST DOCUMENTED: ICD-10-PCS | Mod: CPTII,S$GLB,, | Performed by: PHYSICIAN ASSISTANT

## 2022-07-21 PROCEDURE — 99213 OFFICE O/P EST LOW 20 MIN: CPT | Mod: S$GLB,,, | Performed by: PHYSICIAN ASSISTANT

## 2022-07-21 PROCEDURE — 99213 PR OFFICE/OUTPT VISIT, EST, LEVL III, 20-29 MIN: ICD-10-PCS | Mod: S$GLB,,, | Performed by: PHYSICIAN ASSISTANT

## 2022-07-21 RX ORDER — VALACYCLOVIR HYDROCHLORIDE 1 G/1
TABLET, FILM COATED ORAL
COMMUNITY
Start: 2022-06-06 | End: 2023-06-16

## 2022-07-21 NOTE — PROGRESS NOTES
"Subjective:       Patient ID: Gisella Smith is a 37 y.o. female.    Vitals:  height is 5' 3" (1.6 m) and weight is 67.1 kg (148 lb). Her temperature is 97.6 °F (36.4 °C). Her blood pressure is 121/78 and her pulse is 93. Her respiration is 16 and oxygen saturation is 98%.     Chief Complaint: Sore Throat (/)    Patient presents with complaints of fever Tmax 102 F this morning. She had covid 6 weeks ago. Pt taking advil 800 mg with relief. She reports runny nose, sore throat, and cough.    Sore Throat   This is a new problem. The current episode started yesterday. The problem has been unchanged. The maximum temperature recorded prior to her arrival was 102 - 102.9 F. The pain is at a severity of 2/10. The pain is mild. Associated symptoms include congestion, coughing and a hoarse voice. Pertinent negatives include no abdominal pain, diarrhea, drooling, ear discharge, ear pain, headaches, plugged ear sensation, neck pain, shortness of breath, stridor, swollen glands, trouble swallowing (pain with swallowing) or vomiting. She has tried NSAIDs for the symptoms. The treatment provided mild relief.       Constitution: Positive for fatigue and fever. Negative for chills.   HENT: Positive for congestion, postnasal drip and sore throat. Negative for ear pain, ear discharge, drooling, sinus pain, sinus pressure and trouble swallowing (pain with swallowing).    Neck: Negative for neck pain, neck stiffness and painful lymph nodes.   Cardiovascular: Negative for chest pain, palpitations and sob on exertion.   Eyes: Negative for eye discharge, eye itching and eye pain.   Respiratory: Positive for cough. Negative for sputum production, shortness of breath and stridor.    Gastrointestinal: Negative for abdominal pain, nausea, vomiting and diarrhea.   Genitourinary: Negative for dysuria, hematuria and pelvic pain.   Musculoskeletal: Positive for muscle ache. Negative for pain and muscle cramps.   Skin: Negative for pale, " rash and wound.   Neurological: Negative for dizziness, light-headedness and headaches.   Hematologic/Lymphatic: Negative for swollen lymph nodes.       Objective:      Physical Exam   Constitutional: She is oriented to person, place, and time. She appears well-developed. She is cooperative.  Non-toxic appearance. She does not appear ill. No distress.   HENT:   Head: Normocephalic and atraumatic.   Ears:   Right Ear: Tympanic membrane, external ear and ear canal normal.   Left Ear: Tympanic membrane, external ear and ear canal normal.   Nose: Mucosal edema present. No rhinorrhea. Right sinus exhibits no maxillary sinus tenderness and no frontal sinus tenderness. Left sinus exhibits no maxillary sinus tenderness and no frontal sinus tenderness.   Mouth/Throat: Mucous membranes are normal. Posterior oropharyngeal edema and posterior oropharyngeal erythema present. No oropharyngeal exudate or cobblestoning.   Eyes: Conjunctivae, EOM and lids are normal. Right eye exhibits no discharge. Left eye exhibits no discharge. No scleral icterus.   Neck: Trachea normal and phonation normal. Neck supple.   Cardiovascular: Normal rate, regular rhythm and normal heart sounds.   No murmur heard.Exam reveals no gallop.   Pulmonary/Chest: Effort normal and breath sounds normal. No respiratory distress. She has no decreased breath sounds. She has no wheezes. She has no rhonchi. She has no rales.   Musculoskeletal:         General: No deformity.   Lymphadenopathy:        Head (right side): No submandibular and no tonsillar adenopathy present.        Head (left side): No submandibular and no tonsillar adenopathy present.   Neurological: She is alert and oriented to person, place, and time. Coordination normal.   Skin: Skin is warm, dry, intact, not diaphoretic and not pale.   Psychiatric: Her speech is normal and behavior is normal. Judgment and thought content normal.   Nursing note and vitals reviewed.        Assessment:       1. Viral  syndrome        Office Visit on 07/21/2022   Component Date Value Ref Range Status    POC Molecular Influenza A Ag 07/21/2022 Negative  Negative, Not Reported Final    POC Molecular Influenza B Ag 07/21/2022 Negative  Negative, Not Reported Final     Acceptable 07/21/2022 Yes   Final    Molecular Strep A, POC 07/21/2022 Negative  Negative Final     Acceptable 07/21/2022 Yes   Final      Plan:         Viral syndrome  -     POCT Influenza A/B Molecular  -     POCT Strep A, Molecular

## 2022-07-26 ENCOUNTER — CLINICAL SUPPORT (OUTPATIENT)
Dept: REHABILITATION | Facility: OTHER | Age: 37
End: 2022-07-26
Payer: COMMERCIAL

## 2022-07-26 DIAGNOSIS — M79.18 MYALGIA OF PELVIC FLOOR: ICD-10-CM

## 2022-07-26 DIAGNOSIS — R10.2 PELVIC PAIN: ICD-10-CM

## 2022-07-26 PROCEDURE — 97161 PT EVAL LOW COMPLEX 20 MIN: CPT

## 2022-07-26 PROCEDURE — 97530 THERAPEUTIC ACTIVITIES: CPT

## 2022-07-26 NOTE — PLAN OF CARE
"[facility]  Pelvic Health Physical Therapy Initial Evaluation    Date: 2022   Name: Gisella Smith  Clinic Number: [id number]  Therapy Diagnosis:   Encounter Diagnosis   Name Primary?    Myalgia of pelvic floor      Physician: Dolores Swartz PA*    Physician Orders: PT Eval and Treat  Medical Diagnosis from Referral: R10.2 (ICD-10-CM) - Pelvic pain  Evaluation Date: 2022  Authorization Period Expiration: 2023  Plan of Care Expiration: 10/24/2022  Visit # / Visits authorized:   FOTO: 1/3    Time In: 900  Time Out: 1000  Total Appointment Time (timed & untimed codes): 60 minutes    Precautions: universal    Pronouns: she/her    Subjective     Date of onset: urinary frequency and pain several years; BEE and perineal pain since 10/2020    History of current condition - Gisella reports: urinary frequency which she believes is related to not being to empty her bladder fully. She states that she also experiences dyspareunia which was present before having a baby but "feels different." Pt states that she has to work hard to relax during intercourse and it "feels like her partner is hitting a wall." Pt reports second degree perineal tear. She states that she had a UTI several weeks ago while traveling internationally, but also states that she has UTI-like symptoms frequently without active infection. Pt describes this feeling as pelvic heaviness. She states that she has BEE since having baby. Pt reports having a miscarriage 10 weeks prior to conceiving successfully and states that she developed endometritis then and after having her daughter.     OB/GYN History: , vaginal delivery and perineal laceration  Birth Control: no, currently try to conceive  Sexually active? Yes  Pain with vaginal exams, intercourse or tampon use? Yes    Bladder/Bowel History: trouble emptying bladder completely and urinary incontinence   Frequency of urination:   Daytime: at work 3x/8 hr " shift, every 1-2 hours at home   Nighttime: 0-1x   Difficulty initiating urine stream: No   Urine stream: strong   Complete emptying: No   Bladder leakage: Yes   Frequency of incidents/Type of incontinence: 1x/week, BEE   Amount leaked (urine): few drops   Urinary Urgency: Yes   Frequency of bowel movements: once a day   Difficulty initiating BM: No   Quality/Shape of BM: [place] Type 3-4   Does Patient Feel Empty after BM? Yes   Fiber Supplements or Laxative Use? 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     Surgical History: Gisella Smith  has a past surgical history that includes [place] tooth extraction.    Medications: Gisella has a current medication list which includes the following prescription(s): prenatal 25/iron fum/folic/dha.    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  Prior Level of Function: IND w/ urinary frequency and dyspareunia  Current Level of Function: urinary frequency, dyspareunia, BEE    Types of fluid intake: 1-2 cups  "coffee, water (~40-60 oz), [place]  Diet: WNL   Habitus: well developed, well nourished  Abuse/Neglect: No     Pts goals: "having a normal relationship with my bladder, have less painful sex"     OBJECTIVE     See EMR under MEDIA for written consent provided 7/26/2022  Chaperone: Declined    ORTHO SCREEN  Posture in sitting: forward trunk lean  Posture in standing: not assessed this visit  Pelvic alignment: Not assessed today      ABDOMINAL WALL ASSESSMENT  Palpation: WNL  Abdominal strength: good  Scarring: none  Pelvic Floor Muscle and Transverse Abdominus Synergy: present  Diastasis: absent      BREATHING MECHANICS ASSESSMENT   Thorax Assessment During Quiet Respiration: WNL excursion of ribcage and WNL excursion of abdominal wall  Thorax Assessment During Deep Respiration: WNL excursion of ribcage and WNL excursion of abdominal wall    VAGINAL PELVIC FLOOR EXAM    EXTERNAL ASSESSMENT  Introitus: WNL  Skin condition: milky white discharge present at introitus  Scarring: episiotomy scar noted, TTP  Sensation: WNL   Pain: Q-tip test positive at 5-7 o'clock  Voluntary contraction: visible lift  Voluntary relaxation: visible drop  Involuntary contraction: visible drop  Bearing down: visible drop  Perineal descent: absent      INTERNAL ASSESSMENT  Pain: tender areas noted as follows: throughout R PFM   Sensation: WNL   Vaginal vault: WNL   Muscle Bulk: hypertonus at R  Muscle Power: 2+/5  Muscle Endurance: 10 sec  # Reps To Fatigue: NT    Fast Contractions in 10 seconds: 5     Quality of contraction: WNL   Specificity: WNL   Coordination: WNL   Prolapse check: none    Limitation/Restriction for FOTO Initial Survey    Therapist reviewed FOTO scores for Gisella Smith on 7/26/2022.   FOTO documents entered into [employer] - see Media section.    Limitation Score:   PFDI Pain - 8%  Urinary Problem - 61%  PFDI Urinary - 0%       TREATMENT     Treatment Time In: 935  Treatment Time Out: 1000  Total Treatment time " (time-based codes) separate from Evaluation: 25 minutes    Therapeutic Activity Patient participated in dynamic functional therapeutic activities to improve functional performance for 25 minutes. Including: Education as described below.   Edu on bladder health  Edu on double voiding techniques. Practiced technique with patient and utilized teach back strategy to confirm understanding.   Edu on performing PFM activation prior to activities that increase intraabdominal pressure in order to decrease BEE  Edu on navigating intimacy with a partner while experiencing pelvic pain    Patient Education provided:   general anatomy/physiology of urinary/ bowel  system, benefits of treatment and risks of treatment were discussed with the pt. Additionally, bladder irritants, anatomy/physiology of pelvic floor, posture/body mechanices, bladder retraining, diaphragmatic breathing, double voiding techniques, fluid intake/dietary modifications, behavior modifications and Coordination of kegels with functional activities such as cough, laugh, sneeze, lift, etc.  were reviewed.     Home Exercises provided:  Written Home Exercises provided: Yes  Exercises were reviewed and Gisella was able to demonstrate them prior to the end of the session.    Gisella demonstrated good  understanding of the education provided.     See EMR under Patient Instructions for exercises provided prior visit.    Assessment     Gisella is a 37 y.o. female referred to outpatient Physical Therapy with a medical diagnosis of R10.2 (ICD-10-CM) - Pelvic pain. Pt presents to PT with primary complaints of urinary frequency and dyspareunia that has been longstanding, as well as BEE and perineal sensitivity since having first child in October 2020. She reports that she frequently experiences UTI-like symptoms. Intravaginal assessment reveals overactivity through R OI and levator ani with reports of TTP, as well as decreased mobility and sensitivity at perineal  scar. Pt was educated on bladder health, double voiding strategies, navigating intimacy with a partner while experiencing pain, as well as performing the knack prior to coughing or sneezing in order to decrease BEE. Pt presents with pelvic floor tenderness, decreased pelvic muscle strength, increased tension of the pelvic muscles, poor quality of pelvic muscle contraction, increased frequency of urination, poor coordination of pelvic floor muscles during ADL's leading to urinary or fecal leakage and incomplete urination.      Pt prognosis is Excellent  Pt will benefit from skilled outpatient Physical Therapy to address the deficits stated above and in the chart below, provide pt/family education, and to maximize pt's level of independence.     Plan of care discussed with patient: Yes  Pt's spiritual, cultural and educational needs considered and patient is agreeable to the plan of care and goals as stated below:     Anticipated Barriers for therapy: None    Medical Necessity is demonstrated by the following:    History  Co-morbidities and personal factors that may impact the plan of care Co-morbidities   no significant PMHx reported    Personal Factors  no deficits     low   Examination  Body structures and functions, activity limitations and participation restrictions that may impact the plan of care Body Regions/Systems/Functions:  pelvic floor tenderness, decreased pelvic muscle strength, increased tension of the pelvic muscles, poor quality of pelvic muscle contraction, increased frequency of urination, poor coordination of pelvic floor muscles during ADL's leading to urinary or fecal leakage and incomplete urination     Activity Limitations:  delaying urge to urinate, full bladder emptying, intercourse/vaginal exam/tampon use without pain and incontinence with ADLs    Participation Restrictions:  all ADLs/iADLs uninterrupted by urinary incontinence/urgency/frequency, relationship with spouse/partner and  difficulty starting urine stream or fully emptying bladder     Activity limitations:   Learning and applying knowledge  No deficits    General Tasks and Commands  No deficits    Communication  No deficits    Mobility  No deficits    Self care  No deficits    Domestic Life  No deficits    Interactions/Relationships  No deficits    Life Areas  No deficits    Community and Social Life  No deficits       low   Clinical Presentation stable and uncomplicated low   Decision Making/ Complexity Score: low       Goals:  Short Term Goals: 6 weeks   Pt indep in HEP  Pt indep in functional brace technique for decreased strain of pelvic structures or UI with activities which increase IAP  Pt able to wait at least 2 hours between trips to the bathroom for improved participation in ADLs  Pt demo complete contraction and deactivation of pelvic floor muscles x 10 reps    Long Term Goals: 12 weeks   Pt indep in progressive HEP  Pt reports 0 episodes of leakage in at least 2 weeks for improved ADL participation  Pt able to wait at least 2.5-4 hours between trips to the bathroom   Pt reports able to have painfree intercourse for improved participation in ADLs and improved intimacy with partner    Plan     Plan of care Certification: 7/26/2022 to 10/24/2022.    Outpatient Physical Therapy 1 times weekly for 12 weeks to include the following interventions: therapeutic exercises, therapeutic activity, neuromuscular re-education, manual therapy, patient/family education and self care/home management    Teach perineal stretching. Discuss activity/exercise modifications to decrease BEE and improve pressure management. STM to R PFM.    Anna Kimura, PT

## 2022-07-26 NOTE — PATIENT INSTRUCTIONS
Home Exercise Program: 07/26/2022      BLADDER HEALTH      WHAT IS CONSIDERED NORMAL?  The average bladder can hold about 2 cups of urine before it needs to be emptied.  The normal range of voiding urine is 6 to 8 times during a 24 hour period, or every 3-4 hours. As we get older, our bladder capacity can get smaller and we may need to pass urine more frequently but usually not more than every 2 hours.  Urine should flow easily without discomfort in a good, steady stream until the bladder is empty. No pushing or straining is necessary to empty the bladder.  An urge is a normal signal that you feel as the bladder stretches to fill with urine. Urges can be felt even if the bladder is not full. Urges are not commands to go to the toilet, merely a signal and can be controlled.    WHAT ARE GOOD BLADDER HABITS?  Take your time when emptying your bladder. Dont strain or push to empty your bladder. Make sure you empty your bladder completely each time you pass urine.  Dont wait too long - consistently ignoring the urge to go (waiting more than 4 hours between toileting) or urinating too infrequently may be convenient but not healthy for your bladder. You can actually overstretch your bladder beyond its normal size.   Dont go too often - avoid going to the toilet just in case (more often than every 2 hours). It is usually not necessary to go when you feel the first urge. Try to go only when your bladder is full. Dont let your bladder control your life.    DIET CAN AFFECT THE BLADDER  Maintain a healthy fluid intake. Many people decrease their intake of liquids in hopes that they will need to urinate less frequently or have less urinary leakage. While a decrease in liquid intake does result in a decrease in the volume of urine, the smaller amount of urine may be more highly concentrated. Highly concentrated, dark yellow urine is irritating to the bladder surface and may actually cause you to go to the bathroom more  frequently. It also encourages the growth of bacteria, which may lead to infections resulting in incontinence.  Depending on your body size and environment, drink 4-8 cups (8 oz each) of fluid per day, spread throughout the entire day, unless otherwise advised by your doctor.    Avoid or use the following acidic food/beverages in moderation:  Alcoholic beverages    Tomato-based products  Vinegar  Coffee (regular and decaf)  Tea (regular and decaf)  Citrus fruits and juices  Spicy foods, ellington  Caffeinated beverages & soda   Milk  Food colorings and flavorings  Artificial sweeteners  Chocolate    Try using these dietary substitutions:  Water: grape juice, apple juice  Fruit: pears, apricots, papaya, watermelon  Coffee: KAVA®, Postum®, Edvin®, Kaffree [place]®  Tea: Non-citrus herbal, sun-brewed tea  Vitamin C: Calcium carbonate co-buffered with calcium ascorbate    Avoid constipation by maintaining a balanced diet of dietary fiber. Typical dietary recommendations for fiber are between 25-35 grams per day. You should discuss your fiber needs with your physician, pharmacist or nutritionist.    DOUBLE VOIDING - Double voiding is a technique that may assist the bladder to empty more effectively when urine is left in the bladder. It involves passing urine more than once each time that you go to the toilet. This makes sure that the bladder is completely empty.    Here are 3 strategies you can try to fully empty your bladder.  You do not have to do all of these things every single time. Find which ones work best for you.     Check to make sure your pelvic floor is relaxed   Do a body scan - make sure your legs, buttocks, and abdominals are relaxed.  Take a couple deep, slow breaths to encourage your pelvic floor muscles to DROP (ie. Try Diaphragmatic Breathing).  Gently apply pressure over your bladder.  Change your pelvic position: lean forward, rock your pelvis forward and backward, stand up then sit back down.     Wait at  "least 30 seconds to 1 minute to see if a second urine stream begins.     Do not stop your urine stream or push/strain!      THE KNACK         When the pelvic floor is functioning correctly, it should automatically contract before and during an increase in abdominal pressure, such as with a cough or sneeze (see image on left). This contraction, called "the knack," closes off the urethra to prevent leakage of urine. This automatic contraction may diminish during pregnancy and childbirth, after surgical procedures, or with repeated straining during urination, resulting in urinary incontinence with high-pressure activities.     Actively working to reinforce this reflex through practice can help "the knack" become more automatic and prevent stress urinary incontinence. Here's how to practice the knack:  Sit upright in a comfortable chair.   Squeeze and lift your pelvic floor to perform a kegel.   Maintain this activation as you perform a small cough.  After you cough, relax your pelvic floor muscles.   Progressively increase the force behind the cough or perform several coughs in a row to practice maintaining your pelvic floor contraction.         INTIMACY WITH A PARTNER    KNOW YOU'RE NOT ALONE  3 out of 4 women will experience pain during intercourse at some point in their lives. This pain may be temporary or more longer lasting.     COMMUNICATE WITH YOUR PARTNER  Be open with your partner about your comfort level, personal needs, and limitations with sex.   Make sure that your partner is aware that you may experience pain or discomfort during intercourse so that you'll feel more comfortable pressing pause if necessary.   Communicate which positions do and don't work for your body.     USE A LUBRICANT  If you are prone to dryness, tearing, or tissue irritation, lubricants can help make sex more enjoyable.   Choose a lubricant that is water-based and free of any additives (such as flavoring, heating/cooling properties, " scents, glitter) to avoid causing further irritation. Products should be paraben, glycerin, petroleum, and fragrance free, as well as pH balanced.   Talk to your doctor about getting a prescription for topical lidocaine. These creams can be applied to the vulva ahead of time or mixed into lubricant to help decrease pain during intercourse.   Lubricants containing CBD (such as Foria products) may help relax the pelvic floor muscles and decrease pain.  Recommended brands: Slippery Stuff, Good Clean Love, Uberlube, Sliquid    SET THE STAGE  Cultivate an environment that feels safe, relaxing, and inviting. Your mental and emotional state has a direct effect on your body's sexual function and being relaxed can assist with arousal.   You can do activities such as taking a warm bath, lighting candles, putting on your favorite playlist, or meditating beforehand.     PRACTICE NON-SEXUAL SENSUAL TOUCH  This can be done alone or with a partner.   Using non-scented body lotion or coconut oil, gently massage erogenous zones such as the inner thighs, back, or neck. Avoid touching the genitals and breasts, or initiating oral or penetrative sex. The focus should be on fully feeling the experience of being touched without the expectation of sex occurring afterwards.     BE OPEN TO EXPERIMENTATION  Explore what feels pleasurable to you on your own via masturbation. If you're familiar with what you like, it will be easier to communicate your preferences to your partner.   Try different kinds of stimulation, such as oral sex or tantric massage.   Axis with different positions. If one position feels painful, try another angle or using pillows to support your body. Being on top can also allow you to control the speed and depth of penetration.     TRY A BUFFER  A wearable buffer (such as the Ohnut) can be worn over your partner's penis to control the depth of penetration.   These soft sleeves are designed to feel just like you, so  you can feel more comfortable without sacrificing sensation for you or your partner.      MAKE TIME FOR SELF CARE  If you are still experiencing pain during sex, set aside time afterwards for self-care activities such as taking a warm sitz bath, taking an over-the-counter pain reliever, or applying a gel ice pack wrapped in a towel to the vulva.

## 2022-08-09 NOTE — PROGRESS NOTES
"  Pelvic Health Physical Therapy   Treatment Note     Name: Gisella Smith  Clinic Number: [id number]    Therapy Diagnosis:   Encounter Diagnosis   Name Primary?    Myalgia of pelvic floor Yes     Physician: Dolores Swartz PA*    Visit Date: 8/12/2022    Physician Orders: PT Eval and Treat  Medical Diagnosis from Referral: R10.2 (ICD-10-CM) - Pelvic pain  Evaluation Date: 7/26/2022  Authorization Period Expiration: 12/31/2022  Plan of Care Expiration: 10/24/2022  Visit # / Visits authorized: 1/ 20  FOTO: 1/3    Time In: 1400  Time Out: 1445  Total Billable Time: 45 minutes    Precautions: Standard    Subjective     Pt reports: feeling more aware of pelvic floor since initial eval. She states that has been experiencing coughing fits which has resulted in increased BEE. Pt reports practicing "the knack" with coughing and double voiding; states that she did not have much success. Pt reports that she and her partner are actively trying to conceive again; has not experienced as much discomfort with sex. Pt states that she is currently menstruating.     She was compliant with home exercise program.  Response to previous treatment: no adverse effects reported  Functional change: none    Pain: 0/10  Location: NA    Constitutional Symptoms Review: The patient denies having any constitutional symptoms.     Objective   Pt verbally consents to intravaginal treatment today.  Signed consent form already on file.     Gisella received the following manual therapy techniques: to develop extensibility and desensitization for 45 minutes including:  Myofascial release pelvic floor mm, OI and endopelvic fascia using PIT and ischemic compression while giving VC/tactile cues for voluntary relaxation of mm    Scar mobilization at perineum     Gisella participated in dynamic functional therapeutic activities to improve functional performance for 00  minutes, including:   Edu on bladder health  Edu on double voiding " techniques. Practiced technique with patient and utilized teach back strategy to confirm understanding.   Edu on performing PFM activation prior to activities that increase intraabdominal pressure in order to decrease BEE  Edu on navigating intimacy with a partner while experiencing pelvic pain    Home Exercises Provided and Patient Education Provided     Education provided:   - anatomy/physiology of pelvic floor, posture/body mechanices, hygiene of pelvic floor, perineal stretching/massage and behavior modifications  Discussed progression of plan of care with patient; educated pt in activity modification; reviewed HEP with pt. Pt demonstrated and verbalized understanding of all instruction and was provided with a handout of HEP (see Patient Instructions).    Written Home Exercises Provided: yes.  Exercises were reviewed and Gisella was able to demonstrate them prior to the end of the session.  Gisella demonstrated good  understanding of the education provided.     See EMR under Patient Instructions for exercises provided 8/12/2022.    Assessment     Pt reports that she had a brief exacerbation of BEE while sick and experiencing increased coughing, but states that dyspareunia has decreased somewhat. Pt was provided education on performing perineal massage to assist with mobility and decreasing pain. She reports discomfort mainly at right aspect of fourchette and introitus during manual techniques. Pt demonstrates increased tension and reports TTP during intravaginal treatment throughout R PFM. Focused session on manual techniques to decrease pain and improve tissue mobility; plan to progress to strengthening and coordination as tolerated. Pt reports feeling well at end of session.     Gisella Is progressing well towards her goals.   Pt prognosis is Excellent.     Pt will continue to benefit from skilled outpatient physical therapy to address the deficits listed in the problem list box on initial evaluation,  provide pt/family education and to maximize pt's level of independence in the home and community environment.     Pt's spiritual, cultural and educational needs considered and pt agreeable to plan of care and goals.     Anticipated barriers to physical therapy: none    Goals:   Short Term Goals: 6 weeks   Pt indep in HEP  Pt indep in functional brace technique for decreased strain of pelvic structures or UI with activities which increase IAP  Pt able to wait at least 2 hours between trips to the bathroom for improved participation in ADLs  Pt demo complete contraction and deactivation of pelvic floor muscles x 10 reps     Long Term Goals: 12 weeks   Pt indep in progressive HEP  Pt reports 0 episodes of leakage in at least 2 weeks for improved ADL participation  Pt able to wait at least 2.5-4 hours between trips to the bathroom   Pt reports able to have painfree intercourse for improved participation in ADLs and improved intimacy with partner    Plan     Plan of care Certification: 7/26/2022 to 10/24/2022.     Outpatient Physical Therapy 1 times weekly for 12 weeks to include the following interventions: therapeutic exercises, therapeutic activity, neuromuscular re-education, manual therapy, patient/family education and self care/home management     Initiate sEMG for PFM coordination and begin proximal hip/PFM strengthening. Discuss activity/exercise modifications to decrease BEE and improve pressure management. STM to R PFM.    Anna Kimura, PT

## 2022-08-12 ENCOUNTER — CLINICAL SUPPORT (OUTPATIENT)
Dept: REHABILITATION | Facility: OTHER | Age: 37
End: 2022-08-12
Attending: PHYSICIAN ASSISTANT
Payer: COMMERCIAL

## 2022-08-12 DIAGNOSIS — M79.18 MYALGIA OF PELVIC FLOOR: Primary | ICD-10-CM

## 2022-08-12 PROCEDURE — 97140 MANUAL THERAPY 1/> REGIONS: CPT

## 2022-08-12 NOTE — PATIENT INSTRUCTIONS
Home Exercise Program: 08/12/2022    PERINEAL MASSAGE    1. Wash hands thoroughly with antibacterial soap.  2. Lay down comfortably with your back and head well supported by several pillows.  3. Apply water-based lubricant (ie. Slippery stuff, coconut oil, olive oil) on your thumbs and perineum.  4. Place one or both thumbs just inside the vagina.  5. Gently press downward, then slowly continue the stretch up both sides of the vaginal opening.   6. The amount of pressure for the stretch may cause discomfort, but not pain (ie. You can replicate the stretching sensation by bending your finger backward). Don't go above 4-5/10 pain during or after the exericse.  7. Focus on relaxed breathing and keeping the pelvic floor muscles dropped.   8. Continue for 5-10 minutes, 3-4 times per week.    STOP if there is increased bleeding, an infection, or extreme pain.         Some may prefer their partner to assist them or to perform the massage for them. Your partner needs to use clean hands and gently insert one or two index fingers inside the lower part of the vagina and follow the steps listed above. It is important to tell your partner how much pressure to apply without causing pain.

## 2022-08-17 NOTE — PROGRESS NOTES
Pelvic Health Physical Therapy   Treatment Note     Name: Gisella Smith  Clinic Number: [id number]    Therapy Diagnosis:   Encounter Diagnosis   Name Primary?    Myalgia of pelvic floor Yes     Physician: Dolores Swartz PA*    Visit Date: 8/25/2022    Physician Orders: PT Eval and Treat  Medical Diagnosis from Referral: R10.2 (ICD-10-CM) - Pelvic pain  Evaluation Date: 7/26/2022  Authorization Period Expiration: 12/31/2022  Plan of Care Expiration: 10/24/2022  Visit # / Visits authorized: 2/ 20  FOTO: 1/3    Time In: 1400  Time Out: 1500  Total Billable Time: 60 minutes    Precautions: Standard    Subjective     Pt reports: has been working on perineal massage with her partner and has noticed that intercourse is not as painful. She states that a lot of pain reduction can be attributed to pain neuroscience discussions from therapy. Pt reports that she has not had any incidents of leaking with cough/sneeze with practicing the knack.     She was compliant with home exercise program.  Response to previous treatment: no adverse effects reported  Functional change: none    Pain: 0/10  Location: NA    Constitutional Symptoms Review: The patient denies having any constitutional symptoms.     Objective   Pt verbally consents to intravaginal treatment today.  Signed consent form already on file.     Gisella received the following manual therapy techniques: to develop extensibility and desensitization for 20 minutes including:  Myofascial release pelvic floor mm, OI and endopelvic fascia using PIT and ischemic compression while giving VC/tactile cues for voluntary relaxation of mm    Scar mobilization at perineum     Neuromuscular Re-education to develop Coordination, Control and Down training for 40 minutes including:   SEMG EVALUATION:   PF Electrode placement: external perineal  Pt. Position: supine, supine hooklying, supine butterfly pose    SEMG Finding: elevated baseline and slow return to baseline  No  "skin irritation, erythema, or other adverse effects observed from electrode placement.    Treatment: Worked on pelvic floor muscle downtraining and coordination using sEMG assist.      BASELINE  Position: supine hookyling  Resting tone: 5.0-7.0 uV  Max contraction: 15.0 uV  Max hold time: 7 sec    Position: supine butterfly pose  Resting tone: 2.0-5.0 uV  Max contraction: 10.0 uV  Max hold time: 7 sec    Position: supine   Resting tone: 0.5-1.5 uV  Max contraction: 7.0 uV  Max hold time: 5 sec    Demonstrated impact that hip motion (resisted ER/IR), diaphragmatic breathing, lower abdominal activation, and posture has on activation through PFM through utilizing visual feedback while on sEMG. Practiced both long holds and quick flick kegels. Pt was edu on performing pelvic drops on sEMG to decrease resting tone.    Edu on performing pelvic floor "check-ins" periodically throughout the day to improve tension and practice coordination of PFM relaxation w/ diaphragmatic breathing.     Gisella participated in dynamic functional therapeutic activities to improve functional performance for 00 minutes, including:   Edu on bladder health  Edu on double voiding techniques. Practiced technique with patient and utilized teach back strategy to confirm understanding.   Edu on performing PFM activation prior to activities that increase intraabdominal pressure in order to decrease BEE  Edu on navigating intimacy with a partner while experiencing pelvic pain    Home Exercises Provided and Patient Education Provided     Education provided:   - anatomy/physiology of pelvic floor, posture/body mechanices, diaphragmatic breathing, kegels and behavior modifications  Discussed progression of plan of care with patient; educated pt in activity modification; reviewed HEP with pt. Pt demonstrated and verbalized understanding of all instruction and was provided with a handout of HEP (see Patient Instructions).    Written Home Exercises " "Provided: yes.  Exercises were reviewed and Gisella was able to demonstrate them prior to the end of the session.  Gisella demonstrated good  understanding of the education provided.     See EMR under Patient Instructions for exercises provided 8/25/22.    Assessment     Pt reports decreased BEE and dyspareunia this visit. Intravaginal assessment reveals decreased tension at R PFM with pt reporting less TTP. She reports that pain sensation has changed to a "stretching feeling" or a sore muscle. Performed sEMG assessment today to establish pt baseline. She demonstrates elevated resting tone in supine hooklying, improved with modifications to supine or supine butterfly pose. Pt responds well to cues for pelvic drops to decrease resting tone, however does not experience significant decrease in tone with diaphragmatic breathing. Practiced both endurance holds and quick flick kegels to work on PFM coordination with pt demonstrating good technique and return to lower baseline after PFM activation.     Gisella Is progressing well towards her goals.   Pt prognosis is Excellent.     Pt will continue to benefit from skilled outpatient physical therapy to address the deficits listed in the problem list box on initial evaluation, provide pt/family education and to maximize pt's level of independence in the home and community environment.     Pt's spiritual, cultural and educational needs considered and pt agreeable to plan of care and goals.     Anticipated barriers to physical therapy: none    Goals:   Short Term Goals: 6 weeks   Pt indep in HEP  Pt indep in functional brace technique for decreased strain of pelvic structures or UI with activities which increase IAP  Pt able to wait at least 2 hours between trips to the bathroom for improved participation in ADLs  Pt demo complete contraction and deactivation of pelvic floor muscles x 10 reps     Long Term Goals: 12 weeks   Pt indep in progressive HEP  Pt reports 0 " episodes of leakage in at least 2 weeks for improved ADL participation  Pt able to wait at least 2.5-4 hours between trips to the bathroom   Pt reports able to have painfree intercourse for improved participation in ADLs and improved intimacy with partner    Plan     Plan of care Certification: 7/26/2022 to 10/24/2022.     Outpatient Physical Therapy 1 times weekly for 12 weeks to include the following interventions: therapeutic exercises, therapeutic activity, neuromuscular re-education, manual therapy, patient/family education and self care/home management     Work on downtraining on sEMG. Continue sEMG for PFM coordination and begin proximal hip/PFM strengthening. Discuss activity/exercise modifications to decrease BEE and improve pressure management. STM to R PFM.    Anna Kimura, PT

## 2022-08-25 ENCOUNTER — CLINICAL SUPPORT (OUTPATIENT)
Dept: REHABILITATION | Facility: OTHER | Age: 37
End: 2022-08-25
Attending: PHYSICIAN ASSISTANT
Payer: COMMERCIAL

## 2022-08-25 DIAGNOSIS — M79.18 MYALGIA OF PELVIC FLOOR: Primary | ICD-10-CM

## 2022-08-25 PROCEDURE — 97140 MANUAL THERAPY 1/> REGIONS: CPT

## 2022-08-25 PROCEDURE — 97112 NEUROMUSCULAR REEDUCATION: CPT

## 2022-08-25 NOTE — PATIENT INSTRUCTIONS
1) Voluntary relaxation - spend time consciously relaxing muscles. Get in comfortable position and focus on relaxing all muscles around hips, low back, and abdominals, then pelvic floor. As you are relaxing pelvic floor muscles think about softening, opening, dropping, letting go. Some people describe like a flower whose petals are opening or like gently laying an egg. Spend 3-5 minutes doing this.  This can be a great position:      2) Diaphragmatic breathing - focus on circumferential expansion. Can do sitting or laying on back. Should feel like your lower ribs expand in all directions as the abdominals and low back also gently expand and fill with the breath. The anus should gently drop away from you on inhalation. If you are sitting, you may feel like the anus is floating down towards the seat. 15-20 breaths, or more.    Do above, 1x/day    Also begin to become aware of if/when holding pelvic muscle tension throughout the day and try to relax/let go    3) Utilize a stretching program daily (especially after your work-outs) to help with pelvic floor relaxation:    Happy Baby     Lying on your back, start off with legs up against wall. Bring one leg towards you with knee bent at 90 degrees and hold the inner foot. Repeat with other leg. Hold for 10 deep breaths.    Child's Pose    While in a crawl position, slowly lower your buttocks towards your feet until a stretch is felt along your back and or buttocks.

## 2022-08-31 NOTE — PROGRESS NOTES
Pelvic Health Physical Therapy   Treatment Note     Name: Gisella Smith  Clinic Number: [id number]    Therapy Diagnosis:   Encounter Diagnosis   Name Primary?    Myalgia of pelvic floor Yes     Physician: Dolores Swartz PA*    Visit Date: 9/6/2022    Physician Orders: PT Eval and Treat  Medical Diagnosis from Referral: R10.2 (ICD-10-CM) - Pelvic pain  Evaluation Date: 7/26/2022  Authorization Period Expiration: 12/31/2022  Plan of Care Expiration: 10/24/2022  Visit # / Visits authorized: 2/ 20  FOTO: 1/3    Time In: 1401  Time Out: 1454  Total Billable Time: 53  minutes    Precautions: Standard    Subjective     Pt reports: increased sneezing but has not had any BEE. She states that she has trouble performing pelvic relaxation but has been trying to intentionally relax. Pt reports that intercourse is still uncomfortable but pain has decreased significantly since starting PT. She also states that urinary urgency has decreased.      She was compliant with home exercise program.  Response to previous treatment: no adverse effects reported  Functional change: decreased urinary urgency, BEE, dyspareunia     Pain: 0/10  Location: NA    Constitutional Symptoms Review: The patient denies having any constitutional symptoms.     Objective   Pt verbally consents to intravaginal treatment today.  Signed consent form already on file.     Therapeutic Exercise to develop ROM and flexibility for 15 minutes including:   Groin stretch 3x30 sec   Child's pose 3x30 sec   Happy Baby stretch 3x30 sec   Piriformis stretch 3x30 sec Chidi  Cat-Camel stretch x 15 reps     Gisella received the following manual therapy techniques: to develop extensibility and desensitization for 38 minutes including:  Myofascial release pelvic floor mm, OI and endopelvic fascia using PIT and ischemic compression while giving VC/tactile cues for voluntary relaxation of mm    Scar mobilization at perineum     Neuromuscular Re-education to develop  "Coordination, Control and Down training for 00 minutes including:   SEMG EVALUATION:   PF Electrode placement: external perineal  Pt. Position: supine, supine hooklying, supine butterfly pose    SEMG Finding: elevated baseline and slow return to baseline  No skin irritation, erythema, or other adverse effects observed from electrode placement.    Treatment: Worked on pelvic floor muscle downtraining and coordination using sEMG assist.      BASELINE  Position: supine hookyling  Resting tone: 5.0-7.0 uV  Max contraction: 15.0 uV  Max hold time: 7 sec    Position: supine butterfly pose  Resting tone: 2.0-5.0 uV  Max contraction: 10.0 uV  Max hold time: 7 sec    Position: supine   Resting tone: 0.5-1.5 uV  Max contraction: 7.0 uV  Max hold time: 5 sec    Demonstrated impact that hip motion (resisted ER/IR), diaphragmatic breathing, lower abdominal activation, and posture has on activation through PFM through utilizing visual feedback while on sEMG. Practiced both long holds and quick flick kegels. Pt was edu on performing pelvic drops on sEMG to decrease resting tone.    Edu on performing pelvic floor "check-ins" periodically throughout the day to improve tension and practice coordination of PFM relaxation w/ diaphragmatic breathing.     Gisella participated in dynamic functional therapeutic activities to improve functional performance for 00 minutes, including:   Edu on bladder health  Edu on double voiding techniques. Practiced technique with patient and utilized teach back strategy to confirm understanding.   Edu on performing PFM activation prior to activities that increase intraabdominal pressure in order to decrease BEE  Edu on navigating intimacy with a partner while experiencing pelvic pain    Home Exercises Provided and Patient Education Provided     Education provided:   - anatomy/physiology of pelvic floor and posture/body mechanices  Discussed progression of plan of care with patient; educated pt in " activity modification; reviewed HEP with pt. Pt demonstrated and verbalized understanding of all instruction and was provided with a handout of HEP (see Patient Instructions).    Written Home Exercises Provided: yes.  Exercises were reviewed and Gisella was able to demonstrate them prior to the end of the session.  Gisella demonstrated good  understanding of the education provided.     See EMR under Patient Instructions for exercises provided  9/6/22 .    Assessment     Pt reports dyspareunia has improved overall since starting PT. Also states that she has been sneezing more frequently but has not had any incidents of BEE. Intravaginal palpation of PFM reveals increased tension at L OI which pt reports is TTP. Pt demonstrates independence in diaphragmatic breathing for pelvic relaxation. Noted significant improvement in tension throughout R PFM; pt states that she and her  have been focusing on R side with perineal massage. Initiated pelvic stretching this visit with pt demonstrating good tolerance.     Gisella Is progressing well towards her goals.   Pt prognosis is Excellent.     Pt will continue to benefit from skilled outpatient physical therapy to address the deficits listed in the problem list box on initial evaluation, provide pt/family education and to maximize pt's level of independence in the home and community environment.     Pt's spiritual, cultural and educational needs considered and pt agreeable to plan of care and goals.     Anticipated barriers to physical therapy: none    Goals:   Short Term Goals: 6 weeks   Pt indep in HEP  Pt indep in functional brace technique for decreased strain of pelvic structures or UI with activities which increase IAP  Pt able to wait at least 2 hours between trips to the bathroom for improved participation in ADLs  Pt demo complete contraction and deactivation of pelvic floor muscles x 10 reps     Long Term Goals: 12 weeks   Pt indep in progressive HEP  Pt  reports 0 episodes of leakage in at least 2 weeks for improved ADL participation  Pt able to wait at least 2.5-4 hours between trips to the bathroom   Pt reports able to have painfree intercourse for improved participation in ADLs and improved intimacy with partner    Plan     Plan of care Certification: 7/26/2022 to 10/24/2022.     Outpatient Physical Therapy 1 times weekly for 12 weeks to include the following interventions: therapeutic exercises, therapeutic activity, neuromuscular re-education, manual therapy, patient/family education and self care/home management     Work on downtraining on sEMG. Continue sEMG for PFM coordination and begin proximal hip/PFM strengthening. Discuss activity/exercise modifications to decrease BEE and improve pressure management. STM to R PFM.    Anna Kimura, PT        Instructions (Optional): Pt states she scars very easily Detail Level: Detailed

## 2022-09-06 ENCOUNTER — CLINICAL SUPPORT (OUTPATIENT)
Dept: REHABILITATION | Facility: OTHER | Age: 37
End: 2022-09-06
Attending: PHYSICIAN ASSISTANT
Payer: COMMERCIAL

## 2022-09-06 DIAGNOSIS — M79.18 MYALGIA OF PELVIC FLOOR: Primary | ICD-10-CM

## 2022-09-06 PROCEDURE — 97110 THERAPEUTIC EXERCISES: CPT

## 2022-09-06 PROCEDURE — 97140 MANUAL THERAPY 1/> REGIONS: CPT

## 2022-09-06 NOTE — PATIENT INSTRUCTIONS
Butterfly Stretch    Hold stretch 2 minutes.    Happy Baby     Lying on your back, start off with legs up against wall. Bring one leg towards you with knee bent at 90 degrees and hold the inner foot. Repeat with other leg. Hold for 10 deep breaths.    Piriformis stretch    While lying on your back with both knee bent, cross your one leg on the other knee. Next, hold your thigh and pull it up towards your chest until a stretch is felt in the buttock. Hold for 60 seconds. Repeat on both legs.     Cat/cow    Position yourself on your hands and knees with your hands placed under your shoulders and your knees directly under your hips. Slowly round your back up towards the ceiling and then arch your back down by pulling your abdomen towards the floor.     Child's Pose    While in a crawl position, slowly lower your buttocks towards your feet until a stretch is felt along your back and or buttocks.

## 2022-09-14 ENCOUNTER — OFFICE VISIT (OUTPATIENT)
Dept: PRIMARY CARE CLINIC | Facility: CLINIC | Age: 37
End: 2022-09-14
Payer: COMMERCIAL

## 2022-09-14 ENCOUNTER — PATIENT MESSAGE (OUTPATIENT)
Dept: INTERNAL MEDICINE | Facility: CLINIC | Age: 37
End: 2022-09-14
Payer: COMMERCIAL

## 2022-09-14 DIAGNOSIS — J32.9 SINUSITIS, UNSPECIFIED CHRONICITY, UNSPECIFIED LOCATION: Primary | ICD-10-CM

## 2022-09-14 PROCEDURE — 1160F RVW MEDS BY RX/DR IN RCRD: CPT | Mod: CPTII,95,, | Performed by: NURSE PRACTITIONER

## 2022-09-14 PROCEDURE — 1160F PR REVIEW ALL MEDS BY PRESCRIBER/CLIN PHARMACIST DOCUMENTED: ICD-10-PCS | Mod: CPTII,95,, | Performed by: NURSE PRACTITIONER

## 2022-09-14 PROCEDURE — 3044F HG A1C LEVEL LT 7.0%: CPT | Mod: CPTII,95,, | Performed by: NURSE PRACTITIONER

## 2022-09-14 PROCEDURE — 3044F PR MOST RECENT HEMOGLOBIN A1C LEVEL <7.0%: ICD-10-PCS | Mod: CPTII,95,, | Performed by: NURSE PRACTITIONER

## 2022-09-14 PROCEDURE — 99213 OFFICE O/P EST LOW 20 MIN: CPT | Mod: 95,,, | Performed by: NURSE PRACTITIONER

## 2022-09-14 PROCEDURE — 1159F MED LIST DOCD IN RCRD: CPT | Mod: CPTII,95,, | Performed by: NURSE PRACTITIONER

## 2022-09-14 PROCEDURE — 1159F PR MEDICATION LIST DOCUMENTED IN MEDICAL RECORD: ICD-10-PCS | Mod: CPTII,95,, | Performed by: NURSE PRACTITIONER

## 2022-09-14 PROCEDURE — 99213 PR OFFICE/OUTPT VISIT, EST, LEVL III, 20-29 MIN: ICD-10-PCS | Mod: 95,,, | Performed by: NURSE PRACTITIONER

## 2022-09-14 RX ORDER — AZITHROMYCIN 500 MG/1
500 TABLET, FILM COATED ORAL DAILY
Qty: 3 TABLET | Refills: 0 | Status: SHIPPED | OUTPATIENT
Start: 2022-09-14 | End: 2022-09-17

## 2022-09-14 NOTE — PROGRESS NOTES
"[facility] Note    Chief Complaint    Sinusitis    History of Present Illness      Gisella Smith is a 37 y.o. female who presents today for sinusitis. She has a 22 month old daughter who has recently began attending school. She reports her daughter, her and her  were all sick last week with sinus issues. All symptoms have resolved except now she describes "pus" coming from her nose. She denies any SOB, chest pain, N/V, unintentional weight loss, loss of appetite, fatigue, diarrhea, constipation. She is active daily and remains independent with ADL's. 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      Problem List Items Addressed This Visit    None  Visit Diagnoses       Sinusitis, unspecified chronicity, unspecified location    -  Primary    Relevant Medications    azithromycin (ZITHROMAX) 500 MG tablet            Review of Systems   Constitutional: Negative.    HENT:  Positive for congestion.    Eyes: Negative.    Respiratory:  Positive for sputum production.    Cardiovascular: Negative.    Gastrointestinal: Negative.    Genitourinary: Negative.    Musculoskeletal: Negative.    Skin: Negative.    Neurological: Negative.    Endo/Heme/Allergies: Negative.    Psychiatric/Behavioral: Negative.        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       Past Surgical History:  Past Surgical History:   Procedure Laterality Date    WISDOM TOOTH EXTRACTION         Family History:  family history includes Alzheimer's disease in her maternal grandmother; Arrhythmia in her paternal grandfather; Bullous pemphigoid in her paternal grandfather; Diabetes Mellitus in her paternal grandmother; Hyperlipidemia in her maternal grandmother; Hypertension in her father, maternal grandmother, and paternal grandfather; Liver disease in her maternal grandfather; No Known Problems in her brother and brother; " Osteoporosis in her father and mother; Other in her maternal grandfather; Parkinsonism in her father.   Family history was reviewed with patient.     Social History:  Social History     Socioeconomic History    Marital status:    Tobacco Use    Smoking status: Never    Smokeless tobacco: Never   Substance and Sexual Activity    Alcohol use: Yes    Drug use: Never    Sexual activity: Yes     Partners: Male   Social History Narrative    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); no children    Regular exercise: percy, works with     Outside GYN     Social Determinants of Health     Financial Resource Strain: Low Risk     Difficulty of Paying Living Expenses: Not hard at all   Food Insecurity: No Food Insecurity    Worried About Running Out of Food in the Last Year: Never true    Ran Out of Food in the Last Year: Never true   Transportation Needs: No Transportation Needs    Lack of Transportation (Medical): No    Lack of Transportation (Non-Medical): No   Physical Activity: Sufficiently Active    Days of Exercise per Week: 5 days    Minutes of Exercise per Session: 30 min   Stress: Stress Concern Present    Feeling of Stress : To some extent   Social Connections: Unknown    Frequency of Communication with Friends and Family: Three times a week    Frequency of Social Gatherings with Friends and Family: Once a week    Active Member of Clubs or Organizations: No    Attends Club or Organization Meetings: Never    Marital Status:    Housing Stability: Low Risk     Unable to Pay for Housing in the Last Year: No    Number of Places Lived in the Last Year: 1    Unstable Housing in the Last Year: No         Medications:  Outpatient Encounter Medications as of 9/14/2022   Medication Sig Dispense Refill    azithromycin (ZITHROMAX) 500 MG tablet Take 1 tablet (500 mg total) by mouth once daily. for 3 days 3 tablet 0    prenatal 25/iron fum/folic/dha (PRENATAL-1 ORAL) Take 1 tablet by mouth  once daily.      valACYclovir (VALTREX) 1000 MG tablet SMARTSI Tablet(s) By Mouth Every 12 Hours       No facility-administered encounter medications on file as of 2022.       Allergies:  Review of patient's allergies indicates:   Allergen Reactions    Sulfamethoxazole-trimethoprim Hives       Health Maintenance:  Health Maintenance   Topic Date Due    TETANUS VACCINE  2030    Hepatitis C Screening  Completed    Lipid Panel  Completed     Health Maintenance Topics with due status: Not Due       Topic Last Completion Date    TETANUS VACCINE 2020    Cervical Cancer Screening 2021       Physical Exam       Laboratory:  CBC:  Recent Labs   Lab 20  1842 22  1000 22  0842   WBC 11.37 5.17 5.76   RBC 3.50 L 3.93 L 4.08   Hemoglobin 12.1 13.5 13.9   Hematocrit 34.7 L 39.1 40.4   Platelets 346 253 264   MCV 99 H 100 H 99 H   MCH 34.6 H 34.4 H 34.1 H   MCHC 34.9 34.5 34.4     CMP:  Recent Labs   Lab 10/19/20  1247 10/30/20  1931 22  1000   Glucose 83 95 96   Calcium 9.5 9.0 9.2   Albumin 3.1 L 3.1 L 4.1   Total Protein 6.5 6.6 7.1   Sodium 137 138 140   Potassium 3.3 L 3.2 L 3.9   CO2 20 L 15 L 23   Chloride 107 110 109   BUN 7 6 11   Alkaline Phosphatase 90 100 44 L   ALT 13 10 13   AST 14 16 15   Total Bilirubin 0.3 0.3 0.5     URINALYSIS:  Recent Labs   Lab 20  2220   Color, UA Yellow   Specific Gravity, UA 1.025   pH, UA 7.0   Protein, UA Negative   Bacteria Rare   Nitrite, UA Negative   Leukocytes, UA 1+ A   Urobilinogen, UA Negative      LIPIDS:  Recent Labs   Lab 22  1000   TSH 0.704     TSH:  Recent Labs   Lab 22  1000   TSH 0.704     A1C:  Recent Labs   Lab 22  1000   Hemoglobin A1C 4.4       Radiology:        Assessment/Plan     Gisella Smith is a 37 y.o.female with:    Sinusitis, unspecified chronicity, unspecified location  -     azithromycin (ZITHROMAX) 500 MG tablet; Take 1 tablet (500 mg total) by mouth once daily. for 3 days   Dispense: 3 tablet; Refill: 0      As above, continue current medications and maintain follow up with specialists.  Return to clinic as needed.    I spent 16 minutes on the day of this virtual encounter for preparing, evaluating, treating, and discussing plan of care with this patient.  Greater than 50% of this time was spent face to face via virtual visit with patient.  All questions were answered to patient's satisfaction.             Karen L Spencer, NP-C [facility]                  Answers submitted by the patient for this visit:  Review of Systems Questionnaire (Submitted on 9/14/2022)  activity change: No  unexpected weight change: No  neck pain: No  hearing loss: Yes  rhinorrhea: Yes  trouble swallowing: No  eye discharge: No  visual disturbance: No  chest tightness: No  wheezing: No  chest pain: No  palpitations: No  blood in stool: No  constipation: No  vomiting: No  diarrhea: No  polydipsia: No  polyuria: No  difficulty urinating: No  hematuria: No  menstrual problem: No  dysuria: No  joint swelling: No  arthralgias: No  headaches: Yes  weakness: No  confusion: No  dysphoric mood: No

## 2022-09-18 ENCOUNTER — PATIENT MESSAGE (OUTPATIENT)
Dept: REHABILITATION | Facility: OTHER | Age: 37
End: 2022-09-18
Payer: COMMERCIAL

## 2022-09-25 ENCOUNTER — PATIENT MESSAGE (OUTPATIENT)
Dept: OBSTETRICS AND GYNECOLOGY | Facility: CLINIC | Age: 37
End: 2022-09-25
Payer: COMMERCIAL

## 2022-09-26 ENCOUNTER — PATIENT MESSAGE (OUTPATIENT)
Dept: OBSTETRICS AND GYNECOLOGY | Facility: CLINIC | Age: 37
End: 2022-09-26
Payer: COMMERCIAL

## 2022-09-26 ENCOUNTER — PATIENT MESSAGE (OUTPATIENT)
Dept: INTERNAL MEDICINE | Facility: CLINIC | Age: 37
End: 2022-09-26
Payer: COMMERCIAL

## 2022-09-26 ENCOUNTER — LAB VISIT (OUTPATIENT)
Dept: LAB | Facility: OTHER | Age: 37
End: 2022-09-26
Attending: PHYSICIAN ASSISTANT
Payer: COMMERCIAL

## 2022-09-26 DIAGNOSIS — R30.0 DYSURIA: ICD-10-CM

## 2022-09-26 DIAGNOSIS — R30.0 DYSURIA: Primary | ICD-10-CM

## 2022-09-26 PROCEDURE — 87086 URINE CULTURE/COLONY COUNT: CPT | Performed by: PHYSICIAN ASSISTANT

## 2022-09-27 LAB
BACTERIA UR CULT: NORMAL
BACTERIA UR CULT: NORMAL

## 2022-10-13 ENCOUNTER — PATIENT MESSAGE (OUTPATIENT)
Dept: INTERNAL MEDICINE | Facility: CLINIC | Age: 37
End: 2022-10-13
Payer: COMMERCIAL

## 2022-10-20 ENCOUNTER — OFFICE VISIT (OUTPATIENT)
Dept: INTERNAL MEDICINE | Facility: CLINIC | Age: 37
End: 2022-10-20
Payer: COMMERCIAL

## 2022-10-20 ENCOUNTER — PATIENT MESSAGE (OUTPATIENT)
Dept: INTERNAL MEDICINE | Facility: CLINIC | Age: 37
End: 2022-10-20

## 2022-10-20 VITALS
WEIGHT: 143.94 LBS | OXYGEN SATURATION: 100 % | HEART RATE: 86 BPM | SYSTOLIC BLOOD PRESSURE: 117 MMHG | BODY MASS INDEX: 25.5 KG/M2 | HEIGHT: 63 IN | DIASTOLIC BLOOD PRESSURE: 75 MMHG

## 2022-10-20 DIAGNOSIS — L29.9 ITCHING: ICD-10-CM

## 2022-10-20 DIAGNOSIS — N64.59 BREAST SYMPTOM: Primary | ICD-10-CM

## 2022-10-20 PROCEDURE — 3078F PR MOST RECENT DIASTOLIC BLOOD PRESSURE < 80 MM HG: ICD-10-PCS | Mod: CPTII,S$GLB,, | Performed by: PHYSICIAN ASSISTANT

## 2022-10-20 PROCEDURE — 3074F SYST BP LT 130 MM HG: CPT | Mod: CPTII,S$GLB,, | Performed by: PHYSICIAN ASSISTANT

## 2022-10-20 PROCEDURE — 3078F DIAST BP <80 MM HG: CPT | Mod: CPTII,S$GLB,, | Performed by: PHYSICIAN ASSISTANT

## 2022-10-20 PROCEDURE — 99213 PR OFFICE/OUTPT VISIT, EST, LEVL III, 20-29 MIN: ICD-10-PCS | Mod: S$GLB,,, | Performed by: PHYSICIAN ASSISTANT

## 2022-10-20 PROCEDURE — 3044F PR MOST RECENT HEMOGLOBIN A1C LEVEL <7.0%: ICD-10-PCS | Mod: CPTII,S$GLB,, | Performed by: PHYSICIAN ASSISTANT

## 2022-10-20 PROCEDURE — 1160F RVW MEDS BY RX/DR IN RCRD: CPT | Mod: CPTII,S$GLB,, | Performed by: PHYSICIAN ASSISTANT

## 2022-10-20 PROCEDURE — 1159F PR MEDICATION LIST DOCUMENTED IN MEDICAL RECORD: ICD-10-PCS | Mod: CPTII,S$GLB,, | Performed by: PHYSICIAN ASSISTANT

## 2022-10-20 PROCEDURE — 3074F PR MOST RECENT SYSTOLIC BLOOD PRESSURE < 130 MM HG: ICD-10-PCS | Mod: CPTII,S$GLB,, | Performed by: PHYSICIAN ASSISTANT

## 2022-10-20 PROCEDURE — 1160F PR REVIEW ALL MEDS BY PRESCRIBER/CLIN PHARMACIST DOCUMENTED: ICD-10-PCS | Mod: CPTII,S$GLB,, | Performed by: PHYSICIAN ASSISTANT

## 2022-10-20 PROCEDURE — 99213 OFFICE O/P EST LOW 20 MIN: CPT | Mod: S$GLB,,, | Performed by: PHYSICIAN ASSISTANT

## 2022-10-20 PROCEDURE — 1159F MED LIST DOCD IN RCRD: CPT | Mod: CPTII,S$GLB,, | Performed by: PHYSICIAN ASSISTANT

## 2022-10-20 PROCEDURE — 3044F HG A1C LEVEL LT 7.0%: CPT | Mod: CPTII,S$GLB,, | Performed by: PHYSICIAN ASSISTANT

## 2022-10-20 PROCEDURE — 99999 PR PBB SHADOW E&M-EST. PATIENT-LVL IV: ICD-10-PCS | Mod: PBBFAC,,, | Performed by: PHYSICIAN ASSISTANT

## 2022-10-20 PROCEDURE — 99999 PR PBB SHADOW E&M-EST. PATIENT-LVL IV: CPT | Mod: PBBFAC,,, | Performed by: PHYSICIAN ASSISTANT

## 2022-10-20 NOTE — PROGRESS NOTES
INTERNAL MEDICINE URGENT VISIT NOTE    CHIEF COMPLAINT     Chief Complaint   Patient presents with    Itching     Left nipple irration , stated she was nursing 20 months ago has recently stropped and in process of have another baby       HPI     Gisella Smith is a 37 y.o. female who presents for an urgent visit today.    PCP is Audrey Leyva MD, patient is new to me.     Patient is new to me. She presents with unilateral breast itching.     Pt presents with complaints of area of itching to the right breast - see message below for details.     Message from patient pertaining to this OV:  So, this is weird and Im probably being paranoid - but for the past few months Ive noticed some itching/zinging on my right breast. There are no obvious abnormalities & I dont feel any lumps. I did breast feed my daughter and stopped about 20 months ago so I probably have some changes from that. Not sure how I should proceed or how to address - I do not have a family history of breast cancer (no history of cancer at all really) but I know that doesnt always matter. I do have a history of fibroadenomas in my breasts in the past, they have since resolved.      Anyways, Im sure Im being hyper vigilant but we are trying to get pregnant again and I just want to stay healthy!    Past Medical History:  Past Medical History:   Diagnosis Date    Overweight (BMI 25.0-29.9) 2017    Recurrent cold sores 2017       Home Medications:  Prior to Admission medications    Medication Sig Start Date End Date Taking? Authorizing Provider   prenatal 25/iron fum/folic/dha (PRENATAL-1 ORAL) Take 1 tablet by mouth once daily.   Yes Historical Provider   valACYclovir (VALTREX) 1000 MG tablet SMARTSI Tablet(s) By Mouth Every 12 Hours 22  Yes Historical Provider       Review of Systems:  Review of Systems   Constitutional:  Negative for chills and fever.   HENT:  Negative for sore throat and trouble swallowing.    Eyes:   "Negative for visual disturbance.   Respiratory:  Negative for cough and shortness of breath.    Cardiovascular:  Negative for chest pain.   Gastrointestinal:  Negative for abdominal pain, constipation, diarrhea, nausea and vomiting.   Genitourinary:  Negative for dysuria and flank pain.   Musculoskeletal:  Negative for back pain, neck pain and neck stiffness.   Skin:  Negative for rash.        Breast itching    Neurological:  Negative for dizziness, syncope, weakness and headaches.   Psychiatric/Behavioral:  Negative for confusion.      Health Maintainence:   Immunizations:  Health Maintenance         Date Due Completion Date    COVID-19 Vaccine (4 - Booster for Pfizer series) 11/26/2021 10/1/2021    Cervical Cancer Screening 12/08/2026 12/8/2021    TETANUS VACCINE 08/24/2030 8/24/2020    Override on 11/15/2013: Done             PHYSICAL EXAM     /75 (BP Location: Left arm, Patient Position: Sitting, BP Method: Medium (Manual))   Pulse 86   Ht 5' 3" (1.6 m)   Wt 65.3 kg (143 lb 15.4 oz)   SpO2 100%   BMI 25.50 kg/m²     Physical Exam  Vitals and nursing note reviewed.   Constitutional:       Appearance: Normal appearance.      Comments: Healthy appearing female in NAD or apparent pain. She makes good eye contact, speaks in clear full sentences and ambulates with ease.        HENT:      Head: Normocephalic and atraumatic.   Cardiovascular:      Rate and Rhythm: Normal rate.   Pulmonary:      Effort: No respiratory distress.   Musculoskeletal:         General: Normal range of motion.      Cervical back: No rigidity.   Neurological:      General: No focal deficit present.      Mental Status: She is alert.      Gait: Gait normal.   Psychiatric:         Mood and Affect: Mood normal.       LABS     Lab Results   Component Value Date    HGBA1C 4.4 02/16/2022     CMP  Sodium   Date Value Ref Range Status   02/16/2022 140 136 - 145 mmol/L Final     Potassium   Date Value Ref Range Status   02/16/2022 3.9 3.5 - 5.1 " mmol/L Final     Chloride   Date Value Ref Range Status   02/16/2022 109 95 - 110 mmol/L Final     CO2   Date Value Ref Range Status   02/16/2022 23 23 - 29 mmol/L Final     Glucose   Date Value Ref Range Status   02/16/2022 96 70 - 110 mg/dL Final     BUN   Date Value Ref Range Status   02/16/2022 11 6 - 20 mg/dL Final     Creatinine   Date Value Ref Range Status   02/16/2022 0.7 0.5 - 1.4 mg/dL Final     Calcium   Date Value Ref Range Status   02/16/2022 9.2 8.7 - 10.5 mg/dL Final     Total Protein   Date Value Ref Range Status   02/16/2022 7.1 6.0 - 8.4 g/dL Final     Albumin   Date Value Ref Range Status   02/16/2022 4.1 3.5 - 5.2 g/dL Final     Total Bilirubin   Date Value Ref Range Status   02/16/2022 0.5 0.1 - 1.0 mg/dL Final     Comment:     For infants and newborns, interpretation of results should be based  on gestational age, weight and in agreement with clinical  observations.    Premature Infant recommended reference ranges:  Up to 24 hours.............<8.0 mg/dL  Up to 48 hours............<12.0 mg/dL  3-5 days..................<15.0 mg/dL  6-29 days.................<15.0 mg/dL       Alkaline Phosphatase   Date Value Ref Range Status   02/16/2022 44 (L) 55 - 135 U/L Final     AST   Date Value Ref Range Status   02/16/2022 15 10 - 40 U/L Final     ALT   Date Value Ref Range Status   02/16/2022 13 10 - 44 U/L Final     Anion Gap   Date Value Ref Range Status   02/16/2022 8 8 - 16 mmol/L Final     eGFR if    Date Value Ref Range Status   02/16/2022 >60 >60 mL/min/1.73 m^2 Final     eGFR if non    Date Value Ref Range Status   02/16/2022 >60 >60 mL/min/1.73 m^2 Final     Comment:     Calculation used to obtain the estimated glomerular filtration  rate (eGFR) is the CKD-EPI equation.        Lab Results   Component Value Date    WBC 5.76 03/03/2022    HGB 13.9 03/03/2022    HCT 40.4 03/03/2022    MCV 99 (H) 03/03/2022     03/03/2022     Lab Results   Component Value Date     CHOL 171 05/13/2019    CHOL 165 11/20/2017     Lab Results   Component Value Date    HDL 91 (H) 05/13/2019    HDL 78 (H) 11/20/2017     Lab Results   Component Value Date    LDLCALC 70.2 05/13/2019    LDLCALC 72.0 11/20/2017     Lab Results   Component Value Date    TRIG 49 05/13/2019    TRIG 75 11/20/2017     Lab Results   Component Value Date    CHOLHDL 53.2 (H) 05/13/2019    CHOLHDL 47.3 11/20/2017     Lab Results   Component Value Date    TSH 0.704 02/16/2022       ASSESSMENT/PLAN     Gisella Smith is a 37 y.o. female     Gisella was seen today for itching of the right breast with no overlying skin changes or lumps/bumps reported by patient. No family history of Breast CA. History of breast bx years ago -benign. Will get diagnostic breast imaging and refer to high risk breast clinic as warranted.     Diagnoses and all orders for this visit:    Breast symptom  -     Mammo Digital Diagnostic Bilat; Future  -     US Breast Bilateral Complete; Future    Itching     Patient was counseled on when and how to seek emergent care.       Sima Armenta PA-C   Department of Internal Medicine - [facility]   12:01 PM

## 2022-11-03 ENCOUNTER — HOSPITAL ENCOUNTER (OUTPATIENT)
Dept: RADIOLOGY | Facility: OTHER | Age: 37
Discharge: HOME | End: 2022-11-03
Attending: PHYSICIAN ASSISTANT
Payer: COMMERCIAL

## 2022-11-03 DIAGNOSIS — N64.59 BREAST SYMPTOM: ICD-10-CM

## 2022-11-03 PROCEDURE — 77066 DX MAMMO INCL CAD BI: CPT | Mod: 26,,, | Performed by: RADIOLOGY

## 2022-11-03 PROCEDURE — 77066 MAMMO DIGITAL DIAGNOSTIC BILAT WITH TOMO: ICD-10-PCS | Mod: 26,,, | Performed by: RADIOLOGY

## 2022-11-03 PROCEDURE — 77062 MAMMO DIGITAL DIAGNOSTIC BILAT WITH TOMO: ICD-10-PCS | Mod: 26,,, | Performed by: RADIOLOGY

## 2022-11-03 PROCEDURE — 77062 BREAST TOMOSYNTHESIS BI: CPT | Mod: TC

## 2022-11-03 PROCEDURE — 77062 BREAST TOMOSYNTHESIS BI: CPT | Mod: 26,,, | Performed by: RADIOLOGY

## 2022-11-17 PROBLEM — M79.18 MYALGIA OF PELVIC FLOOR: Status: RESOLVED | Noted: 2022-07-20 | Resolved: 2022-11-17

## 2022-11-22 ENCOUNTER — OFFICE VISIT (OUTPATIENT)
Dept: URGENT CARE | Facility: CLINIC | Age: 37
End: 2022-11-22
Payer: COMMERCIAL

## 2022-11-22 VITALS
WEIGHT: 142 LBS | TEMPERATURE: 99 F | OXYGEN SATURATION: 95 % | HEIGHT: 63 IN | BODY MASS INDEX: 25.16 KG/M2 | HEART RATE: 111 BPM | RESPIRATION RATE: 16 BRPM | DIASTOLIC BLOOD PRESSURE: 86 MMHG | SYSTOLIC BLOOD PRESSURE: 129 MMHG

## 2022-11-22 DIAGNOSIS — Z76.0 MEDICATION REFILL: ICD-10-CM

## 2022-11-22 DIAGNOSIS — J06.9 VIRAL URI WITH COUGH: ICD-10-CM

## 2022-11-22 DIAGNOSIS — R05.9 COUGH, UNSPECIFIED TYPE: Primary | ICD-10-CM

## 2022-11-22 LAB
CTP QC/QA: YES
CTP QC/QA: YES
POC MOLECULAR INFLUENZA A AGN: NEGATIVE
POC MOLECULAR INFLUENZA B AGN: NEGATIVE
SARS-COV-2 RDRP RESP QL NAA+PROBE: NEGATIVE

## 2022-11-22 PROCEDURE — 1160F PR REVIEW ALL MEDS BY PRESCRIBER/CLIN PHARMACIST DOCUMENTED: ICD-10-PCS | Mod: CPTII,S$GLB,, | Performed by: NURSE PRACTITIONER

## 2022-11-22 PROCEDURE — 3074F PR MOST RECENT SYSTOLIC BLOOD PRESSURE < 130 MM HG: ICD-10-PCS | Mod: CPTII,S$GLB,, | Performed by: NURSE PRACTITIONER

## 2022-11-22 PROCEDURE — 99213 OFFICE O/P EST LOW 20 MIN: CPT | Mod: S$GLB,,, | Performed by: NURSE PRACTITIONER

## 2022-11-22 PROCEDURE — 3008F BODY MASS INDEX DOCD: CPT | Mod: CPTII,S$GLB,, | Performed by: NURSE PRACTITIONER

## 2022-11-22 PROCEDURE — 3079F PR MOST RECENT DIASTOLIC BLOOD PRESSURE 80-89 MM HG: ICD-10-PCS | Mod: CPTII,S$GLB,, | Performed by: NURSE PRACTITIONER

## 2022-11-22 PROCEDURE — 87502 POCT INFLUENZA A/B MOLECULAR: ICD-10-PCS | Mod: QW,S$GLB,, | Performed by: NURSE PRACTITIONER

## 2022-11-22 PROCEDURE — 3008F PR BODY MASS INDEX (BMI) DOCUMENTED: ICD-10-PCS | Mod: CPTII,S$GLB,, | Performed by: NURSE PRACTITIONER

## 2022-11-22 PROCEDURE — 87635 SARS-COV-2 COVID-19 AMP PRB: CPT | Mod: QW,S$GLB,, | Performed by: NURSE PRACTITIONER

## 2022-11-22 PROCEDURE — 1160F RVW MEDS BY RX/DR IN RCRD: CPT | Mod: CPTII,S$GLB,, | Performed by: NURSE PRACTITIONER

## 2022-11-22 PROCEDURE — 1159F MED LIST DOCD IN RCRD: CPT | Mod: CPTII,S$GLB,, | Performed by: NURSE PRACTITIONER

## 2022-11-22 PROCEDURE — 1159F PR MEDICATION LIST DOCUMENTED IN MEDICAL RECORD: ICD-10-PCS | Mod: CPTII,S$GLB,, | Performed by: NURSE PRACTITIONER

## 2022-11-22 PROCEDURE — 87635: ICD-10-PCS | Mod: QW,S$GLB,, | Performed by: NURSE PRACTITIONER

## 2022-11-22 PROCEDURE — 3074F SYST BP LT 130 MM HG: CPT | Mod: CPTII,S$GLB,, | Performed by: NURSE PRACTITIONER

## 2022-11-22 PROCEDURE — 87502 INFLUENZA DNA AMP PROBE: CPT | Mod: QW,S$GLB,, | Performed by: NURSE PRACTITIONER

## 2022-11-22 PROCEDURE — 3079F DIAST BP 80-89 MM HG: CPT | Mod: CPTII,S$GLB,, | Performed by: NURSE PRACTITIONER

## 2022-11-22 PROCEDURE — 99213 PR OFFICE/OUTPT VISIT, EST, LEVL III, 20-29 MIN: ICD-10-PCS | Mod: S$GLB,,, | Performed by: NURSE PRACTITIONER

## 2022-11-22 PROCEDURE — 3044F PR MOST RECENT HEMOGLOBIN A1C LEVEL <7.0%: ICD-10-PCS | Mod: CPTII,S$GLB,, | Performed by: NURSE PRACTITIONER

## 2022-11-22 PROCEDURE — 3044F HG A1C LEVEL LT 7.0%: CPT | Mod: CPTII,S$GLB,, | Performed by: NURSE PRACTITIONER

## 2022-11-22 RX ORDER — BENZONATATE 200 MG/1
200 CAPSULE ORAL 3 TIMES DAILY PRN
Qty: 30 CAPSULE | Refills: 0 | Status: SHIPPED | OUTPATIENT
Start: 2022-11-22 | End: 2022-12-02

## 2022-11-22 RX ORDER — FLUTICASONE PROPIONATE AND SALMETEROL 100; 50 UG/1; UG/1
1 POWDER RESPIRATORY (INHALATION) 2 TIMES DAILY
Qty: 60 EACH | Refills: 0 | Status: SHIPPED | OUTPATIENT
Start: 2022-11-22 | End: 2023-01-10

## 2022-11-22 NOTE — PROGRESS NOTES
"Subjective:       Patient ID: Gisella Smith is a 37 y.o. female.    Vitals:  height is 5' 3" (1.6 m) and weight is 64.4 kg (142 lb). Her temporal temperature is 99.1 °F (37.3 °C). Her blood pressure is 129/86 and her pulse is 111 (abnormal). Her respiration is 16 and oxygen saturation is 95%.     Chief Complaint: Cough    Patient c/o cough and fever.Patient states that sheen missy having a real bad cough for the last two night that it cause her chest to have soreness. Patient stated that when she woke up this morning that she had fever of 102.0 and patient took Advil 600 mg at 7 am. Patient is requesting to be tested for flu and covid due to holiday traveling.  Provider note begins below    Patient is an anesthesiologist.  She is unsure of exposure to illness.   She is tried promethazine DM without relief.  She has an Advair inhaler that is  that she is used.  Reports no relief.  She is not slept in 2 days.  Reports a history of reactive airway disease.     Cough  This is a new problem. Episode onset: 2 days ago. The problem has been gradually worsening. The problem occurs constantly. The cough is Productive of sputum. Associated symptoms include a fever and postnasal drip. Pertinent negatives include no chest pain or wheezing. The symptoms are aggravated by lying down. She has tried OTC cough suppressant (Advil) for the symptoms. The treatment provided no relief. There is no history of pneumonia.     Constitution: Positive for fever. Negative for fatigue.   HENT:  Positive for postnasal drip.    Cardiovascular:  Negative for chest pain and sob on exertion.   Respiratory:  Positive for cough and sputum production. Negative for wheezing and asthma.    Gastrointestinal:  Negative for nausea, vomiting, constipation and diarrhea.   Allergic/Immunologic: Negative for asthma.     Objective:      Physical Exam   Constitutional: She is oriented to person, place, and time.   HENT:   Head: Normocephalic and " atraumatic.   Cardiovascular: Regular rhythm. Tachycardia present.   Pulmonary/Chest: Effort normal. No respiratory distress. She has rhonchi (LLL).   Abdominal: Normal appearance.   Neurological: She is alert and oriented to person, place, and time.   Skin: Skin is warm and dry.   Psychiatric: Her behavior is normal. Mood normal.       Results for orders placed or performed in visit on 11/22/22   POCT COVID-19 Rapid Screening   Result Value Ref Range    POC Rapid COVID Negative Negative     Acceptable Yes    POCT Influenza A/B MOLECULAR   Result Value Ref Range    POC Molecular Influenza A Ag Negative Negative, Not Reported    POC Molecular Influenza B Ag Negative Negative, Not Reported     Acceptable Yes         Assessment:       1. Cough, unspecified type    2. Medication refill    3. Viral URI with cough          Plan:       Flu test negative  Covid test negative  Tessalon Perles   Refill of Advair.    Potentially too soon test positive.  Recommend follow-up if symptoms worsen or persist.      Cough, unspecified type  -     POCT COVID-19 Rapid Screening  -     POCT Influenza A/B MOLECULAR  -     benzonatate (TESSALON) 200 MG capsule; Take 1 capsule (200 mg total) by mouth 3 (three) times daily as needed for Cough.  Dispense: 30 capsule; Refill: 0    Medication refill    Viral URI with cough    Other orders  -     fluticasone-salmeterol diskus inhaler 100-50 mcg; Inhale 1 puff into the lungs 2 (two) times daily. Controller  Dispense: 60 each; Refill: 0

## 2022-12-01 ENCOUNTER — PATIENT MESSAGE (OUTPATIENT)
Dept: INTERNAL MEDICINE | Facility: CLINIC | Age: 37
End: 2022-12-01
Payer: COMMERCIAL

## 2022-12-01 NOTE — TELEPHONE ENCOUNTER
Please offer patient an in person appointment so that she can be appropriately examined. Also please let her know that viral symptoms can last for 10-14 days and in some cases even longer -it seems she is still in this window. Please let me know if she has any questions or concerns.   -LESLIE

## 2022-12-01 NOTE — TELEPHONE ENCOUNTER
Unable to offer in person visit on Monday 12/5. I offer Pt a virtual visit (only soonest appt available). Make a note to self to call her in case there are any cancellations on Monday after 1 PM.

## 2022-12-05 ENCOUNTER — OFFICE VISIT (OUTPATIENT)
Dept: INTERNAL MEDICINE | Facility: CLINIC | Age: 37
End: 2022-12-05
Payer: COMMERCIAL

## 2022-12-05 VITALS — DIASTOLIC BLOOD PRESSURE: 60 MMHG | BODY MASS INDEX: 24.8 KG/M2 | WEIGHT: 140 LBS | SYSTOLIC BLOOD PRESSURE: 110 MMHG

## 2022-12-05 DIAGNOSIS — R05.9 COUGH, UNSPECIFIED TYPE: Primary | ICD-10-CM

## 2022-12-05 PROCEDURE — 3008F BODY MASS INDEX DOCD: CPT | Mod: CPTII,95,, | Performed by: PHYSICIAN ASSISTANT

## 2022-12-05 PROCEDURE — 3074F SYST BP LT 130 MM HG: CPT | Mod: CPTII,95,, | Performed by: PHYSICIAN ASSISTANT

## 2022-12-05 PROCEDURE — 1159F PR MEDICATION LIST DOCUMENTED IN MEDICAL RECORD: ICD-10-PCS | Mod: CPTII,95,, | Performed by: PHYSICIAN ASSISTANT

## 2022-12-05 PROCEDURE — 1160F RVW MEDS BY RX/DR IN RCRD: CPT | Mod: CPTII,95,, | Performed by: PHYSICIAN ASSISTANT

## 2022-12-05 PROCEDURE — 3078F PR MOST RECENT DIASTOLIC BLOOD PRESSURE < 80 MM HG: ICD-10-PCS | Mod: CPTII,95,, | Performed by: PHYSICIAN ASSISTANT

## 2022-12-05 PROCEDURE — 1159F MED LIST DOCD IN RCRD: CPT | Mod: CPTII,95,, | Performed by: PHYSICIAN ASSISTANT

## 2022-12-05 PROCEDURE — 1160F PR REVIEW ALL MEDS BY PRESCRIBER/CLIN PHARMACIST DOCUMENTED: ICD-10-PCS | Mod: CPTII,95,, | Performed by: PHYSICIAN ASSISTANT

## 2022-12-05 PROCEDURE — 99214 PR OFFICE/OUTPT VISIT, EST, LEVL IV, 30-39 MIN: ICD-10-PCS | Mod: 95,,, | Performed by: PHYSICIAN ASSISTANT

## 2022-12-05 PROCEDURE — 3078F DIAST BP <80 MM HG: CPT | Mod: CPTII,95,, | Performed by: PHYSICIAN ASSISTANT

## 2022-12-05 PROCEDURE — 3044F PR MOST RECENT HEMOGLOBIN A1C LEVEL <7.0%: ICD-10-PCS | Mod: CPTII,95,, | Performed by: PHYSICIAN ASSISTANT

## 2022-12-05 PROCEDURE — 3074F PR MOST RECENT SYSTOLIC BLOOD PRESSURE < 130 MM HG: ICD-10-PCS | Mod: CPTII,95,, | Performed by: PHYSICIAN ASSISTANT

## 2022-12-05 PROCEDURE — 3008F PR BODY MASS INDEX (BMI) DOCUMENTED: ICD-10-PCS | Mod: CPTII,95,, | Performed by: PHYSICIAN ASSISTANT

## 2022-12-05 PROCEDURE — 99214 OFFICE O/P EST MOD 30 MIN: CPT | Mod: 95,,, | Performed by: PHYSICIAN ASSISTANT

## 2022-12-05 PROCEDURE — 3044F HG A1C LEVEL LT 7.0%: CPT | Mod: CPTII,95,, | Performed by: PHYSICIAN ASSISTANT

## 2022-12-05 RX ORDER — AZITHROMYCIN 250 MG/1
TABLET, FILM COATED ORAL
Qty: 6 TABLET | Refills: 0 | Status: SHIPPED | OUTPATIENT
Start: 2022-12-05 | End: 2022-12-10

## 2022-12-05 RX ORDER — HYDROCODONE POLISTIREX AND CHLORPHENIRAMINE POLISTIREX 10; 8 MG/5ML; MG/5ML
5 SUSPENSION, EXTENDED RELEASE ORAL EVERY 12 HOURS PRN
Qty: 50 ML | Refills: 0 | Status: SHIPPED | OUTPATIENT
Start: 2022-12-05 | End: 2022-12-10

## 2022-12-05 RX ORDER — BENZONATATE 100 MG/1
100 CAPSULE ORAL 3 TIMES DAILY PRN
Qty: 30 CAPSULE | Refills: 0 | Status: SHIPPED | OUTPATIENT
Start: 2022-12-05 | End: 2022-12-15

## 2022-12-06 ENCOUNTER — PATIENT MESSAGE (OUTPATIENT)
Dept: INTERNAL MEDICINE | Facility: CLINIC | Age: 37
End: 2022-12-06
Payer: COMMERCIAL

## 2022-12-06 RX ORDER — ALBUTEROL SULFATE 90 UG/1
2 AEROSOL, METERED RESPIRATORY (INHALATION) EVERY 6 HOURS PRN
Qty: 18 G | Refills: 0 | Status: SHIPPED | OUTPATIENT
Start: 2022-12-06 | End: 2023-01-10

## 2022-12-06 NOTE — TELEPHONE ENCOUNTER
Ok to continue current plan of abx and cough suppressant, monitor symptoms carefully, if any worsening, RTC for in person eval. ACM

## 2022-12-08 NOTE — PROGRESS NOTES
INTERNAL MEDICINE URGENT VISIT NOTE    Virtual Visit     CHIEF COMPLAINT     Chief Complaint   Patient presents with    Cough       HPI     Gisella Smith is a 37 y.o. female who presents for an urgent visit today.    PCP is Audrey Leyva MD, patient is known to me.     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 She denies fever, chills, nausea, vomiting. She has tried promethazine DM with no relief. She denies chest pain or SOB. She denies lower extremity swelling. She wears a mask at work. She is at home during this VV and audio call time was 10 mins.     Past Medical History:  Past Medical History:   Diagnosis Date    Overweight (BMI 25.0-29.9) 2017    Recurrent cold sores 2017       Home Medications:  Prior to Admission medications    Medication Sig Start Date End Date Taking? Authorizing Provider   albuterol (VENTOLIN HFA) 90 mcg/actuation inhaler Inhale 2 puffs into the lungs every 6 (six) hours as needed for Wheezing. Rescue 22  Sima Armenta PA-C   azithromycin (Z-JUAN) 250 MG tablet Take 2 tablets by mouth on day 1; Take 1 tablet by mouth on days 2-5 12/5/22 12/10/22  Sima Armenta PA-C   benzonatate (TESSALON) 100 MG capsule Take 1 capsule (100 mg total) by mouth 3 (three) times daily as needed for Cough. 12/5/22 12/15/22  Sima Armenta PA-C   fluticasone-salmeterol diskus inhaler 100-50 mcg Inhale 1 puff into the lungs 2 (two) times daily. Controller 22  Chelita Huntley, EMANUEL   hydrocodone-chlorpheniramine (TUSSIONEX) 10-8 mg/5 mL suspension Take 5 mLs by mouth every 12 (twelve) hours as needed for Cough or Congestion. 12/5/22 12/10/22  Sima Armenta PA-C   prenatal 25/iron fum/folic/dha (PRENATAL-1 ORAL) Take 1 tablet by mouth once daily.    Historical Provider   valACYclovir (VALTREX) 1000 MG tablet SMARTSI Tablet(s) By  Mouth Every 12 Hours 6/6/22   Historical Provider       Review of Systems:  Review of Systems   Constitutional:  Negative for chills and fever.   HENT:  Positive for postnasal drip and rhinorrhea. Negative for ear pain and sore throat.    Respiratory:  Positive for cough. Negative for shortness of breath and wheezing.    Cardiovascular:  Negative for chest pain.   Musculoskeletal:  Negative for myalgias.   Skin:  Negative for rash.   Allergic/Immunologic: Negative for environmental allergies.   Neurological:  Negative for headaches.     Health Maintainence:   Immunizations:  Health Maintenance         Date Due Completion Date    COVID-19 Vaccine (4 - Booster for Pfizer series) 11/26/2021 10/1/2021    Cervical Cancer Screening 12/08/2026 12/8/2021    TETANUS VACCINE 08/24/2030 8/24/2020    Override on 11/15/2013: Done             PHYSICAL EXAM     /60   Wt 63.5 kg (140 lb)   LMP 12/01/2022   BMI 24.80 kg/m²     Physical Exam  Constitutional:       Comments: Healthy appearing female in NAD or apparent pain. She speaks in clear full sentences. She sounds nasally congested when speaking and occasionally has junky cough.            LABS     Lab Results   Component Value Date    HGBA1C 4.4 02/16/2022     CMP  Sodium   Date Value Ref Range Status   02/16/2022 140 136 - 145 mmol/L Final     Potassium   Date Value Ref Range Status   02/16/2022 3.9 3.5 - 5.1 mmol/L Final     Chloride   Date Value Ref Range Status   02/16/2022 109 95 - 110 mmol/L Final     CO2   Date Value Ref Range Status   02/16/2022 23 23 - 29 mmol/L Final     Glucose   Date Value Ref Range Status   02/16/2022 96 70 - 110 mg/dL Final     BUN   Date Value Ref Range Status   02/16/2022 11 6 - 20 mg/dL Final     Creatinine   Date Value Ref Range Status   02/16/2022 0.7 0.5 - 1.4 mg/dL Final     Calcium   Date Value Ref Range Status   02/16/2022 9.2 8.7 - 10.5 mg/dL Final     Total Protein   Date Value Ref Range Status   02/16/2022 7.1 6.0 - 8.4 g/dL  Final     Albumin   Date Value Ref Range Status   02/16/2022 4.1 3.5 - 5.2 g/dL Final     Total Bilirubin   Date Value Ref Range Status   02/16/2022 0.5 0.1 - 1.0 mg/dL Final     Comment:     For infants and newborns, interpretation of results should be based  on gestational age, weight and in agreement with clinical  observations.    Premature Infant recommended reference ranges:  Up to 24 hours.............<8.0 mg/dL  Up to 48 hours............<12.0 mg/dL  3-5 days..................<15.0 mg/dL  6-29 days.................<15.0 mg/dL       Alkaline Phosphatase   Date Value Ref Range Status   02/16/2022 44 (L) 55 - 135 U/L Final     AST   Date Value Ref Range Status   02/16/2022 15 10 - 40 U/L Final     ALT   Date Value Ref Range Status   02/16/2022 13 10 - 44 U/L Final     Anion Gap   Date Value Ref Range Status   02/16/2022 8 8 - 16 mmol/L Final     eGFR if    Date Value Ref Range Status   02/16/2022 >60 >60 mL/min/1.73 m^2 Final     eGFR if non    Date Value Ref Range Status   02/16/2022 >60 >60 mL/min/1.73 m^2 Final     Comment:     Calculation used to obtain the estimated glomerular filtration  rate (eGFR) is the CKD-EPI equation.        Lab Results   Component Value Date    WBC 5.76 03/03/2022    HGB 13.9 03/03/2022    HCT 40.4 03/03/2022    MCV 99 (H) 03/03/2022     03/03/2022     Lab Results   Component Value Date    CHOL 171 05/13/2019    CHOL 165 11/20/2017     Lab Results   Component Value Date    HDL 91 (H) 05/13/2019    HDL 78 (H) 11/20/2017     Lab Results   Component Value Date    LDLCALC 70.2 05/13/2019    LDLCALC 72.0 11/20/2017     Lab Results   Component Value Date    TRIG 49 05/13/2019    TRIG 75 11/20/2017     Lab Results   Component Value Date    CHOLHDL 53.2 (H) 05/13/2019    CHOLHDL 47.3 11/20/2017     Lab Results   Component Value Date    TSH 0.704 02/16/2022       ASSESSMENT/PLAN     Gisella Smith is a 37 y.o. female     Gisella was seen  today for cough. Will treat as PNA given reported adventitious breath sounds and double worsening reported after two weeks of symptoms. Will cover with tessalon for daytime use and tuzzionex at night. Will cover with azithromycin. Low threshold to RTC for in person eval if symptoms do not improve. She is aware of ED prompts.     Diagnoses and all orders for this visit:    Cough, unspecified type  -     hydrocodone-chlorpheniramine (TUSSIONEX) 10-8 mg/5 mL suspension; Take 5 mLs by mouth every 12 (twelve) hours as needed for Cough or Congestion.  -     azithromycin (Z-JUAN) 250 MG tablet; Take 2 tablets by mouth on day 1; Take 1 tablet by mouth on days 2-5  -     benzonatate (TESSALON) 100 MG capsule; Take 1 capsule (100 mg total) by mouth 3 (three) times daily as needed for Cough.         Patient was counseled on when and how to seek emergent care.       Sima Armenta PA-C   Department of Internal Medicine - [facility]   10:41 AM     Answers submitted by the patient for this visit:  Cough Questionnaire (Submitted on 12/5/2022)  Chief Complaint: Cough  Chronicity: new  Onset: 1 to 4 weeks ago  Progression since onset: gradually improving  Frequency: hourly  Cough characteristics: productive of sputum  ear congestion: No  heartburn: No  hemoptysis: No  nasal congestion: Yes  sweats: No  weight loss: No  Aggravated by: lying down  asthma: No  bronchiectasis: No  bronchitis: No  COPD: No  emphysema: No  pneumonia: No  Treatments tried: a beta-agonist inhaler, body position changes, cool air, rest, steroid inhaler  Improvement on treatment: mild

## 2022-12-12 ENCOUNTER — PATIENT MESSAGE (OUTPATIENT)
Dept: OBSTETRICS AND GYNECOLOGY | Facility: CLINIC | Age: 37
End: 2022-12-12
Payer: COMMERCIAL

## 2022-12-12 ENCOUNTER — TELEPHONE (OUTPATIENT)
Dept: OBSTETRICS AND GYNECOLOGY | Facility: CLINIC | Age: 37
End: 2022-12-12
Payer: COMMERCIAL

## 2022-12-23 ENCOUNTER — E-VISIT (OUTPATIENT)
Dept: INTERNAL MEDICINE | Facility: CLINIC | Age: 37
End: 2022-12-23
Payer: COMMERCIAL

## 2022-12-23 ENCOUNTER — PATIENT MESSAGE (OUTPATIENT)
Dept: INTERNAL MEDICINE | Facility: CLINIC | Age: 37
End: 2022-12-23

## 2022-12-23 DIAGNOSIS — J02.9 SORE THROAT: Primary | ICD-10-CM

## 2022-12-23 PROCEDURE — 99422 PR E&M, ONLINE DIGIT, EST, < 7 DAYS,  11-20 MINS: ICD-10-PCS | Mod: S$GLB,,, | Performed by: STUDENT IN AN ORGANIZED HEALTH CARE EDUCATION/TRAINING PROGRAM

## 2022-12-23 PROCEDURE — 99422 OL DIG E/M SVC 11-20 MIN: CPT | Mod: S$GLB,,, | Performed by: STUDENT IN AN ORGANIZED HEALTH CARE EDUCATION/TRAINING PROGRAM

## 2022-12-23 RX ORDER — AZELASTINE 1 MG/ML
2 SPRAY, METERED NASAL 2 TIMES DAILY
Qty: 30 ML | Refills: 0 | Status: SHIPPED | OUTPATIENT
Start: 2022-12-23 | End: 2023-01-10

## 2022-12-23 RX ORDER — PROMETHAZINE HYDROCHLORIDE AND DEXTROMETHORPHAN HYDROBROMIDE 6.25; 15 MG/5ML; MG/5ML
5 SYRUP ORAL EVERY 6 HOURS PRN
Qty: 120 ML | Refills: 0 | Status: SHIPPED | OUTPATIENT
Start: 2022-12-23 | End: 2023-01-02

## 2022-12-23 RX ORDER — AMOXICILLIN 500 MG/1
500 TABLET, FILM COATED ORAL EVERY 12 HOURS
Qty: 20 TABLET | Refills: 0 | Status: SHIPPED | OUTPATIENT
Start: 2022-12-23 | End: 2023-01-02

## 2022-12-23 NOTE — PROGRESS NOTES
Patient ID: Gisella Smith is a 37 y.o. female.    Chief Complaint: Sore Throat    The patient initiated a request through [employer] on 12/23/2022 for evaluation and management with a chief complaint of Sore Throat     I evaluated the questionnaire submission on 12/23/2022.    Ohs Peq Evisit Upper Respitatory/Cough Questionnaire    12/23/2022  8:35 AM CST - Filed by Patient   Do you agree to participate in an E-Visit? Yes   If you have any of the following symptoms, please present to your local ER or call 911:  I acknowledge   What is the main issue that you would like for your doctor to address today? Sore throat   Are you able to take your vital signs? Yes   Systolic Blood Pressure:    Diastolic Blood Pressure:    Weight: 141   Height: 63   Pulse: 88   Temp: 99.2   Resp:    Pulse Ox: 98   What symptoms do you currently have?  Fatigue;  Runny nose;  Sore throat   Have you had a fever? No   When did your symptoms first appear? 12/21/2022   In the last two weeks, have you been in close contact with someone who has COVID-19 or the Flu? No   In the last two weeks, have you worked or volunteered in a healthcare facility or as a ? Healthcare facilities include a hospital, medical or dental clinic, long-term care facility, or nursing home Yes   Do you live in a long-term care facility, nursing home, or homeless shelter? No   List what you have done or taken to help your symptoms. Gargled with listerine, ibuprofen   How severe are your symptoms? Moderate   Have you taken an at home Covid test? No   Have you taken a Flu test? No   Have you been fully vaccinated for COVID? (2 Pfizer, 2 Moderna or 1 Joseph & Joseph vaccine injections) Yes   Have you received a booster? Yes   Have you recieved a Flu shot? Yes   When did you recieve your Flu shot? 10/3/2022   Do you have transportation to get tested for COVID if it is indicated and ordered for you at an [facility] location? Yes   Are you currently pregnant,  could you be pregnant, or are you breast feeding? None of the above   Provide any information you feel is important to your history not asked above Red, erythematous oropharynx, uvula enlarged and erythematous, a few white spots on tonsils   Please attach any relevant images or files           Active Problem List with Overview Notes    Diagnosis Date Noted    Strain of right piriformis muscle 2021    Endometritis 2020    Pain of both hip joints 2020    Ocular migraine 04/10/2018    Recurrent cold sores 2017    Heartburn 2017    Reactive airway disease that is not asthma 2017 IMO Regulatory Import        Recent Labs Obtained:  No visits with results within 7 Day(s) from this visit.   Latest known visit with results is:   Office Visit on 2022   Component Date Value Ref Range Status    POC Rapid COVID 2022 Negative  Negative Final     Acceptable 2022 Yes   Final    POC Molecular Influenza A Ag 2022 Negative  Negative, Not Reported Final    POC Molecular Influenza B Ag 2022 Negative  Negative, Not Reported Final     Acceptable 2022 Yes   Final       Encounter Diagnosis   Name Primary?    Sore throat Yes        Orders Placed This Encounter   Procedures    CULTURE, STREP A,  THROAT     Standing Status:   Future     Standing Expiration Date:   2023      Medications Ordered This Encounter   Medications    amoxicillin (AMOXIL) 500 MG Tab     Sig: Take 1 tablet (500 mg total) by mouth every 12 (twelve) hours. for 10 days     Dispense:  20 tablet     Refill:  0    azelastine (ASTELIN) 137 mcg (0.1 %) nasal spray     Si sprays (274 mcg total) by Nasal route 2 (two) times daily.     Dispense:  30 mL     Refill:  0    promethazine-dextromethorphan (PROMETHAZINE-DM) 6.25-15 mg/5 mL Syrp     Sig: Take 5 mLs by mouth every 6 (six) hours as needed (cough).     Dispense:  120 mL     Refill:  0        E-Visit Time  Tracking:    Day 1 Time (in minutes): 18     Total Time (in minutes): 18      Called patient to discuss, concern for strep throat.   Exposure to friend with strep earlier this week.  Now with swollen, erythematous tonsils with exudate.  Centor Score: 3  Unable to come in for testing, is on call all weekend.  Will start prophylactic antibiotics.  Sent azelastine and promethazine- DM in case needed.  RTC in 3-5 days if symptoms worsening or unimproved.      Audrey Leyva MD  12/23/2022

## 2023-01-01 ENCOUNTER — PATIENT MESSAGE (OUTPATIENT)
Dept: OBSTETRICS AND GYNECOLOGY | Facility: CLINIC | Age: 38
End: 2023-01-01
Payer: COMMERCIAL

## 2023-01-01 DIAGNOSIS — Z32.01 POSITIVE PREGNANCY TEST: Primary | ICD-10-CM

## 2023-01-03 ENCOUNTER — PATIENT MESSAGE (OUTPATIENT)
Dept: OBSTETRICS AND GYNECOLOGY | Facility: CLINIC | Age: 38
End: 2023-01-03
Payer: COMMERCIAL

## 2023-01-03 ENCOUNTER — LAB VISIT (OUTPATIENT)
Dept: LAB | Facility: OTHER | Age: 38
End: 2023-01-03
Attending: STUDENT IN AN ORGANIZED HEALTH CARE EDUCATION/TRAINING PROGRAM
Payer: COMMERCIAL

## 2023-01-03 DIAGNOSIS — Z32.01 POSITIVE PREGNANCY TEST: ICD-10-CM

## 2023-01-03 LAB — HCG INTACT+B SERPL-ACNC: 44 MIU/ML

## 2023-01-03 PROCEDURE — 84702 CHORIONIC GONADOTROPIN TEST: CPT | Performed by: STUDENT IN AN ORGANIZED HEALTH CARE EDUCATION/TRAINING PROGRAM

## 2023-01-03 PROCEDURE — 84144 ASSAY OF PROGESTERONE: CPT | Performed by: STUDENT IN AN ORGANIZED HEALTH CARE EDUCATION/TRAINING PROGRAM

## 2023-01-03 PROCEDURE — 36415 COLL VENOUS BLD VENIPUNCTURE: CPT | Performed by: STUDENT IN AN ORGANIZED HEALTH CARE EDUCATION/TRAINING PROGRAM

## 2023-01-04 LAB — PROGEST SERPL-MCNC: 10.1 NG/ML

## 2023-01-05 ENCOUNTER — CLINICAL SUPPORT (OUTPATIENT)
Dept: OBSTETRICS AND GYNECOLOGY | Facility: CLINIC | Age: 38
End: 2023-01-05
Payer: COMMERCIAL

## 2023-01-05 ENCOUNTER — LAB VISIT (OUTPATIENT)
Dept: LAB | Facility: OTHER | Age: 38
End: 2023-01-05
Attending: STUDENT IN AN ORGANIZED HEALTH CARE EDUCATION/TRAINING PROGRAM
Payer: COMMERCIAL

## 2023-01-05 ENCOUNTER — PATIENT MESSAGE (OUTPATIENT)
Dept: OBSTETRICS AND GYNECOLOGY | Facility: CLINIC | Age: 38
End: 2023-01-05
Payer: COMMERCIAL

## 2023-01-05 DIAGNOSIS — N91.2 AMENORRHEA: ICD-10-CM

## 2023-01-05 DIAGNOSIS — Z32.01 POSITIVE PREGNANCY TEST: ICD-10-CM

## 2023-01-05 DIAGNOSIS — Z71.9 COUNSELED BY NURSE: Primary | ICD-10-CM

## 2023-01-05 DIAGNOSIS — Z32.01 POSITIVE PREGNANCY TEST: Primary | ICD-10-CM

## 2023-01-05 LAB — HCG INTACT+B SERPL-ACNC: 135 MIU/ML

## 2023-01-05 PROCEDURE — 36415 COLL VENOUS BLD VENIPUNCTURE: CPT | Performed by: STUDENT IN AN ORGANIZED HEALTH CARE EDUCATION/TRAINING PROGRAM

## 2023-01-05 PROCEDURE — 84702 CHORIONIC GONADOTROPIN TEST: CPT | Performed by: STUDENT IN AN ORGANIZED HEALTH CARE EDUCATION/TRAINING PROGRAM

## 2023-01-06 ENCOUNTER — PATIENT MESSAGE (OUTPATIENT)
Dept: OBSTETRICS AND GYNECOLOGY | Facility: CLINIC | Age: 38
End: 2023-01-06
Payer: COMMERCIAL

## 2023-01-07 ENCOUNTER — LAB VISIT (OUTPATIENT)
Dept: LAB | Facility: HOSPITAL | Age: 38
End: 2023-01-07
Attending: STUDENT IN AN ORGANIZED HEALTH CARE EDUCATION/TRAINING PROGRAM
Payer: COMMERCIAL

## 2023-01-07 DIAGNOSIS — Z32.01 POSITIVE PREGNANCY TEST: ICD-10-CM

## 2023-01-07 LAB — HCG INTACT+B SERPL-ACNC: 287 MIU/ML

## 2023-01-07 PROCEDURE — 84702 CHORIONIC GONADOTROPIN TEST: CPT | Performed by: STUDENT IN AN ORGANIZED HEALTH CARE EDUCATION/TRAINING PROGRAM

## 2023-01-07 PROCEDURE — 36415 COLL VENOUS BLD VENIPUNCTURE: CPT | Performed by: STUDENT IN AN ORGANIZED HEALTH CARE EDUCATION/TRAINING PROGRAM

## 2023-01-09 ENCOUNTER — TELEPHONE (OUTPATIENT)
Dept: OBSTETRICS AND GYNECOLOGY | Facility: CLINIC | Age: 38
End: 2023-01-09
Payer: COMMERCIAL

## 2023-01-09 ENCOUNTER — LAB VISIT (OUTPATIENT)
Dept: LAB | Facility: OTHER | Age: 38
End: 2023-01-09
Attending: STUDENT IN AN ORGANIZED HEALTH CARE EDUCATION/TRAINING PROGRAM
Payer: COMMERCIAL

## 2023-01-09 DIAGNOSIS — M54.9 BACK PAIN AFFECTING PREGNANCY IN FIRST TRIMESTER: Primary | ICD-10-CM

## 2023-01-09 DIAGNOSIS — O36.80X0 PREGNANCY OF UNKNOWN ANATOMIC LOCATION: Primary | ICD-10-CM

## 2023-01-09 DIAGNOSIS — Z32.01 POSITIVE PREGNANCY TEST: ICD-10-CM

## 2023-01-09 DIAGNOSIS — O99.891 BACK PAIN AFFECTING PREGNANCY IN FIRST TRIMESTER: Primary | ICD-10-CM

## 2023-01-09 LAB — HCG INTACT+B SERPL-ACNC: 309 MIU/ML

## 2023-01-09 PROCEDURE — 36415 COLL VENOUS BLD VENIPUNCTURE: CPT | Performed by: STUDENT IN AN ORGANIZED HEALTH CARE EDUCATION/TRAINING PROGRAM

## 2023-01-09 PROCEDURE — 84702 CHORIONIC GONADOTROPIN TEST: CPT | Performed by: STUDENT IN AN ORGANIZED HEALTH CARE EDUCATION/TRAINING PROGRAM

## 2023-01-09 NOTE — TELEPHONE ENCOUNTER
Called pt to let her know that these changes in pregnancy is normal and that progesterone in pregnancy slows down gastric function.   Patient verbalized understanding

## 2023-01-09 NOTE — TELEPHONE ENCOUNTER
Returned pt call and she stated that she is having right side pain that is going to her back; she stated that it is flank pain that is radiating down her leg.   When she got to work this morning she had a co-worker ultrasound her and she stated that her gallbladder looks sluggish and that the calyces of her kidney were alpa dilated.   Denies pain while urinating, is going to the restroom and urinating normally and no burning sensation. Let pt know that per provider she is able to take Tylenol and GasX.

## 2023-01-09 NOTE — TELEPHONE ENCOUNTER
----- Message from Jazmyne Gutierrez sent at 1/9/2023  8:36 AM CST -----  Regarding: concerns  Name of Who is Calling: Gisella           What is the request in detail: Patient is requesting a call back in regards to abdominal pain on the right side that hasn't stop since last night. She would like to have urine and LFT order done. Her pain is severe.           Can the clinic reply by [facility]: No           What Number to Call Back if not in [facility]: [phone]

## 2023-01-10 ENCOUNTER — TELEPHONE (OUTPATIENT)
Dept: OBSTETRICS AND GYNECOLOGY | Facility: CLINIC | Age: 38
End: 2023-01-10
Payer: COMMERCIAL

## 2023-01-10 RX ORDER — AMOXICILLIN 500 MG/1
500 TABLET, FILM COATED ORAL EVERY 12 HOURS
Qty: 20 TABLET | Refills: 0 | Status: SHIPPED | OUTPATIENT
Start: 2023-01-10 | End: 2023-01-20

## 2023-01-10 NOTE — TELEPHONE ENCOUNTER
Pt called to let us know that she has completed her US and she is freaking out. I let her know that once the provider get the report she will give her a call and advise of next steps

## 2023-01-10 NOTE — TELEPHONE ENCOUNTER
----- Message from Teresa Samson sent at 1/10/2023 11:27 AM CST -----  Regarding: Patient call back  Who called:CRISTIAN FUENTES [[id number]]    What is the request in detail: Patient is requesting a call back. She states she is done with her US and she said it looked weird/abnormal. She is concerned  and would like a call back   Please advise.    Can the clinic reply by [facility]? No    Best call back number: [phone]    Additional Information: N/A

## 2023-01-13 ENCOUNTER — OFFICE VISIT (OUTPATIENT)
Dept: OBSTETRICS AND GYNECOLOGY | Facility: CLINIC | Age: 38
End: 2023-01-13
Payer: COMMERCIAL

## 2023-01-13 VITALS
DIASTOLIC BLOOD PRESSURE: 72 MMHG | WEIGHT: 140.63 LBS | SYSTOLIC BLOOD PRESSURE: 114 MMHG | HEIGHT: 63 IN | BODY MASS INDEX: 24.92 KG/M2

## 2023-01-13 DIAGNOSIS — O03.9 SAB (SPONTANEOUS ABORTION): Primary | ICD-10-CM

## 2023-01-13 PROCEDURE — 3008F BODY MASS INDEX DOCD: CPT | Mod: CPTII,S$GLB,, | Performed by: STUDENT IN AN ORGANIZED HEALTH CARE EDUCATION/TRAINING PROGRAM

## 2023-01-13 PROCEDURE — 99213 PR OFFICE/OUTPT VISIT, EST, LEVL III, 20-29 MIN: ICD-10-PCS | Mod: S$GLB,,, | Performed by: STUDENT IN AN ORGANIZED HEALTH CARE EDUCATION/TRAINING PROGRAM

## 2023-01-13 PROCEDURE — 99999 PR PBB SHADOW E&M-EST. PATIENT-LVL III: ICD-10-PCS | Mod: PBBFAC,,, | Performed by: STUDENT IN AN ORGANIZED HEALTH CARE EDUCATION/TRAINING PROGRAM

## 2023-01-13 PROCEDURE — 1159F PR MEDICATION LIST DOCUMENTED IN MEDICAL RECORD: ICD-10-PCS | Mod: CPTII,S$GLB,, | Performed by: STUDENT IN AN ORGANIZED HEALTH CARE EDUCATION/TRAINING PROGRAM

## 2023-01-13 PROCEDURE — 3074F SYST BP LT 130 MM HG: CPT | Mod: CPTII,S$GLB,, | Performed by: STUDENT IN AN ORGANIZED HEALTH CARE EDUCATION/TRAINING PROGRAM

## 2023-01-13 PROCEDURE — 99999 PR PBB SHADOW E&M-EST. PATIENT-LVL III: CPT | Mod: PBBFAC,,, | Performed by: STUDENT IN AN ORGANIZED HEALTH CARE EDUCATION/TRAINING PROGRAM

## 2023-01-13 PROCEDURE — 3078F DIAST BP <80 MM HG: CPT | Mod: CPTII,S$GLB,, | Performed by: STUDENT IN AN ORGANIZED HEALTH CARE EDUCATION/TRAINING PROGRAM

## 2023-01-13 PROCEDURE — 3078F PR MOST RECENT DIASTOLIC BLOOD PRESSURE < 80 MM HG: ICD-10-PCS | Mod: CPTII,S$GLB,, | Performed by: STUDENT IN AN ORGANIZED HEALTH CARE EDUCATION/TRAINING PROGRAM

## 2023-01-13 PROCEDURE — 3074F PR MOST RECENT SYSTOLIC BLOOD PRESSURE < 130 MM HG: ICD-10-PCS | Mod: CPTII,S$GLB,, | Performed by: STUDENT IN AN ORGANIZED HEALTH CARE EDUCATION/TRAINING PROGRAM

## 2023-01-13 PROCEDURE — 99213 OFFICE O/P EST LOW 20 MIN: CPT | Mod: S$GLB,,, | Performed by: STUDENT IN AN ORGANIZED HEALTH CARE EDUCATION/TRAINING PROGRAM

## 2023-01-13 PROCEDURE — 3008F PR BODY MASS INDEX (BMI) DOCUMENTED: ICD-10-PCS | Mod: CPTII,S$GLB,, | Performed by: STUDENT IN AN ORGANIZED HEALTH CARE EDUCATION/TRAINING PROGRAM

## 2023-01-13 PROCEDURE — 1159F MED LIST DOCD IN RCRD: CPT | Mod: CPTII,S$GLB,, | Performed by: STUDENT IN AN ORGANIZED HEALTH CARE EDUCATION/TRAINING PROGRAM

## 2023-01-13 RX ORDER — ONDANSETRON 4 MG/1
4 TABLET, FILM COATED ORAL DAILY PRN
Qty: 30 TABLET | Refills: 1 | Status: ON HOLD | OUTPATIENT
Start: 2023-01-13 | End: 2023-01-23 | Stop reason: HOSPADM

## 2023-01-13 RX ORDER — AMOXICILLIN 500 MG/1
500 CAPSULE ORAL EVERY 12 HOURS
Status: ON HOLD | COMMUNITY
Start: 2023-01-10 | End: 2023-01-23 | Stop reason: HOSPADM

## 2023-01-13 RX ORDER — DOXYCYCLINE 100 MG/1
100 CAPSULE ORAL EVERY 12 HOURS
Qty: 14 CAPSULE | Refills: 0 | Status: SHIPPED | OUTPATIENT
Start: 2023-01-13 | End: 2023-01-20

## 2023-01-13 RX ORDER — MISOPROSTOL 200 UG/1
800 TABLET ORAL ONCE
Qty: 4 TABLET | Refills: 1 | Status: SHIPPED | OUTPATIENT
Start: 2023-01-13 | End: 2023-01-20

## 2023-01-13 NOTE — PROGRESS NOTES
History & Physical  Gynecology      SUBJECTIVE:     Chief Complaint: No chief complaint on file.       History of Present Illness:  Gisella presents for follow up of suspected chemical pregnancy  Betas remained low.   US done 1/10 to rule out ectopic which was normal    She is feeling better now. Had flank pain from pulled psoas after vomiting with GI bug  No fevers, further N/V, urinary issues  Had spotting today    Pregnancy history is as follows  G1: chemical pregnancy  G2: anembryonic pregnancy found on dating scan at 8 weeks, repeated two weeks later and still no embryo. Medically managed. Ended up with endometritis  G3: Jess born by  without immediate complications. Admitted 2 weeks after for endometritis  G4: current      Review of patient's allergies indicates:   Allergen Reactions    Sulfamethoxazole-trimethoprim Hives       Past Medical History:   Diagnosis Date    Overweight (BMI 25.0-29.9) 2017    Recurrent cold sores 2017     Past Surgical History:   Procedure Laterality Date    WISDOM TOOTH EXTRACTION       OB History          1    Para   1    Term   1            AB        Living   1         SAB        IAB        Ectopic        Multiple   0    Live Births   1               Family History   Problem Relation Age of Onset    Osteoporosis Mother     Parkinsonism Father     Hypertension Father     Osteoporosis Father     No Known Problems Brother     Hypertension Maternal Grandmother     Hyperlipidemia Maternal Grandmother     Alzheimer's disease Maternal Grandmother     Other Maternal Grandfather         oropharyngeal cancer    Liver disease Maternal Grandfather     Diabetes Mellitus Paternal Grandmother     Hypertension Paternal Grandfather     Bullous pemphigoid Paternal Grandfather     Arrhythmia Paternal Grandfather     No Known Problems Brother     Colon cancer Neg Hx     Breast cancer Neg Hx     Ovarian cancer Neg Hx     Stroke Neg Hx      Social History     Tobacco Use     Smoking status: Never    Smokeless tobacco: Never   Substance Use Topics    Alcohol use: Yes    Drug use: Never       Current Outpatient Medications   Medication Sig    amoxicillin (AMOXIL) 500 MG Tab Take 1 tablet (500 mg total) by mouth every 12 (twelve) hours. for 10 days    prenatal 25/iron fum/folic/dha (PRENATAL-1 ORAL) Take 1 tablet by mouth once daily.    valACYclovir (VALTREX) 1000 MG tablet SMARTSI Tablet(s) By Mouth Every 12 Hours     No current facility-administered medications for this visit.         Review of Systems:  Review of Systems   Constitutional:  Negative for fever and unexpected weight change.   Respiratory:  Negative for cough and shortness of breath.    Cardiovascular:  Negative for chest pain.   Gastrointestinal:  Negative for abdominal pain, nausea and vomiting.   Genitourinary:  Positive for vaginal bleeding. Negative for pelvic pain.      OBJECTIVE:     Physical Exam:  Physical Exam  Vitals reviewed.   Constitutional:       General: She is not in acute distress.     Appearance: She is well-developed. She is not diaphoretic.   HENT:      Head: Normocephalic and atraumatic.   Eyes:      Conjunctiva/sclera: Conjunctivae normal.   Cardiovascular:      Rate and Rhythm: Normal rate.   Pulmonary:      Effort: Pulmonary effort is normal.   Abdominal:      Tenderness: There is no abdominal tenderness.   Musculoskeletal:         General: Normal range of motion.      Cervical back: Normal range of motion.   Skin:     General: Skin is warm and dry.   Neurological:      Mental Status: She is alert.   Psychiatric:         Mood and Affect: Mood normal.         ASSESSMENT:       ICD-10-CM ICD-9-CM    1. Pregnancy of unknown anatomic location  O36.80X0 V23.87              Plan:      Discussed the diagnosis of miscarriage. Reviewed the three options of management for this: expectant, medical, surgical and the risks and benefits of each. Pt desires medical management for fear of endometritis. Doxy  sent as ppx. ER precautions reviewed. To lab for beta next week. Discussed abstaining from sex or using contraception in meantime.     Face to Face time with patient: 15    20 minutes of total time spent on the encounter, which includes face to face time and non-face to face time preparing to see the patient (eg, review of tests), Obtaining and/or reviewing separately obtained history, Documenting clinical information in the electronic or other health record, Independently interpreting results (not separately reported) and communicating results to the patient/family/caregiver, or Care coordination (not separately reported).      Ketty Winkler

## 2023-01-14 DIAGNOSIS — J02.9 SORE THROAT: ICD-10-CM

## 2023-01-14 NOTE — TELEPHONE ENCOUNTER
No new care gaps identified.  [facility] Embedded Care Gaps. Reference number: [id number]. 1/14/2023   10:31:28 AM CST

## 2023-01-16 RX ORDER — AZELASTINE 1 MG/ML
SPRAY, METERED NASAL
OUTPATIENT
Start: 2023-01-16

## 2023-01-16 NOTE — TELEPHONE ENCOUNTER
Refill Decision Note   Gisella Sarah  is requesting a refill authorization.  Brief Assessment and Rationale for Refill:  Quick Discontinue     Medication Therapy Plan:  DISCONTINUED 1/10/23    Medication Reconciliation Completed: No   Comments:     No Care Gaps recommended.     Note composed:6:37 AM 01/16/2023

## 2023-01-17 ENCOUNTER — PATIENT MESSAGE (OUTPATIENT)
Dept: OBSTETRICS AND GYNECOLOGY | Facility: CLINIC | Age: 38
End: 2023-01-17
Payer: COMMERCIAL

## 2023-01-20 ENCOUNTER — INFUSION (OUTPATIENT)
Dept: INFUSION THERAPY | Facility: OTHER | Age: 38
End: 2023-01-20
Attending: STUDENT IN AN ORGANIZED HEALTH CARE EDUCATION/TRAINING PROGRAM
Payer: COMMERCIAL

## 2023-01-20 ENCOUNTER — OFFICE VISIT (OUTPATIENT)
Dept: OBSTETRICS AND GYNECOLOGY | Facility: CLINIC | Age: 38
End: 2023-01-20
Payer: COMMERCIAL

## 2023-01-20 ENCOUNTER — HOSPITAL ENCOUNTER (OUTPATIENT)
Dept: RADIOLOGY | Facility: OTHER | Age: 38
Discharge: HOME | End: 2023-01-20
Attending: STUDENT IN AN ORGANIZED HEALTH CARE EDUCATION/TRAINING PROGRAM
Payer: COMMERCIAL

## 2023-01-20 VITALS
SYSTOLIC BLOOD PRESSURE: 121 MMHG | RESPIRATION RATE: 16 BRPM | HEIGHT: 63 IN | BODY MASS INDEX: 24.88 KG/M2 | OXYGEN SATURATION: 100 % | WEIGHT: 140.44 LBS | HEART RATE: 75 BPM | TEMPERATURE: 99 F | DIASTOLIC BLOOD PRESSURE: 76 MMHG

## 2023-01-20 DIAGNOSIS — O36.80X0 PREGNANCY OF UNKNOWN ANATOMIC LOCATION: Primary | ICD-10-CM

## 2023-01-20 DIAGNOSIS — O00.90 ECTOPIC PREGNANCY, UNSPECIFIED LOCATION, UNSPECIFIED WHETHER INTRAUTERINE PREGNANCY PRESENT: Primary | ICD-10-CM

## 2023-01-20 DIAGNOSIS — O36.80X0 PREGNANCY OF UNKNOWN ANATOMIC LOCATION: ICD-10-CM

## 2023-01-20 PROCEDURE — 99999 PR PBB SHADOW E&M-EST. PATIENT-LVL I: CPT | Mod: PBBFAC,,, | Performed by: STUDENT IN AN ORGANIZED HEALTH CARE EDUCATION/TRAINING PROGRAM

## 2023-01-20 PROCEDURE — 96401 CHEMO ANTI-NEOPL SQ/IM: CPT

## 2023-01-20 PROCEDURE — 76817 TRANSVAGINAL US OBSTETRIC: CPT | Mod: TC

## 2023-01-20 PROCEDURE — 99214 OFFICE O/P EST MOD 30 MIN: CPT | Mod: S$GLB,,, | Performed by: STUDENT IN AN ORGANIZED HEALTH CARE EDUCATION/TRAINING PROGRAM

## 2023-01-20 PROCEDURE — 99999 PR PBB SHADOW E&M-EST. PATIENT-LVL I: ICD-10-PCS | Mod: PBBFAC,,, | Performed by: STUDENT IN AN ORGANIZED HEALTH CARE EDUCATION/TRAINING PROGRAM

## 2023-01-20 PROCEDURE — 76817 TRANSVAGINAL US OBSTETRIC: CPT | Mod: 26,,, | Performed by: RADIOLOGY

## 2023-01-20 PROCEDURE — 76817 US OB <14 WEEKS, TRANSABDOM & TRANSVAG, SINGLE GESTATION (XPD): ICD-10-PCS | Mod: 26,,, | Performed by: RADIOLOGY

## 2023-01-20 PROCEDURE — 99214 PR OFFICE/OUTPT VISIT, EST, LEVL IV, 30-39 MIN: ICD-10-PCS | Mod: S$GLB,,, | Performed by: STUDENT IN AN ORGANIZED HEALTH CARE EDUCATION/TRAINING PROGRAM

## 2023-01-20 PROCEDURE — 63600175 PHARM REV CODE 636 W HCPCS: Performed by: STUDENT IN AN ORGANIZED HEALTH CARE EDUCATION/TRAINING PROGRAM

## 2023-01-20 PROCEDURE — 76801 US OB <14 WEEKS, TRANSABDOM & TRANSVAG, SINGLE GESTATION (XPD): ICD-10-PCS | Mod: 26,,, | Performed by: RADIOLOGY

## 2023-01-20 PROCEDURE — 76801 OB US < 14 WKS SINGLE FETUS: CPT | Mod: 26,,, | Performed by: RADIOLOGY

## 2023-01-20 RX ORDER — METHOTREXATE 25 MG/ML
50 INJECTION INTRA-ARTERIAL; INTRAMUSCULAR; INTRATHECAL; INTRAVENOUS
Status: CANCELLED | OUTPATIENT
Start: 2023-01-20

## 2023-01-20 RX ORDER — METHOTREXATE 25 MG/ML
50 INJECTION INTRA-ARTERIAL; INTRAMUSCULAR; INTRATHECAL; INTRAVENOUS
Status: COMPLETED | OUTPATIENT
Start: 2023-01-20 | End: 2023-01-20

## 2023-01-20 RX ORDER — METHOTREXATE 25 MG/ML
50 INJECTION INTRA-ARTERIAL; INTRAMUSCULAR; INTRATHECAL; INTRAVENOUS
OUTPATIENT
Start: 2023-01-27

## 2023-01-20 RX ADMIN — METHOTREXATE 85 MG: 25 SOLUTION INTRA-ARTERIAL; INTRAMUSCULAR; INTRATHECAL; INTRAVENOUS at 03:01

## 2023-01-20 NOTE — PLAN OF CARE
Vital Signs Stable, No apparent distress noted; Pt tolerated __MTX injection w/o difficulty.  No bleeding,swelling or drainage noted to the sites;bandaids applied  Emotional support provided;pt monitored post injection for s/s of possible side effects   Discharge /teaching instructions provided;all questions answered;Pt verbalizes understanding.  Pt ambulated from unit in [facility], accompanied by

## 2023-01-20 NOTE — PROGRESS NOTES
History & Physical  Gynecology      SUBJECTIVE:     Chief Complaint: Ectopic Pregnancy       History of Present Illness:  I saw Gisella last week fur suspected SAB due to abnormal beta levels, bleeding. TVUS at that time was normal. However, when patient went to get beta today, it has risen from 200 to 2000. She was sent for STAT TVUS which was suspicious for ectopic per report below.   She is feeling well today, other than upset. No pain. Minor spotting. No fevers, N/V.       FINDINGS:  The cervix is unremarkable.  No intrauterine gestational sac identified.  The endometrial stripe is not thickened measuring 1 cm.  No abnormal endometrial hyperemia.  There are a few scattered endometrial/myometrial cysts.     The right ovary measures approximately 2.4 x 2.2 x 1.7 cm.  Adjacent to the right ovary, there is a complex structure measuring approximately 2.4 x 2.3 x 1.5 cm without internal vascularity.  The left ovary measures approximately 3.2 x 2.1 x 1.0 cm.  Arterial and venous flow is documented to the ovaries bilaterally.  No significant free fluid in the pelvis.     Impression:     This report was flagged in Epic as abnormal.     Heterogeneous structure adjacent to or abutting the right ovary without significant internal vascularity.  In the setting of no discernible intrauterine gestational sac, findings are concerning for sequela of ectopic pregnancy likely having undergone internal hemorrhage.  Differential for this finding would potentially include hemorrhagic right ovarian/paraovarian cyst in the setting of pregnancy of unknown location.  Follow-up beta HCG and/or ultrasound is recommended until confirmation.     Stable endometrial/myometrial cysts.  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     Past Surgical History:   Procedure  Laterality Date    WISDOM TOOTH EXTRACTION       OB History          1    Para   1    Term   1            AB        Living   1         SAB        IAB        Ectopic        Multiple   0    Live Births   1               Family History   Problem Relation Age of Onset    Osteoporosis Mother     Parkinsonism Father     Hypertension Father     Osteoporosis Father     No Known Problems Brother     Hypertension Maternal Grandmother     Hyperlipidemia Maternal Grandmother     Alzheimer's disease Maternal Grandmother     Other Maternal Grandfather         oropharyngeal cancer    Liver disease Maternal Grandfather     Diabetes Mellitus Paternal Grandmother     Hypertension Paternal Grandfather     Bullous pemphigoid Paternal Grandfather     Arrhythmia Paternal Grandfather     No Known Problems Brother     Colon cancer Neg Hx     Breast cancer Neg Hx     Ovarian cancer Neg Hx     Stroke Neg Hx      Social History     Tobacco Use    Smoking status: Never    Smokeless tobacco: Never   Substance Use Topics    Alcohol use: Yes    Drug use: Never       Current Outpatient Medications   Medication Sig    amoxicillin (AMOXIL) 500 MG capsule Take 500 mg by mouth every 12 (twelve) hours.    amoxicillin (AMOXIL) 500 MG Tab Take 1 tablet (500 mg total) by mouth every 12 (twelve) hours. for 10 days    doxycycline (MONODOX) 100 MG capsule Take 1 capsule (100 mg total) by mouth every 12 (twelve) hours. for 7 days    ondansetron (ZOFRAN) 4 MG tablet Take 1 tablet (4 mg total) by mouth daily as needed for Nausea.    prenatal 25/iron fum/folic/dha (PRENATAL-1 ORAL) Take 1 tablet by mouth once daily.    valACYclovir (VALTREX) 1000 MG tablet SMARTSI Tablet(s) By Mouth Every 12 Hours     No current facility-administered medications for this visit.         Review of Systems:  Review of Systems   Constitutional:  Negative for fever and unexpected weight change.   Respiratory:  Negative for cough and shortness of breath.     Gastrointestinal:  Negative for abdominal pain, nausea and vomiting.   Genitourinary:  Positive for menstrual problem and vaginal bleeding (light). Negative for pelvic pain, vaginal discharge, vaginal pain and vaginal odor.   Neurological:  Negative for syncope.      OBJECTIVE:     Physical Exam:  Physical Exam  Vitals reviewed.   Constitutional:       General: She is not in acute distress.     Appearance: She is well-developed. She is not diaphoretic.   HENT:      Head: Normocephalic and atraumatic.   Eyes:      General: No scleral icterus.        Right eye: No discharge.         Left eye: No discharge.      Conjunctiva/sclera: Conjunctivae normal.   Neck:      Thyroid: No thyromegaly.   Cardiovascular:      Rate and Rhythm: Normal rate.   Pulmonary:      Effort: Pulmonary effort is normal.   Chest:   Breasts:     Breasts are symmetrical.      Right: No inverted nipple, mass, nipple discharge, skin change or tenderness.      Left: No inverted nipple, mass, nipple discharge, skin change or tenderness.   Abdominal:      General: There is no distension.      Palpations: Abdomen is soft.      Tenderness: There is no abdominal tenderness.   Genitourinary:     Labia:         Right: No rash, tenderness, lesion or injury.         Left: No rash, tenderness, lesion or injury.       Vagina: Normal. No signs of injury and foreign body. No vaginal discharge, erythema, tenderness or bleeding.      Cervix: No cervical motion tenderness, discharge or friability.      Uterus: Not deviated, not enlarged, not fixed and not tender.       Adnexa:         Right: No mass, tenderness or fullness.          Left: No mass, tenderness or fullness.     Musculoskeletal:         General: Normal range of motion.      Cervical back: Normal range of motion and neck supple.   Lymphadenopathy:      Cervical: No cervical adenopathy.   Skin:     General: Skin is warm and dry.      Findings: No erythema or rash.   Neurological:      Mental Status: She  is alert and oriented to person, place, and time.         ASSESSMENT:       ICD-10-CM ICD-9-CM    1. Ectopic pregnancy, unspecified location, unspecified whether intrauterine pregnancy present  O00.90 633.90 CBC Auto Differential      Comprehensive Metabolic Panel      DISCONTINUED: methotrexate (PF) injection 50 mg/m2 (Dosing Weight)             Plan:      Reviewed options for treatment of ectopic pregnancy  She agrees to MTX therapy. Consent signed. Therapy plan placed.  Pt aware of importance of beta levels on days 4 & 7.  Reviewed common side effects of the medication and recommended contraception or abstinence for 2-3 month to allow for drug to clear system and not affect future pregnancies.   Precautions reviewed.  F/u arranged for after day 7 labs  Avoid folic acid supplements, foods that contain folic acid because these products may decrease the efficacy of methotrexate.  Vigorous activity and sexual intercourse should be avoided until confirmation of resolution because of the theoretical risk of inducing rupture of the ectopic pregnancy.  Sunlight exposure also should be avoided during treatment to limit the risk of methotrexate dermatitis.  CBC and CMP today  MTX infusion today  Betas scheduled    Face to Face time with patient: 30    45 minutes of total time spent on the encounter, which includes face to face time and non-face to face time preparing to see the patient (eg, review of tests), Obtaining and/or reviewing separately obtained history, Documenting clinical information in the electronic or other health record, Independently interpreting results (not separately reported) and communicating results to the patient/family/caregiver, or Care coordination (not separately reported).          Ketty Winkler     none

## 2023-01-23 ENCOUNTER — NURSE TRIAGE (OUTPATIENT)
Dept: ADMINISTRATIVE | Facility: CLINIC | Age: 38
End: 2023-01-23
Payer: COMMERCIAL

## 2023-01-23 ENCOUNTER — ANESTHESIA EVENT (OUTPATIENT)
Dept: SURGERY | Facility: OTHER | Age: 38
End: 2023-01-23
Payer: COMMERCIAL

## 2023-01-23 ENCOUNTER — HOSPITAL ENCOUNTER (OUTPATIENT)
Facility: OTHER | Age: 38
Discharge: HOME | End: 2023-01-24
Attending: EMERGENCY MEDICINE | Admitting: STUDENT IN AN ORGANIZED HEALTH CARE EDUCATION/TRAINING PROGRAM
Payer: COMMERCIAL

## 2023-01-23 ENCOUNTER — LAB VISIT (OUTPATIENT)
Dept: LAB | Facility: OTHER | Age: 38
End: 2023-01-23
Attending: STUDENT IN AN ORGANIZED HEALTH CARE EDUCATION/TRAINING PROGRAM
Payer: COMMERCIAL

## 2023-01-23 ENCOUNTER — ANESTHESIA (OUTPATIENT)
Dept: SURGERY | Facility: OTHER | Age: 38
End: 2023-01-23
Payer: COMMERCIAL

## 2023-01-23 DIAGNOSIS — Z90.79 H/O UNILATERAL SALPINGECTOMY: ICD-10-CM

## 2023-01-23 DIAGNOSIS — O03.9 SAB (SPONTANEOUS ABORTION): ICD-10-CM

## 2023-01-23 DIAGNOSIS — O00.90 ECTOPIC PREGNANCY: ICD-10-CM

## 2023-01-23 DIAGNOSIS — O00.90 RUPTURED ECTOPIC PREGNANCY: Primary | ICD-10-CM

## 2023-01-23 LAB
ABO + RH BLD: NORMAL
ALBUMIN SERPL BCP-MCNC: 4.3 G/DL (ref 3.5–5.2)
ALP SERPL-CCNC: 51 U/L (ref 55–135)
ALT SERPL W/O P-5'-P-CCNC: 24 U/L (ref 10–44)
ANION GAP SERPL CALC-SCNC: 10 MMOL/L (ref 8–16)
AST SERPL-CCNC: 16 U/L (ref 10–40)
BASOPHILS # BLD AUTO: 0.02 K/UL (ref 0–0.2)
BASOPHILS NFR BLD: 0.3 % (ref 0–1.9)
BILIRUB SERPL-MCNC: 0.7 MG/DL (ref 0.1–1)
BLD GP AB SCN CELLS X3 SERPL QL: NORMAL
BUN SERPL-MCNC: 9 MG/DL (ref 6–20)
CALCIUM SERPL-MCNC: 9.4 MG/DL (ref 8.7–10.5)
CHLORIDE SERPL-SCNC: 107 MMOL/L (ref 95–110)
CO2 SERPL-SCNC: 23 MMOL/L (ref 23–29)
CREAT SERPL-MCNC: 0.7 MG/DL (ref 0.5–1.4)
DIFFERENTIAL METHOD: ABNORMAL
EOSINOPHIL # BLD AUTO: 0.1 K/UL (ref 0–0.5)
EOSINOPHIL NFR BLD: 0.9 % (ref 0–8)
ERYTHROCYTE [DISTWIDTH] IN BLOOD BY AUTOMATED COUNT: 11.9 % (ref 11.5–14.5)
EST. GFR  (NO RACE VARIABLE): >60 ML/MIN/1.73 M^2
GLUCOSE SERPL-MCNC: 90 MG/DL (ref 70–110)
HCG INTACT+B SERPL-ACNC: 1863 MIU/ML
HCG INTACT+B SERPL-ACNC: 1929 MIU/ML
HCT VFR BLD AUTO: 36.3 % (ref 37–48.5)
HGB BLD-MCNC: 13 G/DL (ref 12–16)
IMM GRANULOCYTES # BLD AUTO: 0.03 K/UL (ref 0–0.04)
IMM GRANULOCYTES NFR BLD AUTO: 0.4 % (ref 0–0.5)
LYMPHOCYTES # BLD AUTO: 2 K/UL (ref 1–4.8)
LYMPHOCYTES NFR BLD: 26.2 % (ref 18–48)
MCH RBC QN AUTO: 33.9 PG (ref 27–31)
MCHC RBC AUTO-ENTMCNC: 35.8 G/DL (ref 32–36)
MCV RBC AUTO: 95 FL (ref 82–98)
MONOCYTES # BLD AUTO: 0.7 K/UL (ref 0.3–1)
MONOCYTES NFR BLD: 9.7 % (ref 4–15)
NEUTROPHILS # BLD AUTO: 4.7 K/UL (ref 1.8–7.7)
NEUTROPHILS NFR BLD: 62.5 % (ref 38–73)
NRBC BLD-RTO: 0 /100 WBC
PLATELET # BLD AUTO: 247 K/UL (ref 150–450)
PMV BLD AUTO: 9.1 FL (ref 9.2–12.9)
POTASSIUM SERPL-SCNC: 3.5 MMOL/L (ref 3.5–5.1)
PROT SERPL-MCNC: 7.3 G/DL (ref 6–8.4)
RBC # BLD AUTO: 3.83 M/UL (ref 4–5.4)
SODIUM SERPL-SCNC: 140 MMOL/L (ref 136–145)
WBC # BLD AUTO: 7.49 K/UL (ref 3.9–12.7)

## 2023-01-23 PROCEDURE — 37000009 HC ANESTHESIA EA ADD 15 MINS: Performed by: STUDENT IN AN ORGANIZED HEALTH CARE EDUCATION/TRAINING PROGRAM

## 2023-01-23 PROCEDURE — 63600175 PHARM REV CODE 636 W HCPCS: Performed by: EMERGENCY MEDICINE

## 2023-01-23 PROCEDURE — 96361 HYDRATE IV INFUSION ADD-ON: CPT

## 2023-01-23 PROCEDURE — 88305 TISSUE EXAM BY PATHOLOGIST: ICD-10-PCS | Mod: 26,,, | Performed by: PATHOLOGY

## 2023-01-23 PROCEDURE — 25000003 PHARM REV CODE 250: Performed by: NURSE ANESTHETIST, CERTIFIED REGISTERED

## 2023-01-23 PROCEDURE — 63600175 PHARM REV CODE 636 W HCPCS: Performed by: NURSE ANESTHETIST, CERTIFIED REGISTERED

## 2023-01-23 PROCEDURE — 27201423 OPTIME MED/SURG SUP & DEVICES STERILE SUPPLY: Performed by: STUDENT IN AN ORGANIZED HEALTH CARE EDUCATION/TRAINING PROGRAM

## 2023-01-23 PROCEDURE — 36415 COLL VENOUS BLD VENIPUNCTURE: CPT | Performed by: STUDENT IN AN ORGANIZED HEALTH CARE EDUCATION/TRAINING PROGRAM

## 2023-01-23 PROCEDURE — 96376 TX/PRO/DX INJ SAME DRUG ADON: CPT

## 2023-01-23 PROCEDURE — 36000708 HC OR TIME LEV III 1ST 15 MIN: Performed by: STUDENT IN AN ORGANIZED HEALTH CARE EDUCATION/TRAINING PROGRAM

## 2023-01-23 PROCEDURE — 37000008 HC ANESTHESIA 1ST 15 MINUTES: Performed by: STUDENT IN AN ORGANIZED HEALTH CARE EDUCATION/TRAINING PROGRAM

## 2023-01-23 PROCEDURE — G0378 HOSPITAL OBSERVATION PER HR: HCPCS

## 2023-01-23 PROCEDURE — 86900 BLOOD TYPING SEROLOGIC ABO: CPT | Performed by: EMERGENCY MEDICINE

## 2023-01-23 PROCEDURE — 84702 CHORIONIC GONADOTROPIN TEST: CPT | Performed by: STUDENT IN AN ORGANIZED HEALTH CARE EDUCATION/TRAINING PROGRAM

## 2023-01-23 PROCEDURE — 25000003 PHARM REV CODE 250: Performed by: ANESTHESIOLOGY

## 2023-01-23 PROCEDURE — 84702 CHORIONIC GONADOTROPIN TEST: CPT | Mod: 91 | Performed by: EMERGENCY MEDICINE

## 2023-01-23 PROCEDURE — 63600175 PHARM REV CODE 636 W HCPCS: Performed by: ANESTHESIOLOGY

## 2023-01-23 PROCEDURE — 88305 TISSUE EXAM BY PATHOLOGIST: CPT | Performed by: PATHOLOGY

## 2023-01-23 PROCEDURE — 71000015 HC POSTOP RECOV 1ST HR: Performed by: STUDENT IN AN ORGANIZED HEALTH CARE EDUCATION/TRAINING PROGRAM

## 2023-01-23 PROCEDURE — 88305 TISSUE EXAM BY PATHOLOGIST: CPT | Mod: 26,,, | Performed by: PATHOLOGY

## 2023-01-23 PROCEDURE — 80053 COMPREHEN METABOLIC PANEL: CPT | Performed by: EMERGENCY MEDICINE

## 2023-01-23 PROCEDURE — 96374 THER/PROPH/DIAG INJ IV PUSH: CPT

## 2023-01-23 PROCEDURE — 71000033 HC RECOVERY, INTIAL HOUR: Performed by: STUDENT IN AN ORGANIZED HEALTH CARE EDUCATION/TRAINING PROGRAM

## 2023-01-23 PROCEDURE — 25000003 PHARM REV CODE 250: Performed by: EMERGENCY MEDICINE

## 2023-01-23 PROCEDURE — 85025 COMPLETE CBC W/AUTO DIFF WBC: CPT | Performed by: EMERGENCY MEDICINE

## 2023-01-23 PROCEDURE — 36000709 HC OR TIME LEV III EA ADD 15 MIN: Performed by: STUDENT IN AN ORGANIZED HEALTH CARE EDUCATION/TRAINING PROGRAM

## 2023-01-23 PROCEDURE — 59151 TREAT ECTOPIC PREGNANCY: CPT | Mod: RT,,, | Performed by: STUDENT IN AN ORGANIZED HEALTH CARE EDUCATION/TRAINING PROGRAM

## 2023-01-23 PROCEDURE — 59151 PR RX ECTOP PREG BY SCOPE,RMV TUBE/OVRY: ICD-10-PCS | Mod: RT,,, | Performed by: STUDENT IN AN ORGANIZED HEALTH CARE EDUCATION/TRAINING PROGRAM

## 2023-01-23 PROCEDURE — 99285 EMERGENCY DEPT VISIT HI MDM: CPT | Mod: 25

## 2023-01-23 PROCEDURE — 71000016 HC POSTOP RECOV ADDL HR: Performed by: STUDENT IN AN ORGANIZED HEALTH CARE EDUCATION/TRAINING PROGRAM

## 2023-01-23 PROCEDURE — 25000003 PHARM REV CODE 250

## 2023-01-23 PROCEDURE — 96375 TX/PRO/DX INJ NEW DRUG ADDON: CPT

## 2023-01-23 RX ORDER — DIPHENHYDRAMINE HYDROCHLORIDE 50 MG/ML
25 INJECTION INTRAMUSCULAR; INTRAVENOUS EVERY 4 HOURS PRN
Status: DISCONTINUED | OUTPATIENT
Start: 2023-01-23 | End: 2023-01-24 | Stop reason: HOSPADM

## 2023-01-23 RX ORDER — MORPHINE SULFATE 2 MG/ML
2 INJECTION, SOLUTION INTRAMUSCULAR; INTRAVENOUS
Status: DISCONTINUED | OUTPATIENT
Start: 2023-01-23 | End: 2023-01-23

## 2023-01-23 RX ORDER — ONDANSETRON 8 MG/1
8 TABLET, ORALLY DISINTEGRATING ORAL EVERY 8 HOURS PRN
Status: DISCONTINUED | OUTPATIENT
Start: 2023-01-23 | End: 2023-01-24 | Stop reason: HOSPADM

## 2023-01-23 RX ORDER — DIPHENHYDRAMINE HCL 25 MG
25 CAPSULE ORAL EVERY 4 HOURS PRN
Status: DISCONTINUED | OUTPATIENT
Start: 2023-01-23 | End: 2023-01-24 | Stop reason: HOSPADM

## 2023-01-23 RX ORDER — DOCUSATE SODIUM 100 MG/1
100 CAPSULE, LIQUID FILLED ORAL 2 TIMES DAILY
Qty: 30 CAPSULE | Refills: 2 | Status: SHIPPED | OUTPATIENT
Start: 2023-01-23 | End: 2023-05-22

## 2023-01-23 RX ORDER — SODIUM CHLORIDE 0.9 % (FLUSH) 0.9 %
10 SYRINGE (ML) INJECTION
Status: DISCONTINUED | OUTPATIENT
Start: 2023-01-23 | End: 2023-01-24 | Stop reason: HOSPADM

## 2023-01-23 RX ORDER — DEXAMETHASONE SODIUM PHOSPHATE 4 MG/ML
INJECTION, SOLUTION INTRA-ARTICULAR; INTRALESIONAL; INTRAMUSCULAR; INTRAVENOUS; SOFT TISSUE
Status: DISCONTINUED | OUTPATIENT
Start: 2023-01-23 | End: 2023-01-23

## 2023-01-23 RX ORDER — SODIUM CHLORIDE 0.9 % (FLUSH) 0.9 %
3 SYRINGE (ML) INJECTION
Status: DISCONTINUED | OUTPATIENT
Start: 2023-01-23 | End: 2023-01-24 | Stop reason: HOSPADM

## 2023-01-23 RX ORDER — HYDROMORPHONE HYDROCHLORIDE 2 MG/ML
0.4 INJECTION, SOLUTION INTRAMUSCULAR; INTRAVENOUS; SUBCUTANEOUS EVERY 5 MIN PRN
Status: DISCONTINUED | OUTPATIENT
Start: 2023-01-23 | End: 2023-01-23

## 2023-01-23 RX ORDER — DIPHENHYDRAMINE HYDROCHLORIDE 50 MG/ML
25 INJECTION INTRAMUSCULAR; INTRAVENOUS EVERY 6 HOURS PRN
Status: DISCONTINUED | OUTPATIENT
Start: 2023-01-23 | End: 2023-01-23

## 2023-01-23 RX ORDER — PROCHLORPERAZINE EDISYLATE 5 MG/ML
5 INJECTION INTRAMUSCULAR; INTRAVENOUS EVERY 30 MIN PRN
Status: DISCONTINUED | OUTPATIENT
Start: 2023-01-23 | End: 2023-01-23

## 2023-01-23 RX ORDER — SODIUM CHLORIDE 9 MG/ML
INJECTION, SOLUTION INTRAVENOUS CONTINUOUS
Status: DISCONTINUED | OUTPATIENT
Start: 2023-01-23 | End: 2023-01-24 | Stop reason: HOSPADM

## 2023-01-23 RX ORDER — ONDANSETRON 2 MG/ML
4 INJECTION INTRAMUSCULAR; INTRAVENOUS
Status: COMPLETED | OUTPATIENT
Start: 2023-01-23 | End: 2023-01-23

## 2023-01-23 RX ORDER — DIPHENHYDRAMINE HYDROCHLORIDE 50 MG/ML
INJECTION INTRAMUSCULAR; INTRAVENOUS
Status: DISCONTINUED | OUTPATIENT
Start: 2023-01-23 | End: 2023-01-23

## 2023-01-23 RX ORDER — PROCHLORPERAZINE EDISYLATE 5 MG/ML
5 INJECTION INTRAMUSCULAR; INTRAVENOUS EVERY 6 HOURS PRN
Status: DISCONTINUED | OUTPATIENT
Start: 2023-01-23 | End: 2023-01-24 | Stop reason: HOSPADM

## 2023-01-23 RX ORDER — MEPERIDINE HYDROCHLORIDE 25 MG/ML
12.5 INJECTION INTRAMUSCULAR; INTRAVENOUS; SUBCUTANEOUS ONCE AS NEEDED
Status: COMPLETED | OUTPATIENT
Start: 2023-01-23 | End: 2023-01-23

## 2023-01-23 RX ORDER — MORPHINE SULFATE 4 MG/ML
4 INJECTION, SOLUTION INTRAMUSCULAR; INTRAVENOUS
Status: COMPLETED | OUTPATIENT
Start: 2023-01-23 | End: 2023-01-23

## 2023-01-23 RX ORDER — LIDOCAINE HCL/PF 100 MG/5ML
SYRINGE (ML) INTRAVENOUS
Status: DISCONTINUED | OUTPATIENT
Start: 2023-01-23 | End: 2023-01-23

## 2023-01-23 RX ORDER — FENTANYL CITRATE 50 UG/ML
INJECTION, SOLUTION INTRAMUSCULAR; INTRAVENOUS
Status: DISCONTINUED | OUTPATIENT
Start: 2023-01-23 | End: 2023-01-23

## 2023-01-23 RX ORDER — MIDAZOLAM HYDROCHLORIDE 1 MG/ML
INJECTION INTRAMUSCULAR; INTRAVENOUS
Status: DISCONTINUED | OUTPATIENT
Start: 2023-01-23 | End: 2023-01-23

## 2023-01-23 RX ORDER — ONDANSETRON 2 MG/ML
INJECTION INTRAMUSCULAR; INTRAVENOUS
Status: DISCONTINUED | OUTPATIENT
Start: 2023-01-23 | End: 2023-01-23

## 2023-01-23 RX ORDER — MUPIROCIN 20 MG/G
OINTMENT TOPICAL
Status: DISCONTINUED | OUTPATIENT
Start: 2023-01-23 | End: 2023-01-24 | Stop reason: HOSPADM

## 2023-01-23 RX ORDER — PROPOFOL 10 MG/ML
VIAL (ML) INTRAVENOUS
Status: DISCONTINUED | OUTPATIENT
Start: 2023-01-23 | End: 2023-01-23

## 2023-01-23 RX ORDER — OXYCODONE HYDROCHLORIDE 5 MG/1
5 TABLET ORAL
Status: DISCONTINUED | OUTPATIENT
Start: 2023-01-23 | End: 2023-01-23

## 2023-01-23 RX ORDER — ROCURONIUM BROMIDE 10 MG/ML
INJECTION, SOLUTION INTRAVENOUS
Status: DISCONTINUED | OUTPATIENT
Start: 2023-01-23 | End: 2023-01-23

## 2023-01-23 RX ORDER — HYDROCODONE BITARTRATE AND ACETAMINOPHEN 5; 325 MG/1; MG/1
1 TABLET ORAL EVERY 4 HOURS PRN
Status: DISCONTINUED | OUTPATIENT
Start: 2023-01-23 | End: 2023-01-24 | Stop reason: HOSPADM

## 2023-01-23 RX ORDER — IBUPROFEN 600 MG/1
600 TABLET ORAL 3 TIMES DAILY
Qty: 30 TABLET | Refills: 2 | Status: SHIPPED | OUTPATIENT
Start: 2023-01-23 | End: 2023-05-22

## 2023-01-23 RX ADMIN — FENTANYL CITRATE 100 MCG: 50 INJECTION, SOLUTION INTRAMUSCULAR; INTRAVENOUS at 08:01

## 2023-01-23 RX ADMIN — SUGAMMADEX 200 MG: 100 INJECTION, SOLUTION INTRAVENOUS at 09:01

## 2023-01-23 RX ADMIN — SODIUM CHLORIDE, SODIUM LACTATE, POTASSIUM CHLORIDE, AND CALCIUM CHLORIDE: .6; .31; .03; .02 INJECTION, SOLUTION INTRAVENOUS at 09:01

## 2023-01-23 RX ADMIN — GLYCOPYRROLATE 0.2 MG: 0.2 INJECTION, SOLUTION INTRAMUSCULAR; INTRAVITREAL at 08:01

## 2023-01-23 RX ADMIN — DEXAMETHASONE SODIUM PHOSPHATE 12 MG: 4 INJECTION, SOLUTION INTRAMUSCULAR; INTRAVENOUS at 08:01

## 2023-01-23 RX ADMIN — SODIUM CHLORIDE, SODIUM LACTATE, POTASSIUM CHLORIDE, AND CALCIUM CHLORIDE: .6; .31; .03; .02 INJECTION, SOLUTION INTRAVENOUS at 07:01

## 2023-01-23 RX ADMIN — MORPHINE SULFATE 4 MG: 4 INJECTION, SOLUTION INTRAMUSCULAR; INTRAVENOUS at 07:01

## 2023-01-23 RX ADMIN — DIPHENHYDRAMINE HYDROCHLORIDE 6.25 MG: 50 INJECTION, SOLUTION INTRAMUSCULAR; INTRAVENOUS at 08:01

## 2023-01-23 RX ADMIN — ONDANSETRON HYDROCHLORIDE 4 MG: 2 INJECTION INTRAMUSCULAR; INTRAVENOUS at 09:01

## 2023-01-23 RX ADMIN — MEPERIDINE HYDROCHLORIDE 12.5 MG: 25 INJECTION INTRAMUSCULAR; INTRAVENOUS; SUBCUTANEOUS at 09:01

## 2023-01-23 RX ADMIN — CARBOXYMETHYLCELLULOSE SODIUM 2 DROP: 2.5 SOLUTION/ DROPS OPHTHALMIC at 08:01

## 2023-01-23 RX ADMIN — MIDAZOLAM HYDROCHLORIDE 3 MG: 1 INJECTION, SOLUTION INTRAMUSCULAR; INTRAVENOUS at 08:01

## 2023-01-23 RX ADMIN — ROCURONIUM BROMIDE 50 MG: 10 INJECTION, SOLUTION INTRAVENOUS at 08:01

## 2023-01-23 RX ADMIN — ONDANSETRON 4 MG: 2 INJECTION INTRAMUSCULAR; INTRAVENOUS at 05:01

## 2023-01-23 RX ADMIN — LIDOCAINE HYDROCHLORIDE 100 MG: 20 INJECTION, SOLUTION INTRAVENOUS at 08:01

## 2023-01-23 RX ADMIN — PROPOFOL 200 MG: 10 INJECTION, EMULSION INTRAVENOUS at 08:01

## 2023-01-23 RX ADMIN — HYDROCODONE BITARTRATE AND ACETAMINOPHEN 1 TABLET: 5; 325 TABLET ORAL at 10:01

## 2023-01-23 RX ADMIN — ONDANSETRON 4 MG: 2 INJECTION INTRAMUSCULAR; INTRAVENOUS at 07:01

## 2023-01-23 RX ADMIN — OXYCODONE 5 MG: 5 TABLET ORAL at 09:01

## 2023-01-23 RX ADMIN — SODIUM CHLORIDE 1000 ML: 0.9 INJECTION, SOLUTION INTRAVENOUS at 05:01

## 2023-01-23 NOTE — CONSULTS
[facility] Emergency Dept  Obstetrics & Gynecology  Consult Note    Patient Name: Gisella Smith  MRN: [id number]  Admission Date: 2023  Hospital Length of Stay: 0 days  Code Status: Prior  Primary Care Provider: Audrey Leyva MD  Principal Problem: <principal problem not specified>    Inpatient consult to Obstetrics  Consult performed by: Jess Hobbs MD  Consult ordered by: Jacki Starr MD      Subjective:     Chief Complaint: R side abdominal pain in setting of known ectopic pregnancy    History of Present Illness:   Please see H&P dated 23 for further detail.    In brief, pt is a  who presented to the ED due to 10/10 right sided pain in setting of known ectopic pregnancy treated with methotrexate on 23. Hcg decreased appropriately. TVUS findings concerning for ruptured ectopic pregnancy with free fluid in the pelvis.    Pt consented for diagnostic laparoscopy/blood.    To OR for diag lap/salpingectomy for suspected ruptured ectopic    Jess Hobbs MD  OBGYN PGY-2    
Declines

## 2023-01-23 NOTE — TELEPHONE ENCOUNTER
Pt stated that she was treated for an ectopic pregnancy on Friday. Pt state that she have been having abdominal and back pain 7/10 and nausea. Advised to receive call back from provider within 30 mins per protocol. Verbalized understanding. Encounter routed to provider.       Reason for Disposition   [1] Follow-up call from patient regarding patient's clinical status AND [2] information urgent    Protocols used: PCP Call - No Triage-A-

## 2023-01-23 NOTE — ED PROVIDER NOTES
SCRIBE #1 NOTE: I, Jerry Herrera, am scribing for, and in the presence of,  Jacki Starr MD. I have scribed the entire note.       Source of History:  Patient    Chief complaint:  Ectopic Pregnancy (Pt is currently being treated for ectopic pregnancy, took methotrexate . Beta HCGs are now going down but patient had severe increase in pain approx 2 hours ago. 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 Belly is soft and tender.)      HPI:  Gisella Smith is a 37 y.o. female, A1, who was diagnosed with ectopic pregnancy three days ago and started on methotrexate and now presents with worsening cramping. She describes severe right abdominal and lower back pains today which are now more diffuse. Associated with mild vaginal spotting. Patient denies any active bleeding, chest pain, shortness of breath, lightheadedness, or LOC.  Her Ob/Gyn, Dr. Winkler, is aware she is here. Patient is an anesthesiologist.  She reports pain has continued to intensify following presentation.  Poor appetite and general malaise over the weekend following MTX injection.  Patient had b-hcg redrawn today which was downtrending.      This is the extent to the patients complaints today here in the emergency department.    ROS: As per HPI and below:  General: No fever.  No chills.  Eyes: No visual changes.  ENT: No sore throat. No ear pain  Head: No headache.    Respiratory: No shortness of breath.  Cardiovascular: No chest pain.  Abdomen: No abdominal pain.  No nausea or vomiting.  Genito-Urinary: No abnormal urination. Notes pelvic pain and vaginal spotting.  Neurologic: No focal weakness.  No numbness.  MSK: Notes back pain.  Integument: No rashes or lesions.  Hematologic: No easy bruising.  Endocrine: No excessive thirst or urination.    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    Appearance: No acute distress.   Eyes: No conjunctival injection or pallor.  Neck: No deformity.   ENT: Oropharynx clear.  No stridor.   Chest/ Respiratory: Clear to auscultation bilaterally.  Good air movement.  No wheezes.  No rhonchi. No rales. No accessory muscle use.  Cardiovascular: Regular rate and rhythm with 2+ DP/PT and radial pulses.  No murmurs. No gallops. No rubs.  Abdomen: Soft.  Not distended.  Diffusely tender.  No guarding.  No rebound. Non-peritoneal.  Musculoskeletal: Good range of motion all joints.  No deformities.  Neck supple.  No meningismus. No peripheral edema.  Skin: No rashes seen.  Good turgor.  No abrasions.  No ecchymoses.  Neurologic: Motor intact.  Sensation intact.  Cerebellar intact.  Cranial nerves intact.  Mental Status:  Alert and oriented x 3.  Appropriate, conversant.      I decided to obtain the patient's medical records.  Summary of Medical Records:  1/20 ultrasound Heterogeneous structure adjacent to or abutting the right ovary without significant internal vascularity.  In the setting of no discernible intrauterine gestational sac, findings are concerning for sequela of ectopic pregnancy likely having undergone internal hemorrhage.      1/20 labs: H/H 13.9/40.4; B-hcg 1863     Stable endometrial/myometrial cysts.    Labs:  Results for orders placed or performed during the hospital encounter of 01/23/23   CBC W/ AUTO DIFFERENTIAL   Result Value Ref Range    " WBC 7.49 3.90 - 12.70 K/uL    RBC 3.83 (L) 4.00 - 5.40 M/uL    Hemoglobin 13.0 12.0 - 16.0 g/dL    Hematocrit 36.3 (L) 37.0 - 48.5 %    MCV 95 82 - 98 fL    MCH 33.9 (H) 27.0 - 31.0 pg    MCHC 35.8 32.0 - 36.0 g/dL    RDW 11.9 11.5 - 14.5 %    Platelets 247 150 - 450 K/uL    MPV 9.1 (L) 9.2 - 12.9 fL    Immature Granulocytes 0.4 0.0 - 0.5 %    Gran # (ANC) 4.7 1.8 - 7.7 K/uL    Immature Grans (Abs) 0.03 0.00 - 0.04 K/uL    Lymph # 2.0 1.0 - 4.8 K/uL    Mono # 0.7 0.3 - 1.0 K/uL    Eos # 0.1 0.0 - 0.5 K/uL    Baso # 0.02 0.00 - 0.20 K/uL    nRBC 0 0 /100 WBC    Gran % 62.5 38.0 - 73.0 %    Lymph % 26.2 18.0 - 48.0 %    Mono % 9.7 4.0 - 15.0 %    Eosinophil % 0.9 0.0 - 8.0 %    Basophil % 0.3 0.0 - 1.9 %    Differential Method Automated    Comp. Metabolic Panel   Result Value Ref Range    Sodium 140 136 - 145 mmol/L    Potassium 3.5 3.5 - 5.1 mmol/L    Chloride 107 95 - 110 mmol/L    CO2 23 23 - 29 mmol/L    Glucose 90 70 - 110 mg/dL    BUN 9 6 - 20 mg/dL    Creatinine 0.7 0.5 - 1.4 mg/dL    Calcium 9.4 8.7 - 10.5 mg/dL    Total Protein 7.3 6.0 - 8.4 g/dL    Albumin 4.3 3.5 - 5.2 g/dL    Total Bilirubin 0.7 0.1 - 1.0 mg/dL    Alkaline Phosphatase 51 (L) 55 - 135 U/L    AST 16 10 - 40 U/L    ALT 24 10 - 44 U/L    Anion Gap 10 8 - 16 mmol/L    eGFR >60 >60 mL/min/1.73 m^2   hCG, quantitative   Result Value Ref Range    HCG Quant 1929 See Text mIU/mL   Type & Screen   Result Value Ref Range    Group & Rh O POS     Indirect Edson NEG          Initial Impression  37 y.o. female with known ectopic treated with methotrexate on 1/20 now with icnreased pain. Plan for labs, monitoring, pain control if needed, repeat US, and Ob consult.    Differential Dx:  Ectopic pregnancy, ovarian cysts, ovarian torsion, malignancy, constipation, colitis, diverticulitis    MDM:        ED Course as of 01/23/23 1905   Mon Jan 23, 2023   1724 Consulted and discussed with OB, they will come down to see the patient here. [DS]      ED Course User  Index  [DS] Jerry Herrera     Hemoglobin   Date Value Ref Range Status   01/23/2023 13.0 12.0 - 16.0 g/dL Final   01/20/2023 14.1 12.0 - 16.0 g/dL Final   03/03/2022 13.9 12.0 - 16.0 g/dL Final       Patient was seen by OB/GYN Dr. Kearns and will be taken to OR for definitive management of ruptured ectopic pregnancy.  Patient is hemodynamically stable, mild downtrend of H/H.      Critical Care    Date/Time: 1/23/2023 4:58 PM  Performed by: Jacki Starr MD  Authorized by: Jacki Starr MD   Direct patient critical care time: 10 minutes  Additional history critical care time: 5 minutes  Ordering / reviewing critical care time: 5 minutes  Documentation critical care time: 5 minutes  Consulting other physicians critical care time: 5 minutes  Total critical care time (exclusive of procedural time) : 30 minutes  Critical care was necessary to treat or prevent imminent or life-threatening deterioration of the following conditions: circulatory failure.  Critical care was time spent personally by me on the following activities: development of treatment plan with patient or surrogate, discussions with consultants, evaluation of patient's response to treatment, ordering and performing treatments and interventions, ordering and review of laboratory studies, ordering and review of radiographic studies, re-evaluation of patient's condition and review of old charts.                 Scribe Attestation:   Scribe #1: I performed the above scribed service and the documentation accurately describes the services I performed. I attest to the accuracy of the note.  Physician Attestation for Scribe: I, Jacki Starr MD, reviewed documentation as scribed in my presence, which is both accurate and complete.    Diagnostic Impression:    1. Ruptured ectopic pregnancy    2. Ectopic pregnancy         ED Disposition Condition    Observation                     Jacki Starr MD  01/23/23 [id number]

## 2023-01-24 VITALS
TEMPERATURE: 99 F | BODY MASS INDEX: 26.88 KG/M2 | HEIGHT: 63 IN | OXYGEN SATURATION: 97 % | RESPIRATION RATE: 16 BRPM | DIASTOLIC BLOOD PRESSURE: 61 MMHG | HEART RATE: 89 BPM | WEIGHT: 151.69 LBS | SYSTOLIC BLOOD PRESSURE: 110 MMHG

## 2023-01-24 LAB
BILIRUB UR QL STRIP: NEGATIVE
CLARITY UR: CLEAR
COLOR UR: YELLOW
GLUCOSE UR QL STRIP: NEGATIVE
HGB UR QL STRIP: ABNORMAL
KETONES UR QL STRIP: ABNORMAL
LEUKOCYTE ESTERASE UR QL STRIP: NEGATIVE
MICROSCOPIC COMMENT: ABNORMAL
NITRITE UR QL STRIP: NEGATIVE
PH UR STRIP: 8 [PH] (ref 5–8)
PROT UR QL STRIP: NEGATIVE
RBC #/AREA URNS HPF: 31 /HPF (ref 0–4)
SP GR UR STRIP: 1.01 (ref 1–1.03)
SQUAMOUS #/AREA URNS HPF: 1 /HPF
URN SPEC COLLECT METH UR: ABNORMAL
UROBILINOGEN UR STRIP-ACNC: NEGATIVE EU/DL
WBC #/AREA URNS HPF: 2 /HPF (ref 0–5)

## 2023-01-24 PROCEDURE — 99233 PR SUBSEQUENT HOSPITAL CARE,LEVL III: ICD-10-PCS | Mod: ,,, | Performed by: STUDENT IN AN ORGANIZED HEALTH CARE EDUCATION/TRAINING PROGRAM

## 2023-01-24 PROCEDURE — 99233 SBSQ HOSP IP/OBS HIGH 50: CPT | Mod: ,,, | Performed by: STUDENT IN AN ORGANIZED HEALTH CARE EDUCATION/TRAINING PROGRAM

## 2023-01-24 PROCEDURE — 63600175 PHARM REV CODE 636 W HCPCS: Performed by: STUDENT IN AN ORGANIZED HEALTH CARE EDUCATION/TRAINING PROGRAM

## 2023-01-24 PROCEDURE — 63600175 PHARM REV CODE 636 W HCPCS

## 2023-01-24 PROCEDURE — 25000003 PHARM REV CODE 250

## 2023-01-24 PROCEDURE — 81000 URINALYSIS NONAUTO W/SCOPE: CPT

## 2023-01-24 RX ORDER — HYDROMORPHONE HYDROCHLORIDE 2 MG/ML
0.5 INJECTION, SOLUTION INTRAMUSCULAR; INTRAVENOUS; SUBCUTANEOUS
Status: DISCONTINUED | OUTPATIENT
Start: 2023-01-24 | End: 2023-01-24 | Stop reason: RX

## 2023-01-24 RX ORDER — OXYCODONE HYDROCHLORIDE 5 MG/1
5 TABLET ORAL EVERY 4 HOURS PRN
Qty: 10 TABLET | Refills: 0 | Status: SHIPPED | OUTPATIENT
Start: 2023-01-24 | End: 2023-05-22

## 2023-01-24 RX ORDER — KETOROLAC TROMETHAMINE 30 MG/ML
30 INJECTION, SOLUTION INTRAMUSCULAR; INTRAVENOUS ONCE
Status: DISCONTINUED | OUTPATIENT
Start: 2023-01-24 | End: 2023-01-24

## 2023-01-24 RX ORDER — HYDROMORPHONE HYDROCHLORIDE 1 MG/ML
0.5 INJECTION, SOLUTION INTRAMUSCULAR; INTRAVENOUS; SUBCUTANEOUS
Status: DISCONTINUED | OUTPATIENT
Start: 2023-01-24 | End: 2023-01-24 | Stop reason: HOSPADM

## 2023-01-24 RX ORDER — KETOROLAC TROMETHAMINE 30 MG/ML
30 INJECTION, SOLUTION INTRAMUSCULAR; INTRAVENOUS ONCE
Status: COMPLETED | OUTPATIENT
Start: 2023-01-24 | End: 2023-01-24

## 2023-01-24 RX ORDER — HYDROCODONE BITARTRATE AND ACETAMINOPHEN 5; 325 MG/1; MG/1
1 TABLET ORAL ONCE
Status: COMPLETED | OUTPATIENT
Start: 2023-01-24 | End: 2023-01-24

## 2023-01-24 RX ADMIN — HYDROCODONE BITARTRATE AND ACETAMINOPHEN 1 TABLET: 5; 325 TABLET ORAL at 12:01

## 2023-01-24 RX ADMIN — KETOROLAC TROMETHAMINE 30 MG: 30 INJECTION, SOLUTION INTRAMUSCULAR; INTRAVENOUS at 10:01

## 2023-01-24 RX ADMIN — HYDROCODONE BITARTRATE AND ACETAMINOPHEN 1 TABLET: 5; 325 TABLET ORAL at 06:01

## 2023-01-24 RX ADMIN — HYDROMORPHONE HYDROCHLORIDE 0.5 MG: 1 INJECTION, SOLUTION INTRAMUSCULAR; INTRAVENOUS; SUBCUTANEOUS at 03:01

## 2023-01-24 NOTE — H&P
[facility] Emergency Dept  Obstetrics & Gynecology  History & Physical    Patient Name: Gisella Smith  MRN: [id number]  Admission Date: 2023  Primary Care Provider: Audrey Leyva MD    Subjective:     Chief Complaint/Reason for Admission: ruptured ectopic preg    History of Present Illness: Gisella Smith is a 25 yo  who presents to ED with worsening abdominal pain in setting of known ectopic pregnancy. She was treated with MTX on . Reports increased cramping throughout the day and then severe 10/10 pain late this afternoon. Pt reports taking 500mg of tylenol with minimal relief. Also reports mild vaginal spotting.     Denies taking anything else for the pain. Denies fever, chills, headache, cough, rash, congestion, N/V/D, syncope, LOC.    No current facility-administered medications on file prior to encounter.     Current Outpatient Medications on File Prior to Encounter   Medication Sig    amoxicillin (AMOXIL) 500 MG capsule Take 500 mg by mouth every 12 (twelve) hours.    ondansetron (ZOFRAN) 4 MG tablet Take 1 tablet (4 mg total) by mouth daily as needed for Nausea.    prenatal 25/iron fum/folic/dha (PRENATAL-1 ORAL) Take 1 tablet by mouth once daily.    valACYclovir (VALTREX) 1000 MG tablet SMARTSI Tablet(s) By Mouth Every 12 Hours       Review of patient's allergies indicates:   Allergen Reactions    Sulfamethoxazole-trimethoprim Hives       Past Medical History:   Diagnosis Date    Overweight (BMI 25.0-29.9) 2017    Recurrent cold sores 2017     OB History    Para Term  AB Living   1 1 1     1   SAB IAB Ectopic Multiple Live Births         0 1      # Outcome Date GA Lbr René/2nd Weight Sex Delivery Anes PTL Lv   1 Term 10/31/20 37w6d / 00:50 2.95 kg (6 lb 8.1 oz) F Vag-Spont EPI N TRACI     Past Surgical History:   Procedure Laterality Date    WISDOM TOOTH EXTRACTION       Family History       Problem Relation (Age of Onset)    Alzheimer's disease  Maternal Grandmother    Arrhythmia Paternal Grandfather    Bullous pemphigoid Paternal Grandfather    Diabetes Mellitus Paternal Grandmother    Hyperlipidemia Maternal Grandmother    Hypertension Father, Maternal Grandmother, Paternal Grandfather    Liver disease Maternal Grandfather    No Known Problems Brother, Brother    Osteoporosis Mother, Father    Other Maternal Grandfather    Parkinsonism Father          Tobacco Use    Smoking status: Never    Smokeless tobacco: Never   Substance and Sexual Activity    Alcohol use: Yes    Drug use: Never    Sexual activity: Yes     Partners: Male     Review of Systems   Constitutional:  Negative for chills and fever.   HENT:  Negative for nasal congestion.    Eyes:  Negative for visual disturbance.   Respiratory:  Negative for cough.    Cardiovascular:  Negative for chest pain, palpitations and leg swelling.   Gastrointestinal:  Positive for abdominal pain. Negative for constipation, diarrhea, nausea and vomiting.        R side abd pain   Genitourinary:  Positive for pelvic pain and vaginal bleeding. Negative for dysuria, vaginal discharge, vaginal pain, urinary incontinence, vaginal dryness and vaginal odor.        Mild vaginal bleeding   Musculoskeletal:  Negative for arthralgias and back pain.   Integumentary:  Negative for breast mass.   Neurological:  Negative for seizures, syncope and headaches.   Psychiatric/Behavioral:  Negative for depression.    Breast: Negative for mass  Objective:     Vital Signs (Most Recent):  Temp: 99.3 °F (37.4 °C) (01/23/23 1629)  Pulse: 97 (01/23/23 1900)  Resp: 18 (01/23/23 1900)  BP: 123/85 (01/23/23 1900)  SpO2: 100 % (01/23/23 1900) Vital Signs (24h Range):  Temp:  [99.3 °F (37.4 °C)] 99.3 °F (37.4 °C)  Pulse:  [89-97] 97  Resp:  [18-20] 18  SpO2:  [100 %] 100 %  BP: (119-123)/(74-85) 123/85     Weight: 63.5 kg (140 lb)  Body mass index is 24.8 kg/m².  No LMP recorded (lmp unknown).    Physical Exam:   Constitutional: She is oriented to  person, place, and time. She appears well-developed and well-nourished. No distress.    HENT:   Head: Normocephalic and atraumatic.    Eyes: Pupils are equal, round, and reactive to light. EOM are normal.     Cardiovascular:  Normal rate.             Pulmonary/Chest: Effort normal. No respiratory distress.        Abdominal: Soft. She exhibits no distension. There is abdominal tenderness. There is guarding (voluntary). There is no rebound.             Musculoskeletal: Normal range of motion and moves all extremeties. No tenderness or edema.       Neurological: She is alert and oriented to person, place, and time.    Skin: Skin is warm and dry. She is not diaphoretic.      Laboratory:  CBC:   Recent Labs   Lab 01/23/23  1707   WBC 7.49   RBC 3.83*   HGB 13.0   HCT 36.3*      MCV 95   MCH 33.9*   MCHC 35.8       CMP:   Recent Labs   Lab 01/23/23  1707   GLU 90   CALCIUM 9.4   ALBUMIN 4.3   PROT 7.3      K 3.5   CO2 23      BUN 9   CREATININE 0.7   ALKPHOS 51*   ALT 24   AST 16   BILITOT 0.7       HCG 1929 (sp 1 dose of mtx 3 days ago)    Diagnostic Results:  Labs: Reviewed  US: Reviewed    FINDINGS:  The uterus measures 7.8 x 3.5 x 4.8 cm. There are no uterine masses.  No fetal pole, yolk sac, or gestational sac is detected.     There is a complex area adjacent to the right ovary measuring 3.4 x 2.7 x 2.5 cm, which may represent an ectopic pregnancy.     The right ovary measures 4 x 3.4 x 3.9 cm. The left ovary is not visualized.     Large complex fluid is seen extending from the right adnexa to the cul-de-sac.  No fluid is seen in the upper abdomen.     Impression:     Overall findings are concerning for a ruptured ectopic pregnancy.    Assessment/Plan:     Active Diagnoses:    Diagnosis Date Noted POA    PRINCIPAL PROBLEM:  Ectopic pregnancy [O00.90] 01/20/2023 Yes      Problems Resolved During this Admission:       Ruptured ectopic pregnancy  -Patient with known ectopic and treated with MTX on  1/20 with plans for follow up labs presented to ED today with worsening abdominal pain  -VSS and WNL  -CBC stable  -US concern for ruptured ectopic due to large amount of fluid in abdomen and mass next to ovary  -Patient consented for diagnostic laparoscopy and salpingectomy / removal of ectopic pregnancy  -Diet NPO  -House sup notified and OR team called out  -Pt agrees with plan, staff notified    Jess Hobbs MD  OBGYN PGY-2

## 2023-01-24 NOTE — ED TRIAGE NOTES
Pt presents to ED c/o R flank pain radiating to back and nausea. States she was dx with ectopic preg of R ovary on Fri 1/20 and started methotrexate. States serial Beta Hcgs have been decreasing but pain began becoming more severe today approx 2 hr PTA. Reports vaginal bleeding with clots after meds but not new or more severe.

## 2023-01-24 NOTE — OP NOTE
OPERATIVE REPORT    Date of procedure: 01/23/2023    PREOPERATIVE DIAGNOSIS  1. Ectopic Pregnancy, Right    POSTOPERATIVE DIAGNOSIS  S/p R salpingectomy     PROCEDURE:  1. Diagnostic laparoscopy  2. R salpingectomy    SURGEON: Dr. Gisella Parrish    ASSISTANT: Jess Hobbs MD -PGY2    ANESTHESIA: General    COMPLICATIONS: None    EBL: 5 cc (surgical) + 100 cc hemoperitoneum     IV FLUIDS: see anesthesia report     URINE OUTPUT: see anesthesia report    FINDINGS: Normal appearing uterus, normal ovaries bilaterally. Right tubal ectopic pregnancy, starting from approximately middle of tube and extending out towards fimbria, swollen and edematous; left tube normal appearing. Mild hemoperitoneum. Abdominal survey WNL. Possible endometriosis plaque above bladder.     PROCEDURE:   Patient was taken to the operating room where general anesthesia was administered and found to be adequate.  She was prepped and draped in the dorsal lithotomy position, both vaginally and abdominally. A surgical timeout was performed with patient's name, date of birth, allergies, and procedure to be performed verbalized. All OR staff were in agreement. No preoperative antibiotics were administered as none were indicated. In and out catheter was used to empty the bladder with ~ 200cc evacuated.  A sponge stick uterine manipulator was placed in the vagina.     Gloves were changed.  A Veress needle was inserted into the umbilicus under tenting of the anterior abdominal wall.  Placement into the peritoneal cavity was confirmed via saline drop test.  The abdomen was insufflated to 15mm Hg using Carbon dioxide.  A 5 mm infraumbilical skin incision was made with the scalpel.  A 5 mm Optiview trocar was advanced through this incision.  Excellent hemostasis was noted.  The patient was placed in deep Trendelenburg.  A 5 mm left lateral skin incision was made with a scalpel and a 5 mm trocar was advanced through this incision under visualization of the  camera.  A 5 mm right lateral skin incision was made with the scalpel.  A 5 mm trocar was advanced through this incision under visualization of the camera.  Excellent hemostasis was noted.      Attention was turned to the pelvis. Mild hemoperitoneum with ~ 100cc of dark red and brown clot removed with suction.    Attention was then turned to the right tube where the ectopic pregnancy was visualized. Right side ovary visualized and WNL. Right tube removed with ligasure cautery device with excellent hemostasis noted. Right tube removed with endocatch bag via right side port without difficulty.    The pelvis was copiously irrigated and suctioned.  Excellent hemostasis was noted.      Attention was turned to the anterior abdominal wall. The abdomen was deflated and all trocars were removed under direct visualization.  The skin was closed with 4-0 Monocryl in a subcuticular fashion. Steristrips placed. Sponge, lap, and needle counts were correct x 2. The sponge was removed. The patient was taken to the recovery room in stable condition.    Jess Hobbs MD  OBGYN PGY-2        I was present and scrubbed for entirety of procedure documented as above and agree with documentation. Right salpingectomy performed without complication for ectopic pregnancy.   Gisella Parrish MD

## 2023-01-24 NOTE — PLAN OF CARE
Gisella Pierre Zavalaton has met all discharge criteria from Phase II. Vital Signs are stable, ambulating  without difficulty.Pain is now under control and tolerable for the pt. Pain score 4 at this time.  Discharge instructions given, patient verbalized understanding. Discharged from facility via wheelchair in stable condition.

## 2023-01-24 NOTE — DISCHARGE SUMMARY
Discharge Summary  Gynecology      Admit Date: 2023    Discharge Date and Time: 2023     Attending Physician: Gisella Parrish MD    Principal Diagnoses: H/O unilateral salpingectomy    Active Hospital Problems    Diagnosis  POA    *S/p unilateral salpingectomy (R) for ectopic pregnancy [Z90.79]  Not Applicable    Ectopic pregnancy [O00.90]  Yes      Resolved Hospital Problems   No resolved problems to display.       Procedures: Procedure(s) (LRB):  LAPAROSCOPY, DIAGNOSTIC (N/A)  SALPINGECTOMY, LAPAROSCOPIC (Right)    Discharged Condition: good    Hospital Course:   Gisella Smith is a 37 y.o. y.o.  female who presented on 2023   for above procedures for the treatment of ectopic pregnancy. Patient tolerated procedure. PMH significant for none. Post-operative course was uncomplicated.  On day of discharge, patient was urinating, ambulating, and tolerating a regular diet without difficulty. Pain was well controlled on PO medication. She was discharged home on POD#0 in stable condition with instructions to follow up with Dr. Winkler this week.    Consults: None    Significant Diagnostic Studies:  Recent Labs   Lab 23  1423 23  1707   WBC 6.83 7.49   HGB 14.1 13.0   HCT 39.2 36.3*   MCV 96 95    247        Treatments:   1. Surgery as above    Disposition: Home or Self Care    Patient Instructions:   Current Discharge Medication List        START taking these medications    Details   docusate sodium (COLACE) 100 MG capsule Take 1 capsule (100 mg total) by mouth 2 (two) times daily.  Qty: 30 capsule, Refills: 2      ibuprofen (ADVIL,MOTRIN) 600 MG tablet Take 1 tablet (600 mg total) by mouth 3 (three) times daily.  Qty: 30 tablet, Refills: 2      simethicone 42 mg Chew Take 1 tablet by mouth 3 (three) times daily as needed (gas pains).  Qty: 30 tablet, Refills: 2           CONTINUE these medications which have NOT CHANGED    Details   valACYclovir (VALTREX) 1000 MG tablet  SMARTSI Tablet(s) By Mouth Every 12 Hours           STOP taking these medications       amoxicillin (AMOXIL) 500 MG capsule Comments:   Reason for Stopping:         ondansetron (ZOFRAN) 4 MG tablet Comments:   Reason for Stopping:         prenatal 25/iron fum/folic/dha (PRENATAL-1 ORAL) Comments:   Reason for Stopping:               Discharge Procedure Orders   Diet Adult Regular     Ice to affected area     Lifting restrictions     Other restrictions (specify):   Order Comments: Notify MD if bleeding 1 pad/hour for 2 consecutive hours.     No driving until:   Order Comments: Do not drive while taking narcotics.     Pelvic Rest   Order Comments: Pelvic rest until cleared by MD. Nothing in vagina till cleared by MD including tampons, douching, intercourse     No dressing needed     Notify your health care provider if you experience any of the following:  temperature >100.4     Notify your health care provider if you experience any of the following:  persistent nausea and vomiting or diarrhea     Notify your health care provider if you experience any of the following:  severe uncontrolled pain     Notify your health care provider if you experience any of the following:  redness, tenderness, or signs of infection (pain, swelling, redness, odor or green/yellow discharge around incision site)     Notify your health care provider if you experience any of the following:  difficulty breathing or increased cough     Notify your health care provider if you experience any of the following:  severe persistent headache     Notify your health care provider if you experience any of the following:  worsening rash     Notify your health care provider if you experience any of the following:  persistent dizziness, light-headedness, or visual disturbances     Notify your health care provider if you experience any of the following:  increased confusion or weakness     Notify your health care provider if you experience any of the  following:   Order Comments: Notify MD if bleeding 1 pad/hour for 2 consecutive hours.     Activity as tolerated     Weight bearing restrictions (specify):        Follow-up Information       Ketty Winkler MD Follow up in 2 day(s).    Specialty: Obstetrics and Gynecology  Why: s/p R salpingectomy for ectopic  Contact information:  [address] [id number] [phone]                           Jess Hobbs MD  OBGYN PGY-2

## 2023-01-24 NOTE — ANESTHESIA PREPROCEDURE EVALUATION
01/23/2023  Gisella Smith is a 37 y.o., female.      Pre-op Assessment    I have reviewed the Patient Summary Reports.     I have reviewed the Nursing Notes.    I have reviewed the Medications.     Review of Systems  Anesthesia Hx:  Denies Family Hx of Anesthesia complications.   Denies Personal Hx of Anesthesia complications.   Social:  Non-Smoker    Hematology/Oncology:  Hematology Normal   Oncology Normal     EENT/Dental:EENT/Dental Normal   Cardiovascular:  Cardiovascular Normal     Pulmonary:  Pulmonary Normal    Renal/:  Renal/ Normal     Hepatic/GI:  Hepatic/GI Normal    Musculoskeletal:  Musculoskeletal Normal    Neurological:   Neuromuscular Disease, Headaches    Endocrine:  Endocrine Normal    Dermatological:  Skin Normal    Psych:  Psychiatric Normal           Physical Exam  General: Well nourished, Cooperative, Alert and Oriented    Airway:  Mallampati: II   Mouth Opening: Normal  TM Distance: Normal  Tongue: Normal  Neck ROM: Normal ROM    Dental:  Intact        Anesthesia Plan  Type of Anesthesia, risks & benefits discussed:    Anesthesia Type: Gen ETT  Intra-op Monitoring Plan: Standard ASA Monitors  Post Op Pain Control Plan: multimodal analgesia  Induction:  IV  Airway Plan: Video  Informed Consent: Informed consent signed with the Patient and all parties understand the risks and agree with anesthesia plan.  All questions answered.   ASA Score: 1 Emergent    Ready For Surgery From Anesthesia Perspective.     .

## 2023-01-24 NOTE — ANESTHESIA PROCEDURE NOTES
Intubation    Date/Time: 1/23/2023 8:14 PM  Performed by: Bren Casarez CRNA  Authorized by: Benjamín Gonsalves MD     Intubation:     Induction:  Intravenous    Intubated:  Postinduction    Mask Ventilation:  Easy mask    Attempts:  1    Attempted By:  CRNA    Method of Intubation:  Video laryngoscopy    Blade:  Martinez 3    Laryngeal View Grade: Grade I - full view of cords      Difficult Airway Encountered?: No      Complications:  None    Airway Device:  Oral endotracheal tube    Airway Device Size:  7.0    Style/Cuff Inflation:  Cuffed    Inflation Amount (mL):  3    Tube secured:  21    Secured at:  The lips    Placement Verified By:  Capnometry    Complicating Factors:  None    Findings Post-Intubation:  BS equal bilateral

## 2023-01-24 NOTE — PROGRESS NOTES
Progress Note  Gynecology    Admit Date: 2023  LOS: 0    Reason for Admission:  H/O unilateral salpingectomy    SUBJECTIVE:     Gisella Smith is a 37 y.o.  who is POD#1 Day Post-Op  s/p R salpingectomy for the treatment of ruptured ectopic.  Pt doing well this morning. Pain is well controlled. She is tolerating a regular diet without N/V. Ambulating without difficulty. Voiding spontaneously without difficulty. Passing flatus. She is not yet having bowel movements.    OBJECTIVE:     Vital Signs   Temp:  [97 °F (36.1 °C)-99.3 °F (37.4 °C)] 98.8 °F (37.1 °C)  Pulse:  [] 88  Resp:  [16-20] 16  SpO2:  [96 %-100 %] 96 %  BP: ()/(61-85) 94/63      Intake/Output Summary (Last 24 hours) at 2023 0711  Last data filed at 2023 0641  Gross per 24 hour   Intake 1999 ml   Output 1000 ml   Net 999 ml       Physical Exam:  Gen: A&Ox3, NAD  CV: Regular rate  Pulm: Normal respiratory effort  Abd: soft, non-distended, non-tender to palpation without rebound or guarding  Inc: trocar sires clean, dry, intact with steri-strips on. Left lateral trocar site with some old blood on steri strips   Ext: No peripheral edema, TEDs/SCDs in place   : Villafana in place draining clear yellow urine     Laboratory:  Lab Results   Component Value Date    WBC 7.49 2023    HGB 13.0 2023    HCT 36.3 (L) 2023    MCV 95 2023     2023         BMP  Lab Results   Component Value Date     2023    K 3.5 2023     2023    CO2 23 2023    BUN 9 2023    CREATININE 0.7 2023    CALCIUM 9.4 2023    ANIONGAP 10 2023    EGFRNORACEVR >60 2023     Lab Results   Component Value Date    ALT 24 2023    AST 16 2023    ALKPHOS 51 (L) 2023    BILITOT 0.7 2023         ASSESSMENT/PLAN:     Active Hospital Problems    Diagnosis  POA    *S/p unilateral salpingectomy (R) for ectopic pregnancy [Z90.79]  Not Applicable     Ectopic pregnancy [O00.90]  Yes      Resolved Hospital Problems   No resolved problems to display.       Assessment: 37 y.o.  s/p R salpingectomy for ruptured ectopic    Plan:   1. Post-op   - Routine post-op advances  - Continue PRN pain medications.   - Voiding spontaneously  - Bowel function: +flatus/-BM  - Encourage ambulation   - Encourage IS  - Antiemetics prn nausea/vomiting.  - SCDS for DVT prophylaxis lovenox    Dispo:Patient kept overnight given timing of surgery. Discharge home today.      Josi Frazier MD  Obstetrics and Gynecology      I have seen the patient and agree with above. Pt had partially treated UTI prior to all of this. Is urinating frequency. Likely spasms due to Recent catheterization, but can send for culture to be on the safe size. Pyridium for comfort. Reviewed pain control regimen and precautions. RTC end of this week for close follow up  Ketty Winkler M.D.

## 2023-01-24 NOTE — OR NURSING
Report received from PACU RN. Patient AAOx4, VSS. Patient rates pain 4-5/10. PRN pain medication administered. LLQ lap site with scant drainage, 2 other sites CDI. Abdomen soft and non-tender.  at bedside. Patient and family updated on plan of care. Call light in reach, bed locked in lowest position.

## 2023-01-24 NOTE — ANESTHESIA POSTPROCEDURE EVALUATION
Anesthesia Post Evaluation    Patient: Gisella Smith    Procedure(s) Performed: Procedure(s) (LRB):  LAPAROSCOPY, DIAGNOSTIC (N/A)  SALPINGECTOMY, LAPAROSCOPIC (Right)    Final Anesthesia Type: general      Patient location during evaluation: PACU  Patient participation: Yes- Able to Participate  Level of consciousness: awake and alert  Post-procedure vital signs: reviewed and stable  Pain management: adequate  Airway patency: patent    PONV status at discharge: No PONV  Anesthetic complications: no      Cardiovascular status: blood pressure returned to baseline and stable  Respiratory status: room air  Hydration status: euvolemic  Follow-up not needed.          Vitals Value Taken Time   /64 01/23/23 2142   Temp 36.1 °C (97 °F) 01/23/23 2121   Pulse 96 01/23/23 2148   Resp 16 01/23/23 2142   SpO2 100 % 01/23/23 2148   Vitals shown include unvalidated device data.      No case tracking events are documented in the log.      Pain/Tereso Score: Pain Rating Prior to Med Admin: 5 (1/23/2023  9:42 PM)  Pain Rating Post Med Admin: 5 (1/23/2023  9:21 PM)  Tereso Score: 8 (1/23/2023  9:21 PM)

## 2023-01-24 NOTE — DISCHARGE SUMMARY
Discharge Summary  Gynecology      Admit Date: 2023    Discharge Date and Time: 2023     Attending Physician: Gisella Parrish MD    Principal Diagnoses: H/O unilateral salpingectomy    Active Hospital Problems    Diagnosis  POA    *S/p unilateral salpingectomy (R) for ectopic pregnancy [Z90.79]  Not Applicable    Ectopic pregnancy [O00.90]  Yes      Resolved Hospital Problems   No resolved problems to display.       Procedures: Procedure(s) (LRB):  LAPAROSCOPY, DIAGNOSTIC (N/A)  SALPINGECTOMY, LAPAROSCOPIC (Right)    Discharged Condition: good    Hospital Course:   Gisella Smith is a 37 y.o. y.o.  female who presented on 2023   for above procedures for the treatment of ectopic pregnancy. Patient tolerated procedure. PMH significant for none. Post-operative course was uncomplicated.  On day of discharge, patient was urinating, ambulating, and tolerating a regular diet without difficulty. Pain was well controlled on PO medication. She was discharged home on POD#1 in stable condition with instructions to follow up with Dr. Winkler this week.    Consults: None    Significant Diagnostic Studies:  Recent Labs   Lab 23  1423 23  1707   WBC 6.83 7.49   HGB 14.1 13.0   HCT 39.2 36.3*   MCV 96 95    247          Treatments:   1. Surgery as above    Disposition: Home or Self Care    Patient Instructions:   Current Discharge Medication List        START taking these medications    Details   docusate sodium (COLACE) 100 MG capsule Take 1 capsule (100 mg total) by mouth 2 (two) times daily.  Qty: 30 capsule, Refills: 2      ibuprofen (ADVIL,MOTRIN) 600 MG tablet Take 1 tablet (600 mg total) by mouth 3 (three) times daily.  Qty: 30 tablet, Refills: 2      simethicone 42 mg Chew Take 1 tablet by mouth 3 (three) times daily as needed (gas pains).  Qty: 30 tablet, Refills: 2           CONTINUE these medications which have NOT CHANGED    Details   valACYclovir (VALTREX) 1000 MG  tablet SMARTSI Tablet(s) By Mouth Every 12 Hours           STOP taking these medications       amoxicillin (AMOXIL) 500 MG capsule Comments:   Reason for Stopping:         ondansetron (ZOFRAN) 4 MG tablet Comments:   Reason for Stopping:         prenatal 25/iron fum/folic/dha (PRENATAL-1 ORAL) Comments:   Reason for Stopping:               Discharge Procedure Orders   Diet Adult Regular     Ice to affected area     Lifting restrictions     Other restrictions (specify):   Order Comments: Notify MD if bleeding 1 pad/hour for 2 consecutive hours.     No driving until:   Order Comments: Do not drive while taking narcotics.     Pelvic Rest   Order Comments: Pelvic rest until cleared by MD. Nothing in vagina till cleared by MD including tampons, douching, intercourse     No dressing needed     Notify your health care provider if you experience any of the following:  temperature >100.4     Notify your health care provider if you experience any of the following:  persistent nausea and vomiting or diarrhea     Notify your health care provider if you experience any of the following:  severe uncontrolled pain     Notify your health care provider if you experience any of the following:  redness, tenderness, or signs of infection (pain, swelling, redness, odor or green/yellow discharge around incision site)     Notify your health care provider if you experience any of the following:  difficulty breathing or increased cough     Notify your health care provider if you experience any of the following:  severe persistent headache     Notify your health care provider if you experience any of the following:  worsening rash     Notify your health care provider if you experience any of the following:  persistent dizziness, light-headedness, or visual disturbances     Notify your health care provider if you experience any of the following:  increased confusion or weakness     Notify your health care provider if you experience any of  the following:   Order Comments: Notify MD if bleeding 1 pad/hour for 2 consecutive hours.     Activity as tolerated     Weight bearing restrictions (specify):        Follow-up Information       Ketty Winkler MD Follow up in 2 day(s).    Specialty: Obstetrics and Gynecology  Why: s/p R salpingectomy for ectopic  Contact information:  [address] [address]  [phone]                           Josi Frazier MD PGY-1  Obstetrics and Gynecology  [facility]

## 2023-01-24 NOTE — DISCHARGE INSTRUCTIONS
Anesthesia: After Your Surgery  Youve just had surgery. During surgery, you received medication called anesthesia to keep you comfortable and pain-free. After surgery, you may experience some pain or nausea. This is common. Here are some tips for feeling better and recovering after surgery.    Going home  Your doctor or nurse will show you how to take care of yourself when you go home. He or she will also answer your questions. Have an adult family member or friend drive you home. For the first 24 hours after your surgery:  Do not drive or use heavy equipment.  Do not make important decisions or sign legal documents.  Avoid alcohol.  Have someone stay with you, if needed. He or she can watch for problems and help keep you safe.  Take your time getting up from a seated or lying position. You may experience dizziness for 24 hours  Be sure to keep all follow-up appointments with your doctor. And rest after your procedure for as long as your doctor tells you to.    Coping with pain  If you have pain after surgery, pain medication will help you feel better. Take it as directed, before pain becomes severe. Also, ask your doctor or pharmacist about other ways to control pain, such as with heat, ice, and relaxation. And follow any other instructions your surgeon or nurse gives you.    URINARY RETENTION  Should you experience a decrease in your urine output or are unable to urinate following surgery, this can be due to the medications given during surgery.  We recommend you going to the nearest Emergency Department.    Tips for taking pain medication  To get the best relief possible, remember these points:  Pain medications can upset your stomach. Taking them with a little food may help.  Most pain relievers taken by mouth need at least 20 to 30 minutes to take effect.  Taking medication on a schedule can help you remember to take it. Try to time your medication so that you can take it before beginning an activity, such  as dressing, walking, or sitting down for dinner.  Constipation is a common side effect of pain medications. Contact your doctor before taking any medications like laxatives or stool softeners to help relieve constipation. Also ask about any dietary restrictions, because drinking lots of fluids and eating foods like fruits and vegetables that are high in fiber can also help. Remember, dont take laxatives unless your surgeon has prescribed them.  Mixing alcohol and pain medication can cause dizziness and slow your breathing. It can even be fatal. Dont drink alcohol while taking pain medication.  Pain medication can slow your reflexes. Dont drive or operate machinery while taking pain medication.  If your health care provider tells you to take acetaminophen to help relieve your pain, ask him or her how much you are supposed to take each day. (Acetaminophen is the generic name for Tylenol and other brand-name pain relievers.) Acetaminophen or other pain relievers may interact with your prescription medicines or other over-the-counter (OTC) drugs. Some prescription medications contain acetaminophen along with other active ingredients. Using both prescription and OTC acetaminophen for pain can cause you to overdose. The FDA recommends that you read the labels on your OTC medications carefully. This will help you to clearly understand the list of active ingredients, dosing instructions, and any warnings. It may also help you avoid taking too much acetaminophen. If you have questions or don't understand the information, ask your pharmacist or health care provider to explain it to you before you take the OTC medication.    Managing nausea  Some people have an upset stomach after surgery. This is often due to anesthesia, pain, pain medications, or the stress of surgery. The following tips will help you manage nausea and get good nutrition as you recover. If you were on a special diet before surgery, ask your doctor if you  should follow it during recovery. These tips may help:  Dont push yourself to eat. Your body will tell you when to eat and how much.  Start off with clear liquids and soup. They are easier to digest.  Progress to semi-solid foods (mashed potatoes, applesauce, and gelatin) as you feel ready.  Slowly move to solid foods. Dont eat fatty, rich, or spicy foods at first.  Dont force yourself to have three large meals a day. Instead, eat smaller amounts more often.  Take pain medications with a small amount of solid food, such as crackers or toast to avoid nausea.      Call your surgeon if    You feel too sleepy, dizzy, or groggy (medication may be too strong).  You have side effects like nausea, vomiting, or skin changes (rash, itching, or hives).   © 1922-9876 [employer]. [address] [place] [address]. All rights reserved. This information is not intended as a substitute for professional medical care. Always follow your healthcare professional's instructions.      Abdominal Laparoscopy Discharge Instructions      Activity  Limit your activity for 4-6 weeks.  Dont lift anything heavier than 5-10 pounds.  Avoid strenuous activities, such as mowing the lawn, vacuuming, or playing sports.  Limit your activity to short, slow walks. Gradually increase your pace and distance as you feel able.  Listen to your body. If an activity causes pain, stop.  Rest when you are tired.  Dont have sexual intercourse or use tampons or douches until your doctor says its safe to do so.    Home Care  Always keep your incision clean and dry  Shower as instructed in 24 hours. You may wash your incision gently with mild soap and warm water and pat dry.  Avoid tub baths, hot tubs and swimming pools until seen by your physician for a post-op follow up.  If you have steri strips they should fall off in 7-10 days.    If you have band aids covering your incisions change daily or when soiled.  The band aids may be  removed post op day 1 if they are clean and dry.  Take your medication exactly as instructed by your doctor.  Return to your diet as you feel able. Eat a healthy, well-balanced diet.  Avoid constipation.  Use laxatives, stool softeners, or enemas as directed by your doctor.  Eat more high-fiber foods.  Drink 6-8 glasses of water every day, unless directed otherwise.  Follow-Up  Make a follow-up appointment as directed by our staff.       When to Call Your Doctor  Call your doctor right away if you have any of the following:  Fever above 101.5°F  (38.6°C) or chills  Bright red vaginal bleeding or a foul-smelling discharge  Vaginal bleeding that soaks more than one sanitary pad per hour  Trouble urinating or burning sensation when you urinate  Severe abdominal pain or bloating  Redness, swelling, or drainage at your incision site  Shortness of breath              FOLLOW ANY INSTRUCTIONS GIVEN BY DR ONEAL

## 2023-01-24 NOTE — TRANSFER OF CARE
"Anesthesia Transfer of Care Note    Patient: Gisella Smith    Procedure(s) Performed: Procedure(s) (LRB):  LAPAROSCOPY, DIAGNOSTIC (N/A)  SALPINGECTOMY, LAPAROSCOPIC (Right)    Patient location: PACU    Anesthesia Type: general    Transport from OR: Transported from OR on 2-3 L/min O2 by NC with adequate spontaneous ventilation    Post pain: adequate analgesia    Post assessment: no apparent anesthetic complications    Post vital signs: stable    Level of consciousness: awake, alert and oriented    Nausea/Vomiting: no nausea/vomiting    Complications: none    Transfer of care protocol was followed      Last vitals:   Visit Vitals  /85   Pulse 97   Temp 37.4 °C (99.3 °F) (Oral)   Resp 20   Ht 5' 3" (1.6 m)   Wt 63.5 kg (140 lb)   LMP  (LMP Unknown)   SpO2 100%   BMI 24.80 kg/m²     "

## 2023-01-25 ENCOUNTER — TELEPHONE (OUTPATIENT)
Dept: OBSTETRICS AND GYNECOLOGY | Facility: CLINIC | Age: 38
End: 2023-01-25
Payer: COMMERCIAL

## 2023-01-25 ENCOUNTER — PATIENT MESSAGE (OUTPATIENT)
Dept: OBSTETRICS AND GYNECOLOGY | Facility: CLINIC | Age: 38
End: 2023-01-25
Payer: COMMERCIAL

## 2023-01-25 NOTE — TELEPHONE ENCOUNTER
Contacted patient Gisella Smith today, 1/25/2023, at approximately 5:57 PM. Patient identifier confirmed.   Discussed post-op course. Voiding spontaneously, tolerating PO without n/v, passing flatus, no BM yet, is having some vaginal bleeding but not heavy, similar to a period, precautions reviewed, and pain tolerable. To see Dr. Winkler, primary OB/GYN, for post op apt. To let us know if any changes in course.

## 2023-01-26 LAB
FINAL PATHOLOGIC DIAGNOSIS: NORMAL
GROSS: NORMAL
Lab: NORMAL

## 2023-01-27 ENCOUNTER — OFFICE VISIT (OUTPATIENT)
Dept: OBSTETRICS AND GYNECOLOGY | Facility: CLINIC | Age: 38
End: 2023-01-27
Payer: COMMERCIAL

## 2023-01-27 VITALS
SYSTOLIC BLOOD PRESSURE: 118 MMHG | WEIGHT: 145.5 LBS | DIASTOLIC BLOOD PRESSURE: 70 MMHG | BODY MASS INDEX: 25.78 KG/M2 | HEIGHT: 63 IN

## 2023-01-27 DIAGNOSIS — Z90.79 H/O UNILATERAL SALPINGECTOMY: Primary | ICD-10-CM

## 2023-01-27 PROCEDURE — 1159F MED LIST DOCD IN RCRD: CPT | Mod: CPTII,S$GLB,, | Performed by: STUDENT IN AN ORGANIZED HEALTH CARE EDUCATION/TRAINING PROGRAM

## 2023-01-27 PROCEDURE — 3074F SYST BP LT 130 MM HG: CPT | Mod: CPTII,S$GLB,, | Performed by: STUDENT IN AN ORGANIZED HEALTH CARE EDUCATION/TRAINING PROGRAM

## 2023-01-27 PROCEDURE — 1159F PR MEDICATION LIST DOCUMENTED IN MEDICAL RECORD: ICD-10-PCS | Mod: CPTII,S$GLB,, | Performed by: STUDENT IN AN ORGANIZED HEALTH CARE EDUCATION/TRAINING PROGRAM

## 2023-01-27 PROCEDURE — 3074F PR MOST RECENT SYSTOLIC BLOOD PRESSURE < 130 MM HG: ICD-10-PCS | Mod: CPTII,S$GLB,, | Performed by: STUDENT IN AN ORGANIZED HEALTH CARE EDUCATION/TRAINING PROGRAM

## 2023-01-27 PROCEDURE — 99024 POSTOP FOLLOW-UP VISIT: CPT | Mod: S$GLB,,, | Performed by: STUDENT IN AN ORGANIZED HEALTH CARE EDUCATION/TRAINING PROGRAM

## 2023-01-27 PROCEDURE — 3008F BODY MASS INDEX DOCD: CPT | Mod: CPTII,S$GLB,, | Performed by: STUDENT IN AN ORGANIZED HEALTH CARE EDUCATION/TRAINING PROGRAM

## 2023-01-27 PROCEDURE — 99024 PR POST-OP FOLLOW-UP VISIT: ICD-10-PCS | Mod: S$GLB,,, | Performed by: STUDENT IN AN ORGANIZED HEALTH CARE EDUCATION/TRAINING PROGRAM

## 2023-01-27 PROCEDURE — 3078F DIAST BP <80 MM HG: CPT | Mod: CPTII,S$GLB,, | Performed by: STUDENT IN AN ORGANIZED HEALTH CARE EDUCATION/TRAINING PROGRAM

## 2023-01-27 PROCEDURE — 3008F PR BODY MASS INDEX (BMI) DOCUMENTED: ICD-10-PCS | Mod: CPTII,S$GLB,, | Performed by: STUDENT IN AN ORGANIZED HEALTH CARE EDUCATION/TRAINING PROGRAM

## 2023-01-27 PROCEDURE — 3078F PR MOST RECENT DIASTOLIC BLOOD PRESSURE < 80 MM HG: ICD-10-PCS | Mod: CPTII,S$GLB,, | Performed by: STUDENT IN AN ORGANIZED HEALTH CARE EDUCATION/TRAINING PROGRAM

## 2023-01-27 NOTE — PROGRESS NOTES
History & Physical  Gynecology      SUBJECTIVE:     Chief Complaint: Post-op Evaluation       History of Present Illness:    Gisella presents for post op exam  She underwent Dx lap with left salpingectomy on  for the indication of ruptured ectopic pregnancy. She has had an uncomplicated post operative course thus far. She has been eating normally, no N/V. Normal bladder and bowel function. Minimal bleeding.. She is not requiring any pain medications. ROS otherwise negative. Obviously a bit upset about things, but doing well. Has good support.     FINDINGS: Normal appearing uterus, normal ovaries bilaterally. Right tubal ectopic pregnancy, starting from approximately middle of tube and extending out towards fimbria, swollen and edematous; left tube normal appearing. Mild hemoperitoneum. Abdominal survey WNL. Possible endometriosis plaque above bladder.       Review of patient's allergies indicates:   Allergen Reactions    Sulfamethoxazole-trimethoprim Hives       Past Medical History:   Diagnosis Date    Overweight (BMI 25.0-29.9) 2017    Recurrent cold sores 2017     Past Surgical History:   Procedure Laterality Date    DIAGNOSTIC LAPAROSCOPY N/A 2023    Procedure: LAPAROSCOPY, DIAGNOSTIC;  Surgeon: Gisella Parrish MD;  Location: [facility];  Service: OB/GYN;  Laterality: N/A;    LAPAROSCOPIC SALPINGECTOMY Right 2023    Procedure: SALPINGECTOMY, LAPAROSCOPIC;  Surgeon: Gisella Parrish MD;  Location: [facility];  Service: OB/GYN;  Laterality: Right;    WISDOM TOOTH EXTRACTION       OB History          1    Para   1    Term   1            AB        Living   1         SAB        IAB        Ectopic        Multiple   0    Live Births   1               Family History   Problem Relation Age of Onset    Osteoporosis Mother     Parkinsonism Father     Hypertension Father     Osteoporosis Father     No Known Problems Brother     Hypertension Maternal Grandmother     Hyperlipidemia  Maternal Grandmother     Alzheimer's disease Maternal Grandmother     Other Maternal Grandfather         oropharyngeal cancer    Liver disease Maternal Grandfather     Diabetes Mellitus Paternal Grandmother     Hypertension Paternal Grandfather     Bullous pemphigoid Paternal Grandfather     Arrhythmia Paternal Grandfather     No Known Problems Brother     Colon cancer Neg Hx     Breast cancer Neg Hx     Ovarian cancer Neg Hx     Stroke Neg Hx      Social History     Tobacco Use    Smoking status: Never    Smokeless tobacco: Never   Substance Use Topics    Alcohol use: Yes    Drug use: Never       Current Outpatient Medications   Medication Sig    docusate sodium (COLACE) 100 MG capsule Take 1 capsule (100 mg total) by mouth 2 (two) times daily.    ibuprofen (ADVIL,MOTRIN) 600 MG tablet Take 1 tablet (600 mg total) by mouth 3 (three) times daily.    oxyCODONE (ROXICODONE) 5 MG immediate release tablet Take 1 tablet (5 mg total) by mouth every 4 (four) hours as needed for Pain.    simethicone 42 mg Chew Take 1 tablet by mouth 3 (three) times daily as needed (gas pains).    valACYclovir (VALTREX) 1000 MG tablet SMARTSI Tablet(s) By Mouth Every 12 Hours     No current facility-administered medications for this visit.         Review of Systems:  Review of Systems   Constitutional:  Negative for activity change, appetite change, chills and fever.   Respiratory:  Negative for shortness of breath.    Cardiovascular:  Negative for chest pain.   Gastrointestinal:  Negative for abdominal pain, blood in stool, constipation, diarrhea, nausea and vomiting.   Genitourinary:  Negative for dysuria, hematuria, pelvic pain, vaginal bleeding, vaginal discharge and vaginal pain.      OBJECTIVE:     Physical Exam:  Physical Exam  Vitals reviewed.   Constitutional:       General: She is not in acute distress.     Appearance: She is well-developed. She is not diaphoretic.   HENT:      Head: Normocephalic and atraumatic.   Eyes:       Conjunctiva/sclera: Conjunctivae normal.   Cardiovascular:      Rate and Rhythm: Normal rate.   Pulmonary:      Effort: Pulmonary effort is normal.   Abdominal:      Comments: Incisions c/d/i   Musculoskeletal:         General: Normal range of motion.      Cervical back: Normal range of motion.   Skin:     General: Skin is warm and dry.   Neurological:      Mental Status: She is alert and oriented to person, place, and time.   Psychiatric:         Mood and Affect: Mood normal.         Behavior: Behavior normal.         ASSESSMENT:       ICD-10-CM ICD-9-CM    1. S/p unilateral salpingectomy (R) for ectopic pregnancy  Z90.79 V15.29              Plan:      Doing well post op  Reviewed post op precautions  Reviewed delaying conception for 3 months after MTX treatment  Pt states her understanding    Face to Face time with patient: 15    20 minutes of total time spent on the encounter, which includes face to face time and non-face to face time preparing to see the patient (eg, review of tests), Obtaining and/or reviewing separately obtained history, Documenting clinical information in the electronic or other health record, Independently interpreting results (not separately reported) and communicating results to the patient/family/caregiver, or Care coordination (not separately reported).      Ketty Winkler

## 2023-03-09 ENCOUNTER — PATIENT MESSAGE (OUTPATIENT)
Dept: OBSTETRICS AND GYNECOLOGY | Facility: CLINIC | Age: 38
End: 2023-03-09
Payer: COMMERCIAL

## 2023-05-20 ENCOUNTER — PATIENT MESSAGE (OUTPATIENT)
Dept: OBSTETRICS AND GYNECOLOGY | Facility: CLINIC | Age: 38
End: 2023-05-20
Payer: COMMERCIAL

## 2023-05-22 DIAGNOSIS — Z32.01 POSITIVE PREGNANCY TEST: Primary | ICD-10-CM

## 2023-05-23 ENCOUNTER — LAB VISIT (OUTPATIENT)
Dept: LAB | Facility: OTHER | Age: 38
End: 2023-05-23
Attending: STUDENT IN AN ORGANIZED HEALTH CARE EDUCATION/TRAINING PROGRAM
Payer: COMMERCIAL

## 2023-05-23 DIAGNOSIS — Z32.01 POSITIVE PREGNANCY TEST: ICD-10-CM

## 2023-05-23 LAB
HCG INTACT+B SERPL-ACNC: 238 MIU/ML
PROGEST SERPL-MCNC: 24.2 NG/ML

## 2023-05-23 PROCEDURE — 84702 CHORIONIC GONADOTROPIN TEST: CPT | Performed by: STUDENT IN AN ORGANIZED HEALTH CARE EDUCATION/TRAINING PROGRAM

## 2023-05-23 PROCEDURE — 36415 COLL VENOUS BLD VENIPUNCTURE: CPT | Performed by: STUDENT IN AN ORGANIZED HEALTH CARE EDUCATION/TRAINING PROGRAM

## 2023-05-23 PROCEDURE — 84144 ASSAY OF PROGESTERONE: CPT | Performed by: STUDENT IN AN ORGANIZED HEALTH CARE EDUCATION/TRAINING PROGRAM

## 2023-05-24 ENCOUNTER — CLINICAL SUPPORT (OUTPATIENT)
Dept: OBSTETRICS AND GYNECOLOGY | Facility: CLINIC | Age: 38
End: 2023-05-24
Payer: COMMERCIAL

## 2023-05-24 DIAGNOSIS — N91.2 AMENORRHEA: Primary | ICD-10-CM

## 2023-05-25 ENCOUNTER — LAB VISIT (OUTPATIENT)
Dept: LAB | Facility: OTHER | Age: 38
End: 2023-05-25
Attending: STUDENT IN AN ORGANIZED HEALTH CARE EDUCATION/TRAINING PROGRAM
Payer: COMMERCIAL

## 2023-05-25 DIAGNOSIS — Z32.01 POSITIVE PREGNANCY TEST: ICD-10-CM

## 2023-05-25 LAB — HCG INTACT+B SERPL-ACNC: 606 MIU/ML

## 2023-05-25 PROCEDURE — 84702 CHORIONIC GONADOTROPIN TEST: CPT | Performed by: STUDENT IN AN ORGANIZED HEALTH CARE EDUCATION/TRAINING PROGRAM

## 2023-05-25 PROCEDURE — 36415 COLL VENOUS BLD VENIPUNCTURE: CPT | Performed by: STUDENT IN AN ORGANIZED HEALTH CARE EDUCATION/TRAINING PROGRAM

## 2023-06-05 ENCOUNTER — OFFICE VISIT (OUTPATIENT)
Dept: OBSTETRICS AND GYNECOLOGY | Facility: CLINIC | Age: 38
End: 2023-06-05
Payer: COMMERCIAL

## 2023-06-05 ENCOUNTER — HOSPITAL ENCOUNTER (OUTPATIENT)
Dept: PERINATAL CARE | Facility: OTHER | Age: 38
Discharge: HOME | End: 2023-06-05
Attending: STUDENT IN AN ORGANIZED HEALTH CARE EDUCATION/TRAINING PROGRAM
Payer: COMMERCIAL

## 2023-06-05 ENCOUNTER — PATIENT MESSAGE (OUTPATIENT)
Dept: OBSTETRICS AND GYNECOLOGY | Facility: CLINIC | Age: 38
End: 2023-06-05
Payer: COMMERCIAL

## 2023-06-05 VITALS
HEIGHT: 63 IN | BODY MASS INDEX: 26.95 KG/M2 | WEIGHT: 152.13 LBS | SYSTOLIC BLOOD PRESSURE: 112 MMHG | DIASTOLIC BLOOD PRESSURE: 80 MMHG

## 2023-06-05 DIAGNOSIS — N91.4 SECONDARY OLIGOMENORRHEA: ICD-10-CM

## 2023-06-05 DIAGNOSIS — Z3A.01 5 WEEKS GESTATION OF PREGNANCY: ICD-10-CM

## 2023-06-05 DIAGNOSIS — N91.2 AMENORRHEA: ICD-10-CM

## 2023-06-05 DIAGNOSIS — Z32.01 POSITIVE PREGNANCY TEST: Primary | ICD-10-CM

## 2023-06-05 LAB
CREAT UR-MCNC: 43 MG/DL (ref 15–325)
PROT UR-MCNC: <7 MG/DL (ref 0–15)
PROT/CREAT UR: NORMAL MG/G{CREAT} (ref 0–0.2)

## 2023-06-05 PROCEDURE — 3074F PR MOST RECENT SYSTOLIC BLOOD PRESSURE < 130 MM HG: ICD-10-PCS | Mod: CPTII,S$GLB,, | Performed by: FAMILY MEDICINE

## 2023-06-05 PROCEDURE — 99214 OFFICE O/P EST MOD 30 MIN: CPT | Mod: S$GLB,,, | Performed by: FAMILY MEDICINE

## 2023-06-05 PROCEDURE — 76801 US OB/GYN PROCEDURE (VIEWPOINT): ICD-10-PCS | Mod: 26,,, | Performed by: OBSTETRICS & GYNECOLOGY

## 2023-06-05 PROCEDURE — 3079F PR MOST RECENT DIASTOLIC BLOOD PRESSURE 80-89 MM HG: ICD-10-PCS | Mod: CPTII,S$GLB,, | Performed by: FAMILY MEDICINE

## 2023-06-05 PROCEDURE — 3008F PR BODY MASS INDEX (BMI) DOCUMENTED: ICD-10-PCS | Mod: CPTII,S$GLB,, | Performed by: FAMILY MEDICINE

## 2023-06-05 PROCEDURE — 1159F MED LIST DOCD IN RCRD: CPT | Mod: CPTII,S$GLB,, | Performed by: FAMILY MEDICINE

## 2023-06-05 PROCEDURE — 3079F DIAST BP 80-89 MM HG: CPT | Mod: CPTII,S$GLB,, | Performed by: FAMILY MEDICINE

## 2023-06-05 PROCEDURE — 99999 PR PBB SHADOW E&M-EST. PATIENT-LVL III: ICD-10-PCS | Mod: PBBFAC,,, | Performed by: FAMILY MEDICINE

## 2023-06-05 PROCEDURE — 3008F BODY MASS INDEX DOCD: CPT | Mod: CPTII,S$GLB,, | Performed by: FAMILY MEDICINE

## 2023-06-05 PROCEDURE — 1159F PR MEDICATION LIST DOCUMENTED IN MEDICAL RECORD: ICD-10-PCS | Mod: CPTII,S$GLB,, | Performed by: FAMILY MEDICINE

## 2023-06-05 PROCEDURE — 76801 OB US < 14 WKS SINGLE FETUS: CPT

## 2023-06-05 PROCEDURE — 87086 URINE CULTURE/COLONY COUNT: CPT | Performed by: FAMILY MEDICINE

## 2023-06-05 PROCEDURE — 76801 OB US < 14 WKS SINGLE FETUS: CPT | Mod: 26,,, | Performed by: OBSTETRICS & GYNECOLOGY

## 2023-06-05 PROCEDURE — 3074F SYST BP LT 130 MM HG: CPT | Mod: CPTII,S$GLB,, | Performed by: FAMILY MEDICINE

## 2023-06-05 PROCEDURE — 99999 PR PBB SHADOW E&M-EST. PATIENT-LVL III: CPT | Mod: PBBFAC,,, | Performed by: FAMILY MEDICINE

## 2023-06-05 PROCEDURE — 82570 ASSAY OF URINE CREATININE: CPT | Performed by: FAMILY MEDICINE

## 2023-06-05 PROCEDURE — 99214 PR OFFICE/OUTPT VISIT, EST, LEVL IV, 30-39 MIN: ICD-10-PCS | Mod: S$GLB,,, | Performed by: FAMILY MEDICINE

## 2023-06-05 PROCEDURE — 87591 N.GONORRHOEAE DNA AMP PROB: CPT | Performed by: FAMILY MEDICINE

## 2023-06-05 RX ORDER — VALACYCLOVIR HYDROCHLORIDE 500 MG/1
500 TABLET, FILM COATED ORAL
COMMUNITY
Start: 2023-05-31

## 2023-06-05 NOTE — PATIENT INSTRUCTIONS
LABOR AND DELIVERY PHONE NUMBER, [phone] (OPEN , LOCATED ON 6TH FLOOR OF HOSPITAL)  [address] PHONE NUMBER, [phone] (OPEN MON-FRI, 8a-5p)     1) Eat small frequent meals through the day versus three large meals  2) Try ginger ale or sprite to help settle the stomach   3) Eat crackers or dry toast before getting out of bed in the morning   4) Stay hydrated by drinking plenty of water-do not immediately eat or drink something after vomiting. Give your stomach a rest for 20-30 minutes. Slowly reintroduce ice chips, small sips water, crackers, etc.    5) Try OTC unisom (1/2 tablet) and vitamin B6 - take the 25mg b6 twice a day and then both together at night before bed. This can help with the nausea in the morning and is safe to use during pregnancy.    If you are unable to keep anything down and constantly vomiting for more that 24 hours, let the office know so that dehydration can be avoided. We would recommend being seen in the emergency department if this is the case.       Topic  General Pregnancy Information Recommended   (Unless Otherwise Contraindicated Or Restrictions Given To You By Your OB Doctor)      1. Anticipated course of prenatal care      Visits: will be Every 4 wks until 28 weeks, then every 2 weeks until 36 weeks, and then weekly until delivery.   Urine will be collected at each Obstetric visit        2. Nutrition and weight gain    Daily pre-razia vitamin (recommend taking at night)   Additional 300 calories needed daily  No Sushi, hotdogs, unpasteurized products (milk/cheeses). No large fish such as: shark, som mackerel, tile, sword fish   Incorporate 12 ounces of smaller seafood/week and no more than 6oz of albacore tuna   Caffeine: 200 mg/day or 2 cups of caffeine/day   Weight gain recommendations are based off of BMI before pregnancy. Generally patients who with normal weight prior pregnancy it is recommended 25-35 pounds of weight gain during the pregnancy with an estimated  weekly gain of 1 pound/wk in 2nd and 3rd Trimester.    3. Toxoplasmosis precautions  If cats are in the home avoid changing litter boxes and if you need to change the litter box recommended you use gloves   4. Sexual Activity  Sexual activity is okay unless you are put on restrictions by your provider. I recommend urinating after intercourse    5. Exercise  Generally pre-pregnancy routine is okay to continue   Drink plenty fluids for hydration   Stop any activity that causes heavy cramping like a period or bright red bleeding and contact your provider  No extreme or contact sports   No exercise on your back for an extended period of time after 20 weeks    6. Hot Tub/Saunas  Avoid hot tubes and saunas    7. Hair Treatments  Because of the lack of scientific studies on the effects of chemical treatments on your hair, we must advise that you do it at your own risk. If you choose to treat your hair, we recommend that you wait until after 12 weeks gestation. At this time there is no reason to believe that normal hair treatment is associated with onsequences to the baby.    8. Vaccines  Influenza vaccine is recommended by CDC during flu season   Tdap (pertussis or whopping cough) recommended each pregnancy between 27 and 36 weeks   Tdap booster recommended for family and other planned direct caregivers    9. Water  Water is an important nutrient in a good diet. However, it cannot be stressed enough that during pregnancy water is essential. The body has increased circulation through blood vessels, and without a large increase in water, pregnant women will be dehydrated. It plays an important role in decreasing constipation, preventing  contractions, decreasing swelling, and preventing dizziness. We recommend that you drink 8-10 glasses of water per day.    10. Smoking/Alcohol/Illicit Drug Use  No safe Level   Can lead to problems with pregnancy   Growth of the developing fetus    labor (delivery before 37  weeks)    rupture of the membranes (water breaking before 37 weeks)   Premature separation of the placenta (which may cause bleeding)   American College of Obstetricians and Gynecologists endorses abstinence   Can lead to babies with disabilities    11. Environmental or work hazards  Unless otherwise restricted you may continue work throughout the pregnancy   Notify your provider of any work hazards or chemical exposure concerns   12. Travel    Safe to travel up to 35 weeks   Continue to wear a seatbelt and airbags are still recommended   Drink plenty fluids   Blood clots are a concern during pregnancy with long travel. Recommend compression stockings and moving around at least every 2 hours and staying hydrated.    13. Use of medications, vitamins, herbs, OTC drugs    Any medications not on the list provided to you from our clinic or given to you by one of our providers we recommend calling to make sure the medication is safe for you and baby.    14. Domestic Violence    Please notify office immediately of any concerns or violence so that we can help direct you to assistance needed   [place] [employer]: [phone]    15. Childbirth classes    List of Childbirth classes from [facility] is available    16. Selecting a Pediatrician  Selecting a pediatrician before delivery is recommended  You can interview pediatricians before delivery    17. Fetal Monitoring    A simple test of your babys well-being is a kick count. After 26 weeks, fetal motion of any kind should be monitored. Further discussion at that time   18.  Labor Signs    Water break, leaking fluids from Vagina prior 37 weeks  Regular contractions, Contractions that are more than 5-6/hour, getting stronger and painful with lower back pain, does not go away with rest and fluids    19. Postpartum Family Planning    Multiple options available from short term methods to long term reversible and irreversible methods    Discuss with provider as you get closer to delivery    20. Dental    It is recommended that you get an annual dental cleaning    21. Breastfeeding    Classes offered at [facility] and it is recommended to take a class    22. Lifting In 2013, the [employer] [place] [employer] Safety and Health (NIOSH) published clinical guidelines for occupational lifting in uncomplicated pregnancies. The recommended weight limits are based on gestational age, intermittent versus repetitive lifting, time (hours/day) spent lifting, and lifting height from floor and distance in 3 front of body. In this guideline, the maximum permissible weight for a woman less than 20 weeks of gestation performing infrequent lifting is 36 pounds (16 kgs) and the maximum permissible weight at ?20 weeks is 26 pounds (12 kgs). For repetitive lifting ?1 hour/day, the maximum weights in the first and second half of pregnancy are 18 pounds (8 kgs) and 13 pounds (6 kgs), respectively, and for repetitive lifting <1 hour/day, the maximum weights are 30 pounds (14 kgs) and 22 pounds (10 kgs), respectively. Although not based on high quality evidence, these guidelines are a reasonable reference for counseling pregnant women     23. Scheduling and Provider Availability    Your Obstetric Doctor is usually here weekly but not every day. We recommend you make 3-4 advanced appointments at a time to accommodate your personal needs and work/school obligations.   We ask that you come 15 minutes prior your scheduled appointment.   For same day appointments (not routine appointments) there is a Nurse Practitioner or another obstetric provider available. Please let the  aware you are an OB patient requesting a same day appointment.      24. Recommended Phone [facility]      MEDICATIONS IN PREGNANCY     While some medications are considered safe to take during pregnancy, the effects of other medications on your unborn baby are unknown.  Therefore, it is very important to pay special attention to medications you take while you are pregnant.   If you were taking prescription medications before you became pregnant, please ask your health care provider about continuing these medications as soon as you find out that you are pregnant. Do not stop taking any medications without discussing with your health care provider. Your health care provider will weigh the benefits and risks to your pregnancy when making his or her recommendation. With some medications, the risk of not taking them may be more serious than the potential risk of taking them.   If you are prescribed any new medication, please inform your health care provider that you are pregnant. Be sure to discuss the risks and benefits of the newly prescribed medication with your health care provider before taking the medication. We are always available to answer any questions about new medications and how they relate to your pregnancy.     Are Alternative Pregnancy Medicine Therapies Safe?   Many pregnant women believe natural products can be safely used to relieve nausea, backache, and other annoying symptoms of pregnancy, but many of these so-called natural products have not been tested for their safety and effectiveness. Therefore, it is very important to check with your health care provider before taking any alternative therapies. She will not recommend a product or therapy until it is shown to be safe and effective.     Which Over the Counter Drugs Are Safe?   Prenatal vitamins, now available without a prescription, are safe and important to take during pregnancy. Ask your health care provider about the safety of taking other vitamins, herbal remedies and supplements during pregnancy. Most herbal preparations and supplements have not been proven to be safe during pregnancy. Generally, you should not take any over-the-counter medication unless it is necessary. The following medications and home  remedies have no known harmful effects during pregnancy when taken according to the package directions. If you want to know about the safety of any other medications not listed here, please contact your health care provider.      Problem:  Safe to Take:    Pain relief, headache, and fever  Acetaminophen - Tylenol, Anacin Aspirin-Free    Heartburn  Acid neutralizers - Maalox, Mylanta, Rolaids, Tums, Gaviscon   Histamine-blockers - Pepcid, Zantac, Prilosec    Gas pains and bloating  Simethicone - Gas-X, Maalox Anti-Gas, Mylanta Gas, Mylicon    Nausea  Tania - beverages, tablets, candies   Vitamin B6   Emetrol (if not diabetic)   Sea bands   Anti-histamines - Sleep-chen, Benadryl, Bonnine, Dramamine    Cough  Guaifenesin (expectorant) - Hytuss, Mucinex, Robitussin   Dextromethorphan (antitussive) - Benylin, Delsym, Scot-Tussin DM   Guaifenesin plus dextromethorphan - Benylin Expectorant, Robitussin DM    Congestion  Pseudoephedrine - Sudafed, Actifed, Dristan, Neosynephrine   Vicks VapoRub   Saline nasal drops or spray    Sore throat  Throat lozenges - Sucrets, Cepacol, Cepastat, Ricola   Chloroseptic Spray   Warm salt/water gargle    Allergy relief  Chlorpheniramine - Chlor-Trimeton, Triaminic   Loratadine - Alavert, Claritin, Tavist ND, Triaminic Allerchews   Cetirizine - Zyrtec   Diphenhydramine -Benadryl, Diphenhist    Rashes  Hydrocortisone cream or ointment   Caladryl lotion or cream   Benadryl cream   Oatmeal bath (Aveeno)    Diarrhea  Loperamide - Imodium, Kaopectate, Maalox Anti-Diarrheal, Pepto Bismol    Constipation  Fiber supplements - Metamucil, Citrucel, Fiberall/Fibercon, Benefiber   Stool softeners - Colace, Senekot, Dulcolax   Milk of Magnesia    Hemorrhoids  Warm baths   Witch hazel preparations - Tucks medicated pads   Steroid preparations - Anusol-HC, Preparation H    Insomnia  Diphenhydramine - Benadryl, Unisom SleepGels, Nytol, Sominex   Doxylamine succinate - Unisom Nighttime Sleep-Aid     First-aid ointments  Cortaid, Lanacort, Polysporin, Bacitracin, Neosporin    Yeast infections  Call office for appointment      Connected MOM   Using Wireless Technology to Manage Your Prenatal Health   Congratulations! Your team here at [facility] is excited for the upcoming addition to your family and is ready to support you over the course of your pregnancy. One of the ways we are prepared to help you is through our exciting new Digital Medicine Program, Connected MOM. Connected MOM stands for Connected Maternity Online Monitoring and is offered free of charge as a way to help our expectant mothers manage their pregnancy with fewer visits to the obstetrician.    In the fast-paced environment we live in, patients demand convenient, reliable access to healthcare at their fingertips. Recommended by The American College of Obstetricians and Gynecologists (ACOG), the standard prenatal care model for low-risk pregnancies can average anywhere between 12-14 in-office prenatal visits.    Through this innovative program, participating mothers-to-be receive a wireless scale, wireless blood pressure cuff and an at-home urine protein test kit. Through the use of a smartphone, patients can easily send their weight, blood pressure readings and urine protein test results digitally in real-time from the comfort of their own homes. Results are sent directly to their [facility] account, the systems online patient portal which connects directly to the patients Electronic Medical Record. A specialized Connected MOM care team - comprised of the patients obstetrician, a dedicated health  and a  - reviews the data and provides medical recommendations as needed. This allows expectant mothers to receive care proactively, wherever they are, while minimizing the need for in-person visits and thus minimizing disruption to their regular daily activities.    How it Works At the initial prenatal  visit, interested patients can work with their obstetrician to sign up for the program. After the appointment, expectant mothers can head to the [employer], a first-of-its-kind retail experience created by [facility] offering the latest in cutting-edge, interactive healthcare technology, to receive a Connected MOM kit containing all of the digital tools needed for remote monitoring. Once enrolled, patients weigh themselves regularly and take their blood pressure once a week. Instead of coming to three office appointments at weeks 20, 30, 37 the patient will have the appointment at home. They will take their blood pressure, weight and perform three at-home urine protein tests at these home visits. Results are directly transmitted into the EMR, and notifications and messages from the care team are communicated via [facility].     TECH SUPPORT [phone]  [email or web address]      Chromosomal testing  The sequential screen is a test done around 11-13 weeks that consists of an ultrasound that measures the nuchal fold (neck thickness) of baby and blood work on the same day. You get a second set of labs done a few weeks later after 15 weeks. Based on all three of these results, they are able to tell you if you are considered to be low risk or high risk regarding neural tube defects and chromosomal abnormalities like Down Syndrome. If you are at all interested, I usually place the order today so we do not miss the window. Someone will give you a phone call in a week or two to schedule this. If you do not want it, just tell them no thank you. This test is typically covered by your insurance and is performed by the Maternal Fetal Medicine (MFM) department here at [facility] It will not tell you the sex of the baby.     OR     2.  Call [phone] to see about coverage and any out of pocket costs regarding the [id number] (MT21) testing. This is done any day after 11 weeks and is blood work only. It checks for any  chromosomal abnormalities like Down Syndrome. You can also find out the sex of the baby if you choose to know. Once you find out coverage and decide to proceed, send either myself or your doctor a message and we can see what date you can do it. It is done at our second floor lab at [facility].

## 2023-06-05 NOTE — PROGRESS NOTES
HISTORY OF PRESENT ILLNESS:    Gisella Smith is a 38 y.o. female, ,  Patient's last menstrual period was 2023 (exact date).  for a routine exam complaining of amenorrhea and positive home UPT. Not having any pelvic pain or VB. Having Nausea, no vomiting, fatigue and breast tenderness. 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  in 2020, uncomplicated and child healthy.This is the extent of the patient's complaints at this time.     OB History    Para Term  AB Living   5 1 1   3 1   SAB IAB Ectopic Multiple Live Births   2   1 0 1      # Outcome Date GA Lbr René/2nd Weight Sex Delivery Anes PTL Lv   5 Current            4 Ectopic      ECTOPIC         Birth Comments: R salpingectomy   3 Term 10/31/20 37w6d / 00:50 2.95 kg (6 lb 8.1 oz) F Vag-Spont EPI N TRACI   2 SAB 10/2019              Birth Comments: anembroynic pregnancy   1 SAB 2019              Birth Comments: chemical pregnancy       Past Medical History:   Diagnosis Date    Overweight (BMI 25.0-29.9) 2017    Recurrent cold sores 2017       Past Surgical History:   Procedure Laterality Date    DIAGNOSTIC LAPAROSCOPY N/A 2023    Procedure: LAPAROSCOPY, DIAGNOSTIC;  Surgeon: Gisella Parrish MD;  Location[facility];  Service: OB/GYN;  Laterality: N/A;    LAPAROSCOPIC SALPINGECTOMY Right 2023    Procedure: SALPINGECTOMY, LAPAROSCOPIC;  Surgeon: Gisella Parrish MD;  Location: [facility] [place];  Service: OB/GYN;  Laterality: Right;    WISDOM TOOTH EXTRACTION         MEDICATIONS AND ALLERGIES:      Current Outpatient Medications:     valACYclovir (VALTREX) 500 MG tablet, Take 500 mg by mouth., Disp: , Rfl:     valACYclovir (VALTREX) 1000 MG tablet, SMARTSI Tablet(s) By Mouth Every 12 Hours, Disp: , Rfl:     Review of patient's allergies indicates:   Allergen Reactions    Sulfamethoxazole-trimethoprim Hives       Family History   Problem Relation Age of Onset    Osteoporosis Mother     Parkinsonism  Father     Hypertension Father     Osteoporosis Father     No Known Problems Brother     Hypertension Maternal Grandmother     Hyperlipidemia Maternal Grandmother     Alzheimer's disease Maternal Grandmother     Other Maternal Grandfather         oropharyngeal cancer    Liver disease Maternal Grandfather     Diabetes Mellitus Paternal Grandmother     Hypertension Paternal Grandfather     Bullous pemphigoid Paternal Grandfather     Arrhythmia Paternal Grandfather     No Known Problems Brother     Colon cancer Neg Hx     Breast cancer Neg Hx     Ovarian cancer Neg Hx     Stroke Neg Hx        Social History     Socioeconomic History    Marital status:    Tobacco Use    Smoking status: Never    Smokeless tobacco: Never   Substance and Sexual Activity    Alcohol use: Yes    Drug use: Never    Sexual activity: Yes     Partners: Male   Social History Narrative    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); no children    Regular exercise: percy, works with     Outside GYN     Social Determinants of Health     Financial Resource Strain: Unknown    Difficulty of Paying Living Expenses: Patient refused   Food Insecurity: Unknown    Worried About Running Out of Food in the Last Year: Patient refused    Ran Out of Food in the Last Year: Patient refused   Transportation Needs: Unknown    Lack of Transportation (Medical): Patient refused    Lack of Transportation (Non-Medical): Patient refused   Physical Activity: Unknown    Days of Exercise per Week: Patient refused    Minutes of Exercise per Session: 30 min   Stress: Unknown    Feeling of Stress : Patient refused   Social Connections: Unknown    Frequency of Communication with Friends and Family: Patient refused    Frequency of Social Gatherings with Friends and Family: Patient refused    Active Member of Clubs or Organizations: Patient refused    Attends Club or Organization Meetings: Patient refused    Marital  "Status: Patient refused   Housing Stability: Unknown    Unable to Pay for Housing in the Last Year: Patient refused    Number of Places Lived in the Last Year: 1    Unstable Housing in the Last Year: Patient refused       COMPREHENSIVE GYN HISTORY:  PAP History: + abnormal Paps.  Infection History: + STDs (genital warts). Denies PID.  Benign History: Denies uterine fibroids. Denies ovarian cysts. Denies endometriosis. Denies other conditions.  Cancer History: Denies cervical cancer. Denies uterine cancer or hyperplasia. Denies ovarian cancer. Denies vulvar cancer or pre-cancer. Denies vaginal cancer or pre-cancer. Denies breast cancer. Denies colon cancer.  Sexual Activity History: Reports currently being sexually active  Menstrual History: None.  Contraception: None    ROS:  GENERAL: No weight changes. No swelling. + fatigue. No fever.  CARDIOVASCULAR: No chest pain. No shortness of breath. No leg cramps.   NEUROLOGICAL: No headaches. No vision changes.  BREASTS: + pain. No lumps. No discharge.  ABDOMEN: No pain. + nausea. No vomiting. No diarrhea. No constipation.  REPRODUCTIVE: No abnormal bleeding.   VULVA: No pain. No lesions. No itching.  VAGINA: No relaxation. No itching. No odor. No discharge. No lesions.  URINARY: No incontinence. No nocturia. No frequency. No dysuria.    /80   Ht 5' 3" (1.6 m)   Wt 69 kg (152 lb 1.9 oz)   LMP 04/17/2023 (Exact Date)   BMI 26.95 kg/m²     PE:  Physical Exam:   Constitutional: She appears well-developed and well-nourished. She does not appear ill. No distress.        Pulmonary/Chest: Right breast exhibits no inverted nipple, no mass, no nipple discharge, no skin change, no tenderness and no swelling. Left breast exhibits no inverted nipple, no mass, no nipple discharge, no skin change, no tenderness and no swelling.          Genitourinary:    Urethra, vagina, uterus, right adnexa and left adnexa normal.      Pelvic exam was performed with patient supine.   The " external female genitalia was normal.   Genitalia hair distrobution normal .   There is no rash or lesion on the right labia. There is no rash or lesion on the left labia. Cervix is normal. No rectocele, cystocele or unspecified prolapse of vaginal walls in the vagina.    pap smear not completedUterus is not tender. Normal urethral meatus.              Neurological: GCS eye subscore is 4. GCS verbal subscore is 5. GCS motor subscore is 6.       PROCEDURES:  Genprobe  Pap-UTD 2021      DIAGNOSIS:  Gyn exam  IUP with stated LMP of 4/17/23     Indication   ========   Indication: Estimation of Gestational Age     Method   ======   Transabdominal and transvaginal ultrasound examination. 2D Color Doppler, Voluson S8. View: Good view     Pregnancy   =========   Number of fetuses: none     Dating   ======   LMP on: 4/17/2023   GA by LMP 7 w + 0 d   FERNY by LMP: 1/22/2024     Assessment   ==========   Gestational sac: visualized   GS 7.5 mm 5w 4d Rempen   Location: intrauterine   GS D1 8.3 mm   GS D2 6.9 mm   GS D3 7.4 mm   Yolk sac: visualized   Amniotic sac: not visualized   Embryo: not visualized   Cardiac activity: absent     Maternal Structures   ===============   Uterus / Cervix   Uterus: Normal   Cervix: Visualized   Approach: Transvaginal   Ovaries / Tubes / Adnexa   Rt ovary: Normal   Rt ovary D1 32 mm   Rt ovary D2 23 mm   Rt ovary D3 19 mm   Rt ovary Vol 7.2 cmÂ³   Rt ovarian corpus luteum D1 25.0 mm   Rt ovarian corpus luteum D2 14.0 mm   Rt ovarian corpus luteum D3 21.0 mm   Lt ovary: Normal   Lt ovary D1 19 mm   Lt ovary D2 12 mm   Lt ovary D3 20 mm   Lt ovary Vol 2.4 cmÂ³   Cul de Sac / Bladder / Kidneys / Other   Free fluid: No free fluid visualized     Impression   =========   An intrauterine gestational sac with a mean sac diameter of 7.5 mm is identified. A yolk sac is seen within the gestational sac. An embryo is not visible within the   gestational sac; however, at a mean sac diameter of less than 25 mm,  the absence of a visible embryo may not be pathologic.   The tentative diagnosis, based on ultrasound findings alone, is a pregnancy of uncertain viability.   Other clinical data, as available, should be used in conjunction with ultrasound findings to make a final determination regarding viability.   If a follow-up study will be ordered to assess viability and to assign FERNY, recommend that this study not be done any sooner than 11 days from the date of today's exam.   The maternal adnexae are normal in appearance. The amount of free fluid present in the pelvis is within the normal physiologic range.     Limitations   =========   Please note: Prenatal ultrasound studies have limitations. They do not detect all fetal, genetic, placental, and maternal abnormalities. A normal appearing prenatal   ultrasound is reassuring. However, it does not guarantee the absence of an abnormality or predict a normal outcome for the fetus or the mother.                                                          PLAN:Routine prenatal care    MEDICATIONS PRESCRIBED:  PNV    LABS AND TESTS ORDERED:  New Ob Labs      1st TRIMESTER COUNSELING: Discussed all, booklet provided  Common complaints of pregnancy  HIV and other routine prenatal tests including  genetic screening  Risk factors identified by prenatal history  Anticipated course of prenatal care  Nutrition and weight gain counseling  Toxoplasmosis precautions (Cats/Raw Meat)  Sexual activity and exercise  Environmental/Work hazards  Travel  Tobacco (Ask, Advise, Assess, Assist, and Arrange), as well as alcohol and drug use  Use of any medications (Including supplements, Vitamins, Herbs, or OTC Drugs)  Indications for Ultrasound  Domestic violence  Seat belt use  Childbirth classes/Hospital facilities     TERATOLOGY COUNSELING: Discussed options for SS, MT21 and carrier screening. Pt will let us know her desires. Discussed time constraints on SS      FOLLOW-UP for a New Ob  Visit with Dr. Winkler - will place order for repeat US with IOB that day  -discussed to call clinic or L&D/ER if after hours for pain/bleeding  -discussed otc meds for NV she will let me know if she needs rx  -total time on encounter 35 min

## 2023-06-06 LAB
BACTERIA UR CULT: NORMAL
C TRACH DNA SPEC QL NAA+PROBE: NOT DETECTED
N GONORRHOEA DNA SPEC QL NAA+PROBE: NOT DETECTED

## 2023-06-16 ENCOUNTER — INITIAL PRENATAL (OUTPATIENT)
Dept: OBSTETRICS AND GYNECOLOGY | Facility: CLINIC | Age: 38
End: 2023-06-16
Payer: COMMERCIAL

## 2023-06-16 ENCOUNTER — PATIENT MESSAGE (OUTPATIENT)
Dept: OBSTETRICS AND GYNECOLOGY | Facility: CLINIC | Age: 38
End: 2023-06-16
Payer: COMMERCIAL

## 2023-06-16 ENCOUNTER — HOSPITAL ENCOUNTER (OUTPATIENT)
Dept: PERINATAL CARE | Facility: OTHER | Age: 38
Discharge: HOME | End: 2023-06-16
Attending: FAMILY MEDICINE
Payer: COMMERCIAL

## 2023-06-16 VITALS
WEIGHT: 151.25 LBS | BODY MASS INDEX: 26.79 KG/M2 | DIASTOLIC BLOOD PRESSURE: 80 MMHG | SYSTOLIC BLOOD PRESSURE: 116 MMHG

## 2023-06-16 DIAGNOSIS — Z3A.01 6 WEEKS GESTATION OF PREGNANCY: Primary | ICD-10-CM

## 2023-06-16 DIAGNOSIS — O09.521 MULTIGRAVIDA OF ADVANCED MATERNAL AGE IN FIRST TRIMESTER: ICD-10-CM

## 2023-06-16 DIAGNOSIS — Z3A.01 5 WEEKS GESTATION OF PREGNANCY: ICD-10-CM

## 2023-06-16 PROCEDURE — 0502F PR SUBSEQUENT PRENATAL CARE: ICD-10-PCS | Mod: CPTII,S$GLB,, | Performed by: FAMILY MEDICINE

## 2023-06-16 PROCEDURE — 76801 US OB/GYN PROCEDURE (VIEWPOINT): ICD-10-PCS | Mod: 26,,, | Performed by: OBSTETRICS & GYNECOLOGY

## 2023-06-16 PROCEDURE — 76801 OB US < 14 WKS SINGLE FETUS: CPT | Mod: 26,,, | Performed by: OBSTETRICS & GYNECOLOGY

## 2023-06-16 PROCEDURE — 99999 PR PBB SHADOW E&M-EST. PATIENT-LVL III: ICD-10-PCS | Mod: PBBFAC,,, | Performed by: FAMILY MEDICINE

## 2023-06-16 PROCEDURE — 99999 PR PBB SHADOW E&M-EST. PATIENT-LVL III: CPT | Mod: PBBFAC,,, | Performed by: FAMILY MEDICINE

## 2023-06-16 PROCEDURE — 0502F SUBSEQUENT PRENATAL CARE: CPT | Mod: CPTII,S$GLB,, | Performed by: FAMILY MEDICINE

## 2023-06-16 PROCEDURE — 76817 TRANSVAGINAL US OBSTETRIC: CPT

## 2023-06-16 NOTE — PATIENT INSTRUCTIONS
LABOR AND DELIVERY PHONE NUMBER, [phone] (OPEN 24/7, LOCATED ON 6TH FLOOR OF HOSPITAL)  [address] PHONE NUMBER, [phone] (OPEN MON-FRI, 8a-5p)

## 2023-06-16 NOTE — PROGRESS NOTES
Here for routine OB appt at 6w2d. Had repeat dating US today. Measuring 6w2d with .  No complaints, denies VB and cramping.  Still having nausea but managing. Pt concern with dates being off. She was tracking ovulation and did ovulate later in the cycle she got pregnant. Planning to travel end of the month to [place] for family wedding.  Has f/u with Dr. Winkler next week she'd like to keep then will repeat dating when she is going to be 8w to ensure growth and FHR increasing. Pain, bleeding, and ED precautions given.  F/U scheduled 1 week    Indication   ========   Indication: Estimation of Gestational Age   Indication: Advanced Maternal Age (AMA), Multigravida     History   ======   Risk Factors   History risk factors: AMA     Method   ======   Transvaginal ultrasound examination. 2D Color Doppler, Voluson S8. View: Good view     Pregnancy   =========   Monet pregnancy. Number of embryos: 1     Dating   ======   LMP on: 4/17/2023   GA by LMP 8 w + 4 d   FERNY by LMP: 1/22/2024   Ultrasound examination on: 6/16/2023   GA by U/S based upon: CRL   GA by U/S 6 w + 2 d   FERNY by U/S: 2/7/2024   Assigned: based on ultrasound (CRL), selected on 06/16/2023   Assigned GA 6 w + 2 d   Assigned FERNY: 2/7/2024     Assessment   ==========   Gestational sac: visualized   Location: intrauterine   Yolk sac: visualized   Amniotic sac: not visualized   Embryo: visualized   CRL 5.2 mm 6w 2d Hadlock   Cardiac activity: present    bpm     Maternal Structures   ===============   Uterus / Cervix   Uterus: Normal   Cervix: Visualized   Approach: Transvaginal   Other: hypoechoic area to left of gestational sac = 19 x 2 x 8mm   Ovaries / Tubes / Adnexa   Rt ovary: Not visualized   Lt ovary: Not visualized   Cul de Sac / Bladder / Kidneys / Other   Free fluid: No free fluid visualized     Impression   =========   1. A monet living IUP is identified. Dating is assigned based on the CRL from today?s study. Gestational age is 6w 2d,  with an FRENY of 02/7/2024.   - If other clinical data, such as IVF conception dating or prior ultrasound assignment of FERNY differs from the FERNY assigned today, please contact the [facility] department so   that this report can be revised to reflect the correct FERNY.   2. The maternal adnexae are normal in appearance.     Limitations   =========   Please note: Prenatal ultrasound studies have limitations. They do not detect all fetal, genetic, placental, and maternal abnormalities. A normal appearing prenatal   ultrasound is reassuring. However, it does not guarantee the absence of an abnormality or predict a normal outcome for the fetus or the mother.

## 2023-06-19 ENCOUNTER — TELEPHONE (OUTPATIENT)
Dept: OBSTETRICS AND GYNECOLOGY | Facility: CLINIC | Age: 38
End: 2023-06-19
Payer: COMMERCIAL

## 2023-06-19 ENCOUNTER — NURSE TRIAGE (OUTPATIENT)
Dept: ADMINISTRATIVE | Facility: CLINIC | Age: 38
End: 2023-06-19
Payer: COMMERCIAL

## 2023-06-19 NOTE — TELEPHONE ENCOUNTER
Pt with complaints of vaginal bleeding 15 minutes ago.  Pt has an appointment with Dr. Winkler this Friday.  Care advice states to see provider within 2 weeks.  Patient verbally understands, all questions answered, advised to call back for any worsening symptoms or further needs.     Reason for Disposition   Not feeling pregnant any longer (e.g., breast tenderness or nausea has disappeared)    Additional Information   Negative: Shock suspected (e.g., cold/pale/clammy skin, too weak to stand, low BP, rapid pulse)   Negative: Difficult to awaken or acting confused (e.g., disoriented, slurred speech)   Negative: Passed out (i.e., fainted, collapsed and was not responding)   Negative: Sounds like a life-threatening emergency to the triager   Negative: SEVERE abdominal pain (e.g., excruciating)   Negative: SEVERE vaginal bleeding (e.g., soaking 2 pads or tampons per hour and present 2 or more hours; 1 menstrual cup every 2 hours)   Negative: SEVERE dizziness (e.g., unable to stand, requires support to walk, feels like passing out)   Negative: MODERATE vaginal bleeding (i.e., soaking 1 pad) and present > 6 hours   Negative: MODERATE vaginal bleeding (i.e., soaking 1 pad / hour, clots) and pregnant > 12 weeks   Negative: Passed tissue (e.g., gray-white)   Negative: Shoulder pain   Negative: Constant abdominal pain lasting > 1 hour   Negative: Fever > 100.4 F (38.0 C)   Negative: Pale skin (pallor) of new-onset or worsening   Negative: Patient sounds very sick or weak to the triager   Negative: MODERATE vaginal bleeding (i.e., soaking 1 pad / hour; clots)   Negative: Intermittent lower abdominal pain (e.g., cramping) lasting > 24 hours   Negative: Pain or burning with passing urine (urination)   Negative: Prior history of 'ectopic pregnancy' or previous tubal surgery (e.g., tubal ligation)     Pt states ultrasound done and pregnancy is not ectopic.   Negative: Patient wants to be seen   Negative: MILD vaginal bleeding (i.e.,  less than 1 pad / hour; less than patient's usual menstrual bleeding; not just spotting)   Negative: SPOTTING lasting > 48 hours or spotting happens more than once in a week   Negative: Has IUD   Negative: Unusual vaginal discharge (e.g., bad smelling, yellow, green, or foamy-white)    Protocols used: Pregnancy - Vaginal Bleeding Less Than 20 Weeks EGA-A-OH

## 2023-06-19 NOTE — TELEPHONE ENCOUNTER
Reached out to pt regarding vaginal bleeding. Pt stated she is having dark spotting just when wiping and rated cramping pain a 2 out of 10.Also pt stated she is no longer having symptoms such as breast tenderness and nausea. Pt is okay with appt on 6/23 with . Advised pt if she starts filling a pad an hour report to ER per . Pt verbalized understanding.

## 2023-06-20 ENCOUNTER — PATIENT MESSAGE (OUTPATIENT)
Dept: OBSTETRICS AND GYNECOLOGY | Facility: CLINIC | Age: 38
End: 2023-06-20
Payer: COMMERCIAL

## 2023-06-20 ENCOUNTER — HOSPITAL ENCOUNTER (OUTPATIENT)
Dept: RADIOLOGY | Facility: OTHER | Age: 38
Discharge: HOME | End: 2023-06-20
Attending: STUDENT IN AN ORGANIZED HEALTH CARE EDUCATION/TRAINING PROGRAM
Payer: COMMERCIAL

## 2023-06-20 ENCOUNTER — OFFICE VISIT (OUTPATIENT)
Dept: OBSTETRICS AND GYNECOLOGY | Facility: CLINIC | Age: 38
End: 2023-06-20
Payer: COMMERCIAL

## 2023-06-20 DIAGNOSIS — O20.9 BLEEDING IN EARLY PREGNANCY: Primary | ICD-10-CM

## 2023-06-20 DIAGNOSIS — O20.9 BLEEDING IN EARLY PREGNANCY: ICD-10-CM

## 2023-06-20 DIAGNOSIS — N96 RECURRENT PREGNANCY LOSS: ICD-10-CM

## 2023-06-20 DIAGNOSIS — O03.9 SAB (SPONTANEOUS ABORTION): Primary | ICD-10-CM

## 2023-06-20 PROCEDURE — 76817 TRANSVAGINAL US OBSTETRIC: CPT | Mod: TC

## 2023-06-20 PROCEDURE — 99999 PR PBB SHADOW E&M-EST. PATIENT-LVL I: CPT | Mod: PBBFAC,,, | Performed by: STUDENT IN AN ORGANIZED HEALTH CARE EDUCATION/TRAINING PROGRAM

## 2023-06-20 PROCEDURE — 81229 CYTOG ALYS CHRML ABNR SNPCGH: CPT | Performed by: PATHOLOGY

## 2023-06-20 PROCEDURE — 76817 TRANSVAGINAL US OBSTETRIC: CPT | Mod: 26,,, | Performed by: RADIOLOGY

## 2023-06-20 PROCEDURE — 99214 OFFICE O/P EST MOD 30 MIN: CPT | Mod: S$GLB,,, | Performed by: STUDENT IN AN ORGANIZED HEALTH CARE EDUCATION/TRAINING PROGRAM

## 2023-06-20 PROCEDURE — 76801 OB US < 14 WKS SINGLE FETUS: CPT | Mod: 26,,, | Performed by: RADIOLOGY

## 2023-06-20 PROCEDURE — 99214 PR OFFICE/OUTPT VISIT, EST, LEVL IV, 30-39 MIN: ICD-10-PCS | Mod: S$GLB,,, | Performed by: STUDENT IN AN ORGANIZED HEALTH CARE EDUCATION/TRAINING PROGRAM

## 2023-06-20 PROCEDURE — 88305 TISSUE EXAM BY PATHOLOGIST: CPT | Performed by: PATHOLOGY

## 2023-06-20 PROCEDURE — 99999 PR PBB SHADOW E&M-EST. PATIENT-LVL I: ICD-10-PCS | Mod: PBBFAC,,, | Performed by: STUDENT IN AN ORGANIZED HEALTH CARE EDUCATION/TRAINING PROGRAM

## 2023-06-20 PROCEDURE — 76801 US OB <14 WEEKS, TRANSABDOM & TRANSVAG, SINGLE GESTATION (XPD): ICD-10-PCS | Mod: 26,,, | Performed by: RADIOLOGY

## 2023-06-20 PROCEDURE — 88305 TISSUE EXAM BY PATHOLOGIST: CPT | Mod: 26,,, | Performed by: PATHOLOGY

## 2023-06-20 PROCEDURE — 88305 TISSUE EXAM BY PATHOLOGIST: ICD-10-PCS | Mod: 26,,, | Performed by: PATHOLOGY

## 2023-06-20 PROCEDURE — 76817 US OB <14 WEEKS, TRANSABDOM & TRANSVAG, SINGLE GESTATION (XPD): ICD-10-PCS | Mod: 26,,, | Performed by: RADIOLOGY

## 2023-06-20 RX ORDER — HYDROCODONE BITARTRATE AND ACETAMINOPHEN 5; 325 MG/1; MG/1
1 TABLET ORAL EVERY 6 HOURS PRN
Qty: 3 TABLET | Refills: 0 | Status: SHIPPED | OUTPATIENT
Start: 2023-06-20

## 2023-06-20 RX ORDER — IBUPROFEN 800 MG/1
800 TABLET ORAL 3 TIMES DAILY
Qty: 60 TABLET | Refills: 1 | Status: SHIPPED | OUTPATIENT
Start: 2023-06-20

## 2023-06-20 RX ORDER — MISOPROSTOL 200 UG/1
800 TABLET ORAL ONCE
Qty: 4 TABLET | Refills: 1 | Status: SHIPPED | OUTPATIENT
Start: 2023-06-20 | End: 2023-06-21

## 2023-06-20 RX ORDER — ONDANSETRON 4 MG/1
8 TABLET, ORALLY DISINTEGRATING ORAL 2 TIMES DAILY
Qty: 60 TABLET | Refills: 1 | Status: SHIPPED | OUTPATIENT
Start: 2023-06-20

## 2023-06-20 NOTE — PROGRESS NOTES
History & Physical  Gynecology      SUBJECTIVE:     Chief Complaint: No chief complaint on file.       History of Present Illness:  Gisella come from imaging center after having TVUS confirming  missed AB. She had heartbeat on dating scan on  and no longer does. She reported bleeding and cramping on messaging and was directed to come in today  She recently had ectopic pregnancy and has had missed ab in the past x2 prior to this  She has one successful  at term as well  Today, she is upset and frustrated, but her bleeding is minimal and pain is minimal . No N/V, no breast tenderness any longer  Interested in IVF at this point      Review of patient's allergies indicates:   Allergen Reactions    Sulfamethoxazole-trimethoprim Hives       Past Medical History:   Diagnosis Date    Overweight (BMI 25.0-29.9) 2017    Recurrent cold sores 2017     Past Surgical History:   Procedure Laterality Date    DIAGNOSTIC LAPAROSCOPY N/A 2023    Procedure: LAPAROSCOPY, DIAGNOSTIC;  Surgeon: Gisella Parrish MD;  Location: [facility] OR;  Service: OB/GYN;  Laterality: N/A;    LAPAROSCOPIC SALPINGECTOMY Right 2023    Procedure: SALPINGECTOMY, LAPAROSCOPIC;  Surgeon: Gisella Parrish MD;  Location: [facility] OR;  Service: OB/GYN;  Laterality: Right;    WISDOM TOOTH EXTRACTION       OB History          5    Para   1    Term   1            AB   3    Living   1         SAB   2    IAB        Ectopic   1    Multiple   0    Live Births   1               Family History   Problem Relation Age of Onset    Osteoporosis Mother     Parkinsonism Father     Hypertension Father     Osteoporosis Father     No Known Problems Brother     Hypertension Maternal Grandmother     Hyperlipidemia Maternal Grandmother     Alzheimer's disease Maternal Grandmother     Other Maternal Grandfather         oropharyngeal cancer    Liver disease Maternal Grandfather     Diabetes Mellitus Paternal Grandmother     Hypertension  Paternal Grandfather     Bullous pemphigoid Paternal Grandfather     Arrhythmia Paternal Grandfather     No Known Problems Brother     Colon cancer Neg Hx     Breast cancer Neg Hx     Ovarian cancer Neg Hx     Stroke Neg Hx      Social History     Tobacco Use    Smoking status: Never    Smokeless tobacco: Never   Substance Use Topics    Alcohol use: Yes    Drug use: Never       Current Outpatient Medications   Medication Sig    HYDROcodone-acetaminophen (NORCO) 5-325 mg per tablet Take 1 tablet by mouth every 6 (six) hours as needed for Pain.    ibuprofen (ADVIL,MOTRIN) 800 MG tablet Take 1 tablet (800 mg total) by mouth 3 (three) times daily.    miSOPROStoL (CYTOTEC) 200 MCG Tab Place 4 tablets (800 mcg total) vaginally once. for 1 dose    ondansetron (ZOFRAN-ODT) 4 MG TbDL Take 2 tablets (8 mg total) by mouth 2 (two) times daily.    valACYclovir (VALTREX) 500 MG tablet Take 500 mg by mouth.     No current facility-administered medications for this visit.         Review of Systems:  Review of Systems   Constitutional:  Negative for activity change, appetite change, chills and fever.   Gastrointestinal:  Negative for abdominal pain, diarrhea, nausea and vomiting.   Genitourinary:  Positive for pelvic pain and vaginal bleeding. Negative for vaginal discharge and vaginal pain.      OBJECTIVE:     Physical Exam:  Physical Exam  Vitals reviewed.   Constitutional:       General: She is not in acute distress.     Appearance: She is well-developed. She is not diaphoretic.   HENT:      Head: Normocephalic and atraumatic.   Eyes:      Conjunctiva/sclera: Conjunctivae normal.   Cardiovascular:      Rate and Rhythm: Normal rate.   Pulmonary:      Effort: Pulmonary effort is normal.   Abdominal:      General: There is no distension.      Palpations: Abdomen is soft. There is no mass.      Tenderness: There is no abdominal tenderness. There is no guarding or rebound.   Genitourinary:     Labia:         Right: No rash,  tenderness, lesion or injury.         Left: No rash, tenderness, lesion or injury.       Vagina: No signs of injury and foreign body. No vaginal discharge, erythema, tenderness or bleeding.      Cervix: No cervical motion tenderness, discharge or friability.      Uterus: Not deviated, not enlarged, not fixed and not tender.       Adnexa:         Right: No mass, tenderness or fullness.          Left: No mass, tenderness or fullness.        Comments: Minimal blood in vault. 0.5cm dilated  Musculoskeletal:         General: Normal range of motion.      Cervical back: Normal range of motion.   Skin:     General: Skin is warm and dry.   Neurological:      Mental Status: She is alert.         ASSESSMENT:       ICD-10-CM ICD-9-CM    1. SAB (spontaneous )  O03.9 634.90 miSOPROStoL (CYTOTEC) 200 MCG Tab      ondansetron (ZOFRAN-ODT) 4 MG TbDL      ibuprofen (ADVIL,MOTRIN) 800 MG tablet      HYDROcodone-acetaminophen (NORCO) 5-325 mg per tablet      Specimen to Pathology Gynecology and Obstetrics      ANTIPHOSPHOLIPID AB (ANTICARDIOLIPIN)      Beta-2 Glycoprotein Abs (IgA, IgG, IgM)      LUPUS ANTICOAGULANT (DRVVT)      2. Recurrent pregnancy loss  N96 [id number] Specimen to Pathology Gynecology and Obstetrics      ANTIPHOSPHOLIPID AB (ANTICARDIOLIPIN)      Beta-2 Glycoprotein Abs (IgA, IgG, IgM)      LUPUS ANTICOAGULANT (DRVVT)             Plan:      Discussed the diagnosis of miscarriage. Reviewed the three options of management for this: expectant, medical, surgical and the risks and benefits of each. Pt desires medical management. ER precautions reviewed. To lab for beta and APLS labs.  Standing order to labs placed. Discussed abstaining from sex or using contraception in meantime until beta down all the way. She would like tissue pathology specimen as she is interested in IVF at this point    Face to Face time with patient: 20    30 minutes of total time spent on the encounter, which includes face to face time and  non-face to face time preparing to see the patient (eg, review of tests), Obtaining and/or reviewing separately obtained history, Documenting clinical information in the electronic or other health record, Independently interpreting results (not separately reported) and communicating results to the patient/family/caregiver, or Care coordination (not separately reported).      Ketty Winkler

## 2023-06-21 ENCOUNTER — TELEPHONE (OUTPATIENT)
Dept: OBSTETRICS AND GYNECOLOGY | Facility: CLINIC | Age: 38
End: 2023-06-21
Payer: COMMERCIAL

## 2023-06-21 DIAGNOSIS — O03.9 SAB (SPONTANEOUS ABORTION): Primary | ICD-10-CM

## 2023-06-21 DIAGNOSIS — N96 RECURRENT PREGNANCY LOSS: ICD-10-CM

## 2023-06-21 PROCEDURE — 88305 TISSUE EXAM BY PATHOLOGIST: ICD-10-PCS | Mod: 26,,, | Performed by: PATHOLOGY

## 2023-06-21 PROCEDURE — 88305 TISSUE EXAM BY PATHOLOGIST: CPT | Mod: 26,,, | Performed by: PATHOLOGY

## 2023-06-21 NOTE — TELEPHONE ENCOUNTER
Caryl from Pathology brought a specimen up from the lab that was labeled with pt information but did not have Products of conception written on specimen  jar.   Staff checked with Dr Winkler and wrote POC  on jar and Caryl brought it back to pathology.

## 2023-06-23 ENCOUNTER — PATIENT MESSAGE (OUTPATIENT)
Dept: OBSTETRICS AND GYNECOLOGY | Facility: CLINIC | Age: 38
End: 2023-06-23
Payer: COMMERCIAL

## 2023-06-23 NOTE — LETTER
"            June 23, 2023    Gisella Smith  [address] [facility] [address]              [facility]  [address] [address]  Phone: [phone]  Fax: [phone]    To Whom It May Concern:    Ms. Smith is currently under our care and unfortunately has suffered a miscarriage. Ms. Smith's mother "Ms. Chika Jay" had a flight booked to come and assist her daughter "Ms. Smith" during her pregnancy. Due to the unforseen circumstances "Ms. Jay" had to cancel her flight and drive in.     This letter is to render proof of the medical emergency and to request a total refund for the flight.    If you have any questions or concerns, or if I can be of further assistance, please do not hesitate to contact me.        Sincerely,    Ketty Winkler MD           "

## 2023-06-23 NOTE — LETTER
"            June 23, 2023    Gisella Smith  [address]  [place] [address]              [facility]  [address] [address]  Phone: [phone]  Fax: [phone]    To Whom It May Concern:    Ms. Smith is currently under and has unfortunately suffered a miscarriage. Ms. Smith's mother "Ms. Chika Jay" had a flight booked to come in and assist her daughter during her pregnancy. Due to the circumstances Ms. Jay had to cancel her flight and drive in.   This letter is to render proof of the medical emergency and to request a total refund for the flight.    If you have any concerns, or if I can be of further assistance, please do not hesitate to contact me.       Sincerely,    Ketty Winkler MD           "

## 2023-06-26 ENCOUNTER — PATIENT MESSAGE (OUTPATIENT)
Dept: OBSTETRICS AND GYNECOLOGY | Facility: CLINIC | Age: 38
End: 2023-06-26
Payer: COMMERCIAL

## 2023-06-26 LAB
FINAL PATHOLOGIC DIAGNOSIS: NORMAL
GROSS: NORMAL
Lab: NORMAL

## 2023-06-27 ENCOUNTER — LAB VISIT (OUTPATIENT)
Dept: LAB | Facility: OTHER | Age: 38
End: 2023-06-27
Attending: STUDENT IN AN ORGANIZED HEALTH CARE EDUCATION/TRAINING PROGRAM
Payer: COMMERCIAL

## 2023-06-27 DIAGNOSIS — Z32.01 POSITIVE PREGNANCY TEST: ICD-10-CM

## 2023-06-27 LAB — HCG INTACT+B SERPL-ACNC: 136 MIU/ML

## 2023-06-27 PROCEDURE — 84702 CHORIONIC GONADOTROPIN TEST: CPT | Performed by: STUDENT IN AN ORGANIZED HEALTH CARE EDUCATION/TRAINING PROGRAM

## 2023-06-27 PROCEDURE — 36415 COLL VENOUS BLD VENIPUNCTURE: CPT | Performed by: STUDENT IN AN ORGANIZED HEALTH CARE EDUCATION/TRAINING PROGRAM

## 2023-07-19 ENCOUNTER — PATIENT MESSAGE (OUTPATIENT)
Dept: OBSTETRICS AND GYNECOLOGY | Facility: CLINIC | Age: 38
End: 2023-07-19
Payer: COMMERCIAL

## 2023-07-24 ENCOUNTER — PATIENT MESSAGE (OUTPATIENT)
Dept: OBSTETRICS AND GYNECOLOGY | Facility: CLINIC | Age: 38
End: 2023-07-24
Payer: COMMERCIAL

## 2023-07-25 LAB
FINAL PATHOLOGIC DIAGNOSIS: NORMAL
GROSS: NORMAL
Lab: NORMAL
SUPPLEMENTAL DIAGNOSIS: NORMAL

## 2023-11-15 ENCOUNTER — PATIENT MESSAGE (OUTPATIENT)
Dept: OBSTETRICS AND GYNECOLOGY | Facility: CLINIC | Age: 38
End: 2023-11-15
Payer: COMMERCIAL

## 2024-04-11 ENCOUNTER — PATIENT MESSAGE (OUTPATIENT)
Dept: OBSTETRICS AND GYNECOLOGY | Facility: CLINIC | Age: 39
End: 2024-04-11
Payer: COMMERCIAL

## 2024-05-21 ENCOUNTER — TELEPHONE (OUTPATIENT)
Dept: OBSTETRICS AND GYNECOLOGY | Facility: CLINIC | Age: 39
End: 2024-05-21
Payer: COMMERCIAL

## 2024-05-21 NOTE — TELEPHONE ENCOUNTER
Received fertility paperwork from [facility] today  LMP: 3/11/24  US date: 4/26/24  6w4d  FERNY: 12/16/2024  Embryo transfer: 3/30/24    Records have been given to provider for review.

## 2024-05-22 ENCOUNTER — DOCUMENTATION ONLY (OUTPATIENT)
Dept: OBSTETRICS AND GYNECOLOGY | Facility: CLINIC | Age: 39
End: 2024-05-22
Payer: COMMERCIAL

## 2024-05-23 ENCOUNTER — PATIENT MESSAGE (OUTPATIENT)
Dept: OBSTETRICS AND GYNECOLOGY | Facility: CLINIC | Age: 39
End: 2024-05-23

## 2024-05-23 ENCOUNTER — LAB VISIT (OUTPATIENT)
Dept: LAB | Facility: OTHER | Age: 39
End: 2024-05-23
Attending: STUDENT IN AN ORGANIZED HEALTH CARE EDUCATION/TRAINING PROGRAM
Payer: COMMERCIAL

## 2024-05-23 ENCOUNTER — PATIENT MESSAGE (OUTPATIENT)
Dept: ADMINISTRATIVE | Facility: OTHER | Age: 39
End: 2024-05-23
Payer: COMMERCIAL

## 2024-05-23 ENCOUNTER — INITIAL PRENATAL (OUTPATIENT)
Dept: OBSTETRICS AND GYNECOLOGY | Facility: CLINIC | Age: 39
End: 2024-05-23
Payer: COMMERCIAL

## 2024-05-23 VITALS — BODY MASS INDEX: 27.69 KG/M2 | WEIGHT: 156.31 LBS

## 2024-05-23 DIAGNOSIS — O09.529 ANTEPARTUM MULTIGRAVIDA OF ADVANCED MATERNAL AGE: ICD-10-CM

## 2024-05-23 DIAGNOSIS — Z78.9 CONCEIVED BY IN VITRO FERTILIZATION: Primary | ICD-10-CM

## 2024-05-23 DIAGNOSIS — Z78.9 CONCEIVED BY IN VITRO FERTILIZATION: ICD-10-CM

## 2024-05-23 LAB
ABO + RH BLD: NORMAL
BASOPHILS # BLD AUTO: 0.02 K/UL (ref 0–0.2)
BASOPHILS NFR BLD: 0.3 % (ref 0–1.9)
BLD GP AB SCN CELLS X3 SERPL QL: NORMAL
DIFFERENTIAL METHOD BLD: ABNORMAL
EOSINOPHIL # BLD AUTO: 0.1 K/UL (ref 0–0.5)
EOSINOPHIL NFR BLD: 1.1 % (ref 0–8)
ERYTHROCYTE [DISTWIDTH] IN BLOOD BY AUTOMATED COUNT: 11.6 % (ref 11.5–14.5)
HBV SURFACE AG SERPL QL IA: NORMAL
HCT VFR BLD AUTO: 35.8 % (ref 37–48.5)
HCV AB SERPL QL IA: NEGATIVE
HGB BLD-MCNC: 12.7 G/DL (ref 12–16)
HIV 1+2 AB+HIV1 P24 AG SERPL QL IA: NEGATIVE
IMM GRANULOCYTES # BLD AUTO: 0.02 K/UL (ref 0–0.04)
IMM GRANULOCYTES NFR BLD AUTO: 0.3 % (ref 0–0.5)
LYMPHOCYTES # BLD AUTO: 1.8 K/UL (ref 1–4.8)
LYMPHOCYTES NFR BLD: 24.9 % (ref 18–48)
MCH RBC QN AUTO: 33 PG (ref 27–31)
MCHC RBC AUTO-ENTMCNC: 35.5 G/DL (ref 32–36)
MCV RBC AUTO: 93 FL (ref 82–98)
MONOCYTES # BLD AUTO: 0.6 K/UL (ref 0.3–1)
MONOCYTES NFR BLD: 8.2 % (ref 4–15)
NEUTROPHILS # BLD AUTO: 4.8 K/UL (ref 1.8–7.7)
NEUTROPHILS NFR BLD: 65.2 % (ref 38–73)
NRBC BLD-RTO: 0 /100 WBC
PLATELET # BLD AUTO: 266 K/UL (ref 150–450)
PMV BLD AUTO: 9.6 FL (ref 9.2–12.9)
RBC # BLD AUTO: 3.85 M/UL (ref 4–5.4)
SPECIMEN OUTDATE: NORMAL
TREPONEMA PALLIDUM IGG+IGM AB [PRESENCE] IN SERUM OR PLASMA BY IMMUNOASSAY: NONREACTIVE
WBC # BLD AUTO: 7.32 K/UL (ref 3.9–12.7)

## 2024-05-23 PROCEDURE — 87389 HIV-1 AG W/HIV-1&-2 AB AG IA: CPT | Performed by: STUDENT IN AN ORGANIZED HEALTH CARE EDUCATION/TRAINING PROGRAM

## 2024-05-23 PROCEDURE — 87086 URINE CULTURE/COLONY COUNT: CPT | Performed by: STUDENT IN AN ORGANIZED HEALTH CARE EDUCATION/TRAINING PROGRAM

## 2024-05-23 PROCEDURE — 0500F INITIAL PRENATAL CARE VISIT: CPT | Mod: CPTII,S$GLB,, | Performed by: STUDENT IN AN ORGANIZED HEALTH CARE EDUCATION/TRAINING PROGRAM

## 2024-05-23 PROCEDURE — 87591 N.GONORRHOEAE DNA AMP PROB: CPT | Performed by: STUDENT IN AN ORGANIZED HEALTH CARE EDUCATION/TRAINING PROGRAM

## 2024-05-23 PROCEDURE — 36415 COLL VENOUS BLD VENIPUNCTURE: CPT | Performed by: STUDENT IN AN ORGANIZED HEALTH CARE EDUCATION/TRAINING PROGRAM

## 2024-05-23 PROCEDURE — 86803 HEPATITIS C AB TEST: CPT | Performed by: STUDENT IN AN ORGANIZED HEALTH CARE EDUCATION/TRAINING PROGRAM

## 2024-05-23 PROCEDURE — 83021 HEMOGLOBIN CHROMOTOGRAPHY: CPT | Performed by: STUDENT IN AN ORGANIZED HEALTH CARE EDUCATION/TRAINING PROGRAM

## 2024-05-23 PROCEDURE — 86787 VARICELLA-ZOSTER ANTIBODY: CPT | Performed by: STUDENT IN AN ORGANIZED HEALTH CARE EDUCATION/TRAINING PROGRAM

## 2024-05-23 PROCEDURE — 86901 BLOOD TYPING SEROLOGIC RH(D): CPT | Performed by: STUDENT IN AN ORGANIZED HEALTH CARE EDUCATION/TRAINING PROGRAM

## 2024-05-23 PROCEDURE — 87491 CHLMYD TRACH DNA AMP PROBE: CPT | Performed by: STUDENT IN AN ORGANIZED HEALTH CARE EDUCATION/TRAINING PROGRAM

## 2024-05-23 PROCEDURE — 86593 SYPHILIS TEST NON-TREP QUANT: CPT | Performed by: STUDENT IN AN ORGANIZED HEALTH CARE EDUCATION/TRAINING PROGRAM

## 2024-05-23 PROCEDURE — 86762 RUBELLA ANTIBODY: CPT | Performed by: STUDENT IN AN ORGANIZED HEALTH CARE EDUCATION/TRAINING PROGRAM

## 2024-05-23 PROCEDURE — 99999 PR PBB SHADOW E&M-EST. PATIENT-LVL II: CPT | Mod: PBBFAC,,, | Performed by: STUDENT IN AN ORGANIZED HEALTH CARE EDUCATION/TRAINING PROGRAM

## 2024-05-23 PROCEDURE — 85025 COMPLETE CBC W/AUTO DIFF WBC: CPT | Performed by: STUDENT IN AN ORGANIZED HEALTH CARE EDUCATION/TRAINING PROGRAM

## 2024-05-23 PROCEDURE — 87340 HEPATITIS B SURFACE AG IA: CPT | Performed by: STUDENT IN AN ORGANIZED HEALTH CARE EDUCATION/TRAINING PROGRAM

## 2024-05-23 RX ORDER — ONDANSETRON 8 MG/1
8 TABLET, ORALLY DISINTEGRATING ORAL EVERY 8 HOURS PRN
COMMUNITY
Start: 2024-04-26

## 2024-05-23 RX ORDER — NAPROXEN SODIUM 220 MG/1
81 TABLET, FILM COATED ORAL DAILY
COMMUNITY

## 2024-05-23 RX ORDER — FAMOTIDINE 20 MG/1
20 TABLET, FILM COATED ORAL 2 TIMES DAILY
Qty: 60 TABLET | Refills: 1 | Status: SHIPPED | OUTPATIENT
Start: 2024-05-23 | End: 2025-05-23

## 2024-05-23 NOTE — PROGRESS NOTES
Pt is here for initial OB. Feeling well today  Feeling fatigued, nauseated. Vomits never. 4mg zofran. Also has breast tenderness. No bleeding or pain. Tania not helping. Preggy pop not helping. Unisom and b6.  Works as anesthesiologist   [employer]   Relationship with partner living together. Safe at home. No cats.   Taking PNV and ASA  PMH: none  PSH: right salpingectomy of ectopic  Prior pregnancies: Jess is 3.5.  at term. Endometritis was complication of postpartum  Desires vasectomy for contraception  Wants to breastfeed  Carrier hemochromatosis for both her and same  No personal or family history of DM  Father has HTN  No tobacco, EtOH or illicit drug use  Pap utd  No H/o HSV: cold sores when she is stressed or sick  Covid vaccinated           -Reviewed routine PNC  -Reviewed newmom, connected mom  -Reviewed initial labs, ultrasound  -Reviewed common pregnancy complaints and comfort measures for them  -Genetic testing :: AFP today.  -ASA recommended to start after 12 weeks due to IVF, AMA  -RTC 4 weeks    Ketty Winkler M.D.

## 2024-05-24 LAB
C TRACH DNA SPEC QL NAA+PROBE: NOT DETECTED
HGB A2 MFR BLD HPLC: 2.6 % (ref 2.2–3.2)
HGB FRACT BLD ELPH-IMP: NORMAL
HGB FRACT BLD ELPH-IMP: NORMAL
N GONORRHOEA DNA SPEC QL NAA+PROBE: NOT DETECTED
RUBV IGG SER-ACNC: 14.6 IU/ML
RUBV IGG SER-IMP: REACTIVE
VARICELLA INTERPRETATION: POSITIVE
VARICELLA ZOSTER IGG: 693.3

## 2024-05-25 LAB
BACTERIA UR CULT: NORMAL
BACTERIA UR CULT: NORMAL

## 2024-06-06 ENCOUNTER — PATIENT MESSAGE (OUTPATIENT)
Dept: OBSTETRICS AND GYNECOLOGY | Facility: CLINIC | Age: 39
End: 2024-06-06
Payer: COMMERCIAL

## 2024-06-13 ENCOUNTER — PATIENT MESSAGE (OUTPATIENT)
Dept: OBSTETRICS AND GYNECOLOGY | Facility: CLINIC | Age: 39
End: 2024-06-13
Payer: COMMERCIAL

## 2024-06-14 ENCOUNTER — ROUTINE PRENATAL (OUTPATIENT)
Dept: OBSTETRICS AND GYNECOLOGY | Facility: CLINIC | Age: 39
End: 2024-06-14
Payer: COMMERCIAL

## 2024-06-14 VITALS
DIASTOLIC BLOOD PRESSURE: 83 MMHG | BODY MASS INDEX: 28.35 KG/M2 | SYSTOLIC BLOOD PRESSURE: 130 MMHG | WEIGHT: 160.06 LBS

## 2024-06-14 DIAGNOSIS — Z3A.13 13 WEEKS GESTATION OF PREGNANCY: Primary | ICD-10-CM

## 2024-06-14 PROCEDURE — 0502F SUBSEQUENT PRENATAL CARE: CPT | Mod: CPTII,S$GLB,, | Performed by: NURSE PRACTITIONER

## 2024-06-14 PROCEDURE — 99999 PR PBB SHADOW E&M-EST. PATIENT-LVL III: CPT | Mod: PBBFAC,,, | Performed by: NURSE PRACTITIONER

## 2024-06-14 NOTE — PATIENT INSTRUCTIONS
LABOR AND DELIVERY PHONE NUMBER, [phone] (OPEN 24/7, LOCATED ON 6TH FLOOR OF HOSPITAL)  [address] PHONE NUMBER, [phone] (OPEN MON-FRI, 8a-5p)

## 2024-06-14 NOTE — PROGRESS NOTES
Here for routine OB appt at 13w4d.  No complaints, denies VB and cramping.  Pain, bleeding, and ED precautions given.  Nausea persisting, taking zofran and B6 with gume.   Cannot take phenergan or compazine- too drowsy.  Last pregnancy nausea stopped around 15 weeks  IVF pregnancy- PGT neg, mt21 neg too.   It's a GIRL!  Discussed AFP next, pt interested.  Anatomy sono ordered.   Still taking prenatal vitamins and asa.  Conn mom at home just needs to set up.  Questionable GHTN with her first pregnancy.  Concerned about weight gain. Not eating well but only able to tolerate certain food. Did start exercising again.  RTC @ 16 weeks

## 2024-06-22 ENCOUNTER — PATIENT MESSAGE (OUTPATIENT)
Dept: OBSTETRICS AND GYNECOLOGY | Facility: CLINIC | Age: 39
End: 2024-06-22
Payer: COMMERCIAL

## 2024-07-01 ENCOUNTER — PATIENT MESSAGE (OUTPATIENT)
Dept: OTHER | Facility: OTHER | Age: 39
End: 2024-07-01
Payer: COMMERCIAL

## 2024-07-08 ENCOUNTER — PATIENT MESSAGE (OUTPATIENT)
Dept: OTHER | Facility: OTHER | Age: 39
End: 2024-07-08
Payer: COMMERCIAL

## 2024-07-10 ENCOUNTER — LAB VISIT (OUTPATIENT)
Dept: LAB | Facility: OTHER | Age: 39
End: 2024-07-10
Attending: STUDENT IN AN ORGANIZED HEALTH CARE EDUCATION/TRAINING PROGRAM
Payer: COMMERCIAL

## 2024-07-10 ENCOUNTER — PATIENT MESSAGE (OUTPATIENT)
Dept: OBSTETRICS AND GYNECOLOGY | Facility: CLINIC | Age: 39
End: 2024-07-10

## 2024-07-10 ENCOUNTER — ROUTINE PRENATAL (OUTPATIENT)
Dept: OBSTETRICS AND GYNECOLOGY | Facility: CLINIC | Age: 39
End: 2024-07-10
Payer: COMMERCIAL

## 2024-07-10 VITALS — DIASTOLIC BLOOD PRESSURE: 76 MMHG | WEIGHT: 162.06 LBS | SYSTOLIC BLOOD PRESSURE: 122 MMHG | BODY MASS INDEX: 28.7 KG/M2

## 2024-07-10 DIAGNOSIS — Z78.9 CONCEIVED BY IN VITRO FERTILIZATION: Primary | ICD-10-CM

## 2024-07-10 DIAGNOSIS — Z78.9 CONCEIVED BY IN VITRO FERTILIZATION: ICD-10-CM

## 2024-07-10 PROCEDURE — 36415 COLL VENOUS BLD VENIPUNCTURE: CPT | Performed by: STUDENT IN AN ORGANIZED HEALTH CARE EDUCATION/TRAINING PROGRAM

## 2024-07-10 PROCEDURE — 82105 ALPHA-FETOPROTEIN SERUM: CPT | Performed by: STUDENT IN AN ORGANIZED HEALTH CARE EDUCATION/TRAINING PROGRAM

## 2024-07-10 PROCEDURE — 99999 PR PBB SHADOW E&M-EST. PATIENT-LVL III: CPT | Mod: PBBFAC,,, | Performed by: STUDENT IN AN ORGANIZED HEALTH CARE EDUCATION/TRAINING PROGRAM

## 2024-07-10 RX ORDER — ONDANSETRON 8 MG/1
8 TABLET, ORALLY DISINTEGRATING ORAL EVERY 8 HOURS PRN
Qty: 60 TABLET | Refills: 1 | Status: SHIPPED | OUTPATIENT
Start: 2024-07-10

## 2024-07-10 RX ORDER — SCOLOPAMINE TRANSDERMAL SYSTEM 1 MG/1
1 PATCH, EXTENDED RELEASE TRANSDERMAL
Qty: 10 PATCH | Refills: 2 | Status: SHIPPED | OUTPATIENT
Start: 2024-07-10

## 2024-07-10 NOTE — PROGRESS NOTES
Pt doing ok.  Still having nausea. Very constipated. Taking pepcid 20BID, colace, miralax, zofranODT. Recommend pepcid 40 BID and scope patch  Denies vaginal bleeding, N/V, headaches. Starting to feel quickening.  VS reviewed.  Ordered today: anatomy scan  Glucola instructions completed  AFP today  [facility] info given  RTC: 4 weeks     · On losartan and verapamil at home. · Hold losartan and continue verapamil with holding parameter in view of soft BP.   · Monitor BP closely

## 2024-07-10 NOTE — PATIENT INSTRUCTIONS
[facility], Labor and Delivery is on the 6th floor of [facility]: [phone]    [email or web address]

## 2024-07-15 LAB
# FETUSES US: NORMAL
AFP INTERPRETATION: NORMAL
AFP MOM SERPL: 1.14
AFP SERPL-MCNC: 42.7 NG/ML
AFP SERPL-MCNC: NEGATIVE NG/ML
AGE AT DELIVERY: 38
GA (DAYS): 2 D
GA (WEEKS): 17 WK
GESTATIONAL AGE METHOD: NORMAL
IDDM PATIENT QL: NORMAL
SMOKING STATUS FTND: NO

## 2024-07-22 ENCOUNTER — TELEPHONE (OUTPATIENT)
Dept: MATERNAL FETAL MEDICINE | Facility: CLINIC | Age: 39
End: 2024-07-22

## 2024-07-22 ENCOUNTER — PATIENT MESSAGE (OUTPATIENT)
Dept: OBSTETRICS AND GYNECOLOGY | Facility: CLINIC | Age: 39
End: 2024-07-22
Payer: COMMERCIAL

## 2024-07-22 ENCOUNTER — PROCEDURE VISIT (OUTPATIENT)
Dept: MATERNAL FETAL MEDICINE | Facility: CLINIC | Age: 39
End: 2024-07-22
Payer: COMMERCIAL

## 2024-07-22 DIAGNOSIS — Z36.2 ENCOUNTER FOR FOLLOW-UP ULTRASOUND OF FETAL ANATOMY: Primary | ICD-10-CM

## 2024-07-22 DIAGNOSIS — Z3A.13 13 WEEKS GESTATION OF PREGNANCY: ICD-10-CM

## 2024-07-22 DIAGNOSIS — Z36.89 ENCOUNTER FOR ULTRASOUND TO ASSESS FETAL GROWTH: ICD-10-CM

## 2024-07-22 PROCEDURE — 76811 OB US DETAILED SNGL FETUS: CPT | Mod: S$GLB,,, | Performed by: OBSTETRICS & GYNECOLOGY

## 2024-07-22 PROCEDURE — 76817 TRANSVAGINAL US OBSTETRIC: CPT | Mod: S$GLB,,, | Performed by: OBSTETRICS & GYNECOLOGY

## 2024-07-25 ENCOUNTER — HOSPITAL ENCOUNTER (EMERGENCY)
Facility: OTHER | Age: 39
Discharge: HOME | End: 2024-07-25
Attending: OBSTETRICS & GYNECOLOGY
Payer: COMMERCIAL

## 2024-07-25 ENCOUNTER — TELEPHONE (OUTPATIENT)
Dept: OBSTETRICS AND GYNECOLOGY | Facility: OTHER | Age: 39
End: 2024-07-25
Payer: COMMERCIAL

## 2024-07-25 VITALS
HEART RATE: 94 BPM | TEMPERATURE: 98 F | OXYGEN SATURATION: 98 % | SYSTOLIC BLOOD PRESSURE: 107 MMHG | RESPIRATION RATE: 16 BRPM | DIASTOLIC BLOOD PRESSURE: 64 MMHG

## 2024-07-25 DIAGNOSIS — R00.0 TACHYCARDIA: Primary | ICD-10-CM

## 2024-07-25 DIAGNOSIS — Z3A.19 19 WEEKS GESTATION OF PREGNANCY: ICD-10-CM

## 2024-07-25 DIAGNOSIS — R00.2 PALPITATIONS: ICD-10-CM

## 2024-07-25 LAB
BASOPHILS # BLD AUTO: 0.03 K/UL (ref 0–0.2)
BASOPHILS NFR BLD: 0.4 % (ref 0–1.9)
BILIRUB SERPL-MCNC: NORMAL MG/DL
BLOOD URINE, POC: NORMAL
COLOR, POC UA: NORMAL
DIFFERENTIAL METHOD BLD: ABNORMAL
EOSINOPHIL # BLD AUTO: 0.1 K/UL (ref 0–0.5)
EOSINOPHIL NFR BLD: 1.1 % (ref 0–8)
ERYTHROCYTE [DISTWIDTH] IN BLOOD BY AUTOMATED COUNT: 12.3 % (ref 11.5–14.5)
GLUCOSE UR QL STRIP: NORMAL
HCT VFR BLD AUTO: 29.5 % (ref 37–48.5)
HGB BLD-MCNC: 10.7 G/DL (ref 12–16)
IMM GRANULOCYTES # BLD AUTO: 0.02 K/UL (ref 0–0.04)
IMM GRANULOCYTES NFR BLD AUTO: 0.3 % (ref 0–0.5)
KETONES UR QL STRIP: NORMAL
LEUKOCYTE ESTERASE URINE, POC: NORMAL
LYMPHOCYTES # BLD AUTO: 1.8 K/UL (ref 1–4.8)
LYMPHOCYTES NFR BLD: 24 % (ref 18–48)
MCH RBC QN AUTO: 34.7 PG (ref 27–31)
MCHC RBC AUTO-ENTMCNC: 36.3 G/DL (ref 32–36)
MCV RBC AUTO: 96 FL (ref 82–98)
MONOCYTES # BLD AUTO: 0.7 K/UL (ref 0.3–1)
MONOCYTES NFR BLD: 9.2 % (ref 4–15)
NEUTROPHILS # BLD AUTO: 4.9 K/UL (ref 1.8–7.7)
NEUTROPHILS NFR BLD: 65 % (ref 38–73)
NITRITE, POC UA: NORMAL
NRBC BLD-RTO: 0 /100 WBC
PH, POC UA: 7
PLATELET # BLD AUTO: 234 K/UL (ref 150–450)
PMV BLD AUTO: 9.1 FL (ref 9.2–12.9)
PROTEIN, POC: NORMAL
RBC # BLD AUTO: 3.08 M/UL (ref 4–5.4)
SPECIFIC GRAVITY, POC UA: 1
TSH SERPL DL<=0.005 MIU/L-ACNC: 0.86 UIU/ML (ref 0.4–4)
UROBILINOGEN, POC UA: NORMAL
WBC # BLD AUTO: 7.53 K/UL (ref 3.9–12.7)

## 2024-07-25 PROCEDURE — 99284 EMERGENCY DEPT VISIT MOD MDM: CPT | Mod: ,,, | Performed by: OBSTETRICS & GYNECOLOGY

## 2024-07-25 PROCEDURE — 81002 URINALYSIS NONAUTO W/O SCOPE: CPT

## 2024-07-25 PROCEDURE — 85025 COMPLETE CBC W/AUTO DIFF WBC: CPT | Performed by: OBSTETRICS & GYNECOLOGY

## 2024-07-25 PROCEDURE — 84443 ASSAY THYROID STIM HORMONE: CPT | Performed by: OBSTETRICS & GYNECOLOGY

## 2024-07-25 PROCEDURE — 99284 EMERGENCY DEPT VISIT MOD MDM: CPT | Mod: 25

## 2024-07-25 PROCEDURE — 93010 ELECTROCARDIOGRAM REPORT: CPT | Mod: ,,, | Performed by: INTERNAL MEDICINE

## 2024-07-25 PROCEDURE — 93005 ELECTROCARDIOGRAM TRACING: CPT

## 2024-07-25 PROCEDURE — 82962 GLUCOSE BLOOD TEST: CPT

## 2024-07-25 NOTE — ED PROVIDER NOTES
Encounter Date: 2024       History   No chief complaint on file.    Gisella Smith is a 39 y.o.  at 19w3d who presents to the OB ED today (2024) with CC of palpitations. She complains of several episodes of palpitations lasting about 15 min over the last few weeks. Today however the episode lasted several hours. She tried drinking water and eating something but there was no improvement. She endorses daily nausea with gagging and occasional emesis in the mornings which she takes zofran and pepcid for. She has had decreased appetite but has tried to stay hydrated. She denies any CP, SOB, FC, diarrhea, dysuria.   She reports no vaginal bleeding, no leakage of fluid, and no contractions.   She reports good fetal movement.  This pregnancy is complicated by IVF, AMA, h/o endometritis, h/o R salpingectomy s/p ectopic.            Review of patient's allergies indicates:   Allergen Reactions    Sulfamethoxazole-trimethoprim Hives     Past Medical History:   Diagnosis Date    Overweight (BMI 25.0-29.9) 2017    Recurrent cold sores 2017     Past Surgical History:   Procedure Laterality Date    DIAGNOSTIC LAPAROSCOPY N/A 2023    Procedure: LAPAROSCOPY, DIAGNOSTIC;  Surgeon: Gisella Parrish MD;  Location: [facility] OR;  Service: OB/GYN;  Laterality: N/A;    LAPAROSCOPIC SALPINGECTOMY Right 2023    Procedure: SALPINGECTOMY, LAPAROSCOPIC;  Surgeon: Gisella Parrish MD;  Location: [facility] OR;  Service: OB/GYN;  Laterality: Right;    WISDOM TOOTH EXTRACTION       Family History   Problem Relation Name Age of Onset    Osteoporosis Mother      Parkinsonism Father      Hypertension Father      Osteoporosis Father      No Known Problems Brother      Hypertension Maternal Grandmother      Hyperlipidemia Maternal Grandmother      Alzheimer's disease Maternal Grandmother      Other Maternal Grandfather          oropharyngeal cancer    Liver disease Maternal Grandfather      Diabetes Mellitus  Paternal Grandmother      Hypertension Paternal Grandfather      Bullous pemphigoid Paternal Grandfather      Arrhythmia Paternal Grandfather      No Known Problems Brother      Colon cancer Neg Hx      Breast cancer Neg Hx      Ovarian cancer Neg Hx      Stroke Neg Hx       Social History     Tobacco Use    Smoking status: Never    Smokeless tobacco: Never   Substance Use Topics    Alcohol use: Yes    Drug use: Never     Review of Systems   Constitutional:  Negative for chills and fever.   HENT:  Negative for congestion.    Eyes:  Negative for visual disturbance.   Respiratory:  Negative for cough, chest tightness and shortness of breath.    Cardiovascular:  Positive for palpitations. Negative for chest pain and leg swelling.   Gastrointestinal:  Positive for nausea and vomiting. Negative for constipation and diarrhea.   Genitourinary:  Negative for dysuria and vaginal bleeding.   Skin: Negative.    Neurological:  Negative for dizziness, syncope, speech difficulty, weakness, light-headedness, numbness and headaches.   Hematological:  Does not bruise/bleed easily.   Psychiatric/Behavioral: Negative.     All other systems reviewed and are negative.      Physical Exam     Initial Vitals   BP Pulse Resp Temp SpO2   07/25/24 1412 07/25/24 1412 -- 07/25/24 1412 07/25/24 1430   134/76 105  97.9 °F (36.6 °C) 96 %      MAP       --                Physical Exam    Vitals reviewed.  Constitutional: She appears well-developed and well-nourished. She is not diaphoretic. No distress.   HENT:   Head: Normocephalic and atraumatic.   Cardiovascular:  Normal rate and regular rhythm.           Pulmonary/Chest: No respiratory distress.   Abdominal: Abdomen is soft.   Musculoskeletal:         General: Normal range of motion.     Neurological: She is alert and oriented to person, place, and time.   Skin: Skin is warm and dry. Capillary refill takes less than 2 seconds.   Psychiatric: She has a normal mood and affect. Her behavior is  normal. Judgment and thought content normal.         ED Course   Procedures  Labs Reviewed   CBC W/ AUTO DIFFERENTIAL - Abnormal       Result Value    WBC 7.53      RBC 3.08 (*)     Hemoglobin 10.7 (*)     Hematocrit 29.5 (*)     MCV 96      MCH 34.7 (*)     MCHC 36.3 (*)     RDW 12.3      Platelets 234      MPV 9.1 (*)     Immature Granulocytes 0.3      Gran # (ANC) 4.9      Immature Grans (Abs) 0.02      Lymph # 1.8      Mono # 0.7      Eos # 0.1      Baso # 0.03      nRBC 0      Gran % 65.0      Lymph % 24.0      Mono % 9.2      Eosinophil % 1.1      Basophil % 0.4      Differential Method Automated     TSH   POCT URINALYSIS, DIPSTICK OR TABLET REAGENT, AUTOMATED, WITH MICROSCOP   POCT GLUCOSE MONITORING CONTINUOUS     EKG Readings: (Independently Interpreted)   Rhythm: Normal Sinus Rhythm.       Imaging Results    None          Medications - No data to display  Medical Decision Making  39 y.o.  at 19w3d who presents to the [facility] today (2024) with CC of palpitations.    FHT: 145    Temp:  [97.9 °F (36.6 °C)] 97.9 °F (36.6 °C)  Pulse:  [] 94  Resp:  [16] 16  SpO2:  [96 %-100 %] 98 %  BP: (107-134)/(64-76) 107/64    Palpitations  - VSS  - Udip trace leuks, neg ketones  - POCT glucose 118  - EKG w/ normal sinus rhythm  - TSH WNL  - CBC: 10.7/29.5  - Patient notes improvement of symptoms in [facility] with rest and PO hydration  - Encouraged PO hydration at home  - Placed referral to cardiology for consideration of outpatient Holter monitor given episodic nature of symptoms    - Patient stable for discharge at this time  - ED return precautions given  - She voiced understanding and is in agreement with the plan    Amount and/or Complexity of Data Reviewed  Labs: ordered.              Attending Attestation:   Physician Attestation Statement for Resident:  As the supervising MD   Physician Attestation Statement: I have personally seen and examined this patient.   I agree with the above history.  -:   As the  supervising MD I agree with the above PE.     As the supervising MD I agree with the above treatment, course, plan, and disposition.   -: I agree with the above edited resident note. Pt seen and examined, chart and labs reviewed.    Briefly, 38 yo  at 19w3d presenting for palpitations. Pt was at work today at the [employer] and had an episode of tachycardia with palpitations lasting several hours. Afebrile, VSS, HR  in [facility]. . EKG NSR, POCT glucose 118, CBC shows H&H 10.7/29.5 and TSH WNL 0.857. PO hydration in [facility] with resolution of symptoms. Discussed cardiology eval for possible outpatient Holter monitor if symptoms are becoming increasingly frequent- referral placed and primary OB notified. Pt reassured by normal eval.     All questions answered. Stable for d/c home with outpatient follow up.     Socorro Caldwell MD  OB Hospitalist  2024     I was personally present during the critical portions of the procedure(s) performed by the resident and was immediately available in the ED to provide services and assistance as needed during the entire procedure.  I have reviewed and agree with the residents interpretation of the following: lab data.  I have reviewed the following: old records at this facility.                                       Clinical Impression:  Final diagnoses:  [R00.0] Tachycardia (Primary)  [Z3A.19] 19 weeks gestation of pregnancy              Paula Simpson MD  Obstetrics & Gynecology, PGY-1      Paula Simpson MD  Resident  [phone]       Paula Simpson MD  Resident  [phone]       Socorro Caldwell MD  [phone]

## 2024-07-25 NOTE — DISCHARGE INSTRUCTIONS
Contact your primary OB or after hours at [phone] (option 1) if you experience any of the following:    Contractions every 7-10 minutes for 1 or more hours.   A sudden gush or constant leaking of fluid.  Heavy vaginal bleeding.   If you experience a constant headache, blurry vision, pain underneath your right rib, or sudden swelling of your hands, feet, and face.   If you are 28 weeks pregnant or greater, you can measure kick counts with a goal of 10 or more movements within 2 hours.     Remember to stay hydrated; drink 8-10 bottles of water a day.     Maintain all follow-up appointments.

## 2024-07-26 DIAGNOSIS — R00.2 PALPITATION: Primary | ICD-10-CM

## 2024-07-26 LAB
OHS QRS DURATION: 82 MS
OHS QTC CALCULATION: 457 MS
POCT GLUCOSE: 118 MG/DL (ref 70–110)

## 2024-07-29 ENCOUNTER — PATIENT MESSAGE (OUTPATIENT)
Dept: OTHER | Facility: OTHER | Age: 39
End: 2024-07-29
Payer: COMMERCIAL

## 2024-08-06 ENCOUNTER — OFFICE VISIT (OUTPATIENT)
Dept: CARDIOLOGY | Facility: CLINIC | Age: 39
End: 2024-08-06
Payer: COMMERCIAL

## 2024-08-06 VITALS
WEIGHT: 161.63 LBS | HEIGHT: 63 IN | OXYGEN SATURATION: 99 % | DIASTOLIC BLOOD PRESSURE: 78 MMHG | BODY MASS INDEX: 28.64 KG/M2 | HEART RATE: 110 BPM | SYSTOLIC BLOOD PRESSURE: 128 MMHG | RESPIRATION RATE: 15 BRPM

## 2024-08-06 DIAGNOSIS — Z78.9 CONCEIVED BY IN VITRO FERTILIZATION: ICD-10-CM

## 2024-08-06 DIAGNOSIS — R00.2 PALPITATIONS: Primary | ICD-10-CM

## 2024-08-06 DIAGNOSIS — O09.529 ANTEPARTUM MULTIGRAVIDA OF ADVANCED MATERNAL AGE: ICD-10-CM

## 2024-08-06 DIAGNOSIS — R00.0 TACHYCARDIA: ICD-10-CM

## 2024-08-06 DIAGNOSIS — D64.9 ANEMIA, UNSPECIFIED TYPE: ICD-10-CM

## 2024-08-06 DIAGNOSIS — Z3A.21 21 WEEKS GESTATION OF PREGNANCY: ICD-10-CM

## 2024-08-06 PROCEDURE — 3074F SYST BP LT 130 MM HG: CPT | Mod: CPTII,S$GLB,, | Performed by: INTERNAL MEDICINE

## 2024-08-06 PROCEDURE — 99203 OFFICE O/P NEW LOW 30 MIN: CPT | Mod: S$GLB,,, | Performed by: INTERNAL MEDICINE

## 2024-08-06 PROCEDURE — 99999 PR PBB SHADOW E&M-EST. PATIENT-LVL IV: CPT | Mod: PBBFAC,,, | Performed by: INTERNAL MEDICINE

## 2024-08-06 PROCEDURE — 1159F MED LIST DOCD IN RCRD: CPT | Mod: CPTII,S$GLB,, | Performed by: INTERNAL MEDICINE

## 2024-08-06 PROCEDURE — 3008F BODY MASS INDEX DOCD: CPT | Mod: CPTII,S$GLB,, | Performed by: INTERNAL MEDICINE

## 2024-08-06 PROCEDURE — 3078F DIAST BP <80 MM HG: CPT | Mod: CPTII,S$GLB,, | Performed by: INTERNAL MEDICINE

## 2024-08-12 ENCOUNTER — HOSPITAL ENCOUNTER (OUTPATIENT)
Dept: CARDIOLOGY | Facility: OTHER | Age: 39
Discharge: HOME | End: 2024-08-12
Attending: INTERNAL MEDICINE
Payer: COMMERCIAL

## 2024-08-12 DIAGNOSIS — R00.2 PALPITATIONS: ICD-10-CM

## 2024-08-12 DIAGNOSIS — R00.0 TACHYCARDIA: ICD-10-CM

## 2024-08-12 LAB
AORTIC ROOT ANNULUS: 2.09 CM
ASCENDING AORTA: 1.96 CM
AV INDEX (PROSTH): 0.79
AV MEAN GRADIENT: 9 MMHG
AV PEAK GRADIENT: 15 MMHG
AV VALVE AREA BY VELOCITY RATIO: 1.97 CM²
AV VALVE AREA: 2.03 CM²
AV VELOCITY RATIO: 0.77
CV ECHO LV RWT: 0.6 CM
DOP CALC AO PEAK VEL: 1.92 M/S
DOP CALC AO VTI: 35.1 CM
DOP CALC LVOT AREA: 2.6 CM2
DOP CALC LVOT DIAMETER: 1.81 CM
DOP CALC LVOT PEAK VEL: 1.47 M/S
DOP CALC LVOT STROKE VOLUME: 71.24 CM3
DOP CALCLVOT PEAK VEL VTI: 27.7 CM
E WAVE DECELERATION TIME: 180.83 MSEC
E/A RATIO: 1.31
E/E' RATIO: 8.17 M/S
ECHO LV POSTERIOR WALL: 1.17 CM (ref 0.6–1.1)
FRACTIONAL SHORTENING: 33 % (ref 28–44)
GLOBAL LONGITUIDAL STRAIN: 20.8 %
INTERVENTRICULAR SEPTUM: 1.21 CM (ref 0.6–1.1)
IVC DIAMETER: 1.63 CM
LA MAJOR: 4.46 CM
LA MINOR: 4.67 CM
LA WIDTH: 3.6 CM
LAAS-AP2: 15 CM2
LAAS-AP4: 17 CM2
LEFT ATRIUM AREA SYSTOLIC (APICAL 2 CHAMBER): 15.65 CM2
LEFT ATRIUM AREA SYSTOLIC (APICAL 4 CHAMBER): 17.42 CM2
LEFT ATRIUM SIZE: 3.42 CM
LEFT ATRIUM VOLUME MOD: 43.96 CM3
LEFT ATRIUM VOLUME: 47.75 CM3
LEFT INTERNAL DIMENSION IN SYSTOLE: 2.62 CM (ref 2.1–4)
LEFT VENTRICLE DIASTOLIC VOLUME: 65.86 ML
LEFT VENTRICLE END SYSTOLIC VOLUME APICAL 2 CHAMBER: 39.84 ML
LEFT VENTRICLE END SYSTOLIC VOLUME APICAL 4 CHAMBER: 46.31 ML
LEFT VENTRICLE SYSTOLIC VOLUME: 24.98 ML
LEFT VENTRICULAR INTERNAL DIMENSION IN DIASTOLE: 3.9 CM (ref 3.5–6)
LEFT VENTRICULAR MASS: 157.31 G
LV LATERAL E/E' RATIO: 7.54 M/S
LV SEPTAL E/E' RATIO: 8.91 M/S
LVED V (TEICH): 65.86 ML
LVES V (TEICH): 24.98 ML
LVOT MG: 4.66 MMHG
LVOT MV: 1.02 CM/S
MV PEAK A VEL: 0.75 M/S
MV PEAK E VEL: 0.98 M/S
MV STENOSIS PRESSURE HALF TIME: 52.44 MS
MV VALVE AREA P 1/2 METHOD: 4.2 CM2
OHS CV RV/LV RATIO: 0.46 CM
PISA TR MAX VEL: 2.44 M/S
PV MV: 0.86 M/S
PV PEAK GRADIENT: 6 MMHG
PV PEAK VELOCITY: 1.21 M/S
RA MAJOR: 3.74 CM
RA PRESSURE ESTIMATED: 3 MMHG
RA WIDTH: 3.2 CM
RIGHT VENTRICULAR END-DIASTOLIC DIMENSION: 1.8 CM
RV TB RVSP: 5 MMHG
SINUS: 2.1 CM
STJ: 2.04 CM
TDI LATERAL: 0.13 M/S
TDI SEPTAL: 0.11 M/S
TDI: 0.12 M/S
TR MAX PG: 24 MMHG
TRICUSPID ANNULAR PLANE SYSTOLIC EXCURSION: 1.7 CM
TV REST PULMONARY ARTERY PRESSURE: 27 MMHG

## 2024-08-12 PROCEDURE — 93306 TTE W/DOPPLER COMPLETE: CPT | Mod: 26,,, | Performed by: INTERNAL MEDICINE

## 2024-08-12 PROCEDURE — 93306 TTE W/DOPPLER COMPLETE: CPT

## 2024-08-12 PROCEDURE — 93225 XTRNL ECG REC<48 HRS REC: CPT

## 2024-08-12 PROCEDURE — 93356 MYOCRD STRAIN IMG SPCKL TRCK: CPT

## 2024-08-12 PROCEDURE — 93356 MYOCRD STRAIN IMG SPCKL TRCK: CPT | Mod: ,,, | Performed by: INTERNAL MEDICINE

## 2024-08-12 PROCEDURE — 93226 XTRNL ECG REC<48 HR SCAN A/R: CPT

## 2024-08-16 ENCOUNTER — PATIENT MESSAGE (OUTPATIENT)
Dept: OBSTETRICS AND GYNECOLOGY | Facility: CLINIC | Age: 39
End: 2024-08-16
Payer: COMMERCIAL

## 2024-08-20 ENCOUNTER — PATIENT MESSAGE (OUTPATIENT)
Dept: OBSTETRICS AND GYNECOLOGY | Facility: CLINIC | Age: 39
End: 2024-08-20
Payer: COMMERCIAL

## 2024-08-21 ENCOUNTER — PROCEDURE VISIT (OUTPATIENT)
Dept: MATERNAL FETAL MEDICINE | Facility: CLINIC | Age: 39
End: 2024-08-21
Payer: COMMERCIAL

## 2024-08-21 DIAGNOSIS — Z36.2 ENCOUNTER FOR FOLLOW-UP ULTRASOUND OF FETAL ANATOMY: ICD-10-CM

## 2024-08-21 PROCEDURE — 76816 OB US FOLLOW-UP PER FETUS: CPT | Mod: S$GLB,,, | Performed by: OBSTETRICS & GYNECOLOGY

## 2024-08-21 PROCEDURE — 76817 TRANSVAGINAL US OBSTETRIC: CPT | Mod: S$GLB,,, | Performed by: OBSTETRICS & GYNECOLOGY

## 2024-08-26 ENCOUNTER — PATIENT MESSAGE (OUTPATIENT)
Dept: OTHER | Facility: OTHER | Age: 39
End: 2024-08-26
Payer: COMMERCIAL

## 2024-09-02 ENCOUNTER — PATIENT MESSAGE (OUTPATIENT)
Dept: OBSTETRICS AND GYNECOLOGY | Facility: CLINIC | Age: 39
End: 2024-09-02
Payer: COMMERCIAL

## 2024-09-03 ENCOUNTER — LAB VISIT (OUTPATIENT)
Dept: LAB | Facility: OTHER | Age: 39
End: 2024-09-03
Attending: STUDENT IN AN ORGANIZED HEALTH CARE EDUCATION/TRAINING PROGRAM
Payer: COMMERCIAL

## 2024-09-03 ENCOUNTER — ROUTINE PRENATAL (OUTPATIENT)
Dept: OBSTETRICS AND GYNECOLOGY | Facility: CLINIC | Age: 39
End: 2024-09-03
Payer: COMMERCIAL

## 2024-09-03 VITALS — SYSTOLIC BLOOD PRESSURE: 116 MMHG | DIASTOLIC BLOOD PRESSURE: 72 MMHG

## 2024-09-03 DIAGNOSIS — O09.529 ANTEPARTUM MULTIGRAVIDA OF ADVANCED MATERNAL AGE: ICD-10-CM

## 2024-09-03 DIAGNOSIS — Z78.9 CONCEIVED BY IN VITRO FERTILIZATION: ICD-10-CM

## 2024-09-03 DIAGNOSIS — Z78.9 CONCEIVED BY IN VITRO FERTILIZATION: Primary | ICD-10-CM

## 2024-09-03 LAB
BASOPHILS # BLD AUTO: 0.02 K/UL (ref 0–0.2)
BASOPHILS NFR BLD: 0.2 % (ref 0–1.9)
DIFFERENTIAL METHOD BLD: ABNORMAL
EOSINOPHIL # BLD AUTO: 0.1 K/UL (ref 0–0.5)
EOSINOPHIL NFR BLD: 1.2 % (ref 0–8)
ERYTHROCYTE [DISTWIDTH] IN BLOOD BY AUTOMATED COUNT: 12.9 % (ref 11.5–14.5)
GLUCOSE SERPL-MCNC: 147 MG/DL (ref 70–140)
HCT VFR BLD AUTO: 29.9 % (ref 37–48.5)
HGB BLD-MCNC: 10.7 G/DL (ref 12–16)
IMM GRANULOCYTES # BLD AUTO: 0.06 K/UL (ref 0–0.04)
IMM GRANULOCYTES NFR BLD AUTO: 0.7 % (ref 0–0.5)
LYMPHOCYTES # BLD AUTO: 1.9 K/UL (ref 1–4.8)
LYMPHOCYTES NFR BLD: 20.9 % (ref 18–48)
MCH RBC QN AUTO: 34.7 PG (ref 27–31)
MCHC RBC AUTO-ENTMCNC: 35.8 G/DL (ref 32–36)
MCV RBC AUTO: 97 FL (ref 82–98)
MONOCYTES # BLD AUTO: 0.8 K/UL (ref 0.3–1)
MONOCYTES NFR BLD: 8.6 % (ref 4–15)
NEUTROPHILS # BLD AUTO: 6.3 K/UL (ref 1.8–7.7)
NEUTROPHILS NFR BLD: 68.4 % (ref 38–73)
NRBC BLD-RTO: 0 /100 WBC
PLATELET # BLD AUTO: 241 K/UL (ref 150–450)
PMV BLD AUTO: 9.4 FL (ref 9.2–12.9)
RBC # BLD AUTO: 3.08 M/UL (ref 4–5.4)
WBC # BLD AUTO: 9.18 K/UL (ref 3.9–12.7)

## 2024-09-03 PROCEDURE — 0502F SUBSEQUENT PRENATAL CARE: CPT | Mod: CPTII,S$GLB,, | Performed by: STUDENT IN AN ORGANIZED HEALTH CARE EDUCATION/TRAINING PROGRAM

## 2024-09-03 PROCEDURE — 85025 COMPLETE CBC W/AUTO DIFF WBC: CPT | Performed by: STUDENT IN AN ORGANIZED HEALTH CARE EDUCATION/TRAINING PROGRAM

## 2024-09-03 PROCEDURE — 99999 PR PBB SHADOW E&M-EST. PATIENT-LVL III: CPT | Mod: PBBFAC,,, | Performed by: STUDENT IN AN ORGANIZED HEALTH CARE EDUCATION/TRAINING PROGRAM

## 2024-09-03 PROCEDURE — 36415 COLL VENOUS BLD VENIPUNCTURE: CPT | Performed by: STUDENT IN AN ORGANIZED HEALTH CARE EDUCATION/TRAINING PROGRAM

## 2024-09-03 PROCEDURE — 82950 GLUCOSE TEST: CPT | Performed by: STUDENT IN AN ORGANIZED HEALTH CARE EDUCATION/TRAINING PROGRAM

## 2024-09-03 RX ORDER — LANOLIN ALCOHOL/MO/W.PET/CERES
400 CREAM (GRAM) TOPICAL DAILY
COMMUNITY

## 2024-09-03 NOTE — PROGRESS NOTES
Pt doing well. Palpitation episodes are less frequent, but did have a long one at work with other symptoms like being pale. Went to [facility]. EKG normal but tachy.  Cardiology holter was normal. She is following up with cardiology. She has also not been too hungry lately  Denies vaginal bleeding, LOF, contractions. Reports regular fetal movement. Denies symptoms of pre E.  VS reviewed.  Glucola underway  Tdap recs reviewed   RSV recs reviewed   Pump rx done  Labor and pre E precautions reviewed.   RTC: 4 weeks

## 2024-09-03 NOTE — PATIENT INSTRUCTIONS
[email or web address]    Tdap or Dtap for close family if not had in the past five years    [facility], Labor and Delivery is on the 6th floor of [facility]: [phone]    [email or web address]      Blood pressures to look out for:  Top number >140 OR bottom number>90  If elevated, wait 10-15minutes and then repeat  If still elevated, reach out to Doctor.  If top number >160 OR bottom >110, repeat  If still that high, proceed straight to the [facility]

## 2024-09-04 ENCOUNTER — PATIENT MESSAGE (OUTPATIENT)
Dept: OBSTETRICS AND GYNECOLOGY | Facility: CLINIC | Age: 39
End: 2024-09-04
Payer: COMMERCIAL

## 2024-09-04 DIAGNOSIS — O09.529 ANTEPARTUM MULTIGRAVIDA OF ADVANCED MATERNAL AGE: Primary | ICD-10-CM

## 2024-09-09 ENCOUNTER — PATIENT MESSAGE (OUTPATIENT)
Dept: OTHER | Facility: OTHER | Age: 39
End: 2024-09-09
Payer: COMMERCIAL

## 2024-09-11 ENCOUNTER — PATIENT MESSAGE (OUTPATIENT)
Dept: OBSTETRICS AND GYNECOLOGY | Facility: CLINIC | Age: 39
End: 2024-09-11
Payer: COMMERCIAL

## 2024-09-16 ENCOUNTER — LAB VISIT (OUTPATIENT)
Dept: LAB | Facility: OTHER | Age: 39
End: 2024-09-16
Attending: STUDENT IN AN ORGANIZED HEALTH CARE EDUCATION/TRAINING PROGRAM
Payer: COMMERCIAL

## 2024-09-16 DIAGNOSIS — O09.529 ANTEPARTUM MULTIGRAVIDA OF ADVANCED MATERNAL AGE: ICD-10-CM

## 2024-09-16 LAB
GLUCOSE SERPL-MCNC: 118 MG/DL
GLUCOSE SERPL-MCNC: 126 MG/DL
GLUCOSE SERPL-MCNC: 141 MG/DL
GLUCOSE SERPL-MCNC: 73 MG/DL (ref 70–110)

## 2024-09-16 PROCEDURE — 82952 GTT-ADDED SAMPLES: CPT | Performed by: STUDENT IN AN ORGANIZED HEALTH CARE EDUCATION/TRAINING PROGRAM

## 2024-09-16 PROCEDURE — 36415 COLL VENOUS BLD VENIPUNCTURE: CPT | Performed by: STUDENT IN AN ORGANIZED HEALTH CARE EDUCATION/TRAINING PROGRAM

## 2024-09-16 PROCEDURE — 82951 GLUCOSE TOLERANCE TEST (GTT): CPT | Performed by: STUDENT IN AN ORGANIZED HEALTH CARE EDUCATION/TRAINING PROGRAM

## 2024-09-20 ENCOUNTER — PATIENT MESSAGE (OUTPATIENT)
Dept: OBSTETRICS AND GYNECOLOGY | Facility: CLINIC | Age: 39
End: 2024-09-20
Payer: COMMERCIAL

## 2024-09-23 ENCOUNTER — PATIENT MESSAGE (OUTPATIENT)
Dept: OTHER | Facility: OTHER | Age: 39
End: 2024-09-23
Payer: COMMERCIAL

## 2024-09-30 ENCOUNTER — PATIENT MESSAGE (OUTPATIENT)
Dept: OBSTETRICS AND GYNECOLOGY | Facility: CLINIC | Age: 39
End: 2024-09-30
Payer: COMMERCIAL

## 2024-10-01 ENCOUNTER — CLINICAL SUPPORT (OUTPATIENT)
Dept: OBSTETRICS AND GYNECOLOGY | Facility: CLINIC | Age: 39
End: 2024-10-01
Payer: COMMERCIAL

## 2024-10-01 ENCOUNTER — ROUTINE PRENATAL (OUTPATIENT)
Dept: OBSTETRICS AND GYNECOLOGY | Facility: CLINIC | Age: 39
End: 2024-10-01
Payer: COMMERCIAL

## 2024-10-01 VITALS — SYSTOLIC BLOOD PRESSURE: 122 MMHG | BODY MASS INDEX: 28 KG/M2 | DIASTOLIC BLOOD PRESSURE: 68 MMHG | WEIGHT: 158.06 LBS

## 2024-10-01 DIAGNOSIS — Z78.9 CONCEIVED BY IN VITRO FERTILIZATION: Primary | ICD-10-CM

## 2024-10-01 DIAGNOSIS — O09.529 ANTEPARTUM MULTIGRAVIDA OF ADVANCED MATERNAL AGE: ICD-10-CM

## 2024-10-01 DIAGNOSIS — Z23 NEED FOR TDAP VACCINATION: Primary | ICD-10-CM

## 2024-10-01 PROCEDURE — 99999 PR PBB SHADOW E&M-EST. PATIENT-LVL III: CPT | Mod: PBBFAC,,, | Performed by: STUDENT IN AN ORGANIZED HEALTH CARE EDUCATION/TRAINING PROGRAM

## 2024-10-01 PROCEDURE — 90471 IMMUNIZATION ADMIN: CPT | Mod: S$GLB,,, | Performed by: STUDENT IN AN ORGANIZED HEALTH CARE EDUCATION/TRAINING PROGRAM

## 2024-10-01 PROCEDURE — 99999 PR PBB SHADOW E&M-EST. PATIENT-LVL I: CPT | Mod: PBBFAC,,,

## 2024-10-01 PROCEDURE — 0502F SUBSEQUENT PRENATAL CARE: CPT | Mod: CPTII,S$GLB,, | Performed by: STUDENT IN AN ORGANIZED HEALTH CARE EDUCATION/TRAINING PROGRAM

## 2024-10-01 PROCEDURE — 90715 TDAP VACCINE 7 YRS/> IM: CPT | Mod: S$GLB,,, | Performed by: STUDENT IN AN ORGANIZED HEALTH CARE EDUCATION/TRAINING PROGRAM

## 2024-10-01 NOTE — PROGRESS NOTES
Pt doing ok. Still not much of an appetite, hasn't gained much weight. Starting to get fluid in feet, knuckles.   Denies vaginal bleeding, LOF, contractions. Reports regular fetal movement. Denies symptoms of pre E.  VS reviewed. BP repeated and normal.   Growth/placenta scan scheduled  Cardiology f/u arranged  Tdap today. Had flu last week at work  Reviewed RSV recs.  PNT arranged  Labor and pre E precautions reviewed.   RTC: 2 weeks

## 2024-10-07 ENCOUNTER — PATIENT MESSAGE (OUTPATIENT)
Dept: OTHER | Facility: OTHER | Age: 39
End: 2024-10-07
Payer: COMMERCIAL

## 2024-10-07 ENCOUNTER — OFFICE VISIT (OUTPATIENT)
Dept: CARDIOLOGY | Facility: CLINIC | Age: 39
End: 2024-10-07
Payer: COMMERCIAL

## 2024-10-07 VITALS
OXYGEN SATURATION: 98 % | BODY MASS INDEX: 28.71 KG/M2 | SYSTOLIC BLOOD PRESSURE: 110 MMHG | HEIGHT: 63 IN | RESPIRATION RATE: 15 BRPM | DIASTOLIC BLOOD PRESSURE: 65 MMHG | WEIGHT: 162.06 LBS | HEART RATE: 106 BPM

## 2024-10-07 DIAGNOSIS — R60.0 LOCALIZED EDEMA: ICD-10-CM

## 2024-10-07 DIAGNOSIS — Z3A.30 30 WEEKS GESTATION OF PREGNANCY: ICD-10-CM

## 2024-10-07 DIAGNOSIS — R00.2 PALPITATIONS: Primary | ICD-10-CM

## 2024-10-07 PROCEDURE — 3074F SYST BP LT 130 MM HG: CPT | Mod: CPTII,S$GLB,, | Performed by: INTERNAL MEDICINE

## 2024-10-07 PROCEDURE — 3078F DIAST BP <80 MM HG: CPT | Mod: CPTII,S$GLB,, | Performed by: INTERNAL MEDICINE

## 2024-10-07 PROCEDURE — 3008F BODY MASS INDEX DOCD: CPT | Mod: CPTII,S$GLB,, | Performed by: INTERNAL MEDICINE

## 2024-10-07 PROCEDURE — 1159F MED LIST DOCD IN RCRD: CPT | Mod: CPTII,S$GLB,, | Performed by: INTERNAL MEDICINE

## 2024-10-07 PROCEDURE — 1160F RVW MEDS BY RX/DR IN RCRD: CPT | Mod: CPTII,S$GLB,, | Performed by: INTERNAL MEDICINE

## 2024-10-07 PROCEDURE — 99214 OFFICE O/P EST MOD 30 MIN: CPT | Mod: S$GLB,,, | Performed by: INTERNAL MEDICINE

## 2024-10-07 PROCEDURE — 99999 PR PBB SHADOW E&M-EST. PATIENT-LVL IV: CPT | Mod: PBBFAC,,, | Performed by: INTERNAL MEDICINE

## 2024-10-07 NOTE — PROGRESS NOTES
CARDIOLOGY CLINIC VISIT        HISTORY OF PRESENT ILLNESS:     2024:Gisella Smith presented to the emergency room on 2024 with complaints of palpitations.  Multiple episodes over the preceding few weeks.  She states that she was having poor p.o. intake.  Lasted roughly 15 minutes.  Episode that day lasted several hours.  EKG showed normal sinus rhythm.  Since the ER visit she has had a number of episodes.  They do not last as long.  Her p.o. intake has improved.    Advanced maternal age.  Multigravida.  IVF pregnancy.   currently at 21w    10/07/2024:  Echocardiogram showed ejection fraction of 60-65%.  Holter showed sinus rhythm with high average heart rate.  Very rare PACs.  Currently 30 weeks.  With regards to lower extremity edema she is wearing compression socks at work.  When she gets home with leg elevation edema resolves by morning.  Denies recurrent palpitations.    CARDIOVASCULAR HISTORY:     None    PAST MEDICAL HISTORY:     Past Medical History:   Diagnosis Date    Overweight (BMI 25.0-29.9) 2017    Recurrent cold sores 2017       PAST SURGICAL HISTORY:     Past Surgical History:   Procedure Laterality Date    DIAGNOSTIC LAPAROSCOPY N/A 2023    Procedure: LAPAROSCOPY, DIAGNOSTIC;  Surgeon: Gisella Parrish MD;  Location: [facility] OR;  Service: OB/GYN;  Laterality: N/A;    LAPAROSCOPIC SALPINGECTOMY Right 2023    Procedure: SALPINGECTOMY, LAPAROSCOPIC;  Surgeon: Gisella Parrish MD;  Location: [facility] OR;  Service: OB/GYN;  Laterality: Right;    WISDOM TOOTH EXTRACTION         ALLERGIES AND MEDICATION:     Review of patient's allergies indicates:   Allergen Reactions    Sulfamethoxazole-trimethoprim Hives        Medication List            Accurate as of 2024  2:31 PM. If you have any questions, ask your nurse or doctor.                CONTINUE taking these medications      aspirin 81 MG Chew     famotidine 20 MG tablet  Commonly known as: PEPCID  Take  1 tablet (20 mg total) by mouth 2 (two) times daily.     magnesium oxide 400 mg (241.3 mg magnesium) tablet  Commonly known as: MAG-OX     ondansetron 8 MG Tbdl  Commonly known as: ZOFRAN-ODT  Take 1 tablet (8 mg total) by mouth every 8 (eight) hours as needed.     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); no children    Regular exercise: [email or web address], works with     Outside GYN     Social Drivers of Health     Financial Resource Strain: Low Risk  (5/20/2024)    Overall Financial Resource Strain (CARDIA)     Difficulty of Paying Living Expenses: Not hard at all   Food Insecurity: No Food Insecurity (5/20/2024)    Hunger Vital Sign     Worried About Running Out of Food in the Last Year: Never true     Ran Out of Food in the Last Year: Never true   Transportation Needs: No Transportation Needs (6/16/2023)    PRAPARE - Transportation     Lack of Transportation (Medical): No     Lack of Transportation (Non-Medical): No   Physical Activity: Insufficiently Active (5/20/2024)    Exercise Vital Sign     Days of Exercise per Week: 3 days     Minutes of Exercise per Session: 30 min   Stress: Stress Concern Present (5/20/2024)    [employer] [place] [employer] - Occupational Stress Questionnaire     Feeling of Stress : To some extent   Housing Stability: Low Risk  (6/16/2023)    Housing Stability Vital Sign     Unable to Pay for Housing in the Last Year: No     Number of Places Lived in the Last Year: 1     Unstable Housing in the Last Year: No       FAMILY HISTORY:     Family History   Problem Relation Name Age of Onset    Osteoporosis  "Mother      Parkinsonism Father      Hypertension Father      Osteoporosis Father      No Known Problems Brother      Hypertension Maternal Grandmother      Hyperlipidemia Maternal Grandmother      Alzheimer's disease Maternal Grandmother      Other Maternal Grandfather          oropharyngeal cancer    Liver disease Maternal Grandfather      Diabetes Mellitus Paternal Grandmother      Hypertension Paternal Grandfather      Bullous pemphigoid Paternal Grandfather      Arrhythmia Paternal Grandfather      No Known Problems Brother      Colon cancer Neg Hx      Breast cancer Neg Hx      Ovarian cancer Neg Hx      Stroke Neg Hx         REVIEW OF SYSTEMS:   Review of Systems   Constitutional:  Negative for chills, diaphoresis, fever, malaise/fatigue and weight loss.   HENT:  Negative for congestion, hearing loss, sinus pain, sore throat and tinnitus.    Eyes:  Negative for blurred vision, double vision, photophobia and pain.   Respiratory:  Negative for cough, hemoptysis, sputum production, shortness of breath, wheezing and stridor.    Cardiovascular:  Positive for leg swelling. Negative for chest pain, palpitations, orthopnea, claudication and PND.   Gastrointestinal:  Negative for abdominal pain, blood in stool, heartburn, melena, nausea and vomiting.   Musculoskeletal:  Negative for back pain, falls, joint pain, myalgias and neck pain.   Neurological:  Negative for dizziness, tingling, tremors, sensory change, speech change, focal weakness, seizures, loss of consciousness, weakness and headaches.   Endo/Heme/Allergies:  Does not bruise/bleed easily.   Psychiatric/Behavioral:  Negative for depression, memory loss and substance abuse. The patient is not nervous/anxious.        PHYSICAL EXAM:     Vitals:    10/07/24 1311   BP: 110/65   Pulse: 106   Resp: 15      Body mass index is 28.7 kg/m².  Weight: 73.5 kg (162 lb 0.6 oz)   Height: 5' 3" (160 cm)     Physical Exam  Vitals reviewed.   Constitutional:       General: She " is not in acute distress.     Appearance: Normal appearance. She is well-developed. She is not diaphoretic.   HENT:      Head: Normocephalic.   Neck:      Vascular: No carotid bruit or JVD.   Cardiovascular:      Rate and Rhythm: Regular rhythm. Tachycardia present.      Pulses: Normal pulses.      Heart sounds: Murmur heard.      Systolic murmur is present with a grade of 2/6.   Pulmonary:      Effort: Pulmonary effort is normal.      Breath sounds: Normal breath sounds.   Abdominal:      General: Bowel sounds are normal.      Palpations: Abdomen is soft.      Tenderness: There is no abdominal tenderness.   Musculoskeletal:      Right lower le+ Edema present.      Left lower le+ Edema present.   Skin:     General: Skin is warm and dry.   Neurological:      Mental Status: She is alert and oriented to person, place, and time.   Psychiatric:         Speech: Speech normal.         Behavior: Behavior normal.         Thought Content: Thought content normal.         DATA:   EKG: (personally reviewed tracing)    Results for orders placed or performed during the hospital encounter of 24   EKG 12-lead    Collection Time: 24  2:37 PM   Result Value Ref Range    QRS Duration 82 ms    OHS QTC Calculation 457 ms    Narrative    Test Reason : R00.0,    Vent. Rate : 086 BPM     Atrial Rate : 086 BPM     P-R Int : 140 ms          QRS Dur : 082 ms      QT Int : 382 ms       P-R-T Axes : 036 058 025 degrees     QTc Int : 457 ms    Normal sinus rhythm  Normal ECG  No previous ECGs available  Confirmed by Josee NEAL, Silver VARGHESE ([id number]) on 2024 9:18:28 AM    Referred By: [email or web address]   SELF           Confirmed By:Silver Tripp MD        Laboratory:  CBC:  Recent Labs   Lab 24  1252 24  1443 24  1606   WBC 7.32 7.53 9.18   Hemoglobin 12.7 10.7 L 10.7 L   Hematocrit 35.8 L 29.5 L 29.9 L   Platelets 266 234 241       CHEMISTRIES:  Recent Labs   Lab 22  1000 23  1423 23  1707  06/05/23  1025   Glucose 96 86 90  --    Sodium 140 140 140  --    Potassium 3.9 3.6 3.5  --    BUN 11 8 9  --    Creatinine 0.7 0.8 0.7 0.8   eGFR if  >60  --   --   --    eGFR if non  >60  --   --   --    Calcium 9.2 9.7 9.4  --        CARDIAC BIOMARKERS:        COAGS:        LIPIDS/LFTS:  Recent Labs   Lab 02/16/22  1000 01/20/23  1423 01/23/23  1707 06/05/23  1025   AST 15 21 16 15   ALT 13 28 24  --        Hemoglobin A1C   Date Value Ref Range Status   02/16/2022 4.4 4.0 - 5.6 % Final     Comment:     ADA Screening Guidelines:  5.7-6.4%  Consistent with prediabetes  >or=6.5%  Consistent with diabetes    High levels of fetal hemoglobin interfere with the HbA1C  assay. Heterozygous hemoglobin variants (HbS, HgC, etc)do  not significantly interfere with this assay.   However, presence of multiple variants may affect accuracy.     11/20/2017 4.7 4.0 - 5.6 % Final     Comment:     According to ADA guidelines, hemoglobin A1c <7.0% represents  optimal control in non-pregnant diabetic patients. Different  metrics may apply to specific patient populations.   Standards of Medical Care in Diabetes-2016.  For the purpose of screening for the presence of diabetes:  <5.7%     Consistent with the absence of diabetes  5.7-6.4%  Consistent with increasing risk for diabetes   (prediabetes)  >or=6.5%  Consistent with diabetes  Currently, no consensus exists for use of hemoglobin A1c  for diagnosis of diabetes for children.  This Hemoglobin A1c assay has significant interference with fetal   hemoglobin   (HbF). The results are invalid for patients with abnormal amounts of   HbF,   including those with known Hereditary Persistence   of Fetal Hemoglobin. Heterozygous hemoglobin variants (HbAS, HbAC,   HbAD, HbAE, HbA2) do not significantly interfere with this assay;   however, presence of multiple variants in a sample may impact the %   interference.          The ASCVD Risk score (Hill COLLADO, et al.,  2019) failed to calculate for the following reasons:    The 2019 ASCVD risk score is only valid for ages 40 to 79      Cardiovascular Testing:    Echo    Result Date: 8/12/2024    Left Ventricle: The left ventricle is normal in size. Mildly increased   wall thickness. There is normal systolic function with a visually   estimated ejection fraction of 60 - 65%. Global longitudinal strain is   -20.8%. There is normal diastolic function.    Right Ventricle: Normal right ventricular cavity size. Wall thickness   is normal. Systolic function is normal.    Left Atrium: Left atrium is mildly dilated.          Holter monitor - 48 hour    Result Date: 8/19/2024    Sinus rhythm with high average heart rate.    Very rare, isolated supraventricular ectopy.              ASSESSMENT:     Palpitations:  No recurrence  Edema  Pregnancy  Anemia    PLAN:     Palpitations/tachycardia:  Holter showed no significant abnormalities.  Echocardiogram showed normal function.  Edema: Continue compression socks.  Leg elevation.  Return to clinic 4 months.           Cruz Carty MD, MPH, FACC, [facility]

## 2024-10-16 ENCOUNTER — TELEPHONE (OUTPATIENT)
Dept: OBSTETRICS AND GYNECOLOGY | Facility: OTHER | Age: 39
End: 2024-10-16
Payer: COMMERCIAL

## 2024-10-16 ENCOUNTER — HOSPITAL ENCOUNTER (EMERGENCY)
Facility: OTHER | Age: 39
Discharge: HOME | End: 2024-10-16
Attending: STUDENT IN AN ORGANIZED HEALTH CARE EDUCATION/TRAINING PROGRAM
Payer: COMMERCIAL

## 2024-10-16 VITALS
HEART RATE: 102 BPM | DIASTOLIC BLOOD PRESSURE: 74 MMHG | TEMPERATURE: 98 F | OXYGEN SATURATION: 98 % | SYSTOLIC BLOOD PRESSURE: 127 MMHG | RESPIRATION RATE: 17 BRPM

## 2024-10-16 DIAGNOSIS — N94.9 ROUND LIGAMENT PAIN: ICD-10-CM

## 2024-10-16 DIAGNOSIS — Z3A.31 31 WEEKS GESTATION OF PREGNANCY: ICD-10-CM

## 2024-10-16 DIAGNOSIS — R10.32 LEFT LOWER QUADRANT ABDOMINAL PAIN: Primary | ICD-10-CM

## 2024-10-16 LAB
BILIRUB SERPL-MCNC: NORMAL MG/DL
BLOOD URINE, POC: NORMAL
COLOR, POC UA: NORMAL
GLUCOSE UR QL STRIP: NORMAL
KETONES UR QL STRIP: NORMAL
LEUKOCYTE ESTERASE URINE, POC: NORMAL
NITRITE, POC UA: NORMAL
PH, POC UA: 7
PROTEIN, POC: NORMAL
SPECIFIC GRAVITY, POC UA: 1
UROBILINOGEN, POC UA: NORMAL

## 2024-10-16 PROCEDURE — 59025 FETAL NON-STRESS TEST: CPT | Mod: 26,,, | Performed by: STUDENT IN AN ORGANIZED HEALTH CARE EDUCATION/TRAINING PROGRAM

## 2024-10-16 PROCEDURE — 59025 FETAL NON-STRESS TEST: CPT

## 2024-10-16 PROCEDURE — 99284 EMERGENCY DEPT VISIT MOD MDM: CPT | Mod: 25

## 2024-10-16 PROCEDURE — 81002 URINALYSIS NONAUTO W/O SCOPE: CPT

## 2024-10-16 PROCEDURE — 99284 EMERGENCY DEPT VISIT MOD MDM: CPT | Mod: 25,,, | Performed by: STUDENT IN AN ORGANIZED HEALTH CARE EDUCATION/TRAINING PROGRAM

## 2024-10-21 ENCOUNTER — PATIENT MESSAGE (OUTPATIENT)
Dept: OTHER | Facility: OTHER | Age: 39
End: 2024-10-21
Payer: COMMERCIAL

## 2024-10-21 ENCOUNTER — PATIENT MESSAGE (OUTPATIENT)
Dept: OBSTETRICS AND GYNECOLOGY | Facility: CLINIC | Age: 39
End: 2024-10-21
Payer: COMMERCIAL

## 2024-10-22 ENCOUNTER — PATIENT MESSAGE (OUTPATIENT)
Dept: OBSTETRICS AND GYNECOLOGY | Facility: CLINIC | Age: 39
End: 2024-10-22
Payer: COMMERCIAL

## 2024-10-22 ENCOUNTER — PROCEDURE VISIT (OUTPATIENT)
Dept: MATERNAL FETAL MEDICINE | Facility: CLINIC | Age: 39
End: 2024-10-22
Payer: COMMERCIAL

## 2024-10-22 DIAGNOSIS — Z36.89 ENCOUNTER FOR ULTRASOUND TO ASSESS FETAL GROWTH: ICD-10-CM

## 2024-10-22 PROCEDURE — 76816 OB US FOLLOW-UP PER FETUS: CPT | Mod: S$GLB,,, | Performed by: OBSTETRICS & GYNECOLOGY

## 2024-10-29 ENCOUNTER — ROUTINE PRENATAL (OUTPATIENT)
Dept: OBSTETRICS AND GYNECOLOGY | Facility: CLINIC | Age: 39
End: 2024-10-29
Payer: COMMERCIAL

## 2024-10-29 VITALS
DIASTOLIC BLOOD PRESSURE: 82 MMHG | WEIGHT: 165.81 LBS | SYSTOLIC BLOOD PRESSURE: 116 MMHG | BODY MASS INDEX: 29.37 KG/M2

## 2024-10-29 DIAGNOSIS — Z78.9 CONCEIVED BY IN VITRO FERTILIZATION: Primary | ICD-10-CM

## 2024-10-29 DIAGNOSIS — O09.529 ANTEPARTUM MULTIGRAVIDA OF ADVANCED MATERNAL AGE: ICD-10-CM

## 2024-10-29 PROCEDURE — 99999 PR PBB SHADOW E&M-EST. PATIENT-LVL III: CPT | Mod: PBBFAC,,, | Performed by: STUDENT IN AN ORGANIZED HEALTH CARE EDUCATION/TRAINING PROGRAM

## 2024-10-29 PROCEDURE — 0502F SUBSEQUENT PRENATAL CARE: CPT | Mod: CPTII,S$GLB,, | Performed by: STUDENT IN AN ORGANIZED HEALTH CARE EDUCATION/TRAINING PROGRAM

## 2024-11-01 ENCOUNTER — HOSPITAL ENCOUNTER (OUTPATIENT)
Dept: PERINATAL CARE | Facility: OTHER | Age: 39
Discharge: HOME | End: 2024-11-01
Attending: STUDENT IN AN ORGANIZED HEALTH CARE EDUCATION/TRAINING PROGRAM
Payer: COMMERCIAL

## 2024-11-01 DIAGNOSIS — Z78.9 CONCEIVED BY IN VITRO FERTILIZATION: ICD-10-CM

## 2024-11-01 DIAGNOSIS — O09.529 ANTEPARTUM MULTIGRAVIDA OF ADVANCED MATERNAL AGE: ICD-10-CM

## 2024-11-01 PROCEDURE — 59025 FETAL NON-STRESS TEST: CPT

## 2024-11-01 PROCEDURE — 59025 FETAL NON-STRESS TEST: CPT | Mod: 26,,, | Performed by: OBSTETRICS & GYNECOLOGY

## 2024-11-01 PROCEDURE — 76815 OB US LIMITED FETUS(S): CPT

## 2024-11-01 PROCEDURE — 76815 OB US LIMITED FETUS(S): CPT | Mod: 26,,, | Performed by: OBSTETRICS & GYNECOLOGY

## 2024-11-02 ENCOUNTER — PATIENT MESSAGE (OUTPATIENT)
Dept: OBSTETRICS AND GYNECOLOGY | Facility: CLINIC | Age: 39
End: 2024-11-02
Payer: COMMERCIAL

## 2024-11-06 ENCOUNTER — HOSPITAL ENCOUNTER (OUTPATIENT)
Dept: PERINATAL CARE | Facility: OTHER | Age: 39
Discharge: HOME | End: 2024-11-06
Attending: STUDENT IN AN ORGANIZED HEALTH CARE EDUCATION/TRAINING PROGRAM
Payer: COMMERCIAL

## 2024-11-06 ENCOUNTER — PATIENT MESSAGE (OUTPATIENT)
Dept: OBSTETRICS AND GYNECOLOGY | Facility: CLINIC | Age: 39
End: 2024-11-06
Payer: COMMERCIAL

## 2024-11-06 DIAGNOSIS — O09.529 ANTEPARTUM MULTIGRAVIDA OF ADVANCED MATERNAL AGE: ICD-10-CM

## 2024-11-06 DIAGNOSIS — Z78.9 CONCEIVED BY IN VITRO FERTILIZATION: ICD-10-CM

## 2024-11-06 PROCEDURE — 76815 OB US LIMITED FETUS(S): CPT

## 2024-11-06 PROCEDURE — 76815 OB US LIMITED FETUS(S): CPT | Mod: 26,,, | Performed by: OBSTETRICS & GYNECOLOGY

## 2024-11-06 PROCEDURE — 59025 FETAL NON-STRESS TEST: CPT | Mod: 26,,, | Performed by: OBSTETRICS & GYNECOLOGY

## 2024-11-06 PROCEDURE — 59025 FETAL NON-STRESS TEST: CPT

## 2024-11-09 ENCOUNTER — TELEPHONE (OUTPATIENT)
Dept: OBSTETRICS AND GYNECOLOGY | Facility: OTHER | Age: 39
End: 2024-11-09
Payer: COMMERCIAL

## 2024-11-11 ENCOUNTER — PATIENT MESSAGE (OUTPATIENT)
Dept: OTHER | Facility: OTHER | Age: 39
End: 2024-11-11
Payer: COMMERCIAL

## 2024-11-12 ENCOUNTER — OFFICE VISIT (OUTPATIENT)
Dept: OBSTETRICS AND GYNECOLOGY | Facility: CLINIC | Age: 39
End: 2024-11-12
Payer: COMMERCIAL

## 2024-11-12 DIAGNOSIS — O09.529 ANTEPARTUM MULTIGRAVIDA OF ADVANCED MATERNAL AGE: ICD-10-CM

## 2024-11-12 DIAGNOSIS — Z78.9 CONCEIVED BY IN VITRO FERTILIZATION: Primary | ICD-10-CM

## 2024-11-12 PROCEDURE — 0502F SUBSEQUENT PRENATAL CARE: CPT | Mod: CPTII,95,, | Performed by: STUDENT IN AN ORGANIZED HEALTH CARE EDUCATION/TRAINING PROGRAM

## 2024-11-12 NOTE — PROGRESS NOTES
The patient location is:  Patient Home   The chief complaint leading to consultation is: AGGIE  Visit type: Virtual visit with synchronous audio and video  Total time spent with patient: 30 min  Each patient to whom he or she provides medical services by telemedicine is:  (1) informed of the relationship between the physician and patient and the respective role of any other health care provider with respect to management of the patient; and (2) notified that he or she may decline to receive medical services by telemedicine and may withdraw from such care at any time.    Pt doing ok. Having more contractions with polyH.  Denies vaginal bleeding, LOF. Reports regular fetal movement. Denies symptoms of pre E.  Connected mom reviewed. Reviewed growth scan and polyH risks. Refers 39th week or earlier if possible IOL  Reviewed consents and preferences  Reviewed routines/expectations on L&D/MBU  Labor and pre E precautions reviewed.   All questions answered  RTC: 2 weeks

## 2024-11-13 ENCOUNTER — HOSPITAL ENCOUNTER (OUTPATIENT)
Dept: PERINATAL CARE | Facility: OTHER | Age: 39
Discharge: HOME | End: 2024-11-13
Attending: STUDENT IN AN ORGANIZED HEALTH CARE EDUCATION/TRAINING PROGRAM
Payer: COMMERCIAL

## 2024-11-13 DIAGNOSIS — Z78.9 CONCEIVED BY IN VITRO FERTILIZATION: ICD-10-CM

## 2024-11-13 DIAGNOSIS — O09.529 ANTEPARTUM MULTIGRAVIDA OF ADVANCED MATERNAL AGE: ICD-10-CM

## 2024-11-13 PROCEDURE — 59025 FETAL NON-STRESS TEST: CPT | Mod: 26,,, | Performed by: OBSTETRICS & GYNECOLOGY

## 2024-11-13 PROCEDURE — 76815 OB US LIMITED FETUS(S): CPT | Mod: 26,,, | Performed by: OBSTETRICS & GYNECOLOGY

## 2024-11-13 PROCEDURE — 76815 OB US LIMITED FETUS(S): CPT

## 2024-11-13 PROCEDURE — 59025 FETAL NON-STRESS TEST: CPT

## 2024-11-18 ENCOUNTER — HOSPITAL ENCOUNTER (EMERGENCY)
Facility: OTHER | Age: 39
Discharge: HOME | End: 2024-11-18
Attending: OBSTETRICS & GYNECOLOGY
Payer: COMMERCIAL

## 2024-11-18 VITALS
TEMPERATURE: 98 F | DIASTOLIC BLOOD PRESSURE: 84 MMHG | RESPIRATION RATE: 18 BRPM | SYSTOLIC BLOOD PRESSURE: 144 MMHG | OXYGEN SATURATION: 97 % | HEART RATE: 97 BPM

## 2024-11-18 DIAGNOSIS — O47.9 UTERINE CONTRACTIONS AT GREATER THAN 20 WEEKS OF GESTATION: Primary | ICD-10-CM

## 2024-11-18 DIAGNOSIS — Z3A.36 36 WEEKS GESTATION OF PREGNANCY: ICD-10-CM

## 2024-11-18 DIAGNOSIS — O13.3 GESTATIONAL HYPERTENSION, THIRD TRIMESTER: ICD-10-CM

## 2024-11-18 LAB
ALBUMIN SERPL BCP-MCNC: 3 G/DL (ref 3.5–5.2)
ALP SERPL-CCNC: 147 U/L (ref 40–150)
ALT SERPL W/O P-5'-P-CCNC: 14 U/L (ref 10–44)
ANION GAP SERPL CALC-SCNC: 14 MMOL/L (ref 8–16)
AST SERPL-CCNC: 19 U/L (ref 10–40)
BASOPHILS # BLD AUTO: 0.06 K/UL (ref 0–0.2)
BASOPHILS NFR BLD: 0.6 % (ref 0–1.9)
BILIRUB SERPL-MCNC: 0.4 MG/DL (ref 0.1–1)
BILIRUB SERPL-MCNC: NORMAL MG/DL
BLOOD URINE, POC: NEGATIVE
BUN SERPL-MCNC: 5 MG/DL (ref 6–20)
CALCIUM SERPL-MCNC: 10.4 MG/DL (ref 8.7–10.5)
CHLORIDE SERPL-SCNC: 110 MMOL/L (ref 95–110)
CO2 SERPL-SCNC: 16 MMOL/L (ref 23–29)
COLOR, POC UA: NORMAL
CREAT SERPL-MCNC: 0.8 MG/DL (ref 0.5–1.4)
CREAT UR-MCNC: 16.5 MG/DL (ref 15–325)
DIFFERENTIAL METHOD BLD: ABNORMAL
EOSINOPHIL # BLD AUTO: 0.1 K/UL (ref 0–0.5)
EOSINOPHIL NFR BLD: 0.6 % (ref 0–8)
ERYTHROCYTE [DISTWIDTH] IN BLOOD BY AUTOMATED COUNT: 13.4 % (ref 11.5–14.5)
EST. GFR  (NO RACE VARIABLE): >60 ML/MIN/1.73 M^2
GLUCOSE SERPL-MCNC: 67 MG/DL (ref 70–110)
GLUCOSE UR QL STRIP: NEGATIVE
HCT VFR BLD AUTO: 31.4 % (ref 37–48.5)
HGB BLD-MCNC: 11 G/DL (ref 12–16)
IMM GRANULOCYTES # BLD AUTO: 0.07 K/UL (ref 0–0.04)
IMM GRANULOCYTES NFR BLD AUTO: 0.7 % (ref 0–0.5)
KETONES UR QL STRIP: NORMAL
LEUKOCYTE ESTERASE URINE, POC: NEGATIVE
LYMPHOCYTES # BLD AUTO: 1.9 K/UL (ref 1–4.8)
LYMPHOCYTES NFR BLD: 19.5 % (ref 18–48)
MCH RBC QN AUTO: 34.2 PG (ref 27–31)
MCHC RBC AUTO-ENTMCNC: 35 G/DL (ref 32–36)
MCV RBC AUTO: 98 FL (ref 82–98)
MONOCYTES # BLD AUTO: 1 K/UL (ref 0.3–1)
MONOCYTES NFR BLD: 10 % (ref 4–15)
NEUTROPHILS # BLD AUTO: 6.6 K/UL (ref 1.8–7.7)
NEUTROPHILS NFR BLD: 68.6 % (ref 38–73)
NITRITE, POC UA: NEGATIVE
NRBC BLD-RTO: 0 /100 WBC
PH, POC UA: NORMAL
PLATELET # BLD AUTO: 195 K/UL (ref 150–450)
PMV BLD AUTO: 9.6 FL (ref 9.2–12.9)
POTASSIUM SERPL-SCNC: 3.2 MMOL/L (ref 3.5–5.1)
PROT SERPL-MCNC: 6.5 G/DL (ref 6–8.4)
PROT UR-MCNC: <7 MG/DL (ref 0–15)
PROT/CREAT UR: NORMAL MG/G{CREAT} (ref 0–0.2)
PROTEIN, POC: NEGATIVE
RBC # BLD AUTO: 3.22 M/UL (ref 4–5.4)
SODIUM SERPL-SCNC: 140 MMOL/L (ref 136–145)
SPECIFIC GRAVITY, POC UA: NORMAL
UROBILINOGEN, POC UA: NORMAL
WBC # BLD AUTO: 9.63 K/UL (ref 3.9–12.7)

## 2024-11-18 PROCEDURE — 80053 COMPREHEN METABOLIC PANEL: CPT

## 2024-11-18 PROCEDURE — 59025 FETAL NON-STRESS TEST: CPT

## 2024-11-18 PROCEDURE — 85025 COMPLETE CBC W/AUTO DIFF WBC: CPT

## 2024-11-18 PROCEDURE — 81002 URINALYSIS NONAUTO W/O SCOPE: CPT

## 2024-11-18 PROCEDURE — 99284 EMERGENCY DEPT VISIT MOD MDM: CPT | Mod: 25

## 2024-11-18 PROCEDURE — 99284 EMERGENCY DEPT VISIT MOD MDM: CPT | Mod: 25,,, | Performed by: OBSTETRICS & GYNECOLOGY

## 2024-11-18 PROCEDURE — 59025 FETAL NON-STRESS TEST: CPT | Mod: 26,,, | Performed by: OBSTETRICS & GYNECOLOGY

## 2024-11-18 PROCEDURE — 84156 ASSAY OF PROTEIN URINE: CPT

## 2024-11-18 PROCEDURE — 25000003 PHARM REV CODE 250

## 2024-11-18 RX ORDER — POTASSIUM CHLORIDE 20 MEQ/1
40 TABLET, EXTENDED RELEASE ORAL ONCE
Status: COMPLETED | OUTPATIENT
Start: 2024-11-18 | End: 2024-11-18

## 2024-11-18 RX ADMIN — POTASSIUM CHLORIDE 40 MEQ: 1500 TABLET, EXTENDED RELEASE ORAL at 04:11

## 2024-11-18 NOTE — ED PROVIDER NOTES
Encounter Date: 2024       History   No chief complaint on file.    Gisella Smith is a 39 y.o. [id number] at 36w0d presents complaining of CTX.     Patient reports she started having contractions around 0830 this AM. She reports they have been increasing in frequency and now occur every 3 min.     This IUP is complicated by new gHTN (diagnosed this visit), IVF, AMA, h/o endometritis, h/o R salpingectomy s/p ectopic, polyhydramnios, failed 1h GTT.  Patient reports contractions, denies vaginal bleeding, denies LOF.   Fetal Movement: normal         Review of patient's allergies indicates:   Allergen Reactions    Sulfamethoxazole-trimethoprim Hives     Past Medical History:   Diagnosis Date    Overweight (BMI 25.0-29.9) 2017    Recurrent cold sores 2017     Past Surgical History:   Procedure Laterality Date    DIAGNOSTIC LAPAROSCOPY N/A 2023    Procedure: LAPAROSCOPY, DIAGNOSTIC;  Surgeon: Gisella Parrish MD;  Location: [facility] OR;  Service: OB/GYN;  Laterality: N/A;    LAPAROSCOPIC SALPINGECTOMY Right 2023    Procedure: SALPINGECTOMY, LAPAROSCOPIC;  Surgeon: Gisella Parrish MD;  Location: [facility] OR;  Service: OB/GYN;  Laterality: Right;    WISDOM TOOTH EXTRACTION       Family History   Problem Relation Name Age of Onset    Osteoporosis Mother      Parkinsonism Father      Hypertension Father      Osteoporosis Father      No Known Problems Brother      Hypertension Maternal Grandmother      Hyperlipidemia Maternal Grandmother      Alzheimer's disease Maternal Grandmother      Other Maternal Grandfather          oropharyngeal cancer    Liver disease Maternal Grandfather      Diabetes Mellitus Paternal Grandmother      Hypertension Paternal Grandfather      Bullous pemphigoid Paternal Grandfather      Arrhythmia Paternal Grandfather      No Known Problems Brother      Colon cancer Neg Hx      Breast cancer Neg Hx      Ovarian cancer Neg Hx      Stroke Neg Hx       Social History      Tobacco Use    Smoking status: Never    Smokeless tobacco: Never   Substance Use Topics    Alcohol use: Yes    Drug use: Never     Review of Systems   Constitutional:  Negative for chills and fever.   HENT:  Negative for sore throat.    Eyes:  Negative for visual disturbance.   Respiratory:  Negative for shortness of breath.    Cardiovascular:  Negative for chest pain and leg swelling.   Gastrointestinal:  Positive for abdominal pain (contractions). Negative for nausea and vomiting.   Genitourinary:  Negative for dysuria and vaginal bleeding.   Musculoskeletal:  Negative for back pain.   Skin:  Negative for rash.   Neurological:  Negative for weakness and headaches.   Hematological:  Does not bruise/bleed easily.   Psychiatric/Behavioral: Negative.         Physical Exam     Initial Vitals   BP Pulse Resp Temp SpO2   -- -- -- -- --      MAP       --         Physical Exam    Vitals reviewed.  Constitutional: She appears well-developed and well-nourished.   HENT:   Head: Normocephalic and atraumatic.   Neck: Neck supple.   Cardiovascular:  Normal rate.           Pulmonary/Chest: No respiratory distress.   Abdominal: There is no abdominal tenderness. There is no rebound and no guarding.   Musculoskeletal:         General: No tenderness or edema. Normal range of motion.      Cervical back: Neck supple.     Neurological: She is alert and oriented to person, place, and time.   Skin: Skin is warm.   Psychiatric: She has a normal mood and affect. Her behavior is normal. Judgment and thought content normal.     OB LABOR EXAM:   Pre-Term Labor: No.   Membranes ruptured: No.   Method: Sterile vaginal exam per MD.       Dilatation: 1.   Station: -4.         Comments: SVE 1/long/high       ED Course   Obtain Fetal nonstress test (NST)    Date/Time: 11/18/2024 11:45 PM    Performed by: Paula Simpson MD  Authorized by: Socorro Caldwell MD    Nonstress Test:     Variability:  6-25 BPM    Decelerations:  None     Accelerations:  15 bpm    Acoustic Stimulator: No      Baseline:  140    Uterine Irritability: No      Contractions:  Regular    Contraction Frequency:  Q3 min  Biophysical Profile:     Nonstress Test Interpretation: reactive      Overall Impression:  Reassuring  Post-procedure:     Patient tolerance:  Patient tolerated the procedure well with no immediate complications    Labs Reviewed   POCT URINALYSIS, DIPSTICK OR TABLET REAGENT, AUTOMATED, WITH MICROSCOP          Imaging Results    None          Medications - No data to display  Medical Decision Making  39 y.o. [id number] at 36w0d presents complaining of CTX.     NST R&R  [place] w/ CTX q3 min    Temp:  [98.1 °F (36.7 °C)] 98.1 °F (36.7 °C)  Pulse:  [] 97  Resp:  [18] 18  SpO2:  [97 %-98 %] 97 %  BP: (138-144)/(74-86) 144/84     Latent labor:  - NST reactive and reassuring  - SVE @ 1420 /hi, 2 hour re-check unchanged   - no s/s of labor at this time  - Patient counseled on risks/benefits/alternatives to late  BMZ for threatened  labor given frequent contractions  - symptomatic relief: tylenol 650 PRN, hot bath/shower, warm compress, leg elevation, adequate PO hydration  - RTC for prenatal appt as scheduled  - RT CALI for ctx of increasing frequency/intensity, LOF, decreased fetal movement, vaginal bleeding  - Pt was given routine pregnancy instructions including to return to triage if she had any vaginal bleeding (other than spotting for the next 48hrs), any loss of fluid like her water broke, decreased fetal movement, or contractions Q 5min  lasting for 2 or more hours. Pt was also instructed to drink copious water. Patient voiced understanding of all theseinstructions and was subsequently discharged home.    gHTN  - Patient met criteria for gHTN today with mild range BP in CALI and prior on 10/1  - Asymptomatic  - Discussed plan for IOL at 37w 2/2 gHTN with polyhydramnios  - Plt 195  - Cr 0.8  - AST19/ALT 14    - Patient stable for discharge  at this time  - ED return precautions given  - She voiced understanding and is in agreement with the plan      Paula Simpson MD  Obstetrics & Gynecology, PGY-1     Amount and/or Complexity of Data Reviewed  Labs: ordered.    Risk  Prescription drug management.              Attending Attestation:   Physician Attestation Statement for Resident:  As the supervising MD   Physician Attestation Statement: I have personally seen and examined this patient.   I agree with the above history.  -:   As the supervising MD I agree with the above PE.     As the supervising MD I agree with the above treatment, course, plan, and disposition.   -: I agree with the above edited resident note. Pt seen and examined, chart and labs reviewed.    Briefly, 40 yo  at 36w0d presenting for r/o PTL. NST Cat I reactive, [place] Q 3 mins. SVE 1/long/high, unchanged on repeat exam 2 hrs later. Udip with 1+ ketones, PO hydration performed with modest improvement in ctx over monitoring period. While in the CALI pt  had several mild range Bps, with prior mild range BP on 10/1. Pt has now met criteria for gHTN. PreE labs obtained and unremarkable: plts 195, Cr  0.8, AST/ALT /14 and P:C TLTC. Discussed recommendation for delivery at 37wga if no  labor prior. Reviewed pt with MFM and no role for BMTZ in the setting of  contractions w/o cervical change at 36wga. PTL precautions, kick counts and PreE precautions reviewed.     All questions answered. Stable for d/c home with outpatient follow up.     Socorro Caldwell MD  OB Hospitalist  2024     I was personally present during the critical portions of the procedure(s) performed by the resident and was immediately available in the ED to provide services and assistance as needed during the entire procedure.  I have reviewed and agree with the residents interpretation of the following: lab data.  I have reviewed the following: old records at this facility.                                        Clinical Impression:  Final diagnoses:  [O47.9] Uterine contractions at greater than 20 weeks of gestation (Primary)  [Z3A.36] 36 weeks gestation of pregnancy                 Paula Simpson MD  Resident  11/19/24 0003       Socorro Caldwell MD  11/19/24 0044

## 2024-11-19 ENCOUNTER — ROUTINE PRENATAL (OUTPATIENT)
Dept: OBSTETRICS AND GYNECOLOGY | Facility: CLINIC | Age: 39
End: 2024-11-19
Payer: COMMERCIAL

## 2024-11-19 ENCOUNTER — TELEPHONE (OUTPATIENT)
Dept: OBSTETRICS AND GYNECOLOGY | Facility: OTHER | Age: 39
End: 2024-11-19
Payer: COMMERCIAL

## 2024-11-19 ENCOUNTER — HOSPITAL ENCOUNTER (OUTPATIENT)
Dept: PERINATAL CARE | Facility: OTHER | Age: 39
Discharge: HOME | End: 2024-11-19
Attending: STUDENT IN AN ORGANIZED HEALTH CARE EDUCATION/TRAINING PROGRAM
Payer: COMMERCIAL

## 2024-11-19 VITALS
SYSTOLIC BLOOD PRESSURE: 140 MMHG | WEIGHT: 163.56 LBS | DIASTOLIC BLOOD PRESSURE: 88 MMHG | BODY MASS INDEX: 28.98 KG/M2 | HEART RATE: 102 BPM

## 2024-11-19 DIAGNOSIS — O09.529 ANTEPARTUM MULTIGRAVIDA OF ADVANCED MATERNAL AGE: ICD-10-CM

## 2024-11-19 DIAGNOSIS — Z78.9 CONCEIVED BY IN VITRO FERTILIZATION: ICD-10-CM

## 2024-11-19 DIAGNOSIS — O13.3 GESTATIONAL HYPERTENSION, THIRD TRIMESTER: Primary | ICD-10-CM

## 2024-11-19 PROCEDURE — 99999 PR PBB SHADOW E&M-EST. PATIENT-LVL II: CPT | Mod: PBBFAC,,, | Performed by: STUDENT IN AN ORGANIZED HEALTH CARE EDUCATION/TRAINING PROGRAM

## 2024-11-19 PROCEDURE — 59025 FETAL NON-STRESS TEST: CPT

## 2024-11-19 PROCEDURE — 87653 STREP B DNA AMP PROBE: CPT | Performed by: STUDENT IN AN ORGANIZED HEALTH CARE EDUCATION/TRAINING PROGRAM

## 2024-11-19 PROCEDURE — 76815 OB US LIMITED FETUS(S): CPT | Mod: 26,,, | Performed by: STUDENT IN AN ORGANIZED HEALTH CARE EDUCATION/TRAINING PROGRAM

## 2024-11-19 PROCEDURE — 76815 OB US LIMITED FETUS(S): CPT

## 2024-11-19 PROCEDURE — 59025 FETAL NON-STRESS TEST: CPT | Mod: 26,,, | Performed by: STUDENT IN AN ORGANIZED HEALTH CARE EDUCATION/TRAINING PROGRAM

## 2024-11-19 PROCEDURE — 0502F SUBSEQUENT PRENATAL CARE: CPT | Mod: CPTII,S$GLB,, | Performed by: STUDENT IN AN ORGANIZED HEALTH CARE EDUCATION/TRAINING PROGRAM

## 2024-11-19 RX ORDER — VALACYCLOVIR HYDROCHLORIDE 500 MG/1
500 TABLET, FILM COATED ORAL 2 TIMES DAILY
Qty: 60 TABLET | Refills: 11 | Status: SHIPPED | OUTPATIENT
Start: 2024-11-19 | End: 2025-11-19

## 2024-11-19 NOTE — H&P (VIEW-ONLY)
HISTORY AND PHYSICAL                                                OBSTETRICS          Subjective:       Gisella Smith is a 39 y.o.  female with IUP at 36w1d weeks gestation who presents for routine OB visit. Now diagnosed with gHTN after CALI visit yesterday. Patient denies vaginal bleeding, LOF.  Having regular contractions, but not increasing in intensity. No severe symptoms of Pre E at this time. Fetal Movement: normal.    This IUP is complicated by IVF, AMA, h/o endometritis after last delivery, polyhydramnios, h/o t11 loss, h/o ectopic pregnancy s/p righ salpingectomy (wants left tube removed as well if ends up needing ).      PMHx:   Past Medical History:   Diagnosis Date    Overweight (BMI 25.0-29.9) 2017    Recurrent cold sores 2017       PSHx:   Past Surgical History:   Procedure Laterality Date    DIAGNOSTIC LAPAROSCOPY N/A 2023    Procedure: LAPAROSCOPY, DIAGNOSTIC;  Surgeon: Gisella Parrish MD;  Location: [facility] OR;  Service: OB/GYN;  Laterality: N/A;    LAPAROSCOPIC SALPINGECTOMY Right 2023    Procedure: SALPINGECTOMY, LAPAROSCOPIC;  Surgeon: Gisella Parrish MD;  Location: [facility] OR;  Service: OB/GYN;  Laterality: Right;    WISDOM TOOTH EXTRACTION         All:   Review of patient's allergies indicates:   Allergen Reactions    Sulfamethoxazole-trimethoprim Hives       Meds: (Not in a hospital admission)      SH:   Social History     Socioeconomic History    Marital status:    Tobacco Use    Smoking status: Never    Smokeless tobacco: Never   Substance and Sexual Activity    Alcohol use: Yes    Drug use: Never    Sexual activity: Yes     Partners: Male     Birth control/protection: None   Social History Narrative    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); no children    Regular exercise: pelaton, works with     Outside GYN     Social Drivers of Health     Financial Resource Strain: Low Risk  (2024)    Overall  Financial Resource Strain (CARDIA)     Difficulty of Paying Living Expenses: Not hard at all   Food Insecurity: No Food Insecurity (2024)    Hunger Vital Sign     Worried About Running Out of Food in the Last Year: Never true     Ran Out of Food in the Last Year: Never true   Transportation Needs: No Transportation Needs (2023)    PRAPARE - Transportation     Lack of Transportation (Medical): No     Lack of Transportation (Non-Medical): No   Physical Activity: Insufficiently Active (2024)    Exercise Vital Sign     Days of Exercise per Week: 3 days     Minutes of Exercise per Session: 30 min   Stress: Stress Concern Present (2024)    [employer] - Occupational Stress Questionnaire     Feeling of Stress : To some extent   Housing Stability: Low Risk  (2023)    Housing Stability Vital Sign     Unable to Pay for Housing in the Last Year: No     Number of Places Lived in the Last Year: 1     Unstable Housing in the Last Year: No       FH:   Family History   Problem Relation Name Age of Onset    Osteoporosis Mother      Parkinsonism Father      Hypertension Father      Osteoporosis Father      No Known Problems Brother      Hypertension Maternal Grandmother      Hyperlipidemia Maternal Grandmother      Alzheimer's disease Maternal Grandmother      Other Maternal Grandfather          oropharyngeal cancer    Liver disease Maternal Grandfather      Diabetes Mellitus Paternal Grandmother      Hypertension Paternal Grandfather      Bullous pemphigoid Paternal Grandfather      Arrhythmia Paternal Grandfather      No Known Problems Brother      Colon cancer Neg Hx      Breast cancer Neg Hx      Ovarian cancer Neg Hx      Stroke Neg Hx         OBHx:   OB History    Para Term  AB Living   7 1 1 0 5 1   SAB IAB Ectopic Multiple Live Births   4 0 1 0 1      # Outcome Date GA Lbr René/2nd Weight Sex Type Anes PTL Lv   7 Current            6 2024               Birth Comments: chemical   5 SAB 05/2023           4 Ectopic 2023     ECTOPIC         Birth Comments: R salpingectomy   3 Term 10/31/20 37w6d / 00:50 2.95 kg (6 lb 8.1 oz) F Vag-Spont EPI N TRACI      Name: MARLYS FUENTES      Apgar1: 9  Apgar5: 9   2 SAB 10/2019              Birth Comments: anembroynic pregnancy   1 SAB 09/2019              Birth Comments: chemical pregnancy       Objective:       BP (!) 140/88   Pulse 102   Wt 74.2 kg (163 lb 9.3 oz)   LMP 04/17/2023 (Exact Date)   BMI 28.98 kg/m²     Vitals:    11/19/24 0903   BP: (!) 140/88   Pulse: 102   Weight: 74.2 kg (163 lb 9.3 oz)       General:   alert, appears stated age and cooperative, no apparent distress   HENT:  normocephalic, atraumatic   Eyes:  extraocular movements and conjunctivae normal   Neck:  supple, range of motion normal, no thyromegaly   Lungs:   no respiratory distress   Heart:   regular rate   Abdomen:  Nontender, gravid   Extremities positive edema, negative erythema   FHT: verified   Presentations: cephalic by ultrasound   Cervix: 1/50/-3    Consistency: medium    Position: middle     Recent Growth Scan: 32 weeks Fetal size is AGA with the EFW at the 51% and the AC at the 93%. The EFW is 2252 g.     Lab Review  Blood Type O POS  GBBS: pending  Rubella: Immune  RPR: nonreactive  HIV: negative  HepB: negative      Creatinine, Urine   Date Value Ref Range Status   11/18/2024 16.5 15.0 - 325.0 mg/dL Final   06/05/2023 43.0 15.0 - 325.0 mg/dL Final   10/19/2020 65.4 15.0 - 325.0 mg/dL Final     Comment:     The random urine reference ranges provided were established   for 24 hour urine collections.  No reference ranges exist for  random urine specimens.  Correlate clinically.       Protein, Urine   Date Value Ref Range Status   10/27/2020 <7 0 - 15 mg/dL Final     Protein, Urine Random   Date Value Ref Range Status   11/18/2024 <7 0 - 15 mg/dL Final   06/05/2023 <7 0 - 15 mg/dL Final   10/19/2020 9 0 - 15 mg/dL Final     Comment:      The random urine reference ranges provided were established   for 24 hour urine collections.  No reference ranges exist for  random urine specimens.  Correlate clinically.       Prot/Creat Ratio, Urine   Date Value Ref Range Status   11/18/2024 Unable to calculate 0.00 - 0.20 Final   06/05/2023 Unable to calculate 0.00 - 0.20 Final   10/19/2020 0.14 0.00 - 0.20 Final       Lab Results   Component Value Date    WBC 9.63 11/18/2024    HGB 11.0 (L) 11/18/2024    HCT 31.4 (L) 11/18/2024    MCV 98 11/18/2024     11/18/2024              Assessment:       36w1d weeks gestation     There are no hospital problems to display for this patient.         Plan:      Risks, benefits, alternatives and possible complications have been discussed in detail with the patient.   - Consents in media   - Pt instructed to present to Labor and Delivery unit at the time of labor or at time given to her by the nurse that will phone her  - Being induction per cervical exam upon arrival. Declines balloon outpatient. GBS collected. Pre e precautions reviewed     Post-Partum Hemorrhage risk - medium    Ketty Winkler M.D.

## 2024-11-19 NOTE — PROGRESS NOTES
HISTORY AND PHYSICAL                                                OBSTETRICS          Subjective:       Gisella Smith is a 39 y.o.  female with IUP at 36w1d weeks gestation who presents for routine OB visit. Now diagnosed with gHTN after CALI visit yesterday. Patient denies vaginal bleeding, LOF.  Having regular contractions, but not increasing in intensity. No severe symptoms of Pre E at this time. Fetal Movement: normal.    This IUP is complicated by IVF, AMA, h/o endometritis after last delivery, polyhydramnios, h/o t11 loss, h/o ectopic pregnancy s/p righ salpingectomy (wants left tube removed as well if ends up needing ).      PMHx:   Past Medical History:   Diagnosis Date    Overweight (BMI 25.0-29.9) 2017    Recurrent cold sores 2017       PSHx:   Past Surgical History:   Procedure Laterality Date    DIAGNOSTIC LAPAROSCOPY N/A 2023    Procedure: LAPAROSCOPY, DIAGNOSTIC;  Surgeon: Gisella Parrish MD;  Location: [facility];  Service: OB/GYN;  Laterality: N/A;    LAPAROSCOPIC SALPINGECTOMY Right 2023    Procedure: SALPINGECTOMY, LAPAROSCOPIC;  Surgeon: Gisella Parrish MD;  [facility]: [facility];  Service: OB/GYN;  Laterality: Right;    WISDOM TOOTH EXTRACTION         All:   Review of patient's allergies indicates:   Allergen Reactions    Sulfamethoxazole-trimethoprim Hives       Meds: (Not in a hospital admission)      SH:   Social History     Socioeconomic History    Marital status:    Tobacco Use    Smoking status: Never    Smokeless tobacco: Never   Substance and Sexual Activity    Alcohol use: Yes    Drug use: Never    Sexual activity: Yes     Partners: Male     Birth control/protection: None   Social History Narrative    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); no children    Regular exercise: pelaton, works with     Outside GYN     Social Drivers of Health     Financial Resource Strain: Low Risk  (2024)    Overall  Financial Resource Strain (CARDIA)     Difficulty of Paying Living Expenses: Not hard at all   Food Insecurity: No Food Insecurity (2024)    Hunger Vital Sign     Worried About Running Out of Food in the Last Year: Never true     Ran Out of Food in the Last Year: Never true   Transportation Needs: No Transportation Needs (2023)    PRAPARE - Transportation     Lack of Transportation (Medical): No     Lack of Transportation (Non-Medical): No   Physical Activity: Insufficiently Active (2024)    Exercise Vital Sign     Days of Exercise per Week: 3 days     Minutes of Exercise per Session: 30 min   Stress: Stress Concern Present (2024)    [employer] - Occupational Stress Questionnaire     Feeling of Stress : To some extent   Housing Stability: Low Risk  (2023)    Housing Stability Vital Sign     Unable to Pay for Housing in the Last Year: No     Number of Places Lived in the Last Year: 1     Unstable Housing in the Last Year: No       FH:   Family History   Problem Relation Name Age of Onset    Osteoporosis Mother      Parkinsonism Father      Hypertension Father      Osteoporosis Father      No Known Problems Brother      Hypertension Maternal Grandmother      Hyperlipidemia Maternal Grandmother      Alzheimer's disease Maternal Grandmother      Other Maternal Grandfather          oropharyngeal cancer    Liver disease Maternal Grandfather      Diabetes Mellitus Paternal Grandmother      Hypertension Paternal Grandfather      Bullous pemphigoid Paternal Grandfather      Arrhythmia Paternal Grandfather      No Known Problems Brother      Colon cancer Neg Hx      Breast cancer Neg Hx      Ovarian cancer Neg Hx      Stroke Neg Hx         OBHx:   OB History    Para Term  AB Living   7 1 1 0 5 1   SAB IAB Ectopic Multiple Live Births   4 0 1 0 1      # Outcome Date GA Lbr René/2nd Weight Sex Type Anes PTL Lv   7 Current            6 2024               Birth Comments: chemical   5 SAB 05/2023           4 Ectopic 2023     ECTOPIC         Birth Comments: R salpingectomy   3 Term 10/31/20 37w6d / 00:50 2.95 kg (6 lb 8.1 oz) F Vag-Spont EPI N TRACI      Name: MARLYS FUENTES      Apgar1: 9  Apgar5: 9   2 SAB 10/2019              Birth Comments: anembroynic pregnancy   1 SAB 09/2019              Birth Comments: chemical pregnancy       Objective:       BP (!) 140/88   Pulse 102   Wt 74.2 kg (163 lb 9.3 oz)   LMP 04/17/2023 (Exact Date)   BMI 28.98 kg/m²     Vitals:    11/19/24 0903   BP: (!) 140/88   Pulse: 102   Weight: 74.2 kg (163 lb 9.3 oz)       General:   alert, appears stated age and cooperative, no apparent distress   HENT:  normocephalic, atraumatic   Eyes:  extraocular movements and conjunctivae normal   Neck:  supple, range of motion normal, no thyromegaly   Lungs:   no respiratory distress   Heart:   regular rate   Abdomen:  Nontender, gravid   Extremities positive edema, negative erythema   FHT: verified   Presentations: cephalic by ultrasound   Cervix: 1/50/-3    Consistency: medium    Position: middle     Recent Growth Scan: 32 weeks Fetal size is AGA with the EFW at the 51% and the AC at the 93%. The EFW is 2252 g.     Lab Review  Blood Type O POS  GBBS: pending  Rubella: Immune  RPR: nonreactive  HIV: negative  HepB: negative      Creatinine, Urine   Date Value Ref Range Status   11/18/2024 16.5 15.0 - 325.0 mg/dL Final   06/05/2023 43.0 15.0 - 325.0 mg/dL Final   10/19/2020 65.4 15.0 - 325.0 mg/dL Final     Comment:     The random urine reference ranges provided were established   for 24 hour urine collections.  No reference ranges exist for  random urine specimens.  Correlate clinically.       Protein, Urine   Date Value Ref Range Status   10/27/2020 <7 0 - 15 mg/dL Final     Protein, Urine Random   Date Value Ref Range Status   11/18/2024 <7 0 - 15 mg/dL Final   06/05/2023 <7 0 - 15 mg/dL Final   10/19/2020 9 0 - 15 mg/dL Final     Comment:      The random urine reference ranges provided were established   for 24 hour urine collections.  No reference ranges exist for  random urine specimens.  Correlate clinically.       Prot/Creat Ratio, Urine   Date Value Ref Range Status   11/18/2024 Unable to calculate 0.00 - 0.20 Final   06/05/2023 Unable to calculate 0.00 - 0.20 Final   10/19/2020 0.14 0.00 - 0.20 Final       Lab Results   Component Value Date    WBC 9.63 11/18/2024    HGB 11.0 (L) 11/18/2024    HCT 31.4 (L) 11/18/2024    MCV 98 11/18/2024     11/18/2024              Assessment:       36w1d weeks gestation     There are no hospital problems to display for this patient.         Plan:      Risks, benefits, alternatives and possible complications have been discussed in detail with the patient.   - Consents in media   - Pt instructed to present to Labor and Delivery unit at the time of labor or at time given to her by the nurse that will phone her  - Being induction per cervical exam upon arrival. Declines balloon outpatient. GBS collected. Pre e precautions reviewed     Post-Partum Hemorrhage risk - medium    Ketty Winkler M.D.

## 2024-11-20 ENCOUNTER — PATIENT MESSAGE (OUTPATIENT)
Dept: OBSTETRICS AND GYNECOLOGY | Facility: CLINIC | Age: 39
End: 2024-11-20
Payer: COMMERCIAL

## 2024-11-21 LAB — GROUP B STREPTOCOCCUS, PCR: POSITIVE

## 2024-11-25 ENCOUNTER — HOSPITAL ENCOUNTER (EMERGENCY)
Facility: OTHER | Age: 39
Discharge: HOME | End: 2024-11-25
Attending: OBSTETRICS & GYNECOLOGY
Payer: COMMERCIAL

## 2024-11-25 VITALS
SYSTOLIC BLOOD PRESSURE: 132 MMHG | OXYGEN SATURATION: 98 % | TEMPERATURE: 98 F | RESPIRATION RATE: 16 BRPM | HEART RATE: 93 BPM | DIASTOLIC BLOOD PRESSURE: 88 MMHG

## 2024-11-25 DIAGNOSIS — O40.3XX0 POLYHYDRAMNIOS AFFECTING PREGNANCY IN THIRD TRIMESTER: ICD-10-CM

## 2024-11-25 DIAGNOSIS — O47.9 UTERINE CONTRACTIONS DURING PREGNANCY: Primary | ICD-10-CM

## 2024-11-25 DIAGNOSIS — Z3A.37 37 WEEKS GESTATION OF PREGNANCY: ICD-10-CM

## 2024-11-25 LAB
BILIRUBIN, POC UA: NEGATIVE
BLOOD, POC UA: NEGATIVE
CLARITY, UA: CLEAR
COLOR, UA: YELLOW
GLUCOSE, POC UA: NEGATIVE
KETONES, POC UA: NEGATIVE
LEUKOCYTE EST, POC UA: ABNORMAL
NITRITE, POC UA: NEGATIVE
PH UR STRIP: 7 [PH] (ref 5–8)
PROTEIN, POC UA: NEGATIVE
SPECIFIC GRAVITY, POC UA: 1.01 (ref 1–1.03)
UROBILINOGEN, POC UA: 0.2 E.U./DL

## 2024-11-25 PROCEDURE — 99283 EMERGENCY DEPT VISIT LOW MDM: CPT | Mod: 25,,, | Performed by: OBSTETRICS & GYNECOLOGY

## 2024-11-25 PROCEDURE — 99284 EMERGENCY DEPT VISIT MOD MDM: CPT | Mod: 25

## 2024-11-25 PROCEDURE — 59025 FETAL NON-STRESS TEST: CPT | Mod: 26,,, | Performed by: OBSTETRICS & GYNECOLOGY

## 2024-11-25 PROCEDURE — 59025 FETAL NON-STRESS TEST: CPT

## 2024-11-26 ENCOUNTER — PATIENT MESSAGE (OUTPATIENT)
Dept: OBSTETRICS AND GYNECOLOGY | Facility: OTHER | Age: 39
End: 2024-11-26
Payer: COMMERCIAL

## 2024-11-26 ENCOUNTER — HOSPITAL ENCOUNTER (OUTPATIENT)
Dept: PERINATAL CARE | Facility: OTHER | Age: 39
Discharge: HOME | End: 2024-11-26
Attending: STUDENT IN AN ORGANIZED HEALTH CARE EDUCATION/TRAINING PROGRAM
Payer: COMMERCIAL

## 2024-11-26 DIAGNOSIS — O09.529 ANTEPARTUM MULTIGRAVIDA OF ADVANCED MATERNAL AGE: ICD-10-CM

## 2024-11-26 DIAGNOSIS — Z78.9 CONCEIVED BY IN VITRO FERTILIZATION: ICD-10-CM

## 2024-11-26 PROCEDURE — 76815 OB US LIMITED FETUS(S): CPT | Mod: 26,,, | Performed by: OBSTETRICS & GYNECOLOGY

## 2024-11-26 PROCEDURE — 59025 FETAL NON-STRESS TEST: CPT | Mod: 26,,, | Performed by: OBSTETRICS & GYNECOLOGY

## 2024-11-26 PROCEDURE — 76815 OB US LIMITED FETUS(S): CPT

## 2024-11-26 PROCEDURE — 59025 FETAL NON-STRESS TEST: CPT

## 2024-11-26 NOTE — DISCHARGE INSTRUCTIONS
Contact us at clinic [phone] or after hours at [phone] if you experience any of the following: contractions every 3-5 minutes for 2 or more hours, leakage of fluid, heavy vaginal discharge, or you are not feeling your baby move.  Continue to do kick counts, 10 kicks within a 2 hour period.  Maintain proper hydration.  Drink 8-10 bottles of water a day.    Contact us if you have a persistent headache that is unrelieved by Tylenol, blurry vision, pain in your abdomen (other than a contraction); persistent nausea and vomiting; or a fever 100.4 or greater    Maintain all follow-up appointments.

## 2024-11-26 NOTE — ED PROVIDER NOTES
Encounter Date: 2024       History     Chief Complaint   Patient presents with    Contractions     Gisella Smith is a 39 y.o. [id number] at 37w1d presents complaining of contractions/back pain.     Patient reports intensifying contractions which she feels mostly in her back. She reports constant back pain and intermittent abdominal tightening. Denies fever, chills, dysuria.     This IUP is complicated by IVF, AMA, h/o endometritis, h/o R salpingectomy s/p ectopic, polyhydramnios, failed 1h GTT.  Patient reports contractions, denies vaginal bleeding, denies LOF.   Fetal Movement: normal         Review of patient's allergies indicates:   Allergen Reactions    Sulfamethoxazole-trimethoprim Hives     Past Medical History:   Diagnosis Date    Overweight (BMI 25.0-29.9) 2017    Recurrent cold sores 2017     Past Surgical History:   Procedure Laterality Date    DIAGNOSTIC LAPAROSCOPY N/A 2023    Procedure: LAPAROSCOPY, DIAGNOSTIC;  Surgeon: Gisella Parrish MD;  Location: [facility] OR;  Service: OB/GYN;  Laterality: N/A;    LAPAROSCOPIC SALPINGECTOMY Right 2023    Procedure: SALPINGECTOMY, LAPAROSCOPIC;  Surgeon: Gisella Parrish MD;  Location: [facility] OR;  Service: OB/GYN;  Laterality: Right;    WISDOM TOOTH EXTRACTION       Family History   Problem Relation Name Age of Onset    Osteoporosis Mother      Parkinsonism Father      Hypertension Father      Osteoporosis Father      No Known Problems Brother      Hypertension Maternal Grandmother      Hyperlipidemia Maternal Grandmother      Alzheimer's disease Maternal Grandmother      Other Maternal Grandfather          oropharyngeal cancer    Liver disease Maternal Grandfather      Diabetes Mellitus Paternal Grandmother      Hypertension Paternal Grandfather      Bullous pemphigoid Paternal Grandfather      Arrhythmia Paternal Grandfather      No Known Problems Brother      Colon cancer Neg Hx      Breast cancer Neg Hx      Ovarian cancer Neg  Hx      Stroke Neg Hx       Social History     Tobacco Use    Smoking status: Never    Smokeless tobacco: Never   Substance Use Topics    Alcohol use: Yes    Drug use: Never     Review of Systems   Constitutional:  Negative for chills and fever.   HENT:  Negative for sore throat.    Eyes:  Negative for visual disturbance.   Respiratory:  Negative for shortness of breath.    Cardiovascular:  Negative for chest pain and leg swelling.   Gastrointestinal:  Positive for abdominal pain. Negative for nausea and vomiting.   Genitourinary:  Negative for dysuria and vaginal bleeding.   Musculoskeletal:  Positive for back pain.   Skin:  Negative for rash.   Neurological:  Negative for weakness and headaches.   Hematological:  Does not bruise/bleed easily.   Psychiatric/Behavioral: Negative.         Physical Exam     Initial Vitals   BP Pulse Resp Temp SpO2   11/25/24 1900 11/25/24 1859 11/25/24 1900 11/25/24 1900 11/25/24 1859   132/88 94 16 97.6 °F (36.4 °C) 98 %      MAP       --                Physical Exam    Vitals reviewed.  Constitutional: She appears well-developed and well-nourished.   HENT:   Head: Normocephalic and atraumatic.   Eyes: EOM are normal. Pupils are equal, round, and reactive to light.   Neck:   Normal range of motion.  Cardiovascular:  Normal rate.           Pulmonary/Chest: No respiratory distress.   Abdominal: There is no abdominal tenderness.   Gravid habitus There is no rebound and no guarding.   Musculoskeletal:         General: No tenderness or edema. Normal range of motion.      Cervical back: Normal range of motion.     Neurological: She is alert and oriented to person, place, and time.   Skin: Skin is warm and dry.   Psychiatric: She has a normal mood and affect. Her behavior is normal. Judgment and thought content normal.     OB LABOR EXAM:       Method: Sterile vaginal exam per MD.       Dilatation: 1.   Station: -3.   Effacement: 50%.             ED Course   Obtain Fetal nonstress test  (NST)    Date/Time: 2024 4:18 PM    Performed by: Paula Simpson MD  Authorized by: Paula Simpson MD    Nonstress Test:     Variability:  6-25 BPM    Decelerations:  None    Accelerations:  15 bpm    Acoustic Stimulator: No      Baseline:  130    Contraction Frequency:  1 contraction on 20 min NST    Labs Reviewed   POCT URINALYSIS W/O SCOPE - Abnormal       Result Value    Spec Grav UA 1.015      PH, UA 7.0      Protein, UA Negative      Glucose, UA Negative      Ketones, UA Negative      Bilirubin, UA Negative      Blood, UA Negative      Leukocytes, UA Small (*)     Nitrite, UA Negative      Urobilinogen, UA 0.2      Color, UA POC Yellow      Clarity, UA, POC Clear            Imaging Results    None          Medications - No data to display  Medical Decision Making  39 y.o. [id number] at 37w1d presents complaining of contractions/back pain.    NST R&R  [place] w/ 1 contraction in 20 min NST    Temp:  [97.6 °F (36.4 °C)] 97.6 °F (36.4 °C)  Pulse:  [93-94] 93  Resp:  [16] 16  SpO2:  [98 %] 98 %  BP: (132)/(88) 132/88     Latent labor:  - NST reactive and reassuring  - Cervix unchanged from previous clinic exam ()   - no s/s of labor at this time  - symptomatic relief: tylenol 650 PRN, hot bath/shower, warm compress, leg elevation, adequate PO hydration  - RTC for prenatal appt as scheduled  - RT CALI for ctx of increasing frequency/intensity, LOF, decreased fetal movement, vaginal bleeding  - Pt was given routine pregnancy instructions including to return to triage if she had any vaginal bleeding (other than spotting for the next 48hrs), any loss of fluid like her water broke, decreased fetal movement, or contractions Q 5min  lasting for 2 or more hours. Pt was also instructed to drink copious water. Patient voiced understanding of all theseinstructions and was subsequently discharged home.            Attending Attestation:   Physician Attestation Statement for Resident:  As the supervising MD    Physician Attestation Statement: I have personally seen and examined this patient.   I agree with the above history.  -:   As the supervising MD I agree with the above PE.     As the supervising MD I agree with the above treatment, course, plan, and disposition.   I was personally present during the critical portions of the procedure(s) performed by the resident and was immediately available in the ED to provide services and assistance as needed during the entire procedure.                  ED Course as of 24 BP: 132/88 [RF]    Pulse: 93  Gisella Smith is a 39 y.o. female  @ 37w0d presenting for contractions  SVE unchanged from clinic  [place]: one contraction  /mod/+accels/-decels    Reassurance provided, counseled on reasons to return, labor symptoms [RF]      ED Course User Index  [RF] Nandini Quinones MD                           Clinical Impression:  Final diagnoses:  [O47.9] Uterine contractions during pregnancy (Primary)  [O40.3XX0] Polyhydramnios affecting pregnancy in third trimester  [Z3A.37] 37 weeks gestation of pregnancy          ED Disposition Condition    Discharge Stable          ED Prescriptions    None       Follow-up Information       Follow up With Specialties Details Why Contact Info    [facility] - Emergency Dept (Obstetrics) Emergency Medicine Go to  If symptoms worsen [address] [facility] [address]  [phone]             Paula Simpson MD  Resident  24       Paula Simpson MD  Resident  24       Nandini Quinones MD  [phone]

## 2024-11-27 ENCOUNTER — ANESTHESIA EVENT (OUTPATIENT)
Dept: OBSTETRICS AND GYNECOLOGY | Facility: OTHER | Age: 39
End: 2024-11-27
Payer: COMMERCIAL

## 2024-11-27 ENCOUNTER — ANESTHESIA (OUTPATIENT)
Dept: OBSTETRICS AND GYNECOLOGY | Facility: OTHER | Age: 39
End: 2024-11-27
Payer: COMMERCIAL

## 2024-11-27 ENCOUNTER — RESEARCH ENCOUNTER (OUTPATIENT)
Dept: RESEARCH | Facility: HOSPITAL | Age: 39
End: 2024-11-27
Payer: COMMERCIAL

## 2024-11-27 ENCOUNTER — HOSPITAL ENCOUNTER (INPATIENT)
Facility: OTHER | Age: 39
LOS: 2 days | Discharge: HOME | End: 2024-11-29
Attending: STUDENT IN AN ORGANIZED HEALTH CARE EDUCATION/TRAINING PROGRAM | Admitting: OBSTETRICS & GYNECOLOGY
Payer: COMMERCIAL

## 2024-11-27 DIAGNOSIS — Z78.9 CONCEIVED BY IN VITRO FERTILIZATION: ICD-10-CM

## 2024-11-27 DIAGNOSIS — O09.529 ANTEPARTUM MULTIGRAVIDA OF ADVANCED MATERNAL AGE: ICD-10-CM

## 2024-11-27 DIAGNOSIS — Z34.90 ENCOUNTER FOR INDUCTION OF LABOR: ICD-10-CM

## 2024-11-27 PROBLEM — O00.90 ECTOPIC PREGNANCY: Status: RESOLVED | Noted: 2023-01-20 | Resolved: 2024-11-27

## 2024-11-27 PROBLEM — O99.820 GBS (GROUP B STREPTOCOCCUS CARRIER), +RV CULTURE, CURRENTLY PREGNANT: Status: RESOLVED | Noted: 2024-11-27 | Resolved: 2024-11-27

## 2024-11-27 PROBLEM — N71.9 ENDOMETRITIS: Status: RESOLVED | Noted: 2020-11-13 | Resolved: 2024-11-27

## 2024-11-27 PROBLEM — N91.2 AMENORRHEA: Status: RESOLVED | Noted: 2023-06-05 | Resolved: 2024-11-27

## 2024-11-27 PROBLEM — Z3A.01 5 WEEKS GESTATION OF PREGNANCY: Status: RESOLVED | Noted: 2023-06-16 | Resolved: 2024-11-27

## 2024-11-27 PROBLEM — O99.820 GBS (GROUP B STREPTOCOCCUS CARRIER), +RV CULTURE, CURRENTLY PREGNANT: Status: ACTIVE | Noted: 2024-11-27

## 2024-11-27 PROBLEM — D64.9 ANEMIA: Status: ACTIVE | Noted: 2024-11-27

## 2024-11-27 LAB
ABO + RH BLD: NORMAL
ALBUMIN SERPL BCP-MCNC: 3 G/DL (ref 3.5–5.2)
ALP SERPL-CCNC: 151 U/L (ref 40–150)
ALT SERPL W/O P-5'-P-CCNC: 12 U/L (ref 10–44)
ANION GAP SERPL CALC-SCNC: 13 MMOL/L (ref 8–16)
AST SERPL-CCNC: 20 U/L (ref 10–40)
BASOPHILS # BLD AUTO: 0.06 K/UL (ref 0–0.2)
BASOPHILS NFR BLD: 0.7 % (ref 0–1.9)
BILIRUB SERPL-MCNC: 0.4 MG/DL (ref 0.1–1)
BLD GP AB SCN CELLS X3 SERPL QL: NORMAL
BUN SERPL-MCNC: 5 MG/DL (ref 6–20)
CALCIUM SERPL-MCNC: 10.3 MG/DL (ref 8.7–10.5)
CHLORIDE SERPL-SCNC: 108 MMOL/L (ref 95–110)
CO2 SERPL-SCNC: 16 MMOL/L (ref 23–29)
CREAT SERPL-MCNC: 0.8 MG/DL (ref 0.5–1.4)
DIFFERENTIAL METHOD BLD: ABNORMAL
EOSINOPHIL # BLD AUTO: 0.1 K/UL (ref 0–0.5)
EOSINOPHIL NFR BLD: 1.4 % (ref 0–8)
ERYTHROCYTE [DISTWIDTH] IN BLOOD BY AUTOMATED COUNT: 13.6 % (ref 11.5–14.5)
EST. GFR  (NO RACE VARIABLE): >60 ML/MIN/1.73 M^2
GLUCOSE SERPL-MCNC: 109 MG/DL (ref 70–110)
HCT VFR BLD AUTO: 31.7 % (ref 37–48.5)
HGB BLD-MCNC: 11.4 G/DL (ref 12–16)
HIV 1+2 AB+HIV1 P24 AG SERPL QL IA: NEGATIVE
IMM GRANULOCYTES # BLD AUTO: 0.05 K/UL (ref 0–0.04)
IMM GRANULOCYTES NFR BLD AUTO: 0.6 % (ref 0–0.5)
LYMPHOCYTES # BLD AUTO: 2.5 K/UL (ref 1–4.8)
LYMPHOCYTES NFR BLD: 29.8 % (ref 18–48)
MCH RBC QN AUTO: 35 PG (ref 27–31)
MCHC RBC AUTO-ENTMCNC: 36 G/DL (ref 32–36)
MCV RBC AUTO: 97 FL (ref 82–98)
MONOCYTES # BLD AUTO: 0.9 K/UL (ref 0.3–1)
MONOCYTES NFR BLD: 10.9 % (ref 4–15)
NEUTROPHILS # BLD AUTO: 4.7 K/UL (ref 1.8–7.7)
NEUTROPHILS NFR BLD: 56.6 % (ref 38–73)
NRBC BLD-RTO: 0 /100 WBC
PLATELET # BLD AUTO: 196 K/UL (ref 150–450)
PMV BLD AUTO: 9.9 FL (ref 9.2–12.9)
POTASSIUM SERPL-SCNC: 3 MMOL/L (ref 3.5–5.1)
PROT SERPL-MCNC: 6.4 G/DL (ref 6–8.4)
RBC # BLD AUTO: 3.26 M/UL (ref 4–5.4)
SODIUM SERPL-SCNC: 137 MMOL/L (ref 136–145)
SPECIMEN OUTDATE: NORMAL
TREPONEMA PALLIDUM IGG+IGM AB [PRESENCE] IN SERUM OR PLASMA BY IMMUNOASSAY: NONREACTIVE
WBC # BLD AUTO: 8.38 K/UL (ref 3.9–12.7)

## 2024-11-27 PROCEDURE — 86593 SYPHILIS TEST NON-TREP QUANT: CPT

## 2024-11-27 PROCEDURE — 10907ZC DRAINAGE OF AMNIOTIC FLUID, THERAPEUTIC FROM PRODUCTS OF CONCEPTION, VIA NATURAL OR ARTIFICIAL OPENING: ICD-10-PCS | Performed by: STUDENT IN AN ORGANIZED HEALTH CARE EDUCATION/TRAINING PROGRAM

## 2024-11-27 PROCEDURE — 72100002 HC LABOR CARE, 1ST 8 HOURS

## 2024-11-27 PROCEDURE — 25000003 PHARM REV CODE 250: Performed by: STUDENT IN AN ORGANIZED HEALTH CARE EDUCATION/TRAINING PROGRAM

## 2024-11-27 PROCEDURE — 25000003 PHARM REV CODE 250

## 2024-11-27 PROCEDURE — 3E0P7VZ INTRODUCTION OF HORMONE INTO FEMALE REPRODUCTIVE, VIA NATURAL OR ARTIFICIAL OPENING: ICD-10-PCS | Performed by: STUDENT IN AN ORGANIZED HEALTH CARE EDUCATION/TRAINING PROGRAM

## 2024-11-27 PROCEDURE — 62326 NJX INTERLAMINAR LMBR/SAC: CPT | Performed by: ANESTHESIOLOGY

## 2024-11-27 PROCEDURE — 0KQM0ZZ REPAIR PERINEUM MUSCLE, OPEN APPROACH: ICD-10-PCS | Performed by: STUDENT IN AN ORGANIZED HEALTH CARE EDUCATION/TRAINING PROGRAM

## 2024-11-27 PROCEDURE — 51702 INSERT TEMP BLADDER CATH: CPT

## 2024-11-27 PROCEDURE — 25000003 PHARM REV CODE 250: Performed by: ANESTHESIOLOGY

## 2024-11-27 PROCEDURE — 27000181 HC CABLE, IUPC

## 2024-11-27 PROCEDURE — 59400 OBSTETRICAL CARE: CPT | Mod: AT,,, | Performed by: STUDENT IN AN ORGANIZED HEALTH CARE EDUCATION/TRAINING PROGRAM

## 2024-11-27 PROCEDURE — 80053 COMPREHEN METABOLIC PANEL: CPT

## 2024-11-27 PROCEDURE — 63600175 PHARM REV CODE 636 W HCPCS

## 2024-11-27 PROCEDURE — 59200 INSERT CERVICAL DILATOR: CPT

## 2024-11-27 PROCEDURE — 72200005 HC VAGINAL DELIVERY LEVEL II

## 2024-11-27 PROCEDURE — 63600175 PHARM REV CODE 636 W HCPCS: Mod: JZ,JG

## 2024-11-27 PROCEDURE — 63600175 PHARM REV CODE 636 W HCPCS: Performed by: ANESTHESIOLOGY

## 2024-11-27 PROCEDURE — 11000001 HC ACUTE MED/SURG PRIVATE ROOM

## 2024-11-27 PROCEDURE — 86901 BLOOD TYPING SEROLOGIC RH(D): CPT

## 2024-11-27 PROCEDURE — 87389 HIV-1 AG W/HIV-1&-2 AB AG IA: CPT

## 2024-11-27 PROCEDURE — 85025 COMPLETE CBC W/AUTO DIFF WBC: CPT

## 2024-11-27 RX ORDER — CARBOPROST TROMETHAMINE 250 UG/ML
250 INJECTION, SOLUTION INTRAMUSCULAR
Status: DISCONTINUED | OUTPATIENT
Start: 2024-11-27 | End: 2024-11-27

## 2024-11-27 RX ORDER — OXYTOCIN-SODIUM CHLORIDE 0.9% IV SOLN 30 UNIT/500ML 30-0.9/5 UT/ML-%
95 SOLUTION INTRAVENOUS ONCE AS NEEDED
Status: DISCONTINUED | OUTPATIENT
Start: 2024-11-27 | End: 2024-11-27

## 2024-11-27 RX ORDER — ACETAMINOPHEN 325 MG/1
650 TABLET ORAL EVERY 6 HOURS SCHEDULED
Status: DISCONTINUED | OUTPATIENT
Start: 2024-11-27 | End: 2024-11-29 | Stop reason: HOSPADM

## 2024-11-27 RX ORDER — OXYTOCIN-SODIUM CHLORIDE 0.9% IV SOLN 30 UNIT/500ML 30-0.9/5 UT/ML-%
10 SOLUTION INTRAVENOUS ONCE AS NEEDED
Status: DISCONTINUED | OUTPATIENT
Start: 2024-11-27 | End: 2024-11-29 | Stop reason: HOSPADM

## 2024-11-27 RX ORDER — MISOPROSTOL 200 UG/1
800 TABLET ORAL ONCE AS NEEDED
Status: DISCONTINUED | OUTPATIENT
Start: 2024-11-27 | End: 2024-11-29 | Stop reason: HOSPADM

## 2024-11-27 RX ORDER — ONDANSETRON HYDROCHLORIDE 2 MG/ML
8 INJECTION, SOLUTION INTRAVENOUS ONCE
Status: COMPLETED | OUTPATIENT
Start: 2024-11-27 | End: 2024-11-27

## 2024-11-27 RX ORDER — DIPHENOXYLATE HYDROCHLORIDE AND ATROPINE SULFATE 2.5; .025 MG/1; MG/1
2 TABLET ORAL EVERY 6 HOURS PRN
Status: DISCONTINUED | OUTPATIENT
Start: 2024-11-27 | End: 2024-11-29 | Stop reason: HOSPADM

## 2024-11-27 RX ORDER — SIMETHICONE 80 MG
1 TABLET,CHEWABLE ORAL EVERY 6 HOURS PRN
Status: DISCONTINUED | OUTPATIENT
Start: 2024-11-27 | End: 2024-11-29 | Stop reason: HOSPADM

## 2024-11-27 RX ORDER — FENTANYL/BUPIVACAINE/NS/PF 2MCG/ML-.1
PLASTIC BAG, INJECTION (ML) INJECTION
Status: COMPLETED
Start: 2024-11-27 | End: 2024-11-27

## 2024-11-27 RX ORDER — OXYCODONE AND ACETAMINOPHEN 5; 325 MG/1; MG/1
1 TABLET ORAL EVERY 4 HOURS PRN
Status: DISCONTINUED | OUTPATIENT
Start: 2024-11-27 | End: 2024-11-28

## 2024-11-27 RX ORDER — BUPIVACAINE HYDROCHLORIDE 2.5 MG/ML
INJECTION, SOLUTION EPIDURAL; INFILTRATION; INTRACAUDAL CONTINUOUS PRN
Status: DISCONTINUED | OUTPATIENT
Start: 2024-11-27 | End: 2024-11-28

## 2024-11-27 RX ORDER — OXYCODONE AND ACETAMINOPHEN 10; 325 MG/1; MG/1
1 TABLET ORAL EVERY 4 HOURS PRN
Status: DISCONTINUED | OUTPATIENT
Start: 2024-11-27 | End: 2024-11-28

## 2024-11-27 RX ORDER — OXYTOCIN-SODIUM CHLORIDE 0.9% IV SOLN 30 UNIT/500ML 30-0.9/5 UT/ML-%
95 SOLUTION INTRAVENOUS CONTINUOUS PRN
Status: DISCONTINUED | OUTPATIENT
Start: 2024-11-27 | End: 2024-11-27

## 2024-11-27 RX ORDER — METHYLERGONOVINE MALEATE 0.2 MG/ML
200 INJECTION INTRAVENOUS ONCE AS NEEDED
Status: DISCONTINUED | OUTPATIENT
Start: 2024-11-27 | End: 2024-11-27

## 2024-11-27 RX ORDER — OXYTOCIN 10 [USP'U]/ML
10 INJECTION, SOLUTION INTRAMUSCULAR; INTRAVENOUS ONCE AS NEEDED
Status: DISCONTINUED | OUTPATIENT
Start: 2024-11-27 | End: 2024-11-29 | Stop reason: HOSPADM

## 2024-11-27 RX ORDER — CARBOPROST TROMETHAMINE 250 UG/ML
250 INJECTION, SOLUTION INTRAMUSCULAR
Status: DISCONTINUED | OUTPATIENT
Start: 2024-11-27 | End: 2024-11-29 | Stop reason: HOSPADM

## 2024-11-27 RX ORDER — SODIUM CHLORIDE 0.9 % (FLUSH) 0.9 %
10 SYRINGE (ML) INJECTION EVERY 6 HOURS PRN
Status: DISCONTINUED | OUTPATIENT
Start: 2024-11-27 | End: 2024-11-29 | Stop reason: HOSPADM

## 2024-11-27 RX ORDER — ACETAMINOPHEN 325 MG/1
650 TABLET ORAL EVERY 6 HOURS SCHEDULED
Status: DISCONTINUED | OUTPATIENT
Start: 2024-11-28 | End: 2024-11-27

## 2024-11-27 RX ORDER — SODIUM CHLORIDE 9 MG/ML
INJECTION, SOLUTION INTRAVENOUS
Status: DISCONTINUED | OUTPATIENT
Start: 2024-11-27 | End: 2024-11-27

## 2024-11-27 RX ORDER — ONDANSETRON HYDROCHLORIDE 2 MG/ML
INJECTION, SOLUTION INTRAVENOUS
Status: DISPENSED
Start: 2024-11-27 | End: 2024-11-28

## 2024-11-27 RX ORDER — LIDOCAINE HYDROCHLORIDE 10 MG/ML
10 INJECTION, SOLUTION INFILTRATION; PERINEURAL ONCE AS NEEDED
Status: DISCONTINUED | OUTPATIENT
Start: 2024-11-27 | End: 2024-11-27

## 2024-11-27 RX ORDER — OXYTOCIN 10 [USP'U]/ML
10 INJECTION, SOLUTION INTRAMUSCULAR; INTRAVENOUS ONCE AS NEEDED
Status: COMPLETED | OUTPATIENT
Start: 2024-11-27 | End: 2024-11-27

## 2024-11-27 RX ORDER — DIPHENHYDRAMINE HYDROCHLORIDE 50 MG/ML
25 INJECTION INTRAMUSCULAR; INTRAVENOUS EVERY 4 HOURS PRN
Status: DISCONTINUED | OUTPATIENT
Start: 2024-11-27 | End: 2024-11-29 | Stop reason: HOSPADM

## 2024-11-27 RX ORDER — FENTANYL/BUPIVACAINE/NS/PF 2MCG/ML-.1
PLASTIC BAG, INJECTION (ML) INJECTION CONTINUOUS PRN
Status: DISCONTINUED | OUTPATIENT
Start: 2024-11-27 | End: 2024-11-28

## 2024-11-27 RX ORDER — DOCUSATE SODIUM 100 MG/1
200 CAPSULE, LIQUID FILLED ORAL 2 TIMES DAILY PRN
Status: DISCONTINUED | OUTPATIENT
Start: 2024-11-27 | End: 2024-11-28

## 2024-11-27 RX ORDER — CALCIUM CARBONATE 200(500)MG
500 TABLET,CHEWABLE ORAL 3 TIMES DAILY PRN
Status: DISCONTINUED | OUTPATIENT
Start: 2024-11-27 | End: 2024-11-27

## 2024-11-27 RX ORDER — DIPHENHYDRAMINE HCL 25 MG
25 CAPSULE ORAL EVERY 4 HOURS PRN
Status: DISCONTINUED | OUTPATIENT
Start: 2024-11-27 | End: 2024-11-29 | Stop reason: HOSPADM

## 2024-11-27 RX ORDER — TRANEXAMIC ACID 10 MG/ML
1000 INJECTION, SOLUTION INTRAVENOUS EVERY 30 MIN PRN
Status: DISCONTINUED | OUTPATIENT
Start: 2024-11-27 | End: 2024-11-29 | Stop reason: HOSPADM

## 2024-11-27 RX ORDER — POTASSIUM CHLORIDE 7.45 MG/ML
10 INJECTION INTRAVENOUS
Status: DISPENSED | OUTPATIENT
Start: 2024-11-27 | End: 2024-11-28

## 2024-11-27 RX ORDER — MISOPROSTOL 200 UG/1
800 TABLET ORAL ONCE AS NEEDED
Status: DISCONTINUED | OUTPATIENT
Start: 2024-11-27 | End: 2024-11-27

## 2024-11-27 RX ORDER — FAMOTIDINE 10 MG/ML
INJECTION INTRAVENOUS
Status: COMPLETED
Start: 2024-11-27 | End: 2024-11-27

## 2024-11-27 RX ORDER — KETOROLAC TROMETHAMINE 30 MG/ML
15 INJECTION, SOLUTION INTRAMUSCULAR; INTRAVENOUS ONCE
Status: COMPLETED | OUTPATIENT
Start: 2024-11-27 | End: 2024-11-27

## 2024-11-27 RX ORDER — DIPHENOXYLATE HYDROCHLORIDE AND ATROPINE SULFATE 2.5; .025 MG/1; MG/1
2 TABLET ORAL EVERY 6 HOURS PRN
Status: DISCONTINUED | OUTPATIENT
Start: 2024-11-27 | End: 2024-11-27

## 2024-11-27 RX ORDER — IBUPROFEN 600 MG/1
600 TABLET ORAL EVERY 6 HOURS
Status: DISCONTINUED | OUTPATIENT
Start: 2024-11-27 | End: 2024-11-28

## 2024-11-27 RX ORDER — PRENATAL WITH FERROUS FUM AND FOLIC ACID 3080; 920; 120; 400; 22; 1.84; 3; 20; 10; 1; 12; 200; 27; 25; 2 [IU]/1; [IU]/1; MG/1; [IU]/1; MG/1; MG/1; MG/1; MG/1; MG/1; MG/1; UG/1; MG/1; MG/1; MG/1; MG/1
1 TABLET ORAL DAILY
Status: DISCONTINUED | OUTPATIENT
Start: 2024-11-28 | End: 2024-11-29 | Stop reason: HOSPADM

## 2024-11-27 RX ORDER — OXYTOCIN-SODIUM CHLORIDE 0.9% IV SOLN 30 UNIT/500ML 30-0.9/5 UT/ML-%
0-32 SOLUTION INTRAVENOUS CONTINUOUS
Status: DISCONTINUED | OUTPATIENT
Start: 2024-11-27 | End: 2024-11-27

## 2024-11-27 RX ORDER — PROCHLORPERAZINE EDISYLATE 5 MG/ML
5 INJECTION INTRAMUSCULAR; INTRAVENOUS EVERY 6 HOURS PRN
Status: DISCONTINUED | OUTPATIENT
Start: 2024-11-27 | End: 2024-11-27

## 2024-11-27 RX ORDER — SODIUM CHLORIDE, SODIUM LACTATE, POTASSIUM CHLORIDE, CALCIUM CHLORIDE 600; 310; 30; 20 MG/100ML; MG/100ML; MG/100ML; MG/100ML
INJECTION, SOLUTION INTRAVENOUS CONTINUOUS
Status: DISCONTINUED | OUTPATIENT
Start: 2024-11-27 | End: 2024-11-27

## 2024-11-27 RX ORDER — ONDANSETRON HYDROCHLORIDE 2 MG/ML
INJECTION, SOLUTION INTRAVENOUS
Status: DISPENSED
Start: 2024-11-27 | End: 2024-11-27

## 2024-11-27 RX ORDER — FAMOTIDINE 10 MG/ML
20 INJECTION INTRAVENOUS ONCE
Status: COMPLETED | OUTPATIENT
Start: 2024-11-27 | End: 2024-11-27

## 2024-11-27 RX ORDER — OXYTOCIN-SODIUM CHLORIDE 0.9% IV SOLN 30 UNIT/500ML 30-0.9/5 UT/ML-%
95 SOLUTION INTRAVENOUS ONCE AS NEEDED
Status: DISCONTINUED | OUTPATIENT
Start: 2024-11-27 | End: 2024-11-29 | Stop reason: HOSPADM

## 2024-11-27 RX ORDER — METHYLERGONOVINE MALEATE 0.2 MG/ML
200 INJECTION INTRAVENOUS ONCE AS NEEDED
Status: DISCONTINUED | OUTPATIENT
Start: 2024-11-27 | End: 2024-11-29 | Stop reason: HOSPADM

## 2024-11-27 RX ORDER — ONDANSETRON 8 MG/1
8 TABLET, ORALLY DISINTEGRATING ORAL EVERY 8 HOURS PRN
Status: DISCONTINUED | OUTPATIENT
Start: 2024-11-27 | End: 2024-11-29 | Stop reason: HOSPADM

## 2024-11-27 RX ORDER — HYDROCORTISONE 25 MG/G
CREAM TOPICAL 3 TIMES DAILY PRN
Status: DISCONTINUED | OUTPATIENT
Start: 2024-11-27 | End: 2024-11-29 | Stop reason: HOSPADM

## 2024-11-27 RX ORDER — OXYTOCIN-SODIUM CHLORIDE 0.9% IV SOLN 30 UNIT/500ML 30-0.9/5 UT/ML-%
10 SOLUTION INTRAVENOUS ONCE AS NEEDED
Status: DISCONTINUED | OUTPATIENT
Start: 2024-11-27 | End: 2024-11-27

## 2024-11-27 RX ORDER — LIDOCAINE HYDROCHLORIDE AND EPINEPHRINE 15; 5 MG/ML; UG/ML
INJECTION, SOLUTION EPIDURAL
Status: DISCONTINUED | OUTPATIENT
Start: 2024-11-27 | End: 2024-11-28

## 2024-11-27 RX ORDER — SIMETHICONE 80 MG
1 TABLET,CHEWABLE ORAL 4 TIMES DAILY PRN
Status: DISCONTINUED | OUTPATIENT
Start: 2024-11-27 | End: 2024-11-27

## 2024-11-27 RX ORDER — ONDANSETRON 8 MG/1
8 TABLET, ORALLY DISINTEGRATING ORAL EVERY 8 HOURS PRN
Status: DISCONTINUED | OUTPATIENT
Start: 2024-11-27 | End: 2024-11-27

## 2024-11-27 RX ORDER — TRANEXAMIC ACID 10 MG/ML
1000 INJECTION, SOLUTION INTRAVENOUS EVERY 30 MIN PRN
Status: DISCONTINUED | OUTPATIENT
Start: 2024-11-27 | End: 2024-11-27

## 2024-11-27 RX ORDER — OXYTOCIN-SODIUM CHLORIDE 0.9% IV SOLN 30 UNIT/500ML 30-0.9/5 UT/ML-%
95 SOLUTION INTRAVENOUS CONTINUOUS PRN
Status: DISCONTINUED | OUTPATIENT
Start: 2024-11-27 | End: 2024-11-29 | Stop reason: HOSPADM

## 2024-11-27 RX ORDER — PHENYLEPHRINE HYDROCHLORIDE 10 MG/ML
INJECTION INTRAVENOUS
Status: DISCONTINUED | OUTPATIENT
Start: 2024-11-27 | End: 2024-11-28

## 2024-11-27 RX ORDER — MISOPROSTOL 100 MCG
25 TABLET ORAL ONCE
Status: DISCONTINUED | OUTPATIENT
Start: 2024-11-27 | End: 2024-11-27

## 2024-11-27 RX ADMIN — SODIUM CHLORIDE: 9 INJECTION, SOLUTION INTRAVENOUS at 02:11

## 2024-11-27 RX ADMIN — KETOROLAC TROMETHAMINE 15 MG: 30 INJECTION, SOLUTION INTRAMUSCULAR; INTRAVENOUS at 09:11

## 2024-11-27 RX ADMIN — FENTANYL CITRATE 10 ML/HR: 50 INJECTION INTRAMUSCULAR; INTRAVENOUS at 08:11

## 2024-11-27 RX ADMIN — ONDANSETRON 8 MG: 2 INJECTION INTRAMUSCULAR; INTRAVENOUS at 04:11

## 2024-11-27 RX ADMIN — OXYTOCIN 10 UNITS: 10 INJECTION, SOLUTION INTRAMUSCULAR; INTRAVENOUS at 06:11

## 2024-11-27 RX ADMIN — BUPIVACAINE HYDROCHLORIDE 5 ML: 2.5 INJECTION, SOLUTION EPIDURAL; INFILTRATION; INTRACAUDAL at 03:11

## 2024-11-27 RX ADMIN — SODIUM CHLORIDE, POTASSIUM CHLORIDE, SODIUM LACTATE AND CALCIUM CHLORIDE: 600; 310; 30; 20 INJECTION, SOLUTION INTRAVENOUS at 07:11

## 2024-11-27 RX ADMIN — OXYCODONE HYDROCHLORIDE AND ACETAMINOPHEN 1 TABLET: 5; 325 TABLET ORAL at 11:11

## 2024-11-27 RX ADMIN — POTASSIUM CHLORIDE 10 MEQ: 7.46 INJECTION, SOLUTION INTRAVENOUS at 11:11

## 2024-11-27 RX ADMIN — PHENYLEPHRINE HYDROCHLORIDE 100 MCG: 10 INJECTION INTRAVENOUS at 10:11

## 2024-11-27 RX ADMIN — FAMOTIDINE 20 MG: 10 INJECTION, SOLUTION INTRAVENOUS at 05:11

## 2024-11-27 RX ADMIN — LIDOCAINE HYDROCHLORIDE,EPINEPHRINE BITARTRATE 5 ML: 15; .005 INJECTION, SOLUTION EPIDURAL; INFILTRATION; INTRACAUDAL; PERINEURAL at 08:11

## 2024-11-27 RX ADMIN — DEXTROSE MONOHYDRATE 5 MILLION UNITS: 5 INJECTION INTRAVENOUS at 06:11

## 2024-11-27 RX ADMIN — ACETAMINOPHEN 650 MG: 325 TABLET, FILM COATED ORAL at 08:11

## 2024-11-27 RX ADMIN — POTASSIUM CHLORIDE 10 MEQ: 7.46 INJECTION, SOLUTION INTRAVENOUS at 09:11

## 2024-11-27 RX ADMIN — DEXTROSE MONOHYDRATE 3 MILLION UNITS: 50 INJECTION, SOLUTION INTRAVENOUS at 10:11

## 2024-11-27 RX ADMIN — MISOPROSTOL 50 MCG: 100 TABLET ORAL at 07:11

## 2024-11-27 RX ADMIN — FAMOTIDINE 20 MG: 10 INJECTION INTRAVENOUS at 05:11

## 2024-11-27 RX ADMIN — SODIUM CHLORIDE, POTASSIUM CHLORIDE, SODIUM LACTATE AND CALCIUM CHLORIDE 500 ML: 600; 310; 30; 20 INJECTION, SOLUTION INTRAVENOUS at 10:11

## 2024-11-27 RX ADMIN — BUPIVACAINE HYDROCHLORIDE 5 ML: 2.5 INJECTION, SOLUTION EPIDURAL; INFILTRATION; INTRACAUDAL at 05:11

## 2024-11-27 RX ADMIN — PHENYLEPHRINE HYDROCHLORIDE 200 MCG: 10 INJECTION INTRAVENOUS at 10:11

## 2024-11-27 RX ADMIN — DEXTROSE MONOHYDRATE 3 MILLION UNITS: 50 INJECTION, SOLUTION INTRAVENOUS at 03:11

## 2024-11-27 RX ADMIN — ONDANSETRON 8 MG: 2 INJECTION INTRAMUSCULAR; INTRAVENOUS at 09:11

## 2024-11-27 NOTE — PROGRESS NOTES
LABOR NOTE    S:  MD to bedside for routine cervical exam. Epidural working:  yes  RN reports decreased urine output recently    O: /70   Pulse 95   Temp 97.8 °F (36.6 °C) (Oral)   Resp 15   LMP 04/17/2023 (Exact Date)   SpO2 98%   Breastfeeding No     FHT: 140 bpm, moderate variability, +accels, -decels, Cat 1 (reassuring)  CTX: q 2-5 minutes  SVE: 3/50/-3, bourne out, SROM not evident on exam    TIMELINE:  0600: 1/70/-3, bourne placed @0600   0745: cytotec dose #1 administered   0930: 3/50/-3, bourne out  1230: 4/70/-2, AROM clear copious fluid. Urine noted to be released into bourne tubing at this time as well    ASSESSMENT:  Final Active Diagnoses:    Diagnosis Date Noted POA    PRINCIPAL PROBLEM:  Encounter for induction of labor [Z34.90] 11/27/2024 Not Applicable    GBS (group B Streptococcus carrier), +RV culture, currently pregnant [O99.820] 11/27/2024 Not Applicable    Conceived by in vitro fertilization [Z78.9] 05/23/2024 Yes    Antepartum multigravida of advanced maternal age [O09.529] 05/23/2024 Yes    Gestational hypertension, third trimester [O13.3] 10/30/2020 Yes      Problems Resolved During this Admission:       PLAN:    Continue Close Maternal/Fetal Monitoring  Recheck 2-4 hours or PRN. If unchanged, consider starting Pitocin.  UOP decrease likely from head dropping. Continue to monitor    Ketty Winkler M.D.

## 2024-11-27 NOTE — PROGRESS NOTES
LABOR NOTE    S:  MD to bedside d/t patient reporting increased pressure. Epidural working:  yes    O: /77   Pulse 103   Temp 98.6 °F (37 °C) (Oral)   Resp 17   LMP 04/17/2023 (Exact Date)   SpO2 98%   Breastfeeding No     FHT: 140 bpm, moderate variability, +accels, + recurrent variable decels, Cat II (overall reassuring)  CTX: q 1 min  SVE: 6/80/-1     TIMELINE:  0600: 1/70/-3, bourne placed @0600   0745: cytotec dose #1 administered   0930: 3/50/-3, bourne out  1230: 4/70/-2, AROM clear copious fluid. Urine noted to be released into bourne tubing at this time as well  1400: 5/80/-1, IUPC placed & maternal repositioning performed with minor improvement in strip. Amnioinfusion ordered  1450: 6/80/-1     ASSESSMENT:  Final Active Diagnoses:    Diagnosis Date Noted POA    PRINCIPAL PROBLEM:  Encounter for induction of labor [Z34.90] 11/27/2024 Not Applicable    GBS (group B Streptococcus carrier), +RV culture, currently pregnant [O99.820] 11/27/2024 Not Applicable    Conceived by in vitro fertilization [Z78.9] 05/23/2024 Yes    Antepartum multigravida of advanced maternal age [O09.529] 05/23/2024 Yes    Gestational hypertension, third trimester [O13.3] 10/30/2020 Yes      Problems Resolved During this Admission:       PLAN:    Continue Close Maternal/Fetal Monitoring  Recheck 2-4 hours or PRN.   Continue to monitor    Gosia Velazquez MD (Mary)   Obstetrics and Gynecology, PGY1

## 2024-11-27 NOTE — ANESTHESIA PROCEDURE NOTES
Epidural    Patient location during procedure: OB   Reason for block: primary anesthetic   Reason for block: labor analgesia requested by patient and obstetrician  Diagnosis: IUP   Start time: 11/27/2024 9:45 AM  Timeout: 11/27/2024 9:42 AM  End time: 11/27/2024 9:51 AM  Surgery related to: Vaginal Delivery    Staffing  Performing Provider: Donta Noble III, MD  Authorizing Provider: Donta Noble III, MD    Staffing  Performed by: Donta Noble III, MD  Authorized by: Donta Noble III, MD        Preanesthetic Checklist  Completed: patient identified, IV checked, site marked, risks and benefits discussed, surgical consent, monitors and equipment checked, pre-op evaluation, timeout performed, anesthesia consent given, hand hygiene performed and patient being monitored  Preparation  Patient position: sitting  Prep: ChloraPrep  Patient monitoring: Pulse Ox  Reason for block: primary anesthetic   Epidural  Skin Anesthetic: lidocaine 1%  Skin Wheal: 3 mL  Administration type: continuous  Approach: midline  Interspace: L3-4    Injection technique: NORMAN saline  Needle and Epidural Catheter  Needle type: Tuohy   Needle gauge: 17  Needle length: 3.5 inches  Needle insertion depth: 5.5 cm  Catheter type: [employer]  Catheter size: 19 G  Catheter at skin depth: 11 cm  Insertion Attempts: 1  Test dose: 3 mL of lidocaine 1.5% with Epi 1-to-200,000  Additional Documentation: incremental injection, negative aspiration for heme and CSF, no paresthesia on injection, no signs/symptoms of IV or SA injection, no significant pain on injection and no significant complaints from patient  Needle localization: anatomical landmarks  Medications:  Volume per aspiration: 5 mL  Time between aspirations: 5 minutes   Assessment  Ease of block: easy  Patient's tolerance of the procedure: comfortable throughout block and no complaints  Additional Notes  Replaced 2/2 repeated downstream occlusion.  No inadvertent dural puncture  with Tuohy.  Dural puncture not performed with spinal needle

## 2024-11-27 NOTE — PROGRESS NOTES
She complains of pressure  Vitals:    24 1620   BP: 106/63   Pulse: 90   Resp:    Temp:      FHT: cat 2 with recurrent variable decels.  +scalp stim  CTX: every 1-3 min on no pit  SVE: 9/c/0  A/p: tracing stable  Anticipate .

## 2024-11-27 NOTE — INTERVAL H&P NOTE
Gisella Smith is 39 y.o.  at 37w2d wga presenting for IOL.     Temp:  [98.5 °F (36.9 °C)] 98.5 °F (36.9 °C)  Pulse:  [106] 106  Resp:  [16] 16  SpO2:  [95 %] 95 %  BP: (123)/(75) 123/75    FHT: 140 bpm, moderate BTBV, +accels, -decels; Cat 1 (reassuring)  [place]: irregular  Presentation: cephalic by ultrasound    SVE: /-3    1) Induction of Labor  - Plan for bourne + vaginal cytotec    2) gHTN  - Asymptomatic  - BP: 123/75   - Plt 196  - Cr 0.6  - AST 20/ALT 12  - Will monitor BP closely during labor   - No home meds     3) GBS +  - PCN for intrapartum prophylaxis    4) HSV  - History of oral lesions only  - On suppressive Valtrex  - Neg SSE on admit    5) Reactive Airway Disease   - Albuterol PRN     6) Polyhydramnios   - MVP 7.1 cm. CUONG 30.2 cm. Q1 7.3 cm, Q2 8.6 cm, Q3 4.3 cm, Q4 10.1 cm   - Controlled ROM in the setting of polyhydramnios

## 2024-11-27 NOTE — PLAN OF CARE
Problem: Adult Inpatient Plan of Care  Goal: Plan of Care Review  Outcome: Progressing  Goal: Patient-Specific Goal (Individualized)  Outcome: Progressing  Goal: Absence of Hospital-Acquired Illness or Injury  Outcome: Progressing  Goal: Optimal Comfort and Wellbeing  Outcome: Progressing  Goal: Readiness for Transition of Care  Outcome: Progressing     Problem:  Fall Injury Risk  Goal: Absence of Fall, Infant Drop and Related Injury  Outcome: Progressing     Problem: Labor  Goal: Hemostasis  Outcome: Progressing  Goal: Stable Fetal Wellbeing  Outcome: Progressing  Goal: Effective Progression to Delivery  Outcome: Progressing  Goal: Absence of Infection Signs and Symptoms  Outcome: Progressing  Goal: Acceptable Pain Control  Outcome: Progressing  Goal: Normal Uterine Contraction Pattern  Outcome: Progressing     Problem: Infection  Goal: Absence of Infection Signs and Symptoms  Outcome: Progressing     Problem: Anesthesia/Analgesia, Neuraxial  Goal: Safe, Effective Infusion Delivery  Outcome: Progressing  Goal: Stable Patient-Fetal Status  Outcome: Progressing  Goal: Absence of Infection Signs and Symptoms  Outcome: Progressing  Goal: Nausea and Vomiting Relief  Outcome: Progressing  Goal: Effective Pain Control  Outcome: Progressing  Goal: Effective Oxygenation and Ventilation  Outcome: Progressing  Goal: Baseline Motor Function Return  Outcome: Progressing  Goal: Effective Urinary Elimination  Outcome: Progressing     VSS. Pain well controlled via epidural infusion. Plan of care reviewed with patient and family.

## 2024-11-27 NOTE — PROGRESS NOTES
Entered patient room to introduce PORSCHE trial. Patient was asleep at time of entry. Research staff to return at later time.

## 2024-11-27 NOTE — PROGRESS NOTES
LABOR NOTE    S:  MD to bedside d/t patient reporting increased pressure. Epidural working:  yes    O: /74   Pulse 95   Temp 98.5 °F (36.9 °C) (Oral)   Resp 16   LMP 04/17/2023 (Exact Date)   SpO2 95%   Breastfeeding No     FHT: 140 bpm, moderate variability, +accels, + recurrent variable decels, Cat II (overall reassuring)  CTX: q 1 min  SVE: 5/80/-1    TIMELINE:  0600: 1/70/-3, bourne placed @0600   0745: cytotec dose #1 administered   0930: 3/50/-3, bourne out  1230: 4/70/-2, AROM clear copious fluid. Urine noted to be released into bourne tubing at this time as well  1400: 5/80/-1, IUPC placed & maternal repositioning performed with minor improvement in strip. Amnioinfusion ordered    ASSESSMENT:  Final Active Diagnoses:    Diagnosis Date Noted POA    PRINCIPAL PROBLEM:  Encounter for induction of labor [Z34.90] 11/27/2024 Not Applicable    GBS (group B Streptococcus carrier), +RV culture, currently pregnant [O99.820] 11/27/2024 Not Applicable    Conceived by in vitro fertilization [Z78.9] 05/23/2024 Yes    Antepartum multigravida of advanced maternal age [O09.529] 05/23/2024 Yes    Gestational hypertension, third trimester [O13.3] 10/30/2020 Yes      Problems Resolved During this Admission:       PLAN:    Continue Close Maternal/Fetal Monitoring  Recheck 2-4 hours or PRN.   Continue to monitor    Silvia Sinha MD  [facility]   OBGYN PGY-1

## 2024-11-27 NOTE — CARE UPDATE
Patient with recurrent variables/lates with return to maternal baseline with moderate variability, overall reassuring. SVE 8/90/0. Not on pitocin. Resolve with repositioning. Will continue at this time. Primary OB notified.     Karol Bond MD/MPH  OB/GYN PGY-4  [facility]

## 2024-11-27 NOTE — ANESTHESIA PROCEDURE NOTES
Epidural    Patient location during procedure: OB   Reason for block: primary anesthetic   Reason for block: labor analgesia requested by patient and obstetrician  Diagnosis: IUP   Start time: 11/27/2024 8:25 AM  Timeout: 11/27/2024 8:23 AM  End time: 11/27/2024 8:33 AM  Surgery related to: Vaginal Delivery    Staffing  Performing Provider: Donta Noble III, MD  Authorizing Provider: Donta Noble III, MD    Staffing  Performed by: Donta Noble III, MD  Authorized by: Donta Noble III, MD        Preanesthetic Checklist  Completed: patient identified, IV checked, site marked, risks and benefits discussed, surgical consent, monitors and equipment checked, pre-op evaluation, timeout performed, anesthesia consent given, hand hygiene performed and patient being monitored  Preparation  Patient position: sitting  Prep: ChloraPrep  Patient monitoring: ECG, Pulse Ox and Blood Pressure  Reason for block: primary anesthetic   Epidural  Skin Anesthetic: lidocaine 1%  Skin Wheal: 3 mL  Administration type: continuous  Approach: midline  Interspace: L3-4    Injection technique: NORMAN saline  Needle and Epidural Catheter  Needle type: Tuohy   Needle gauge: 17  Needle length: 3.5 inches  Needle insertion depth: 5 cm  Catheter type: [employer]  Catheter size: 19 G  Catheter at skin depth: 9 cm  Insertion Attempts: 1  Test dose: 3 mL of lidocaine 1.5% with Epi 1-to-200,000  Additional Documentation: incremental injection, negative aspiration for heme and CSF, no signs/symptoms of IV or SA injection, no significant pain on injection, no significant complaints from patient and right transient paresthesia  Needle localization: anatomical landmarks  Medications:  Volume per aspiration: 5 mL  Time between aspirations: 5 minutes   Assessment  Ease of block: easy  Patient's tolerance of the procedure: comfortable throughout block and no complaints No inadvertent dural puncture with Tuohy.  Dural puncture not performed with  spinal needle

## 2024-11-27 NOTE — PROGRESS NOTES
LABOR NOTE    S:  MD to bedside for routine cervical exam. Epidural working:  yes  Concern for SROM during episode of vomiting.    O: /89   Pulse 109   Temp 97.6 °F (36.4 °C) (Oral)   Resp 15   LMP 04/17/2023 (Exact Date)   SpO2 100%   Breastfeeding No     FHT: 140 bpm, moderate variability, +accels, -decels, Cat 1 (reassuring)  CTX: q 2-5 minutes  SVE: 3/50/-3, bourne out, SROM not evident on exam    TIMELINE:  0600: 1/70/-3, bourne placed @0600   0745: cytotec dose #1 administered   0930: 3/50/-3, bourne out    ASSESSMENT:  Final Active Diagnoses:    Diagnosis Date Noted POA    PRINCIPAL PROBLEM:  Encounter for induction of labor [Z34.90] 11/27/2024 Not Applicable      Problems Resolved During this Admission:       PLAN:    Continue Close Maternal/Fetal Monitoring  Recheck 2-4 hours or PRN    Suni Grullon MD  OB/GYN PGY-2

## 2024-11-27 NOTE — ANESTHESIA PREPROCEDURE EVALUATION
[facility]  Anesthesia Pre-Operative Evaluation         Patient Name: Gisella Smith  YOB: 1985  MRN: [id number]    2024      Gisella Smith is a 39 y.o. female  @ 37w2d who presents to [facility] with contractions. IUP c/b IVF, AMA, h/o R salpingectomy s/p ectopic, polyhydramnios.   Patient denies cardiopulmonary disease, bleeding disorders, or spine pathology.     OB History    Para Term  AB Living   7 1 1   5 1   SAB IAB Ectopic Multiple Live Births   4   1 0 1      # Outcome Date GA Lbr René/2nd Weight Sex Type Anes PTL Lv   7 Current            6 2024              Birth Comments: chemical   5 SAB 2023           4 Ectopic      ECTOPIC         Birth Comments: R salpingectomy   3 Term 10/31/20 37w6d / 00:50 2.95 kg (6 lb 8.1 oz) F Vag-Spont EPI N TRACI   2 SAB 10/2019              Birth Comments: anembroynic pregnancy   1 2019              Birth Comments: chemical pregnancy       Review of patient's allergies indicates:   Allergen Reactions    Sulfamethoxazole-trimethoprim Hives       Wt Readings from Last 1 Encounters:   24 0903 74.2 kg (163 lb 9.3 oz)       BP Readings from Last 3 Encounters:   24 132/88   24 (!) 140/88   24 (!) 144/84       Patient Active Problem List   Diagnosis    Recurrent cold sores    Heartburn    Reactive airway disease that is not asthma    Ocular migraine    Pain of both hip joints    Gestational hypertension, third trimester    Endometritis    Strain of right piriformis muscle    Ectopic pregnancy    S/p unilateral salpingectomy (R) for ectopic pregnancy    Amenorrhea    5 weeks gestation of pregnancy    Conceived by in vitro fertilization    Antepartum multigravida of advanced maternal age       Past Surgical History:   Procedure Laterality Date    DIAGNOSTIC LAPAROSCOPY N/A 2023    Procedure: LAPAROSCOPY, DIAGNOSTIC;  Surgeon: Gisella Parrish MD;  Location: [facility]  OR;  Service: OB/GYN;  Laterality: N/A;    LAPAROSCOPIC SALPINGECTOMY Right 1/23/2023    Procedure: SALPINGECTOMY, LAPAROSCOPIC;  Surgeon: Gisella Parrish MD;  Location: [facility] OR;  Service: OB/GYN;  Laterality: Right;    WISDOM TOOTH EXTRACTION         Social History     Socioeconomic History    Marital status:    Tobacco Use    Smoking status: Never    Smokeless tobacco: Never   Substance and Sexual Activity    Alcohol use: Yes    Drug use: Never    Sexual activity: Yes     Partners: Male     Birth control/protection: None   Social History Narrative    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); no children    Regular exercise: [employer], works with     Outside GYN     Social Drivers of Health     Financial Resource Strain: Low Risk  (5/20/2024)    Overall Financial Resource Strain (CARDIA)     Difficulty of Paying Living Expenses: Not hard at all   Food Insecurity: No Food Insecurity (5/20/2024)    Hunger Vital Sign     Worried About Running Out of Food in the Last Year: Never true     Ran Out of Food in the Last Year: Never true   Transportation Needs: No Transportation Needs (6/16/2023)    PRAPARE - Transportation     Lack of Transportation (Medical): No     Lack of Transportation (Non-Medical): No   Physical Activity: Insufficiently Active (5/20/2024)    Exercise Vital Sign     Days of Exercise per Week: 3 days     Minutes of Exercise per Session: 30 min   Stress: Stress Concern Present (5/20/2024)    [employer] - Occupational Stress Questionnaire     Feeling of Stress : To some extent   Housing Stability: Low Risk  (6/16/2023)    Housing Stability Vital Sign     Unable to Pay for Housing in the Last Year: No     Number of Places Lived in the Last Year: 1     Unstable Housing in the Last Year: No         Chemistry        Component Value Date/Time     11/18/2024 1447    K 3.2 (L) 11/18/2024 1447     11/18/2024 1447    CO2 16 (L) 11/18/2024  "1447    BUN 5 (L) 11/18/2024 1447    CREATININE 0.8 11/18/2024 1447    GLU 67 (L) 11/18/2024 1447        Component Value Date/Time    CALCIUM 10.4 11/18/2024 1447    ALKPHOS 147 11/18/2024 1447    AST 19 11/18/2024 1447    ALT 14 11/18/2024 1447    BILITOT 0.4 11/18/2024 1447    ESTGFRAFRICA >60 02/16/2022 1000    EGFRNONAA >60 02/16/2022 1000            Lab Results   Component Value Date    WBC 9.63 11/18/2024    HGB 11.0 (L) 11/18/2024    HCT 31.4 (L) 11/18/2024    MCV 98 11/18/2024     11/18/2024       No results for input(s): "PT", "INR", "PROTIME", "APTT" in the last 72 hours.          Pre-op Assessment    I have reviewed the Patient Summary Reports.     I have reviewed the Nursing Notes. I have reviewed the NPO Status.   I have reviewed the Medications.     Review of Systems  Anesthesia Hx:  No problems with previous Anesthesia   History of prior surgery of interest to airway management or planning:          Denies Family Hx of Anesthesia complications.    Denies Personal Hx of Anesthesia complications.                    Social:  Non-Smoker, No Alcohol Use       Hematology/Oncology:                      Denies Current/Recent Cancer                EENT/Dental:  denies chronic allergic rhinitis           Cardiovascular:      Denies Hypertension.    Denies CAD.           no hyperlipidemia                               Pulmonary:    Denies COPD.  Denies Asthma.     Denies Sleep Apnea.                Renal/:   Denies Chronic Renal Disease.                Hepatic/GI:      Denies GERD.                Musculoskeletal:  Denies Arthritis.               Neurological:    Denies CVA.    Denies Seizures.                                Psych:  Denies Psychiatric History.                  Physical Exam  General: Well nourished, Cooperative, Alert and Oriented    Airway:  Mallampati: II   Mouth Opening: Normal  TM Distance: Normal  Tongue: Normal  Neck ROM: Normal ROM    Dental:  Intact        Anesthesia Plan  Type " of Anesthesia, risks & benefits discussed:    Anesthesia Type: CSE, Gen ETT, Spinal, Epidural  Intra-op Monitoring Plan: Standard ASA Monitors  Post Op Pain Control Plan: multimodal analgesia  Induction:  IV  Airway Plan: Video, Post-Induction  Informed Consent: Informed consent signed with the Patient and all parties understand the risks and agree with anesthesia plan.  All questions answered.   ASA Score: 2  Day of Surgery Review of History & Physical: H&P Update referred to the surgeon/provider.    Ready For Surgery From Anesthesia Perspective.     .

## 2024-11-28 PROBLEM — O09.529 ANTEPARTUM MULTIGRAVIDA OF ADVANCED MATERNAL AGE: Status: RESOLVED | Noted: 2024-05-23 | Resolved: 2024-11-28

## 2024-11-28 LAB
ANION GAP SERPL CALC-SCNC: 7 MMOL/L (ref 8–16)
BASOPHILS # BLD AUTO: 0.09 K/UL (ref 0–0.2)
BASOPHILS NFR BLD: 0.4 % (ref 0–1.9)
BUN SERPL-MCNC: 7 MG/DL (ref 6–20)
CALCIUM SERPL-MCNC: 9.3 MG/DL (ref 8.7–10.5)
CHLORIDE SERPL-SCNC: 109 MMOL/L (ref 95–110)
CO2 SERPL-SCNC: 20 MMOL/L (ref 23–29)
CREAT SERPL-MCNC: 0.9 MG/DL (ref 0.5–1.4)
DIFFERENTIAL METHOD BLD: ABNORMAL
EOSINOPHIL # BLD AUTO: 0 K/UL (ref 0–0.5)
EOSINOPHIL NFR BLD: 0.2 % (ref 0–8)
ERYTHROCYTE [DISTWIDTH] IN BLOOD BY AUTOMATED COUNT: 13.7 % (ref 11.5–14.5)
EST. GFR  (NO RACE VARIABLE): >60 ML/MIN/1.73 M^2
GLUCOSE SERPL-MCNC: 80 MG/DL (ref 70–110)
HCT VFR BLD AUTO: 29.2 % (ref 37–48.5)
HGB BLD-MCNC: 10 G/DL (ref 12–16)
IMM GRANULOCYTES # BLD AUTO: 0.13 K/UL (ref 0–0.04)
IMM GRANULOCYTES NFR BLD AUTO: 0.6 % (ref 0–0.5)
LYMPHOCYTES # BLD AUTO: 2.9 K/UL (ref 1–4.8)
LYMPHOCYTES NFR BLD: 14.2 % (ref 18–48)
MCH RBC QN AUTO: 34.5 PG (ref 27–31)
MCHC RBC AUTO-ENTMCNC: 34.2 G/DL (ref 32–36)
MCV RBC AUTO: 101 FL (ref 82–98)
MONOCYTES # BLD AUTO: 2 K/UL (ref 0.3–1)
MONOCYTES NFR BLD: 9.5 % (ref 4–15)
NEUTROPHILS # BLD AUTO: 15.5 K/UL (ref 1.8–7.7)
NEUTROPHILS NFR BLD: 75.1 % (ref 38–73)
NRBC BLD-RTO: 0 /100 WBC
PLATELET # BLD AUTO: 179 K/UL (ref 150–450)
PMV BLD AUTO: 10.1 FL (ref 9.2–12.9)
POTASSIUM SERPL-SCNC: 3.9 MMOL/L (ref 3.5–5.1)
RBC # BLD AUTO: 2.9 M/UL (ref 4–5.4)
SODIUM SERPL-SCNC: 136 MMOL/L (ref 136–145)
WBC # BLD AUTO: 20.63 K/UL (ref 3.9–12.7)

## 2024-11-28 PROCEDURE — 25000003 PHARM REV CODE 250

## 2024-11-28 PROCEDURE — 63600175 PHARM REV CODE 636 W HCPCS

## 2024-11-28 PROCEDURE — 80048 BASIC METABOLIC PNL TOTAL CA: CPT | Performed by: STUDENT IN AN ORGANIZED HEALTH CARE EDUCATION/TRAINING PROGRAM

## 2024-11-28 PROCEDURE — 11000001 HC ACUTE MED/SURG PRIVATE ROOM

## 2024-11-28 PROCEDURE — 85025 COMPLETE CBC W/AUTO DIFF WBC: CPT | Performed by: STUDENT IN AN ORGANIZED HEALTH CARE EDUCATION/TRAINING PROGRAM

## 2024-11-28 PROCEDURE — 36415 COLL VENOUS BLD VENIPUNCTURE: CPT | Performed by: STUDENT IN AN ORGANIZED HEALTH CARE EDUCATION/TRAINING PROGRAM

## 2024-11-28 PROCEDURE — 25000003 PHARM REV CODE 250: Performed by: STUDENT IN AN ORGANIZED HEALTH CARE EDUCATION/TRAINING PROGRAM

## 2024-11-28 RX ORDER — OXYCODONE HYDROCHLORIDE 10 MG/1
10 TABLET ORAL EVERY 4 HOURS PRN
Status: DISCONTINUED | OUTPATIENT
Start: 2024-11-28 | End: 2024-11-29 | Stop reason: HOSPADM

## 2024-11-28 RX ORDER — DOCUSATE SODIUM 100 MG/1
200 CAPSULE, LIQUID FILLED ORAL 2 TIMES DAILY
Status: DISCONTINUED | OUTPATIENT
Start: 2024-11-28 | End: 2024-11-29 | Stop reason: HOSPADM

## 2024-11-28 RX ORDER — OXYCODONE HYDROCHLORIDE 5 MG/1
5 TABLET ORAL EVERY 4 HOURS PRN
Status: DISCONTINUED | OUTPATIENT
Start: 2024-11-28 | End: 2024-11-29 | Stop reason: HOSPADM

## 2024-11-28 RX ORDER — IBUPROFEN 600 MG/1
600 TABLET ORAL EVERY 6 HOURS
Status: DISCONTINUED | OUTPATIENT
Start: 2024-11-29 | End: 2024-11-29 | Stop reason: HOSPADM

## 2024-11-28 RX ORDER — KETOROLAC TROMETHAMINE 30 MG/ML
15 INJECTION, SOLUTION INTRAMUSCULAR; INTRAVENOUS EVERY 6 HOURS
Status: DISCONTINUED | OUTPATIENT
Start: 2024-11-28 | End: 2024-11-28

## 2024-11-28 RX ORDER — KETOROLAC TROMETHAMINE 30 MG/ML
15 INJECTION, SOLUTION INTRAMUSCULAR; INTRAVENOUS EVERY 6 HOURS
Status: COMPLETED | OUTPATIENT
Start: 2024-11-28 | End: 2024-11-28

## 2024-11-28 RX ORDER — LANOLIN ALCOHOL/MO/W.PET/CERES
1 CREAM (GRAM) TOPICAL DAILY
Status: DISCONTINUED | OUTPATIENT
Start: 2024-11-28 | End: 2024-11-29 | Stop reason: HOSPADM

## 2024-11-28 RX ADMIN — ACETAMINOPHEN 650 MG: 325 TABLET, FILM COATED ORAL at 08:11

## 2024-11-28 RX ADMIN — KETOROLAC TROMETHAMINE 15 MG: 30 INJECTION, SOLUTION INTRAMUSCULAR; INTRAVENOUS at 01:11

## 2024-11-28 RX ADMIN — FERROUS SULFATE TAB 325 MG (65 MG ELEMENTAL FE) 1 EACH: 325 (65 FE) TAB at 08:11

## 2024-11-28 RX ADMIN — KETOROLAC TROMETHAMINE 15 MG: 30 INJECTION, SOLUTION INTRAMUSCULAR at 08:11

## 2024-11-28 RX ADMIN — DOCUSATE SODIUM 200 MG: 100 CAPSULE, LIQUID FILLED ORAL at 08:11

## 2024-11-28 RX ADMIN — KETOROLAC TROMETHAMINE 15 MG: 30 INJECTION, SOLUTION INTRAMUSCULAR; INTRAVENOUS at 08:11

## 2024-11-28 RX ADMIN — ACETAMINOPHEN 650 MG: 325 TABLET, FILM COATED ORAL at 03:11

## 2024-11-28 RX ADMIN — KETOROLAC TROMETHAMINE 15 MG: 30 INJECTION, SOLUTION INTRAMUSCULAR; INTRAVENOUS at 03:11

## 2024-11-28 RX ADMIN — ACETAMINOPHEN 650 MG: 325 TABLET, FILM COATED ORAL at 01:11

## 2024-11-28 RX ADMIN — PRENATAL VIT W/ FE FUMARATE-FA TAB 27-0.8 MG 1 TABLET: 27-0.8 TAB at 08:11

## 2024-11-28 NOTE — NURSING
RN notified doctor that pt does not want potassium piggy backs hung on her anymore, due to the irritation and pain it was causing to her veins.  Doctor understood and said it was up to the pt if she wanted to refuse the next doses.  Doctor order a BMP in the am to check her chemistry levels in the morning.    Pt. Also wanted a couple of more doses of tordal ordered because it was the only medication that really helped control her uterine cramping.

## 2024-11-28 NOTE — SUBJECTIVE & OBJECTIVE
Interval History: PPD1    She is doing well this morning. She is tolerating a regular diet without nausea or vomiting. She is voiding spontaneously. She is ambulating. She has not passed flatus, and has not a BM. Vaginal bleeding is mild. She denies fever or chills. Abdominal pain is mild and controlled with oral medications. She Is breastfeeding.     Objective:     Vital Signs (Most Recent):  Temp: 98 °F (36.7 °C) (11/28/24 0830)  Pulse: 79 (11/28/24 0830)  Resp: 18 (11/28/24 0830)  BP: 129/80 (11/28/24 0830)  SpO2: 95 % (11/28/24 0830) Vital Signs (24h Range):  Temp:  [97.6 °F (36.4 °C)-98.7 °F (37.1 °C)] 98 °F (36.7 °C)  Pulse:  [] 79  Resp:  [15-18] 18  SpO2:  [95 %-100 %] 95 %  BP: ()/(48-86) 129/80        There is no height or weight on file to calculate BMI.      Intake/Output Summary (Last 24 hours) at 11/28/2024 0957  Last data filed at 11/28/2024 0600  Gross per 24 hour   Intake 1490.82 ml   Output 1262 ml   Net 228.82 ml         Significant Labs:  Lab Results   Component Value Date    GROUPTRH O POS 11/27/2024    HEPBSAG Non-reactive 05/23/2024    STREPBCULT No Group B Streptococcus isolated 10/12/2020     Recent Labs   Lab 11/28/24  0638   HGB 10.0*   HCT 29.2*       I have personallly reviewed all pertinent lab results from the last 24 hours.    Physical Exam:   Constitutional: She is oriented to person, place, and time. She appears well-developed and well-nourished.    HENT:   Head: Normocephalic and atraumatic.    Eyes: Conjunctivae are normal.     Cardiovascular:  Normal rate.             Pulmonary/Chest: Effort normal. No respiratory distress.        Abdominal: Soft. There is no abdominal tenderness.     Genitourinary:    Vagina and uterus normal.             Musculoskeletal: Normal range of motion.       Neurological: She is alert and oriented to person, place, and time.    Skin: Skin is warm and dry.    Psychiatric: She has a normal mood and affect. Her behavior is normal. Judgment and  thought content normal.       Review of Systems   Constitutional:  Negative for chills and fever.   Eyes:  Negative for visual disturbance.   Respiratory:  Negative for cough.    Cardiovascular:  Negative for chest pain.   Gastrointestinal:  Negative for nausea and vomiting.   Genitourinary:  Positive for vaginal bleeding (normal lochia).   Neurological:  Negative for seizures and headaches.   All other systems reviewed and are negative.

## 2024-11-28 NOTE — HOSPITAL COURSE
24: PPD1, doing well, normal involution, breast feeding, routine PP care/advances, anticipate d/c tomorrow.    24 - 39 year old  s/p  of live infant girl at 37 + 2/7 weeks, 2nd degree laceration. Now PP day #2. She is doing well this morning. Gestational htn, few mild range blood pressures in labor. Has been normotensive since. No S&S of pre eclampsia. Normal labs. She is tolerating a regular diet without nausea or vomiting. She is voiding spontaneously. She is ambulating without feeling dizzy or lightheaded. History of iron def anemia in pregnancy, mild/remains. Continue oral iron for at least 4 weeks after discharge. Does have mild paraesthesia to outer aspect of left foot. No weakness or foot drop. + pedal pulse, normal temp. + 1 pedal edema bilaterally. No S&S of DVT. Does not desire Anesthesia eval. 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 Has passed flatus, and has not a BM. Vaginal bleeding is mild. She denies fever or chills. Abdominal pain is mild and controlled with oral medications. She Is breastfeeding without S&S of mastitis or engorgement. Denies history of anxiety or depression. Ready for discharge home today with infant. Undecided as to contraceptive method and will discuss with primary OB. F/U in 1 week for b/p check then 6 weeks for PP visit or sooner, prn.

## 2024-11-28 NOTE — L&D DELIVERY NOTE
"[facility] - Labor & Delivery  Vaginal Delivery   Operative Note    SUMMARY     Normal spontaneous vaginal delivery of live female infant, was placed on mothers abdomen for skin to skin and bulb suctioning performed. APGARS 9/9 at 1 and 5 minutes, respectively.   Infant delivered position CHAPARRO over intact perineum.  Nuchal cord: No.    Spontaneous delivery of placenta following cord evulsion at the vaginal introitus,  IM pitocin given, noting good uterine tone afterwards.   2nd degree laceration noted and repaired in normal fashion with a single running suture of 2.0 vicryl .  Patient tolerated delivery well. Sponge needle and lap counted correctly x2.    QBL 300cc     Gosia Velazquez MD (Mary)   Obstetrics and Gynecology, PGY1    Indications:  (spontaneous vaginal delivery)  Pregnancy complicated by:   Patient Active Problem List   Diagnosis    Recurrent cold sores    Heartburn    Reactive airway disease that is not asthma    Ocular migraine    Pain of both hip joints    Gestational hypertension, third trimester    Strain of right piriformis muscle    S/p unilateral salpingectomy (R) for ectopic pregnancy    Advanced maternal age in multigravida     (spontaneous vaginal delivery)    Anemia     Admitting GA: 37w2d    Delivery Information for Jackelin Smith    Birth information:  YOB: 2023   Time of birth: 6:36 PM   Sex: female   Head Delivery Date/Time: 2024  6:36 PM   Delivery type: Vaginal, Spontaneous   Gestational Age: 37w2d       Delivery Providers    Delivering clinician: Ketty Winkler MD   Provider Role    Sena Velazquez MD Resident    Frught, Laura, RN Delivery Nurse    Dolores Julian RN Registered Nurse              Measurements    Weight: 3270 g  Weight (lbs): 7 lb 3.3 oz  Length: 50.8 cm  Length (in): 20"  Head circumference: 36.5 cm  Chest circumference: 32.5 cm         Apgars    Living status: Living  Apgar Component Scores:  1 min.:  5 min.:  10 min.:  15 " min.:  20 min.:    Skin color:  0  1       Heart rate:  2  2       Reflex irritability:  2  2       Muscle tone:  2  2       Respiratory effort:  2  2       Total:  8  9       Apgars assigned by: MALICK STEELE RN         Operative Delivery    Forceps attempted?: No  Vacuum extractor attempted?: No         Shoulder Dystocia    Shoulder dystocia present?: No           Presentation    Presentation: Vertex  Position: Right Occiput Anterior           Interventions/Resuscitation    Method: Bulb Suctioning, Tactile Stimulation       Cord    Vessels: 3 vessels  Complications: None  Delayed Cord Clamping?: Yes  Cord Clamped Date/Time: 2024  6:37 PM  Cord Blood Disposition: Sent with Baby  Gases Sent?: No  Stem Cell Collection (by MD): No       Placenta    Placenta delivery date/time: 2024 1848  Placenta removal: Spontaneous  Placenta appearance: Intact  Placenta disposition: Discarded           Labor Events:       labor: No     Labor Onset Date/Time: 2024 12:22     Dilation Complete Date/Time: 2024 17:15     Start Pushing Date/Time: 2024 17:20       Start Pushing Date/Time: 2024 17:20     Rupture Date/Time: 24 1222        Rupture type: ARM (Artificial Rupture)        Fluid Amount:       Fluid Color: Clear              steroids: None     Antibiotics given for GBS: Yes     Induction: balloon dilation (Villafana);misoprostol     Indications for induction:  Hypertension     Augmentation: amniotomy     Indications for augmentation: Hypertension     Labor complications:       Additional complications:          Cervical ripening:                     Delivery:      Episiotomy: None     Indication for Episiotomy:       Perineal Lacerations: 2nd Repaired:  Yes   Periurethral Laceration:   Repaired:     Labial Laceration:   Repaired:     Sulcus Laceration:   Repaired:     Vaginal Laceration:   Repaired:     Cervical Laceration:   Repaired:     Repair suture:       Repair # of packets: 1      Last Value - EBL - Nursing (mL):       Sum - EBL - Nursing (mL): 0     Last Value - EBL - Anesthesia (mL):      Calculated QBL (mL): 332     Running total QBL (mL): 332     Vaginal Sweep Performed: Yes     Surgicount Correct: Yes     Vaginal Packing: No Quantity:       Other providers:       Anesthesia    Method: Epidural          Details (if applicable):  Trial of Labor      Categorization:      Priority:     Indications for :     Incision Type:       Additional  information:  Forceps:    Vacuum:    Breech:    Observed anomalies    Other (Comments):         I was present for the entire procedure, and agree with the above resident's assessment of findings and description of procedure.  Ketty Winkler M.D.

## 2024-11-28 NOTE — PROGRESS NOTES
[facility])  Obstetrics  Postpartum Progress Note    Patient Name: Gisella Smith  MRN: [id number]  Admission Date: 2024  Hospital Length of Stay: 1 days  Attending Physician: Ketty Winkler MD  Primary Care Provider: Phyllis, Primary Doctor    Subjective:     Principal Problem: (spontaneous vaginal delivery)    Hospital Course:  24: PPD1, doing well, normal involution, breast feeding, routine PP care/advances, anticipate d/c tomorrow.    Interval History: PPD1    She is doing well this morning. She is tolerating a regular diet without nausea or vomiting. She is voiding spontaneously. She is ambulating. She has not passed flatus, and has not a BM. Vaginal bleeding is mild. She denies fever or chills. Abdominal pain is mild and controlled with oral medications. She Is breastfeeding.     Objective:     Vital Signs (Most Recent):  Temp: 98 °F (36.7 °C) (24)  Pulse: 79 (24)  Resp: 18 (24)  BP: 129/80 (24)  SpO2: 95 % (24) Vital Signs (24h Range):  Temp:  [97.6 °F (36.4 °C)-98.7 °F (37.1 °C)] 98 °F (36.7 °C)  Pulse:  [] 79  Resp:  [15-18] 18  SpO2:  [95 %-100 %] 95 %  BP: ()/(48-86) 129/80        There is no height or weight on file to calculate BMI.      Intake/Output Summary (Last 24 hours) at 2024 0957  Last data filed at 2024 0600  Gross per 24 hour   Intake 1490.82 ml   Output 1262 ml   Net 228.82 ml         Significant Labs:  Lab Results   Component Value Date    GROUPTRH O POS 2024    HEPBSAG Non-reactive 2024    STREPBCULT No Group B Streptococcus isolated 10/12/2020     Recent Labs   Lab 24  0638   HGB 10.0*   HCT 29.2*       I have personallly reviewed all pertinent lab results from the last 24 hours.    Physical Exam:   Constitutional: She is oriented to person, place, and time. She appears well-developed and well-nourished.    HENT:   Head: Normocephalic and atraumatic.     Eyes: Conjunctivae are normal.     Cardiovascular:  Normal rate.             Pulmonary/Chest: Effort normal. No respiratory distress.        Abdominal: Soft. There is no abdominal tenderness.     Genitourinary:    Vagina and uterus normal.             Musculoskeletal: Normal range of motion.       Neurological: She is alert and oriented to person, place, and time.    Skin: Skin is warm and dry.    Psychiatric: She has a normal mood and affect. Her behavior is normal. Judgment and thought content normal.       Review of Systems   Constitutional:  Negative for chills and fever.   Eyes:  Negative for visual disturbance.   Respiratory:  Negative for cough.    Cardiovascular:  Negative for chest pain.   Gastrointestinal:  Negative for nausea and vomiting.   Genitourinary:  Positive for vaginal bleeding (normal lochia).   Neurological:  Negative for seizures and headaches.   All other systems reviewed and are negative.    Assessment/Plan:     39 y.o. female  for:    *  (spontaneous vaginal delivery)  Routine PP care/ advances    Anemia  Post delivery h/h: 10/29  Iron PO    Gestational hypertension, third trimester  No elevated BP in past 24 hours, labs WNL  Plan 1 week PP blood pressure check        Disposition: As patient meets milestones, will plan to discharge tomorrow.    Yasemin Regan CNM  Obstetrics  [facility] ([place])

## 2024-11-28 NOTE — DISCHARGE INSTRUCTIONS
Community Resources for Breastfeeding Mothers:   Hospital Breastfeeding Centers/ Lactation Consultants:   [facility]........................................................................................[phone]  Outpatient Lactation Consults               [phone]   [facility]....................................................................................[phone]   [facility]..........................................................................................[phone]   [facility].................................................[phone]   [facility].......................................................[phone]   [facility].................................[phone]   [facility].......................................[phone]   [facility]..................................................[phone]   [facility].....................................................................[phone]      [facility] (Poison Control)...........[phone]  PoisonHelp.org   Free medical advice 24/7 through the Poison Help Line and the online tool      Online Resources:   International Breastfeeding Tutor Key ...............................................................................ibconline.ca   Dr Darwin Arriaga online resource provides videos, articles, and information sheets.     Coeffective...............................................................................................................coeffective.com   Download the free mobile inga to help get off to a great start with breastfeeding.   Droplet.....................................................................................................................[employer] [place].com   Global  Health Media...........................................................................................SynapSense.org   Videos that teach and empower mothers and caregivers   Infant [facility].............................................................................[phone]      Experts 911   Provides up to date information for medication use by moms during pregnancy and while breastfeeding.   Deneen Lopez....................................................................................................................kellymom.com   Provides online information on breastfeeding and parenting      La Leche League........................................................................................ lllalmsla.org   /   llli.org   Mother-to-mother support groups with education, information support, and encouragement    Work and Pump........................................................................................... Skicka TÃ¥rta.com   Information about breastfeeding for working moms     [place] Resources:   [place] Breastfeeding Coalition............................... [phone]    [facility]   Find local breastfeeding support   [place] Breastfeeding Support............................................................ LaBreastfeedingSport.org   Zip code search of breastfeeding resources in your area   Partners for Healthy Babies............................................................[phone]   5024454iyye.org   Connects [place] moms and their families to health and pregnancy resources.  24/7   WIC (Women, Infant, Children)......................................................... [phone]   ldh.la.gov/WIC   Download the [employer] inga, get code from WIC office    [facility] Resources:     Baby Cafe............................................................................................................. babycafeusa.org   Free, drop in, informal  breastfeeding support groups offering professional lactation care and intervention.    [facility] [place] [facility]....................................... birthmarkMerkle.com   Infant feeding drop in clinics, Lactation services, support groups, education programs   Cafe [facility]...............................................[phone]   Instant AV.com/groups/[facility]   Free breastfeeding support group for families of color   Mothers Milk Bank [facility]....................................................[phone]                                                             discoapisParty Earth.buildabrand/services/mothers-milk-bank-[facility]   GISELLA Nesting..................................................................................[phone] nolanesting.com   In person and virtual support for families through pregnancy, birth, and early parenthood.       [facility] [place]- Dr. Jacki Andrews.......................[phone]   [address] [place] [address]                                  [email or web address]   [email or web address]   [facility] Lactation Care, [facility] (Queenie Rene RN, IBCLC) ............................[phone] [email or web address]    Healthy Start [place].....................................[phone] ([place])  [phone] ([place])   [facility].gov/health-department/healthy-start   Serves women of childbearing age and addresses issues for pregnant women and their children from birth  to age two.          La Leche League- [place]............................. Immunomedics.Profex/ Instant AV.Profex/ Glue Networksmyron   In person and virtual mother to mother support groups with education, information support and   encouragement to women who want to breastfeed      [place] Resources:   Breastfeeding Resources- [facility]  Health.....msdh.ms.gov (under womens services)   Find resources and info about planning for breastfeeding, its benefits, and help with breastfeeding  s uccessfully.    [facility] Choices- [place]....................................... Intamac Systems   [phone]   [address] [place]. Call or text 24/7   [facility].......................................................TalentSprint Educational Servicesastbreastfeedingcenter.iGoOn s.r.l.   [facility] Lactation Consultants sere [facility], including [facility],   [facility], and surrounding areas.    [place] Breastfeeding Coalition...............................................................................msbfc.org   Promotes and supports breastfeeding with families, health providers, and communities.   [facility] breastfeeding Coalition.....................................................................smbfc.org   Find breastfeeding resources and support groups in your area.    WIC Nutrition Program- [facility] of Health.................................... ms.gov

## 2024-11-28 NOTE — ANESTHESIA POSTPROCEDURE EVALUATION
Anesthesia Post Evaluation    Patient: Gisella Smith    Procedure(s) Performed: * No procedures listed *    Final Anesthesia Type: epidural      Patient location during evaluation: med/surg floor  Patient participation: Yes- Able to Participate  Level of consciousness: awake and alert and oriented  Post-procedure vital signs: reviewed and stable  Pain management: adequate  Airway patency: patent  HUDSON mitigation strategies: Multimodal analgesia and Use of major conduction anesthesia (spinal/epidural) or peripheral nerve block  PONV status at discharge: No PONV  Anesthetic complications: no      Cardiovascular status: hemodynamically stable  Respiratory status: unassisted, spontaneous ventilation and room air  Hydration status: euvolemic  Follow-up not needed.              Vitals Value Taken Time   /80 11/28/24 0830   Temp 36.7 °C (98 °F) 11/28/24 0830   Pulse 79 11/28/24 0830   Resp 18 11/28/24 0830   SpO2 95 % 11/28/24 0830         No case tracking events are documented in the log.      Pain/Tereso Score: Pain Rating Prior to Med Admin: 4 (11/28/2024  8:21 AM)  Pain Rating Post Med Admin: 2 (11/28/2024  9:00 AM)

## 2024-11-29 VITALS
SYSTOLIC BLOOD PRESSURE: 116 MMHG | HEART RATE: 95 BPM | TEMPERATURE: 98 F | RESPIRATION RATE: 16 BRPM | DIASTOLIC BLOOD PRESSURE: 74 MMHG | OXYGEN SATURATION: 97 %

## 2024-11-29 PROBLEM — O09.529 ADVANCED MATERNAL AGE IN MULTIGRAVIDA: Status: RESOLVED | Noted: 2024-05-23 | Resolved: 2024-11-29

## 2024-11-29 PROCEDURE — 25000003 PHARM REV CODE 250

## 2024-11-29 PROCEDURE — 99024 POSTOP FOLLOW-UP VISIT: CPT | Mod: ,,, | Performed by: OBSTETRICS & GYNECOLOGY

## 2024-11-29 PROCEDURE — 63600175 PHARM REV CODE 636 W HCPCS

## 2024-11-29 PROCEDURE — 72100003 HC LABOR CARE, EA. ADDL. 8 HRS

## 2024-11-29 RX ORDER — ACETAMINOPHEN 325 MG/1
650 TABLET ORAL EVERY 6 HOURS PRN
Qty: 30 TABLET | Refills: 0 | Status: SHIPPED | OUTPATIENT
Start: 2024-11-29

## 2024-11-29 RX ORDER — KETOROLAC TROMETHAMINE 30 MG/ML
INJECTION, SOLUTION INTRAMUSCULAR; INTRAVENOUS
Status: DISCONTINUED
Start: 2024-11-29 | End: 2024-11-29 | Stop reason: HOSPADM

## 2024-11-29 RX ORDER — KETOROLAC TROMETHAMINE 30 MG/ML
30 INJECTION, SOLUTION INTRAMUSCULAR; INTRAVENOUS ONCE
Status: COMPLETED | OUTPATIENT
Start: 2024-11-29 | End: 2024-11-29

## 2024-11-29 RX ORDER — DOCUSATE SODIUM 100 MG/1
200 CAPSULE, LIQUID FILLED ORAL 2 TIMES DAILY PRN
Qty: 60 CAPSULE | Refills: 0 | Status: SHIPPED | OUTPATIENT
Start: 2024-11-29

## 2024-11-29 RX ORDER — FERROUS SULFATE 325(65) MG
325 TABLET, DELAYED RELEASE (ENTERIC COATED) ORAL DAILY
Qty: 30 TABLET | Refills: 0 | Status: SHIPPED | OUTPATIENT
Start: 2024-11-29

## 2024-11-29 RX ORDER — IBUPROFEN 600 MG/1
600 TABLET ORAL EVERY 8 HOURS PRN
Qty: 30 TABLET | Refills: 0 | Status: SHIPPED | OUTPATIENT
Start: 2024-11-29

## 2024-11-29 RX ORDER — KETOROLAC TROMETHAMINE 30 MG/ML
15 INJECTION, SOLUTION INTRAMUSCULAR; INTRAVENOUS ONCE
Status: DISCONTINUED | OUTPATIENT
Start: 2024-11-29 | End: 2024-11-29 | Stop reason: HOSPADM

## 2024-11-29 RX ADMIN — PRENATAL VIT W/ FE FUMARATE-FA TAB 27-0.8 MG 1 TABLET: 27-0.8 TAB at 08:11

## 2024-11-29 RX ADMIN — FERROUS SULFATE TAB 325 MG (65 MG ELEMENTAL FE) 1 EACH: 325 (65 FE) TAB at 08:11

## 2024-11-29 RX ADMIN — DOCUSATE SODIUM 200 MG: 100 CAPSULE, LIQUID FILLED ORAL at 08:11

## 2024-11-29 RX ADMIN — KETOROLAC TROMETHAMINE 30 MG: 30 INJECTION, SOLUTION INTRAMUSCULAR; INTRAVENOUS at 09:11

## 2024-11-29 NOTE — DISCHARGE SUMMARY
[facility], [place], operated by [facility]  Obstetrics  Discharge Summary      Patient Name: Gisella Smith  MRN: [id number]  Admission Date: 2024  Hospital Length of Stay: 2 days  Discharge Date and Time:  2024 10:46 AM  Attending Physician: Ketty Winkler MD   Discharging Provider: Malinda Lang CNM   Primary Care Provider: Phyllis, Primary Doctor    HPI: No notes on file    * No surgery found *     Hospital Course:   24: PPD1, doing well, normal involution, breast feeding, routine PP care/advances, anticipate d/c tomorrow.    24 - 39 year old  s/p  of live infant girl at 37 + 2/7 weeks, 2nd degree laceration. Now PP day #2. She is doing well this morning. Gestational htn, few mild range blood pressures in labor. Has been normotensive since. No S&S of pre eclampsia. Normal labs. She is tolerating a regular diet without nausea or vomiting. She is voiding spontaneously. She is ambulating without feeling dizzy or lightheaded. History of iron def anemia in pregnancy, mild/remains. Continue oral iron for at least 4 weeks after discharge. Does have mild paraesthesia to outer aspect of left foot. No weakness or foot drop. + pedal pulse, normal temp. + 1 pedal edema bilaterally. No S&S of DVT. Does not desire Anesthesia eval. 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 Has passed flatus, and has not a BM. Vaginal bleeding is mild. She denies fever or chills. Abdominal pain is mild and controlled with oral medications. She Is breastfeeding without S&S of mastitis or engorgement. Denies history of anxiety or depression. Ready for discharge home today with infant. Undecided as to contraceptive method and will discuss with primary OB. F/U in 1 week for b/p check then 6 weeks for PP visit or sooner, prn.    /74 (BP Location: Right arm, Patient Position: Lying)   Pulse 95   Temp 97.9 °F (36.6 °C) (Oral)   Resp 16   LMP 2023  "(Exact Date)   SpO2 97%   Breastfeeding Yes \    Gen: A&O x 4, NAD  CV: normal HR  Lungs: normal resp effort  Breasts: bilaterally soft, non-tender, nipples intact   Abdomen: soft, non-tender, uterus firm at U - 1 fb  Perineum: approximated, no edema   Lochia: minimal rubra  Ext: bilaterally +1  pedal edema, without signs of DVT      Final Active Diagnoses:    Diagnosis Date Noted POA    PRINCIPAL PROBLEM:   (spontaneous vaginal delivery) [O80] 2024 Not Applicable    Obstetrical laceration [O71.9] 2024 No    Anemia [D64.9] 2024 No    Gestational hypertension, third trimester [O13.3] 10/30/2020 Yes      Problems Resolved During this Admission:    Diagnosis Date Noted Date Resolved POA    Encounter for induction of labor [Z34.90] 2024 Not Applicable    GBS (group B Streptococcus carrier), +RV culture, currently pregnant [O99.820] 2024 Not Applicable    Conceived by in vitro fertilization [Z78.9] 2024 Yes    Advanced maternal age in multigravida [O09.529] 2024 Yes        Significant Diagnostic Studies: Labs: CBC   Recent Labs   Lab 24  0638   WBC 20.63*   HGB 10.0*   HCT 29.2*            Feeding Method: breast    Immunizations       Date Immunization Status Dose Route/Site Given by    24 MMR Incomplete 0.5 mL Subcutaneous/     24 Tdap Incomplete 0.5 mL Intramuscular/             Delivery:    Episiotomy: None   Lacerations: 2nd   Repair suture:     Repair # of packets: 1   Blood loss (ml):       Birth information:  YOB: 2023   Time of birth: 6:36 PM   Sex: female   Delivery type: Vaginal, Spontaneous   Gestational Age: 37w2d     Measurements    Weight: 3270 g  Weight (lbs): 7 lb 3.3 oz  Length: 50.8 cm  Length (in): 20"  Head circumference: 36.5 cm  Chest circumference: 32.5 cm         Delivery Clinician: Delivery Providers    Delivering clinician: Ketty Winkler MD   " Provider Role    Sena Velazquez MD Resident    Frught, Laura, RN Delivery Nurse    Dolores Julian RN Registered Nurse             Additional  information:  Forceps:    Vacuum:    Breech:    Observed anomalies      Living?:     Apgars    Living status: Living  Apgar Component Scores:  1 min.:  5 min.:  10 min.:  15 min.:  20 min.:    Skin color:  0  1       Heart rate:  2  2       Reflex irritability:  2  2       Muscle tone:  2  2       Respiratory effort:  2  2       Total:  8  9       Apgars assigned by: MALICK JULIAN RN         Placenta: Delivered:       appearance  Pending Diagnostic Studies:       None            Discharged Condition: stable    Disposition: Home or Self Care    Follow Up:   Follow-up Information       Ketty Winkler MD. Schedule an appointment as soon as possible for a visit in 1 week(s).    Specialty: Obstetrics and Gynecology  Why: blood pressure check  Contact information:  [address] [id number] [phone]               Ketty Winkler MD. Schedule an appointment as soon as possible for a visit in 6 week(s).    Specialty: Obstetrics and Gynecology  Why: for routine postpartum visit  Contact information:  [address] [id number] [phone]                           Patient Instructions:      Diet Adult Regular     Lifting restrictions     Pelvic Rest     Notify your health care provider if you experience any of the following:  temperature >100.4     Notify your health care provider if you experience any of the following:  persistent nausea and vomiting or diarrhea     Notify your health care provider if you experience any of the following:  severe uncontrolled pain     Notify your health care provider if you experience any of the following:  redness, tenderness, or signs of infection (pain, swelling, redness, odor or green/yellow discharge around incision site)     Notify your health care provider if you experience any of the  following:  difficulty breathing or increased cough     Notify your health care provider if you experience any of the following:  severe persistent headache     Notify your health care provider if you experience any of the following:  worsening rash     Notify your health care provider if you experience any of the following:  persistent dizziness, light-headedness, or visual disturbances     Notify your health care provider if you experience any of the following:  increased confusion or weakness     Medications:  Current Discharge Medication List        START taking these medications    Details   acetaminophen (TYLENOL) 325 MG tablet Take 2 tablets (650 mg total) by mouth every 6 (six) hours as needed for Pain.  Qty: 30 tablet, Refills: 0      docusate sodium (COLACE) 100 MG capsule Take 2 capsules (200 mg total) by mouth 2 (two) times daily as needed for Constipation.  Qty: 60 capsule, Refills: 0      ferrous sulfate 325 (65 FE) MG EC tablet Take 1 tablet (325 mg total) by mouth once daily.  Qty: 30 tablet, Refills: 0      ibuprofen (ADVIL,MOTRIN) 600 MG tablet Take 1 tablet (600 mg total) by mouth every 8 (eight) hours as needed for Pain.  Qty: 30 tablet, Refills: 0           CONTINUE these medications which have NOT CHANGED    Details   famotidine (PEPCID) 20 MG tablet Take 1 tablet (20 mg total) by mouth 2 (two) times daily.  Qty: 60 tablet, Refills: 1      ondansetron (ZOFRAN-ODT) 8 MG TbDL Take 1 tablet (8 mg total) by mouth every 8 (eight) hours as needed.  Qty: 60 tablet, Refills: 1      prenatal vit/iron fum/folic ac (PRENATAL 1+1 ORAL) Take by mouth.           STOP taking these medications       aspirin 81 MG Chew Comments:   Reason for Stopping:         magnesium oxide (MAG-OX) 400 mg (241.3 mg magnesium) tablet Comments:   Reason for Stopping:         valACYclovir (VALTREX) 500 MG tablet Comments:   Reason for Stopping:             Follow Up/Patient Instructions:   1. Reviewed postpartum recommendations  and precautions ~ encouraged to call for fever, severe or increased pain in head, chest, abdomen, perineum or legs; heavy bleeding, foul smelling lochia, signs of depression, or any other concern. Reviewed postpartum precautions r/t high blood pressure ~ encouraged to call for HA, vision changes, epigastric discomfort, or abnormal swelling.  2. Rx provided: Iron, Colace, Motrin and Tylenol.  3. Contraception: considering Undecided; will f/u at postpartum visit. Encouraged abstinence and pelvic rest for healing until after postpartum check-up.   4. Encouraged to continue PNV while breastfeeding or at least for 6 weeks postpartum. Continue Iron for at least 4 weeks after discharge.  5. Car seat law will be reviewed with patient at discharge per protocol  6. RTO in 1 wk for b/p check. 6 weeks or sooner prn.  7. Discharge home today with written and verbal postpartum instructions and precautions.     Malinda Lang CNM  Obstetrics  [facility]

## 2024-11-29 NOTE — LACTATION NOTE
11/28/24 1445   Maternal Assessment   Breast Shape Bilateral:;round   Breast Density Bilateral:;soft   Areola Bilateral:;elastic   Nipples Bilateral:;everted;bulbous   Maternal Infant Feeding   Maternal Emotional State assist needed;anxious   Infant Positioning cross-cradle   Signs of Milk Transfer audible swallow;infant jaw motion present   Pain with Feeding yes   Pain Location nipples, bilateral   Pain Description soreness   Comfort Measures Before/During Feeding infant position adjusted;latch adjusted;maternal position adjusted;suction broken using finger   Nipple Shape After Feeding, Left slight compression   Nipple Shape After Feeding, Right slight compression   Latch Assistance yes   Equipment Type   Breast Pump Type double electric, personal  (Lansinoh)   Breast Pump Flange Type hard   Breast Pump Flange Size 21 mm   Community Referrals   Community Referrals support group;pediatric care provider;outpatient lactation program     Basic lactation education reviewed with breastfeeding guide. All questions answered, flange fit verified 21mm flange to order for home pump, a Lansinoh. Also has a [place] pump from last pregnancy.  Discussed baby is 37 weeks and may need to be encouraged to feed and need assistance with breast compression and infant stimulation throughout entire feed to keep active. Baby nursed well on both breast with stimulation.   
Discharge breastfeeding education provided. Questions answered. Lactation warmline and community resources provided.  
no

## 2024-11-30 ENCOUNTER — PATIENT MESSAGE (OUTPATIENT)
Dept: OBSTETRICS AND GYNECOLOGY | Facility: OTHER | Age: 39
End: 2024-11-30
Payer: COMMERCIAL

## 2024-12-02 ENCOUNTER — PATIENT MESSAGE (OUTPATIENT)
Dept: OBSTETRICS AND GYNECOLOGY | Facility: CLINIC | Age: 39
End: 2024-12-02
Payer: COMMERCIAL

## 2024-12-10 ENCOUNTER — PATIENT MESSAGE (OUTPATIENT)
Facility: CLINIC | Age: 39
End: 2024-12-10
Payer: COMMERCIAL

## 2024-12-27 ENCOUNTER — PATIENT MESSAGE (OUTPATIENT)
Dept: OBSTETRICS AND GYNECOLOGY | Facility: CLINIC | Age: 39
End: 2024-12-27
Payer: COMMERCIAL

## 2025-01-08 ENCOUNTER — POSTPARTUM VISIT (OUTPATIENT)
Dept: OBSTETRICS AND GYNECOLOGY | Facility: CLINIC | Age: 40
End: 2025-01-08
Payer: COMMERCIAL

## 2025-01-08 VITALS
DIASTOLIC BLOOD PRESSURE: 70 MMHG | WEIGHT: 142.63 LBS | SYSTOLIC BLOOD PRESSURE: 112 MMHG | HEIGHT: 63 IN | BODY MASS INDEX: 25.27 KG/M2

## 2025-01-08 DIAGNOSIS — R32 URINARY INCONTINENCE, UNSPECIFIED TYPE: ICD-10-CM

## 2025-01-08 PROCEDURE — 0503F POSTPARTUM CARE VISIT: CPT | Mod: CPTII,S$GLB,, | Performed by: STUDENT IN AN ORGANIZED HEALTH CARE EDUCATION/TRAINING PROGRAM

## 2025-01-08 PROCEDURE — 99999 PR PBB SHADOW E&M-EST. PATIENT-LVL III: CPT | Mod: PBBFAC,,, | Performed by: STUDENT IN AN ORGANIZED HEALTH CARE EDUCATION/TRAINING PROGRAM

## 2025-01-08 RX ORDER — VALACYCLOVIR HYDROCHLORIDE 1 G/1
TABLET, FILM COATED ORAL
COMMUNITY
Start: 2024-11-17

## 2025-01-08 NOTE — PATIENT INSTRUCTIONS
PFPT [phone]    Local support:   Breastfeeding support: [facility] Baby Cafe     [place] [employer] and [employer] [place] [email or web address] [email or web address] [phone] (ext [phone])    Snuggles and struggles:  Free and open to the public, call [phone] or email [email or web address] for information     Mom to Mom:  [email or web address]    Cafe Au Lait:  [place]astfeedingcenter.org.    Get education and online support at postpartum.net  Talk @ [phone]   Text @ [phone]

## 2025-01-08 NOTE — PROGRESS NOTES
[facility]  Obstetrics & Gynecology      History of Present Illness:   Gisella Smith is here for her postaprtum visit after . Delivery and postpartum course was uncomplicated.This IUP is complicated by IVF, AMA, h/o endometritis after last delivery, polyhydramnios, h/o t11 loss, h/o ectopic pregnancy s/p righ salpingectomy. She is feeling well today. She denies bleeding, pain, F/C, N/V. Normal bowel and bladder function. However, would feel she would benefit from getting back in with PFPT. pumping is going well. Starting to taper. Has not had sex since the delivery. She denies si/sx of PPD.  Baby is doing well. Contraceptive plans vasectomy planned for next month!  Has had the flu shot.        Current Outpatient Medications on File Prior to Visit   Medication Sig    acetaminophen (TYLENOL) 325 MG tablet Take 2 tablets (650 mg total) by mouth every 6 (six) hours as needed for Pain.    docusate sodium (COLACE) 100 MG capsule Take 2 capsules (200 mg total) by mouth 2 (two) times daily as needed for Constipation.    famotidine (PEPCID) 20 MG tablet Take 1 tablet (20 mg total) by mouth 2 (two) times daily.    ferrous sulfate 325 (65 FE) MG EC tablet Take 1 tablet (325 mg total) by mouth once daily.    ibuprofen (ADVIL,MOTRIN) 600 MG tablet Take 1 tablet (600 mg total) by mouth every 8 (eight) hours as needed for Pain.    ondansetron (ZOFRAN-ODT) 8 MG TbDL Take 1 tablet (8 mg total) by mouth every 8 (eight) hours as needed.    prenatal vit/iron fum/folic ac (PRENATAL 1+1 ORAL) Take by mouth.     No current facility-administered medications on file prior to visit.       Review of patient's allergies indicates:   Allergen Reactions    Sulfamethoxazole-trimethoprim Hives       Past Medical History:   Diagnosis Date    Antepartum multigravida of advanced maternal age 2024    Overweight (BMI 25.0-29.9) 2017    Recurrent cold sores 2017     OB History    Para Term  AB Living   7 2  2 0 5 2   SAB IAB Ectopic Multiple Live Births   4 0 1 0 2      # Outcome Date GA Lbr René/2nd Weight Sex Type Anes PTL Lv   7 Term 11/27/24 37w2d 04:53 / 01:21 3.27 kg (7 lb 3.3 oz) F Vag-Spont EPI N TRACI      Name: Yue Smith      Apgar1: 8  Apgar5: 9   6 SAB 01/2024              Birth Comments: chemical   5 SAB 05/2023           4 Ectopic 2023     ECTOPIC         Birth Comments: R salpingectomy   3 Term 10/31/20 37w6d / 00:50 2.95 kg (6 lb 8.1 oz) F Vag-Spont EPI N TRACI      Name: MARLYS SMITH      Apgar1: 9  Apgar5: 9   2 SAB 10/2019              Birth Comments: anembroynic pregnancy   1 SAB 09/2019              Birth Comments: chemical pregnancy     Past Surgical History:   Procedure Laterality Date    DIAGNOSTIC LAPAROSCOPY N/A 1/23/2023    Procedure: LAPAROSCOPY, DIAGNOSTIC;  Surgeon: Gisella Parrish MD;  Location: [facility] OR;  Service: OB/GYN;  Laterality: N/A;    LAPAROSCOPIC SALPINGECTOMY Right 1/23/2023    Procedure: SALPINGECTOMY, LAPAROSCOPIC;  Surgeon: Gisella Parrish MD;  Location: [facility] OR;  Service: OB/GYN;  Laterality: Right;    WISDOM TOOTH EXTRACTION       Family History       Problem Relation (Age of Onset)    Alzheimer's disease Maternal Grandmother    Arrhythmia Paternal Grandfather    Bullous pemphigoid Paternal Grandfather    Diabetes Mellitus Paternal Grandmother    Hyperlipidemia Maternal Grandmother    Hypertension Father, Maternal Grandmother, Paternal Grandfather    Liver disease Maternal Grandfather    No Known Problems Brother, Brother    Osteoporosis Mother, Father    Other Maternal Grandfather    Parkinsonism Father          Tobacco Use    Smoking status: Never    Smokeless tobacco: Never   Substance and Sexual Activity    Alcohol use: Yes    Drug use: Never    Sexual activity: Yes     Partners: Male     Birth control/protection: None     Review of Systems   Constitutional:  Negative for activity change, appetite change, chills and fever.   Respiratory:  Negative  for shortness of breath.    Cardiovascular:  Negative for chest pain.   Gastrointestinal:  Negative for abdominal pain, blood in stool, constipation, diarrhea, nausea and vomiting.   Endocrine: Negative for diabetes.   Genitourinary:  Negative for dysuria, hematuria, pelvic pain, vaginal bleeding, vaginal discharge and vaginal pain.   Integumentary:  Negative for breast mass.   Neurological:  Negative for headaches.   Psychiatric/Behavioral:  Negative for depression. The patient is not nervous/anxious.    Breast: Negative for mass and mastodynia  Objective:     Vital Signs (Most Recent):    Vital Signs (24h Range):  [unfilled]        There is no height or weight on file to calculate BMI.  Patient's last menstrual period was 04/17/2023 (exact date).    Physical Exam:   Constitutional: She is oriented to person, place, and time. She appears well-developed and well-nourished. No distress.    HENT:   Head: Normocephalic and atraumatic.    Eyes: EOM are normal.      Pulmonary/Chest: Effort normal.          Genitourinary:    Vagina, uterus, right adnexa and left adnexa normal.   The external female genitalia was normal.   Cervix is normal.           Musculoskeletal: Normal range of motion.       Neurological: She is alert and oriented to person, place, and time.    Skin: Skin is warm and dry. She is not diaphoretic.    Psychiatric: She has a normal mood and affect.     Lab Results   Component Value Date    WBC 20.63 (H) 11/28/2024    HGB 10.0 (L) 11/28/2024    HCT 29.2 (L) 11/28/2024     (H) 11/28/2024     11/28/2024         Assessment/Plan:       Assessment and Plan:  Pt is doing well postpartum. No complaints.  Laceration healing well  PPD screen negative  T2DM screen not indicated  Pap and cotest colelcted  Contraceptive plans: reviewed condom use until after vasectomy proven effective   PFPT referral palced   Pt is cleared for all activity    RTC for annual or PRN    Ketty Winkler MD  Obstetrics &  Gynecology  [facility]

## 2025-01-27 ENCOUNTER — PATIENT MESSAGE (OUTPATIENT)
Dept: OBSTETRICS AND GYNECOLOGY | Facility: CLINIC | Age: 40
End: 2025-01-27
Payer: COMMERCIAL

## 2025-02-05 ENCOUNTER — CLINICAL SUPPORT (OUTPATIENT)
Dept: REHABILITATION | Facility: OTHER | Age: 40
End: 2025-02-05
Attending: STUDENT IN AN ORGANIZED HEALTH CARE EDUCATION/TRAINING PROGRAM
Payer: COMMERCIAL

## 2025-02-05 DIAGNOSIS — R32 URINARY INCONTINENCE, UNSPECIFIED TYPE: ICD-10-CM

## 2025-02-05 DIAGNOSIS — R29.898 HIP WEAKNESS: ICD-10-CM

## 2025-02-05 DIAGNOSIS — M62.89 PELVIC FLOOR DYSFUNCTION: Primary | ICD-10-CM

## 2025-02-05 PROCEDURE — 97530 THERAPEUTIC ACTIVITIES: CPT

## 2025-02-05 PROCEDURE — 97162 PT EVAL MOD COMPLEX 30 MIN: CPT

## 2025-02-05 NOTE — PROGRESS NOTES
Outpatient Rehab    Physical Therapy Evaluation    Patient Name: Gisella Smith  MRN: [id number]  YOB: 1985  Encounter Date: 2025    Therapy Diagnosis:   Encounter Diagnoses   Name Primary?     (spontaneous vaginal delivery)     Urinary incontinence, unspecified type     Pelvic floor dysfunction Yes    Hip weakness      Physician: Ketty Winkler MD    Physician Orders: Eval and Treat  Medical Diagnosis:   O80 (ICD-10-CM) -  (spontaneous vaginal delivery)   R32 (ICD-10-CM) - Urinary incontinence, unspecified type       Visit # / Visits Authorized:     Date of Evaluation:  2025   Insurance Authorization Period: 2025 to 2026  Plan of Care Certification:  2025 to 2025      Time In: 1300   Time Out: 1350  Total Time: 50   Total Billable Time: 50    Intake Outcome Measure for FOTO Survey    Therapist reviewed FOTO scores for Gisella Smith on 2025.   FOTO report - see Media section or FOTO account episode details.     Intake Score:  %         Subjective   History of Present Illness  Gisella is a 40 y.o. female who reports to physical therapy with a chief concern of Postpartum Weakness.                 History of Present Condition/Illness: Delivered 2nd child vaginally 2024. Pushed for 1 hour, but otherwise labor and delivery was uncomplicated. Small perineal tear experienced. Has resumed intercourse, and experienced slight pain at scar and with deeper penetration. Minimal leakage since delivery with powerful coughs, sneezing. Also reports history of left sided piriformis syndrome since delivery; history of right sided piriformis syndrome post partum in . Is worst when sleeping.    Activities of Daily Living  Social history was obtained from Patient.    General Prior Level of Function Comments: independent  General Current Level of Function Comments: pain limits, interrupts daily activity  Patient Roles: Caregiver for child    Previously  independent with activities of daily living? Yes     Currently independent with activities of daily living? Yes          Previously independent with instrumental activities of daily living? Yes     Currently independent with instrumental activities of daily living? Yes              Pain     Patient reports a current pain level of 0/10. Pain at best is reported as 0/10. Pain at worst is reported as 7/10.   Location: vagina  Clinical Progression (since onset): Stable  Pain Qualities: Dull, Pressure  Pain-Relieving Factors: Rest  Pain-Aggravating Factors: [place]         Bladder/Bowel Habits and Symptoms  Bladder Habits  Urine Stream: Normal  Bladder Emptying: Complete  Frequency of Bladder Urgency: Occasionally  Urinary Urge/Sensation: Normal  Ability to Delay Urination: More than 1 hour  Daytime Frequency of Urination: Varies  Nighttime Frequency of Urination: 0  Just in Case Voiding: No  Toilet Mapping: No  Subjective Urine Volume: Large  Estimated Fluid Intake: 2-4 c coffee, 32-40 oz water, occasional soda water  Bladder Irritants: Carbonated beverages    Urinary Symptoms  Urine Leakage Amount: Few drops  Frequency of Urinary Incontinence: Once per week  Urinary Incontinence Aggravating Factors: Coughing, Sneezing  Post Void Dribble: Yes  Able to Stop the Flow of Urine: Yes    Bowel Habits  [place] Stool Form Scale: Type 3-4  Frequency of Bowel Movements: 1 time per day  Frequency of Bowel Urgency: Never  Bowel Straining/Pushing: Never  Bowel Incomplete Emptying: Never  Abdominal Fullness/Bloating: Never  Pain with Bowel Movements: Occasionally  Splinting During Bowel Movements: Never  Constipation: Never  Hemorrhoids: Present, External, Internal    Bowel Symptoms  Bowel Incontinence Issues: None    Bladder or Bowel Leakage Protection  Protection for Leakage: None        Sexual Function  Sexually Active: Yes  Birth Control/Protection: None  Sexual Partners: Male  Pain with Sexual Function: With deep  penetration  Able to Achieve Orgasm: No  Vaginal Dryness: Yes  Lubricant Use: Water-based  Additional Sexual Function Details: Vaginal dryness has been improving since she stopped breastfeeding at 8 weeks (2 weeks ago).    Treatment History  Treatments  Previously Received Treatments: Yes  Previous Treatments: Physical therapy  Currently Receiving Treatments: No    Personal Factors   The patient's physical activity or sport participation includes: Peloton, Reformed Pilates, Weight lifting with  On average, the number of days per week the patient engages in moderate to strenuous exercise, like a brisk walk, is 5 days.  Details on sleep habits include: wakes once each night with daughter           Living Arrangements  Living Situation  Housing: Home independently  Living Arrangements: Children, Spouse/significant other        Employment  Patient does not report that: Does the patient's condition impact their ability to work?  Employment Status: Employed full-time   Anesthesiologist      Past Medical History/Physical Systems Review:   Gisella Smith  has a past medical history of Antepartum multigravida of advanced maternal age, Overweight (BMI 25.0-29.9), and Recurrent cold sores.    Gisella Smith  has a past surgical history that includes [place] tooth extraction; Diagnostic laparoscopy (N/A, 1/23/2023); and Laparoscopic salpingectomy (Right, 1/23/2023).    Gisella has a current medication list which includes the following prescription(s): bupropion and valacyclovir.    Review of patient's allergies indicates:   Allergen Reactions    Sulfamethoxazole-trimethoprim Hives        Objective   Pelvic External Observations  Consent for Examination: Yes, Chaperone Present: No           Pelvic Floor Palpation  Pelvic Floor Exams Performed: External Pelvic  External Pelvic Palpation Details: WNL                                         Hip Range of Motion    Hip internal rotation and external rotation  moderately limited when measured in 90 degrees flexion bilaterally. Hamstring length mildly limited bilaterally.              Hip Strength - Planes of Motion   Right Strength Right Pain Left Strength Left  Pain   Flexion (L2) 4-   4+     Extension 4+   4+     ABduction 4+   4+     ADduction 4   4+     Internal Rotation 4-   4-     External Rotation 4-   4-             Pelvic Floor Special Tests  Single Digit Intravaginal Assessment  Intravaginal Pelvic Floor Strength: 2  Intravaginal Pelvic Floor Relaxation Response: Delayed, Incomplete  Right Vaginal Sensation: Intact  Left Vaginal Sensation: Intact             Treatment:            Balance/Neuromuscular Re-Education  Balance/Neuromuscular Re-Education Activity 1: daphragmatic breathing  Balance/Neuromuscular Re-Education Activity 2: pelvic floor awareness, mobilization    Therapeutic Activity  Therapeutic Activity 1: role of PT, plan of care, involved anatomy and pathology,  goals, rational for treatment and exercise, scheduling limitations  Therapeutic Activity 2: hip stretching reviewed, updated    Assessment & Plan   Assessment  Gisella presents with a condition of Moderate complexity.   Presentation of Symptoms: Changing       Functional Limitations: Completing work/school activities, Participating in leisure activities, Sexual function, Activity tolerance, Pain with ADLs/IADLs  Impairments: Abnormal coordination, Activity intolerance, Impaired physical strength, Lack of appropriate home exercise program    Patient Goal for Therapy (PT): Stop leakage with activity, Improve pain  Prognosis: Excellent  Assessment Details: Gisella  presents with fair posture and trunk mobility; however, she also demonstrated poor knowledge of body mechanics and posture, poor trunk stability, decreased pelvic muscle strength, poor quality of pelvic muscle contraction, and poor coordination of pelvic floor muscles during ADL's leading to urinary or fecal leakage. Based on  presentation, hip, abdominal, and pelvic floor weakness and poor control/coordintaion have likely contributed to abnormal tension and pain. Gisella  would benefit from continued pelvic floor physical therapy.     Plan  From a physical therapy perspective, the patient would benefit from: Skilled Rehab Services    Planned therapy interventions include: Therapeutic exercise, Therapeutic activities, Neuromuscular re-education, and Manual therapy.    Planned modalities to include: Biofeedback, Electrical stimulation - attended, Ultrasound, and Other (Comment). Dry Needling      Visit Frequency: 1 times Per Week for 10 Weeks.       This plan was discussed with Patient.   Discussion participants: Agreed Upon Plan of Care             Patient's spiritual, cultural, and educational needs considered and patient agreeable to plan of care and goals.           Goals:   Active       Long Term Goals        Pt to report being able to sneeze without incontinence demonstrating improved pelvic floor strength and coordination to improve confidence in social situations        Start:  02/18/25    Expected End:  04/15/25            Pt to increase strength of pelvic floor muscles to 4/5 to allow for improvement in urinary control with exercise.         Start:  02/18/25    Expected End:  04/15/25            Pt to increase strength of hip muscles to 4+/5 to allow for improvement in abdominal pressure management with exercise.         Start:  02/18/25    Expected End:  04/15/25               Short Term Goals       Pt to demonstrate good body mechanics when performing ADLs such as lifting and bending to decrease strain to lumbopelvic structures and reduce risk of further injury.        Start:  02/18/25    Expected End:  03/11/25            Pt to be able to perform a 5  second kegel x 10 reps to demonstrate improving strength and endurance needed for continence.       Start:  02/18/25    Expected End:  03/11/25            Pt to increase  strength of hip muscles to 4/5 to allow for improvement in lifting mechanics.         Start:  02/18/25    Expected End:  03/11/25                ALTAGRACIA VO PT

## 2025-02-12 ENCOUNTER — PATIENT MESSAGE (OUTPATIENT)
Dept: REHABILITATION | Facility: OTHER | Age: 40
End: 2025-02-12
Payer: COMMERCIAL

## 2025-02-17 ENCOUNTER — OFFICE VISIT (OUTPATIENT)
Dept: PAIN MEDICINE | Facility: CLINIC | Age: 40
End: 2025-02-17
Payer: COMMERCIAL

## 2025-02-17 VITALS
HEART RATE: 84 BPM | OXYGEN SATURATION: 98 % | BODY MASS INDEX: 25.27 KG/M2 | SYSTOLIC BLOOD PRESSURE: 120 MMHG | RESPIRATION RATE: 18 BRPM | DIASTOLIC BLOOD PRESSURE: 86 MMHG | TEMPERATURE: 99 F | WEIGHT: 142.63 LBS

## 2025-02-17 DIAGNOSIS — M70.62 TROCHANTERIC BURSITIS OF LEFT HIP: ICD-10-CM

## 2025-02-17 DIAGNOSIS — G57.02 PIRIFORMIS SYNDROME OF LEFT SIDE: Primary | ICD-10-CM

## 2025-02-17 PROCEDURE — 99205 OFFICE O/P NEW HI 60 MIN: CPT | Mod: S$GLB,,, | Performed by: ANESTHESIOLOGY

## 2025-02-17 NOTE — PROGRESS NOTES
PCP: No, Primary Doctor    REFERRING PHYSICIAN: Self, Aaareferral    CHIEF COMPLAINT: L Piriformis Syndrome    Original HISTORY OF PRESENT ILLNESS: Gisella Smith presents to the clinic for the evaluation of the above pain. The pain started years ago but flared with her last pregnancy November 2024. She has previously had an R piriformis injection with Dr. Wadsworth in 2021. She began having acupuncture in January which was helpful but time consuming so she had to stop.     Original Pain Description:  The pain is located in the L piriformis and is radiating to the below the knee . The pain is described as aching, numbing, and tingling. Exacerbating factors: Laying, Walking, and Night Time. Mitigating factors heat, massage, and physical therapy. Symptoms interfere with daily activity, sleeping, and work. The patient feels like symptoms have been worsening. Patient denies night fever/night sweats, urinary incontinence, bowel incontinence, significant weight loss, significant motor weakness, loss of sensations. 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    Original PAIN SCORES:  Best: Pain is 3  Worst: Pain is 7  Current: Pain is 4        2/17/2025     2:00 PM   Last 3 PDI Scores   Pain Disability Index (PDI) 49       INTERVAL HISTORY: (Newest visit at the bottom)   Interval History (Date):       6 weeks of Conservative therapy:  PT: yes, last appointment a few weeks ago  Chiro: no  HEP: yes       Treatments / Medications: (Ice/Heat/NSAIDS/APAP/etc):  Acupuncture   Heat   Ibuprofen 800 mg (stopped 2/2 to gastritis)      Interventional Pain Procedures: (Previous injections)  R Piriformis Injection 12/06/2021    Past Medical History:   Diagnosis Date    Antepartum multigravida of advanced maternal age 05/23/2024    Overweight (BMI 25.0-29.9) 11/16/2017    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     Past Surgical History:   Procedure Laterality Date    DIAGNOSTIC LAPAROSCOPY N/A 1/23/2023    Procedure: LAPAROSCOPY,  DIAGNOSTIC;  Surgeon: Gisella Parrish MD;  Location: [facility] OR;  Service: OB/GYN;  Laterality: N/A;    LAPAROSCOPIC SALPINGECTOMY Right 2023    Procedure: SALPINGECTOMY, LAPAROSCOPIC;  Surgeon: Gisella Parrish MD;  Location: [facility] OR;  Service: OB/GYN;  Laterality: Right;    WISDOM TOOTH EXTRACTION       Social History[1]  Family History   Problem Relation Name Age of Onset    Osteoporosis Mother      Parkinsonism Father      Hypertension Father      Osteoporosis Father      No Known Problems Brother      Hypertension Maternal Grandmother      Hyperlipidemia Maternal Grandmother      Alzheimer's disease Maternal Grandmother      Other Maternal Grandfather          oropharyngeal cancer    Liver disease Maternal Grandfather      Diabetes Mellitus Paternal Grandmother      Hypertension Paternal Grandfather      Bullous pemphigoid Paternal Grandfather      Arrhythmia Paternal Grandfather      No Known Problems Brother      Colon cancer Neg Hx      Breast cancer Neg Hx      Ovarian cancer Neg Hx      Stroke Neg Hx         Review of patient's allergies indicates:   Allergen Reactions    Sulfamethoxazole-trimethoprim Hives       Current Outpatient Medications   Medication Sig    prenatal vit/iron fum/folic ac (PRENATAL 1+1 ORAL) Take by mouth.    valACYclovir (VALTREX) 1000 MG tablet SMARTSI Gram(s) By Mouth Every 12 Hours     No current facility-administered medications for this visit.       ROS:  GENERAL: No fever. No chills. No fatigue. Denies weight loss. Denies weight gain.  HEENT: Denies headaches. Denies vision change. Denies eye pain. Denies double vision. Denies ear pain.   CV: Denies chest pain.   PULM: Denies of shortness of breath.  GI: Denies constipation. No diarrhea. No abdominal pain. Denies nausea. Denies vomiting. No blood in stool.  HEME: Denies bleeding problems.  : Denies urgency. No painful urination. No blood in urine.  MS: Denies joint stiffness. Denies joint swelling.  Denies back  pain.  SKIN: Denies rash.   NEURO: Denies seizures. No weakness.  PSYCH:  Denies difficulty sleeping. No anxiety. Denies depression. No suicidal thoughts.       VITALS:   Vitals:    02/17/25 1359 02/17/25 1401   BP: 120/86    Pulse: 84    Resp: 18    Temp: 98.8 °F (37.1 °C)    SpO2: 98%    Weight: 64.7 kg (142 lb 10.2 oz)    PainSc:   7   7         PHYSICAL EXAM:   GENERAL: Well appearing, in no acute distress, alert and oriented x3.  PSYCH:  Mood and affect appropriate.  SKIN: Skin color, texture, turgor normal, no rashes or lesions.  HEENT:  Normocephalic, atraumatic. Cranial nerves grossly intact.  NECK: No pain to palpation over the cervical paraspinous muscles. No pain to palpation over facets. No pain with neck flexion, extension, or lateral flexion.   PULM: No evidence of respiratory difficulty, symmetric chest rise.  GI:  Non-distended  BACK: Normal range of motion. No pain to palpation over the spinous processes. No pain to palpation over facet joints. There is no pain with palpation over the sacroiliac joints bilaterally.   EXTREMITIES: No deformities, edema, or skin discoloration.   MUSCULOSKELETAL: Pain to palpation over L piriformis muscle. Shoulder, hip, and knee provocative maneuvers are negative. No atrophy is noted. Pain to palpation over the left GTB.   NEURO: Sensation is equal and appropriate bilaterally. Bilateral upper and lower extremity strength is normal and symmetric. Bilateral upper and lower extremity coordination and muscle stretch reflexes are physiologic and symmetric. Plantar response are downgoing. Straight leg raising in the supine position is negative to radicular pain.   GAIT: normal.      LABS:      IMAGING:        ASSESSMENT: 39 y.o. year old female with pain, consistent with:    Encounter Diagnosis   Name Primary?    Piriformis syndrome of left side Yes       DISCUSSION: Gisella Smith is a an anesthesiologist at the [employer] with a history of piriformis syndrome after  pregnancy. She had her second child in November of 2024 and has been having worsening left buttock pain since. The pain is worst with walking and at night preventing her from sleeping. On exam her pain is reproduced over the left GTB and piriformis.        PLAN:  Continue with Physical Therapy   Continue taking Ibuprofen as needed    Perform in office L piriformis and GTB injection with U/S guidance - she got immediate relief  Follow up as needed    Piriformis Muscle and Greater Trochanteric Bursa Injection under Ultrasound Guidance    The procedure, risks, benefits, and options were discussed with the patient. There are no contraindications to the procedure. The patent expressed understanding and agreed to the procedure. Informed written consent was obtained prior to the start of the procedure and can be found in the patient's chart.    PATIENT NAME: Gisella Smith   MRN: [id number]     DATE OF PROCEDURE: 02/17/2025    PROCEDURE: Left Piriformis Muscle Injection under Fluoroscopic Guidance  PROCEDURE: Left Greater Trochanteric Bursa Injection under Fluoroscopic Guidance    PRE-OP DIAGNOSIS: * No surgery found *    POST-OP DIAGNOSIS: Same    PHYSICIAN: * Surgery not found *    ASSISTANTS: Dr. Tom     MEDICATIONS INJECTED: Preservative-free Kenalog 40mg with 7cc of Bupivacaine 0.25%     LOCAL ANESTHETIC INJECTED: Xylocaine 2%     SEDATION: None    ESTIMATED BLOOD LOSS: None    COMPLICATIONS: None    TECHNIQUE: Time-out was performed to identify the patient and procedure to be performed. With the patient laying in a prone position, the surgical area was prepped and draped in the usual sterile fashion using ChloraPrep and a fenestrated drape.The area overlying the right piriformis muscle was identified under Ultrasound was used to identify the appropriate structures. Skin anesthesia was achieved by injecting Lidocaine 2% over the injection sites. A 22 gauge,  3.5 inch spinal quinke needle was then advanced into the  piriformis muscle. Test injection was shown to be spreading inside the muscle. We then injected 20mg Kenalog and 4mL of Bupivicaine 0.25%. The needles were removed and bleeding was nil. A sterile dressing was applied. No specimens collected. The patient tolerated the procedure well.     TECHNIQUE: Time-out was performed to identify the patient and procedure to be performed. With the patient laying in a prone position, the surgical area was prepped and draped in the usual sterile fashion using ChloraPrep and a fenestrated drape. The area overlying the greater trochanteric bursa was evaluated under ultrasound guidance. Skin anesthesia was achieved by injecting Lidocaine 2% over the injection site. The greater trochanteric bursa was then approached with a 22 gauge, 3.5 inch spinal quinke needle that was introduced under ultrasound guidance in the AP view. Once the needle tip was in the area of the bursa, and there was no blood aspiration,  3 mL of the medication mixture listed above was injected slowly. The needles were removed and bleeding was nil. A sterile dressing was applied. No specimens collected. The patient tolerated the procedure well.    The patient was monitored after the procedure in the recovery area. They were given post-procedure and discharge instructions to follow at home. The patient was discharged in a stable condition.      NOTE: Given the complexity of this situation and the need for immediate treatment, 60 minutes was spent face to face, evaluating the medical record, performing treatment, and documenting this visit.     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); no children    Regular exercise: percy, works with     Outside GYN     Social Drivers of Health     Financial Resource Strain: Low Risk  (5/20/2024)    Overall Financial Resource Strain (CARDIA)     Difficulty of Paying Living Expenses: Not hard at all   Food Insecurity: No Food Insecurity (5/20/2024)    Hunger Vital Sign     Worried About Running Out of Food in the Last Year: Never true     Ran Out of Food in the Last Year: Never true   Transportation Needs: No Transportation Needs (6/16/2023)    PRAPARE - Transportation     Lack of Transportation (Medical): No     Lack of Transportation (Non-Medical): No   Physical Activity: Sufficiently Active (2/5/2025)    Exercise Vital Sign     Days of Exercise per Week: 5 days     Minutes of Exercise per Session: 30 min   Stress: Stress Concern Present (5/20/2024)    [employer] - Occupational Stress Questionnaire     Feeling of Stress : To some extent   Housing Stability: Low Risk  (6/16/2023)    Housing Stability Vital Sign     Unable to Pay for Housing in the Last Year: No     Number of Places Lived in the Last Year: 1     Unstable Housing in the Last Year: No

## 2025-02-18 ENCOUNTER — CLINICAL SUPPORT (OUTPATIENT)
Dept: REHABILITATION | Facility: OTHER | Age: 40
End: 2025-02-18
Attending: STUDENT IN AN ORGANIZED HEALTH CARE EDUCATION/TRAINING PROGRAM
Payer: COMMERCIAL

## 2025-02-18 DIAGNOSIS — R29.898 HIP WEAKNESS: ICD-10-CM

## 2025-02-18 DIAGNOSIS — M62.89 PELVIC FLOOR DYSFUNCTION: Primary | ICD-10-CM

## 2025-02-18 PROCEDURE — 97112 NEUROMUSCULAR REEDUCATION: CPT

## 2025-02-18 PROCEDURE — 97140 MANUAL THERAPY 1/> REGIONS: CPT

## 2025-02-18 NOTE — PROGRESS NOTES
Outpatient Rehab    Physical Therapy Visit    Patient Name: Gisella Smith  MRN: [id number]  YOB: 1985  Encounter Date: 2025    Therapy Diagnosis:   Encounter Diagnoses   Name Primary?    Pelvic floor dysfunction Yes    Hip weakness      Physician: Ketty Winkler MD    Physician Orders: Eval and Treat  Medical Diagnosis:   O80 (ICD-10-CM) -  (spontaneous vaginal delivery)   R32 (ICD-10-CM) - Urinary incontinence, unspecified type       Visit # / Visits Authorized:     Date of Evaluation:  2025  Insurance Authorization Period: 2025 to 2025  Plan of Care Certification:  2025 to 2025      Time In: 1010   Time Out: 1100  Total Time: 50   Total Billable Time: 50    FOTO:  Intake Score:  %  Survey Score 1:  %  Survey Score 2:  %         Subjective   No changes since previous session. Limited use of HEP due to delayed delivery.         Objective   Pelvic External Observations       Abdominal Assessment  Transversus Abdominis Contraction: Weak  Additional Abdominal Assessment Details: Transverse abdominus strength and endurance more limited on left           Pelvic Floor Palpation  Pelvic Floor Exams Performed: External Pelvic  External Pelvic Palpation Details: WNL    Right External Pelvic Floor and Rectal Palpation  Abnormal: Layer 3  Right Pelvic  3 External Palpation Observations: Increased muscle tone       Left External Pelvic Floor and Rectal Palpation  Abnormal: Layer 3  Left Pelvic  3 External Palpation Observations: Increased muscle tone                           Pelvic Floor Special Tests  Single Digit Intravaginal Assessment  Intravaginal Pelvic Floor Strength: 3  Intravaginal Pelvic Floor Relaxation Response: Delayed, Incomplete  Additional Intravaginal Assessment Details: Improved pelvic floor muscle relaxation with gluteal, adductor, transverse abdominus co-contractions              Treatment:       Manual Therapy  Manual  Therapy Activity 1: PF MFR - SIP + relaxation    Balance/Neuromuscular Re-Education  Balance/Neuromuscular Re-Education Activity 1: pelvic floor awareness, mobilization  Balance/Neuromuscular Re-Education Activity 2: layer-specific PF relaxation + hip IR, ER  Balance/Neuromuscular Re-Education Activity 3: PF mobilization + glute, adductor, TA co-activation         Assessment & Plan   Assessment: Pelvic floor reassessed, with Gisella demonstrating increased resting tone as well as limited relaxation following recruitment. Imporoved recruitment and relaxation noted with co-activation of abdominals, gluts, and adductors. Region-specific relaxation also introduced, though awareness was limited at times. Plan to review again prior to progressing.  Evaluation/Treatment Tolerance: Patient tolerated treatment well    Patient will continue to benefit from skilled outpatient physical therapy to address the deficits listed in the problem list box on initial evaluation, provide pt/family education and to maximize pt's level of independence in the home and community environment.     Patient's spiritual, cultural, and educational needs considered and patient agreeable to plan of care and goals.           Plan: review relaxation, progress hip strengthening through larger ROM    Goals:   Active       Long Term Goals        Pt to report being able to sneeze without incontinence demonstrating improved pelvic floor strength and coordination to improve confidence in social situations  (Progressing)       Start:  02/18/25    Expected End:  04/15/25            Pt to increase strength of pelvic floor muscles to 4/5 to allow for improvement in urinary control with exercise.   (Progressing)       Start:  02/18/25    Expected End:  04/15/25            Pt to increase strength of hip muscles to 4+/5 to allow for improvement in abdominal pressure management with exercise.   (Progressing)       Start:  02/18/25    Expected End:  04/15/25                Short Term Goals       Pt to demonstrate good body mechanics when performing ADLs such as lifting and bending to decrease strain to lumbopelvic structures and reduce risk of further injury.  (Progressing)       Start:  02/18/25    Expected End:  03/11/25            Pt to be able to perform a 5  second kegel x 10 reps to demonstrate improving strength and endurance needed for continence. (Progressing)       Start:  02/18/25    Expected End:  03/11/25            Pt to increase strength of hip muscles to 4/5 to allow for improvement in lifting mechanics.   (Progressing)       Start:  02/18/25    Expected End:  03/11/25                ALTAGRACIA VO, PT

## 2025-03-05 ENCOUNTER — TELEPHONE (OUTPATIENT)
Dept: PAIN MEDICINE | Facility: CLINIC | Age: 40
End: 2025-03-05
Payer: COMMERCIAL

## 2025-03-05 NOTE — TELEPHONE ENCOUNTER
----- Message from Johana Florentino MD sent at 3/5/2025  9:10 AM CST -----  Good morning. 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 I know it says I'm in a [place] at that time. You can put her on the schedule at 10, but ask her to come in at 9:45. Thank you!

## 2025-03-05 NOTE — TELEPHONE ENCOUNTER
Last ov 03/5/2020  Next ov none  Last refill on requested med 08/19/2020 #90  Last tsh 04/27/2020          MD to address STAFF SPOKE WITH PT.

## 2025-03-06 ENCOUNTER — OFFICE VISIT (OUTPATIENT)
Dept: PAIN MEDICINE | Facility: CLINIC | Age: 40
End: 2025-03-06
Payer: COMMERCIAL

## 2025-03-06 VITALS
WEIGHT: 142.63 LBS | HEART RATE: 65 BPM | SYSTOLIC BLOOD PRESSURE: 125 MMHG | BODY MASS INDEX: 25.27 KG/M2 | OXYGEN SATURATION: 98 % | RESPIRATION RATE: 18 BRPM | TEMPERATURE: 99 F | DIASTOLIC BLOOD PRESSURE: 82 MMHG

## 2025-03-06 DIAGNOSIS — G57.02 PIRIFORMIS SYNDROME OF LEFT SIDE: Primary | ICD-10-CM

## 2025-03-06 DIAGNOSIS — M54.16 LUMBAR RADICULOPATHY, CHRONIC: ICD-10-CM

## 2025-03-06 PROCEDURE — 99999 PR PBB SHADOW E&M-EST. PATIENT-LVL IV: CPT | Mod: PBBFAC,,, | Performed by: ANESTHESIOLOGY

## 2025-03-06 NOTE — PROGRESS NOTES
PCP: No, Primary Doctor    REFERRING PHYSICIAN: No ref. provider found    CHIEF COMPLAINT: L Piriformis Syndrome    Original HISTORY OF PRESENT ILLNESS: Gisella Smith presents to the clinic for the evaluation of the above pain. The pain started years ago but flared with her last pregnancy November 2024. She has previously had an R piriformis injection with Dr. Wadsworth in 2021. She began having acupuncture in January which was helpful but time consuming so she had to stop.     Original Pain Description:  The pain is located in the L piriformis and is radiating to the below the knee . The pain is described as aching, numbing, and tingling. Exacerbating factors: Laying, Walking, and Night Time. Mitigating factors heat, massage, and physical therapy. Symptoms interfere with daily activity, sleeping, and work. The patient feels like symptoms have been worsening. Patient denies night fever/night sweats, urinary incontinence, bowel incontinence, significant weight loss, significant motor weakness, loss of sensations. 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    Original PAIN SCORES:  Best: Pain is 3  Worst: Pain is 7  Current: Pain is 4        3/6/2025     9:57 AM   Last 3 PDI Scores   Pain Disability Index (PDI) 14       INTERVAL HISTORY: (Newest visit at the bottom)   Interval History 3/6/25: Gisella Smith returns for follow up. At our last visit she was having significant left buttock and lateral thigh pain which appeared to be a recurrence of her previous piriformis syndrome, previously on the right side. Due to the level of pain she was experiencing and inability to sleep we did an US guided left piriformis and left GTB in the office. She reports 100% pain relief. Unfortunately, after prolonged standing, walking, and carrying her baby during [place]. It began aching in the left buttock and left lateral thigh after that week, especially when bouncing the baby. Pain was back up to 8/10. She does  report a small amount of low back pain which she attributes to trying to sleep on her back. She does have one reported episode of low back pain after twisting approximately 5 years ago. Today, now that J Luis Griggs is over, and she is no longer standing and walking all day, the pain is improving, especially with Pilates. Chief complaint remains the left buttock but she is concerned by her left leg. She is noting decreased sensation in the left lateral calf and left lateral foot. 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 It resolved over time, but those symptoms have recurred with this exacerbation. On exam she does have decreased sensation in the left peroneal distribution. Pain today is 6/10 making it difficult to sleep.        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 OR;  Service: OB/GYN;  Laterality: N/A;    LAPAROSCOPIC SALPINGECTOMY Right 1/23/2023    Procedure: SALPINGECTOMY, LAPAROSCOPIC;  Surgeon: Gisella Parrish MD;  Location: [facility] OR;  Service: OB/GYN;  Laterality: Right;    WISDOM TOOTH EXTRACTION       Social History[1]  Family History   Problem Relation Name Age of Onset    Osteoporosis Mother       Parkinsonism Father      Hypertension Father      Osteoporosis Father      No Known Problems Brother      Hypertension Maternal Grandmother      Hyperlipidemia Maternal Grandmother      Alzheimer's disease Maternal Grandmother      Other Maternal Grandfather          oropharyngeal cancer    Liver disease Maternal Grandfather      Diabetes Mellitus Paternal Grandmother      Hypertension Paternal Grandfather      Bullous pemphigoid Paternal Grandfather      Arrhythmia Paternal Grandfather      No Known Problems Brother      Colon cancer Neg Hx      Breast cancer Neg Hx      Ovarian cancer Neg Hx      Stroke Neg Hx         Review of patient's allergies indicates:   Allergen Reactions    Sulfamethoxazole-trimethoprim Hives       Current Outpatient Medications   Medication Sig    prenatal vit/iron fum/folic ac (PRENATAL 1+1 ORAL) Take by mouth.    valACYclovir (VALTREX) 1000 MG tablet SMARTSI Gram(s) By Mouth Every 12 Hours     No current facility-administered medications for this visit.       ROS:  GENERAL: No fever. No chills. No fatigue. Denies weight loss. Denies weight gain.  HEENT: Denies headaches. Denies vision change. Denies eye pain. Denies double vision. Denies ear pain.   CV: Denies chest pain.   PULM: Denies of shortness of breath.  GI: Denies constipation. No diarrhea. No abdominal pain. Denies nausea. Denies vomiting. No blood in stool.  HEME: Denies bleeding problems.  : Denies urgency. No painful urination. No blood in urine.  MS: Denies joint stiffness. Denies joint swelling.  + back pain.  SKIN: Denies rash.   NEURO: Denies seizures. No weakness.  PSYCH:  + difficulty sleeping. No anxiety. Denies depression. No suicidal thoughts.       VITALS:   Vitals:    25 0956   BP: 125/82   Pulse: 65   Resp: 18   Temp: 98.8 °F (37.1 °C)   SpO2: 98%   Weight: 64.7 kg (142 lb 10.2 oz)   PainSc:   2           PHYSICAL EXAM:   GENERAL: Well appearing, in no acute distress, alert and oriented x3.  PSYCH:   Mood and affect appropriate.  SKIN: Skin color, texture, turgor normal, no rashes or lesions.  HEENT:  Normocephalic, atraumatic. Cranial nerves grossly intact.  NECK: No pain to palpation over the cervical paraspinous muscles. No pain to palpation over facets. No pain with neck flexion, extension, or lateral flexion.   PULM: No evidence of respiratory difficulty, symmetric chest rise.  GI:  Non-distended  BACK: Normal range of motion. No pain to palpation over the spinous processes. No pain to palpation over facet joints. There is no pain with palpation over the sacroiliac joints bilaterally.   EXTREMITIES: No deformities, edema, or skin discoloration.   MUSCULOSKELETAL: Tender to palpation over L piriformis muscle. Shoulder, hip, and knee provocative maneuvers are negative. No atrophy is noted. Tender to palpation over the bilateral GTB.   NEURO: Sensation is altered in the left lateral leg. Bilateral upper and lower extremity strength is normal and symmetric. Bilateral upper and lower extremity coordination and muscle stretch reflexes are physiologic and symmetric. Plantar response are downgoing. Straight leg raising in the supine position is negative to radicular pain.   GAIT: normal.      LABS:      IMAGING:        ASSESSMENT: 40 y.o. year old female with pain, consistent with:    Encounter Diagnoses   Name Primary?    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 She had her second child in November of 2024 and has been having worsening left buttock pain since. The pain is worst with walking and at night preventing her from sleeping. On exam her pain is reproduced over the left GTB and piriformis.        PLAN:  Continue with Physical Therapy. Orders placed.   Continue taking Ibuprofen as needed. Taper as tolerated.    Recommend avoiding triggers:  Prolonged standing, walking, lifting, bouncing, axial loading  Continue heat daily  Ordered MRI due to progression of neurological symptoms  Follow up after MRI      Johana Florentino MD  03/06/2025                [1]   Social History  Socioeconomic History    Marital status:    Tobacco Use    Smoking status: Never    Smokeless tobacco: Never   Substance and Sexual Activity    Alcohol use: Yes    Drug use: Never    Sexual activity: Yes     Partners: Male     Birth control/protection: None   Social History Narrative    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); no children    Regular exercise: percy, works with     Outside GYN     Social Drivers of Health     Financial Resource Strain: Low Risk  (5/20/2024)    Overall Financial Resource Strain (CARDIA)     Difficulty of Paying Living Expenses: Not hard at all   Food Insecurity: No Food Insecurity (5/20/2024)    Hunger Vital Sign     Worried About Running Out of Food in the Last Year: Never true     Ran Out of Food in the Last Year: Never true   Transportation Needs: No Transportation Needs (6/16/2023)    PRAPARE - Transportation     Lack of Transportation (Medical): No     Lack of Transportation (Non-Medical): No   Physical Activity: Sufficiently Active (2/5/2025)    Exercise Vital Sign     Days of Exercise per Week: 5 days     Minutes of Exercise per Session: 30 min   Stress: Stress Concern Present (5/20/2024)    [employer] - Occupational Stress Questionnaire     Feeling of Stress : To some extent   Housing Stability: Low Risk  (6/16/2023)    Housing Stability Vital Sign     Unable to Pay for Housing in the Last Year: No     Number of Places Lived in the Last Year: 1     Unstable Housing in the Last Year: No

## 2025-03-07 ENCOUNTER — CLINICAL SUPPORT (OUTPATIENT)
Dept: REHABILITATION | Facility: OTHER | Age: 40
End: 2025-03-07
Attending: STUDENT IN AN ORGANIZED HEALTH CARE EDUCATION/TRAINING PROGRAM
Payer: COMMERCIAL

## 2025-03-07 DIAGNOSIS — M62.89 PELVIC FLOOR DYSFUNCTION: Primary | ICD-10-CM

## 2025-03-07 DIAGNOSIS — R29.898 HIP WEAKNESS: ICD-10-CM

## 2025-03-07 PROCEDURE — 97530 THERAPEUTIC ACTIVITIES: CPT

## 2025-03-07 PROCEDURE — 97110 THERAPEUTIC EXERCISES: CPT

## 2025-03-07 NOTE — PROGRESS NOTES
Outpatient Rehab    Physical Therapy Visit    Patient Name: Gisella Smith  MRN: [id number]  YOB: 1985  Encounter Date: 3/7/2025    Therapy Diagnosis:   Encounter Diagnoses   Name Primary?    Pelvic floor dysfunction Yes    Hip weakness      Physician: Ketty Winkler MD    Physician Orders: Eval and Treat  Medical Diagnosis:   O80 (ICD-10-CM) -  (spontaneous vaginal delivery)   R32 (ICD-10-CM) - Urinary incontinence, unspecified type       Visit # / Visits Authorized:  2/10   Date of Evaluation:  2025  Insurance Authorization Period: 2025 to 2025  Plan of Care Certification:  2025 to 2025      Time In: 1400   Time Out: 1445  Total Time: 45   Total Billable Time: 45    FOTO:  Intake Score:  %  Survey Score 1:  %  Survey Score 2:  %         Subjective   Relief post injection lasted about a week, then symptoms returned and intensified. Has also noted increased peroneal nerve irritation in lateral lower leg..         Objective                Treatment:  Therapeutic Exercise  Therapeutic Exercise Activity 1: lunge + rotation  Therapeutic Exercise Activity 2: 90/90 hip rotation mobilization + lift  Therapeutic Exercise Activity 3: supine figure 4 + IR  Therapeutic Exercise Activity 4: TA sets              Therapeutic Activity  Therapeutic Activity 1: review of exercise - modifications for improved hip strengthening while lengthening  Therapeutic Activity 2: abdominal bracing for improved pressure management    Assessment & Plan   Assessment: Based on increased hip pain, exercise routine modified. All exercises performed well. Modifications to pilates reoutine for improved trunk support and hip mobilization reviewed. Plan to reassess utility prior to progressing  Evaluation/Treatment Tolerance: Patient tolerated treatment well    Patient will continue to benefit from skilled outpatient physical therapy to address the deficits listed in the problem list box on initial  evaluation, provide pt/family education and to maximize pt's level of independence in the home and community environment.     Patient's spiritual, cultural, and educational needs considered and patient agreeable to plan of care and goals.           Plan: progress hip strengthening, reassess pelvic floor relaxation    Goals:   Active       Long Term Goals        Pt to report being able to sneeze without incontinence demonstrating improved pelvic floor strength and coordination to improve confidence in social situations  (Progressing)       Start:  02/18/25    Expected End:  04/15/25            Pt to increase strength of pelvic floor muscles to 4/5 to allow for improvement in urinary control with exercise.   (Progressing)       Start:  02/18/25    Expected End:  04/15/25            Pt to increase strength of hip muscles to 4+/5 to allow for improvement in abdominal pressure management with exercise.   (Progressing)       Start:  02/18/25    Expected End:  04/15/25               Short Term Goals       Pt to demonstrate good body mechanics when performing ADLs such as lifting and bending to decrease strain to lumbopelvic structures and reduce risk of further injury.  (Progressing)       Start:  02/18/25    Expected End:  03/11/25            Pt to be able to perform a 5  second kegel x 10 reps to demonstrate improving strength and endurance needed for continence. (Progressing)       Start:  02/18/25    Expected End:  03/11/25            Pt to increase strength of hip muscles to 4/5 to allow for improvement in lifting mechanics.   (Progressing)       Start:  02/18/25    Expected End:  03/11/25                ALTAGRACIA VO, PT

## 2025-03-11 ENCOUNTER — HOSPITAL ENCOUNTER (OUTPATIENT)
Dept: RADIOLOGY | Facility: OTHER | Age: 40
Discharge: HOME | End: 2025-03-11
Attending: ANESTHESIOLOGY
Payer: COMMERCIAL

## 2025-03-11 ENCOUNTER — OFFICE VISIT (OUTPATIENT)
Dept: INTERNAL MEDICINE | Facility: CLINIC | Age: 40
End: 2025-03-11
Payer: COMMERCIAL

## 2025-03-11 VITALS
BODY MASS INDEX: 25.27 KG/M2 | HEIGHT: 63 IN | DIASTOLIC BLOOD PRESSURE: 74 MMHG | OXYGEN SATURATION: 99 % | HEART RATE: 85 BPM | WEIGHT: 142.63 LBS | SYSTOLIC BLOOD PRESSURE: 118 MMHG

## 2025-03-11 DIAGNOSIS — G57.02 PIRIFORMIS SYNDROME, LEFT: ICD-10-CM

## 2025-03-11 DIAGNOSIS — Z00.00 HEALTH MAINTENANCE EXAMINATION: Primary | ICD-10-CM

## 2025-03-11 DIAGNOSIS — F43.9 STRESS: ICD-10-CM

## 2025-03-11 DIAGNOSIS — Z13.1 SCREENING FOR DIABETES MELLITUS: ICD-10-CM

## 2025-03-11 DIAGNOSIS — M54.16 LUMBAR RADICULOPATHY, CHRONIC: ICD-10-CM

## 2025-03-11 DIAGNOSIS — Z13.6 SCREENING FOR CARDIOVASCULAR CONDITION: ICD-10-CM

## 2025-03-11 DIAGNOSIS — Z83.79 FAMILY HISTORY OF CROHN'S DISEASE: ICD-10-CM

## 2025-03-11 DIAGNOSIS — D75.89 MACROCYTOSIS: ICD-10-CM

## 2025-03-11 PROCEDURE — 99395 PREV VISIT EST AGE 18-39: CPT | Mod: S$GLB,,, | Performed by: STUDENT IN AN ORGANIZED HEALTH CARE EDUCATION/TRAINING PROGRAM

## 2025-03-11 PROCEDURE — 1159F MED LIST DOCD IN RCRD: CPT | Mod: CPTII,S$GLB,, | Performed by: STUDENT IN AN ORGANIZED HEALTH CARE EDUCATION/TRAINING PROGRAM

## 2025-03-11 PROCEDURE — 99214 OFFICE O/P EST MOD 30 MIN: CPT | Mod: 25,S$GLB,, | Performed by: STUDENT IN AN ORGANIZED HEALTH CARE EDUCATION/TRAINING PROGRAM

## 2025-03-11 PROCEDURE — 3078F DIAST BP <80 MM HG: CPT | Mod: CPTII,S$GLB,, | Performed by: STUDENT IN AN ORGANIZED HEALTH CARE EDUCATION/TRAINING PROGRAM

## 2025-03-11 PROCEDURE — 3008F BODY MASS INDEX DOCD: CPT | Mod: CPTII,S$GLB,, | Performed by: STUDENT IN AN ORGANIZED HEALTH CARE EDUCATION/TRAINING PROGRAM

## 2025-03-11 PROCEDURE — 99999 PR PBB SHADOW E&M-EST. PATIENT-LVL III: CPT | Mod: PBBFAC,,, | Performed by: STUDENT IN AN ORGANIZED HEALTH CARE EDUCATION/TRAINING PROGRAM

## 2025-03-11 PROCEDURE — 72148 MRI LUMBAR SPINE W/O DYE: CPT | Mod: 26,,, | Performed by: INTERNAL MEDICINE

## 2025-03-11 PROCEDURE — 72148 MRI LUMBAR SPINE W/O DYE: CPT | Mod: TC

## 2025-03-11 PROCEDURE — 3074F SYST BP LT 130 MM HG: CPT | Mod: CPTII,S$GLB,, | Performed by: STUDENT IN AN ORGANIZED HEALTH CARE EDUCATION/TRAINING PROGRAM

## 2025-03-11 RX ORDER — BUPROPION HYDROCHLORIDE 150 MG/1
150 TABLET ORAL DAILY
Qty: 90 TABLET | Refills: 0 | Status: SHIPPED | OUTPATIENT
Start: 2025-03-11

## 2025-03-11 NOTE — PROGRESS NOTES
Subjective:       Patient ID: Gisella Smith is a 40 y.o. female.    Chief Complaint: Health maintenance examination [Z00.00]    Patient is established with me, here today for the following:    History of Present Illness      OBSTETRIC HISTORY:  She has a history of seven pregnancies with multiple miscarriages. She underwent IVF after experiencing fertility challenges. Her most recent pregnancy was complicated by polyhydramnios and gestational hypertension, requiring induction at 37 weeks. Blood pressure improved after stopping work at 36 weeks. She experienced severe reflux and daily vomiting throughout the pregnancy.    CURRENT POSTPARTUM STATUS:  Her first postpartum menstrual period lasted 10 days. She briefly attempted breastfeeding but forced to discontinue due to problems. She is experiencing paresthesia on her left side, similar to symptoms experienced after her previous pregnancy but on the opposite side. The symptoms fluctuate in severity day to day.    MENTAL HEALTH:  She has a history of depression related to fertility struggles. Previous therapy during fertility treatment was reported as unproductive. A psychiatrist recommended Wellbutrin for depression, anxiety, and ADHD symptoms. She currently experiences anhedonia and motivation issues, though reports loving her life and children. She expresses hesitation about starting Wellbutrin due to concerns about side effects.    GASTROINTESTINAL:  She currently has gastritis and a long history of reflux. She reports recent frequent ibuprofen use.    FAMILY HISTORY:  Brother diagnosed with Crohn's disease and psoriasis at age 45, younger brother with psoriasis. 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 Mother has untreated ADHD with significant anxiety.      ROS:  Gastrointestinal: +vomiting, +heartburn  Neurological: +numbness  Psychiatric: -emotional lability          Health maintenance -   Denies family history of colorectal cancer.   Mammogram BI-RADS  "1 in .  Recommend restart mammograms at age 41.  Denies family history of breast cancers.  Denies family history of ovarian cancers.  Last pap performed .   History of prior abnormal pap smears, cryo previously, normal since.  Family history of osteoporosis.  Family history of cardiac disease.  UTD on COVID, Tdap, Influenza vaccinations.   Never smoker.  Denies drug use.  Completed HIV and hepatitis C screening.  Due for lipid screening.  Lab Results   Component Value Date    LDLCALC 70.2 2019   Due for diabetes screening.  Lab Results   Component Value Date    HGBA1C 4.4 2022     Began menarche at age 14.   [id number]  Denies gestational diabetes.  Endorses gestational HTN.   Currently sexually active with .    Following with Dr. Florentino routinely for pain management.  Piriformis syndrome  Pelvic floor PT    Vitamin B12 deficiency -  Lab Results   Component Value Date    [id number] 271 2022        Current Outpatient Medications   Medication Instructions    buPROPion (WELLBUTRIN XL) 150 mg, Oral, Daily    valACYclovir (VALTREX) 1000 MG tablet SMARTSI Gram(s) By Mouth Every 12 Hours     Objective:      Vitals:    25 0957   BP: 118/74   Pulse: 85   SpO2: 99%   Weight: 64.7 kg (142 lb 10.2 oz)   Height: 5' 3" (1.6 m)   PainSc: 0-No pain     Body mass index is 25.27 kg/m².    Physical Exam  Vitals reviewed.   Constitutional:       General: She is not in acute distress.     Appearance: Normal appearance. She is not ill-appearing or diaphoretic.   HENT:      Head: Normocephalic and atraumatic.      Right Ear: Tympanic membrane, ear canal and external ear normal. There is no impacted cerumen.      Left Ear: Tympanic membrane, ear canal and external ear normal. There is no impacted cerumen.      Nose: Nose normal. No rhinorrhea.      Mouth/Throat:      Mouth: Mucous membranes are moist.      Pharynx: Oropharynx is clear. No oropharyngeal exudate or posterior oropharyngeal " erythema.   Eyes:      General: No scleral icterus.        Right eye: No discharge.         Left eye: No discharge.      Conjunctiva/sclera: Conjunctivae normal.   Neck:      Thyroid: No thyromegaly or thyroid tenderness.      Trachea: Trachea normal.   Cardiovascular:      Rate and Rhythm: Normal rate and regular rhythm.      Heart sounds: Normal heart sounds. No murmur heard.     No friction rub. No gallop.   Pulmonary:      Effort: Pulmonary effort is normal. No respiratory distress.      Breath sounds: Normal breath sounds. No stridor. No wheezing, rhonchi or rales.   Abdominal:      General: There is no distension.      Palpations: Abdomen is soft.      Tenderness: There is no abdominal tenderness. There is no guarding or rebound.   Musculoskeletal:         General: No swelling or deformity.      Cervical back: Neck supple.   Lymphadenopathy:      Head:      Right side of head: No submandibular or posterior auricular adenopathy.      Left side of head: No submandibular or posterior auricular adenopathy.      Cervical: No cervical adenopathy.      Right cervical: No superficial, deep or posterior cervical adenopathy.     Left cervical: No superficial, deep or posterior cervical adenopathy.      Upper Body:      Right upper body: No supraclavicular adenopathy.      Left upper body: No supraclavicular adenopathy.   Skin:     General: Skin is warm and dry.   Neurological:      General: No focal deficit present.      Mental Status: She is alert. Mental status is at baseline.      Gait: Gait normal.   Psychiatric:         Mood and Affect: Mood normal.         Behavior: Behavior normal.         Assessment:       1. Health maintenance examination    2. Macrocytosis    3. Screening for diabetes mellitus    4. Screening for cardiovascular condition    5. Family history of Crohn's disease    6. Piriformis syndrome, left    7. Stress        Plan:   Health maintenance examination  -     Vitamin B12; Future; Expected date:  03/11/2025  -     Folate; Future; Expected date: 03/11/2025  -     Hemoglobin A1C; Future; Expected date: 03/11/2025  -     TSH; Future; Expected date: 03/11/2025  -     Comprehensive Metabolic Panel; Future; Expected date: 03/11/2025  -     CBC Auto Differential; Future; Expected date: 03/11/2025  -     Lipid Panel; Future; Expected date: 03/11/2025    Macrocytosis  -     Vitamin B12; Future; Expected date: 03/11/2025  -     Folate; Future; Expected date: 03/11/2025    Screening for diabetes mellitus  -     Hemoglobin A1C; Future; Expected date: 03/11/2025    Screening for cardiovascular condition  -     Lipid Panel; Future; Expected date: 03/11/2025    Family history of Crohn's disease  -     Calprotectin, Stool; Future; Expected date: 03/11/2025    Piriformis syndrome, left  -     Ambulatory referral/consult to Sports Medicine; Future; Expected date: 03/18/2025    Stress  -     buPROPion (WELLBUTRIN XL) 150 MG TB24 tablet; Take 1 tablet (150 mg total) by mouth once daily.  Dispense: 90 tablet; Refill: 0      Assessment & Plan    IMPRESSION:  - Considered starting Wellbutrin 150 mg for depression, anxiety, and ADHD symptoms  - Discussed potential combination of low-dose Wellbutrin and Zoloft to balance mood and anxiety if needed  - Evaluated need for GI referral based on family history of Crohn's disease and patient's symptoms  - Assessed postpartum recovery and need for lab work, recommending delay until June for more accurate results  - Reviewed recent pregnancy complications including polyhydramnios and gestational hypertension    DEPRESSION:  - Discussed the patient's history of depression related to fertility struggles, including multiple miscarriages and traumatic experiences.  - Noted the patient's symptoms of anhedonia and lack of motivation.  - Acknowledged the psychiatrist's diagnosis of mild depression.  - Explained potential side effects of Wellbutrin.  - Prescribed Wellbutrin 150 mg extended-release,  to be taken in the morning.  - Scheduled a virtual follow-up visit on April 22nd to assess Wellbutrin efficacy.    ANXIETY:  - Noted the patient's reports of anxiety, particularly related to work stress and life changes.  - Acknowledged the psychiatrist's diagnosis of mild anxiety.  - Discussed the relationship between anxiety and ADHD.  - Explained potential side effects of Wellbutrin, including worsening anxiety.  - Considered potential combination therapy of Wellbutrin and Zoloft if anxiety worsens with Wellbutrin alone.    REFLUX:  - Noted the patient's ongoing reflux issues.  - Will refer the patient to a gastroenterologist for evaluation of reflux and other GI symptoms.    PARESTHESIA:  - Noted the patient's ongoing pain after childbirth.  - Following with pain specialist currently.   - Gisella is currently undergoing pelvic floor therapy.    OSTEOPATHIC MANIPULATIVE TREATMENT REFERRAL:  - Referred to Dr. Audrey May for Osteopathic Manipulative Treatment (OMT).    CHOLESTEROL MANAGEMENT:  - Informed about the impact of pregnancy on cholesterol levels and the need to wait for accurate testing.    LABS:  - Labs ordered for June: cholesterol, A1C, lipid panel, TSH, CBC, CMP, B12, and folate.    FOLLOW UP:  - Contact the office if any concerns arise before the scheduled follow-up.           Audrey Leyva MD  3/11/2025

## 2025-03-12 PROBLEM — M62.89 PELVIC FLOOR DYSFUNCTION: Status: ACTIVE | Noted: 2025-03-12

## 2025-03-12 PROBLEM — R29.898 HIP WEAKNESS: Status: ACTIVE | Noted: 2025-03-12

## 2025-03-12 NOTE — PATIENT INSTRUCTIONS
([email or web address])     Relaxation      To isolate layer 1: think about opening around your vagina as you inhale or imagine sit bones widening      To isolate layer 2: imagine letting your urethra drop away from you as you inhale      To isolate layer 3: relax at your tailbone to let it expand down and backward as you inhale         So when practicing this at home: Complete 1 minute at each layer 1-2 times per day  Inhale and let pelvic floor muscles expand. Do not push muscles down!  Exhale and let pelvic floor muscles relax. Do not contract!

## 2025-03-17 ENCOUNTER — CLINICAL SUPPORT (OUTPATIENT)
Dept: REHABILITATION | Facility: OTHER | Age: 40
End: 2025-03-17
Attending: STUDENT IN AN ORGANIZED HEALTH CARE EDUCATION/TRAINING PROGRAM
Payer: COMMERCIAL

## 2025-03-17 ENCOUNTER — OFFICE VISIT (OUTPATIENT)
Dept: PAIN MEDICINE | Facility: CLINIC | Age: 40
End: 2025-03-17
Payer: COMMERCIAL

## 2025-03-17 ENCOUNTER — PATIENT MESSAGE (OUTPATIENT)
Dept: INTERNAL MEDICINE | Facility: CLINIC | Age: 40
End: 2025-03-17
Payer: COMMERCIAL

## 2025-03-17 ENCOUNTER — PATIENT MESSAGE (OUTPATIENT)
Dept: OBSTETRICS AND GYNECOLOGY | Facility: CLINIC | Age: 40
End: 2025-03-17
Payer: COMMERCIAL

## 2025-03-17 VITALS
HEART RATE: 70 BPM | DIASTOLIC BLOOD PRESSURE: 78 MMHG | SYSTOLIC BLOOD PRESSURE: 127 MMHG | TEMPERATURE: 98 F | BODY MASS INDEX: 25.27 KG/M2 | WEIGHT: 142.63 LBS

## 2025-03-17 DIAGNOSIS — M70.62 TROCHANTERIC BURSITIS OF LEFT HIP: ICD-10-CM

## 2025-03-17 DIAGNOSIS — M62.89 PELVIC FLOOR DYSFUNCTION: Primary | ICD-10-CM

## 2025-03-17 DIAGNOSIS — M51.360 DEGENERATION OF INTERVERTEBRAL DISC OF LUMBAR REGION WITH DISCOGENIC BACK PAIN: ICD-10-CM

## 2025-03-17 DIAGNOSIS — G57.02 PIRIFORMIS SYNDROME OF LEFT SIDE: Primary | ICD-10-CM

## 2025-03-17 DIAGNOSIS — R29.898 HIP WEAKNESS: ICD-10-CM

## 2025-03-17 PROCEDURE — 3008F BODY MASS INDEX DOCD: CPT | Mod: CPTII,S$GLB,, | Performed by: ANESTHESIOLOGY

## 2025-03-17 PROCEDURE — 1159F MED LIST DOCD IN RCRD: CPT | Mod: CPTII,S$GLB,, | Performed by: ANESTHESIOLOGY

## 2025-03-17 PROCEDURE — 3078F DIAST BP <80 MM HG: CPT | Mod: CPTII,S$GLB,, | Performed by: ANESTHESIOLOGY

## 2025-03-17 PROCEDURE — 3074F SYST BP LT 130 MM HG: CPT | Mod: CPTII,S$GLB,, | Performed by: ANESTHESIOLOGY

## 2025-03-17 PROCEDURE — 99214 OFFICE O/P EST MOD 30 MIN: CPT | Mod: S$GLB,,, | Performed by: ANESTHESIOLOGY

## 2025-03-17 PROCEDURE — 97530 THERAPEUTIC ACTIVITIES: CPT

## 2025-03-17 PROCEDURE — 99999 PR PBB SHADOW E&M-EST. PATIENT-LVL III: CPT | Mod: PBBFAC,,, | Performed by: ANESTHESIOLOGY

## 2025-03-17 PROCEDURE — 97140 MANUAL THERAPY 1/> REGIONS: CPT

## 2025-03-17 NOTE — PROGRESS NOTES
Outpatient Rehab    Physical Therapy Visit    Patient Name: Gisella Smith  MRN: [id number]  YOB: 1985  Encounter Date: 3/17/2025    Therapy Diagnosis:   Encounter Diagnoses   Name Primary?    Pelvic floor dysfunction Yes    Hip weakness      Physician: Ketty Winkler MD    Physician Orders: Eval and Treat  Medical Diagnosis:  (spontaneous vaginal delivery)  Urinary incontinence, unspecified type    Visit # / Visits Authorized:  3 / 10  Date of Evaluation: 2025  Insurance Authorization Period: 2025 to 2025  Plan of Care Certification:  2025 to 2025      PT/PTA:     Number of PTA visits since last PT visit:   Time In: 0955   Time Out: 1050  Total Time: 55   Total Billable Time: 55    FOTO:  Intake Score:  %  Survey Score 1:  %  Survey Score 2:  %         Subjective   Noticed significant tightness with dynamic mobility. Slightly improved pain and mobility also noted following use. Today she can feel piriformis tightness, but is not experiencing L LE pain, numbness..         Objective            Treatment:  Manual Therapy  MT 1: Dry needling to B piriformis - 2x 60 mm needle inserted in prone position  Therapeutic Activity  TA 1: HEP modification for performance in car between appointments  TA 2: modification to pilates routine for gentle piriformis mobilizatin and strengthening    Time Entry(in minutes):  Manual Therapy Time Entry: 30  Therapeutic Activity Time Entry: 25    Assessment & Plan   Assessment: Patient provided written and verbal consent to receive functional dry needling at today's visit (see consent form scanned into chart). FDN performed to B piriformis. FDN performed to reduce pain and muscle tension, promote blood flow, and improve ROM and function. Pt tolerated tx well without adverse effects. R side treated first, due to significant guarding prior to performance. Gisella was educated on what to expect following the procedure and verbalized  understanding. Significant tension noted bilaterally, though increased TTP noted on L compared to R. Significant improvement in tissue mobility following completion as noted with passive internal and external rotation. Slight soreness noted following, though improved symmetry noted with posterior hip activation.  Evaluation/Treatment Tolerance: Patient tolerated treatment well    Patient will continue to benefit from skilled outpatient physical therapy to address the deficits listed in the problem list box on initial evaluation, provide pt/family education and to maximize pt's level of independence in the home and community environment.     Patient's spiritual, cultural, and educational needs considered and patient agreeable to plan of care and goals.           Plan:      Goals:   Active       Long Term Goals        Pt to report being able to sneeze without incontinence demonstrating improved pelvic floor strength and coordination to improve confidence in social situations  (Progressing)       Start:  02/18/25    Expected End:  04/15/25            Pt to increase strength of pelvic floor muscles to 4/5 to allow for improvement in urinary control with exercise.   (Progressing)       Start:  02/18/25    Expected End:  04/15/25            Pt to increase strength of hip muscles to 4+/5 to allow for improvement in abdominal pressure management with exercise.   (Progressing)       Start:  02/18/25    Expected End:  04/15/25               Short Term Goals       Pt to demonstrate good body mechanics when performing ADLs such as lifting and bending to decrease strain to lumbopelvic structures and reduce risk of further injury.  (Progressing)       Start:  02/18/25    Expected End:  03/11/25            Pt to be able to perform a 5  second kegel x 10 reps to demonstrate improving strength and endurance needed for continence. (Progressing)       Start:  02/18/25    Expected End:  03/11/25            Pt to increase strength of  hip muscles to 4/5 to allow for improvement in lifting mechanics.   (Progressing)       Start:  02/18/25    Expected End:  03/11/25                ALTAGRACIA VO PT

## 2025-03-17 NOTE — PROGRESS NOTES
PCP: Audrey Leyva MD    REFERRING PHYSICIAN: No ref. provider found    CHIEF COMPLAINT: L Piriformis Syndrome    Original HISTORY OF PRESENT ILLNESS: Gisella Smith presents to the clinic for the evaluation of the above pain. The pain started years ago but flared with her last pregnancy November 2024. She has previously had an R piriformis injection with Dr. Wadwsorth in 2021. She began having acupuncture in January which was helpful but time consuming so she had to stop.     Original Pain Description:  The pain is located in the L piriformis and is radiating to the below the knee . The pain is described as aching, numbing, and tingling. Exacerbating factors: Laying, Walking, and Night Time. Mitigating factors heat, massage, and physical therapy. Symptoms interfere with daily activity, sleeping, and work. The patient feels like symptoms have been worsening. Patient denies night fever/night sweats, urinary incontinence, bowel incontinence, significant weight loss, significant motor weakness, loss of sensations. 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    Original PAIN SCORES:  Best: Pain is 3  Worst: Pain is 7  Current: Pain is 4        3/17/2025    11:09 AM   Last 3 PDI Scores   Pain Disability Index (PDI) 48       INTERVAL HISTORY: (Newest visit at the bottom)   Interval History 3/6/25: Gisella Smith returns for follow up. At our last visit she was having significant left buttock and lateral thigh pain which appeared to be a recurrence of her previous piriformis syndrome, previously on the right side. Due to the level of pain she was experiencing and inability to sleep we did an US guided left piriformis and left GTB in the office. She reports 100% pain relief. Unfortunately, after prolonged standing, walking, and carrying her baby during [place]. It began aching in the left buttock and left lateral thigh after that week, especially when bouncing the baby. Pain was back up to 8/10. She  does report a small amount of low back pain which she attributes to trying to sleep on her back. She does have one reported episode of low back pain after twisting approximately 5 years ago. Today, now that J Luis Griggs is over, and she is no longer standing and walking all day, the pain is improving, especially with Pilates. Chief complaint remains the left buttock but she is concerned by her left leg. She is noting decreased sensation in the left lateral calf and left lateral foot. 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 It resolved over time, but those symptoms have recurred with this exacerbation. On exam she does have decreased sensation in the left peroneal distribution. Pain today is 6/10 making it difficult to sleep.      Interval History 3/16/25: Gisella Smith returns for follow up. At our last visit Dr. Smith was having continued buttock pain. Due to continued pain we decided to get advanced imaging. She returns today to discuss the MRI. Fortunately, MRI looks pretty good. She is in PT and receiving dry needling. She has been taking it easy and has reported significant improvement. Her pain yesterday was 2/10 and today it is 0/10.       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     Past Surgical History:   Procedure Laterality Date    DIAGNOSTIC LAPAROSCOPY N/A 1/23/2023     Procedure: LAPAROSCOPY, DIAGNOSTIC;  Surgeon: Gisella Parrish MD;  Location: [facility] OR;  Service: OB/GYN;  Laterality: N/A;    LAPAROSCOPIC SALPINGECTOMY Right 2023    Procedure: SALPINGECTOMY, LAPAROSCOPIC;  Surgeon: Gisella Parrish MD;  Location: [facility] OR;  Service: OB/GYN;  Laterality: Right;    WISDOM TOOTH EXTRACTION       Social History[1]  Family History   Problem Relation Name Age of Onset    Osteoporosis Mother      Parkinsonism Father      Hypertension Father      Osteoporosis Father      No Known Problems Brother      Hypertension Maternal Grandmother      Hyperlipidemia Maternal Grandmother      Alzheimer's disease Maternal Grandmother      Other Maternal Grandfather          oropharyngeal cancer    Liver disease Maternal Grandfather      Diabetes Mellitus Paternal Grandmother      Hypertension Paternal Grandfather      Bullous pemphigoid Paternal Grandfather      Arrhythmia Paternal Grandfather      No Known Problems Brother      Colon cancer Neg Hx      Breast cancer Neg Hx      Ovarian cancer Neg Hx      Stroke Neg Hx         Review of patient's allergies indicates:   Allergen Reactions    Sulfamethoxazole-trimethoprim Hives       Current Outpatient Medications   Medication Sig    buPROPion (WELLBUTRIN XL) 150 MG TB24 tablet Take 1 tablet (150 mg total) by mouth once daily.    valACYclovir (VALTREX) 1000 MG tablet SMARTSI Gram(s) By Mouth Every 12 Hours     No current facility-administered medications for this visit.       ROS:  GENERAL: No fever. No chills. No fatigue. Denies weight loss. Denies weight gain.  HEENT: Denies headaches. Denies vision change. Denies eye pain. Denies double vision. Denies ear pain.   CV: Denies chest pain.   PULM: Denies of shortness of breath.  GI: Denies constipation. No diarrhea. No abdominal pain. Denies nausea. Denies vomiting. No blood in stool.  HEME: Denies bleeding problems.  : Denies urgency. No painful urination. No blood in urine.  MS: Denies  joint stiffness. Denies joint swelling.  + back pain.  SKIN: Denies rash.   NEURO: Denies seizures. No weakness.  PSYCH:  + difficulty sleeping. No anxiety. Denies depression. No suicidal thoughts.       VITALS:   Vitals:    03/17/25 1108   BP: 127/78   Pulse: 70   Temp: 97.6 °F (36.4 °C)   Weight: 64.7 kg (142 lb 10.2 oz)   PainSc: 0-No pain           PHYSICAL EXAM:   GENERAL: Well appearing, in no acute distress, alert and oriented x3.  PSYCH:  Mood and affect appropriate.  SKIN: Skin color, texture, turgor normal, no rashes or lesions.  HEENT:  Normocephalic, atraumatic. Cranial nerves grossly intact.  NECK: No pain to palpation over the cervical paraspinous muscles. No pain to palpation over facets. No pain with neck flexion, extension, or lateral flexion.   PULM: No evidence of respiratory difficulty, symmetric chest rise.  GI:  Non-distended  BACK: Normal range of motion. No pain to palpation over the spinous processes. No pain to palpation over facet joints. There is no pain with palpation over the sacroiliac joints bilaterally.   EXTREMITIES: No deformities, edema, or skin discoloration.   MUSCULOSKELETAL: Tender to palpation over L piriformis muscle. Shoulder, hip, and knee provocative maneuvers are negative. No atrophy is noted. Tender to palpation over the bilateral GTB.   NEURO: Sensation is barely altered in the left lateral leg. Bilateral upper and lower extremity strength is normal and symmetric. Bilateral upper and lower extremity coordination and muscle stretch reflexes are physiologic and symmetric. Plantar response are downgoing. Straight leg raising in the supine position is negative to radicular pain.   GAIT: normal.      LABS:      IMAGING:    MRI LUMBAR SPINE WITHOUT CONTRAST     CLINICAL HISTORY:  Lumbar radiculopathy, symptoms persist with conservative treatment; Radiculopathy, lumbar region     TECHNIQUE:  Multiplanar, multisequence MR images were acquired from the thoracolumbar junction to  the sacrum without the administration of contrast.     COMPARISON:  None.     FINDINGS:  ALIGNMENT: Normal.     BONE: No compression fractures.  Scattered vertebral hemangiomas.     JOINT: Disc desiccation at L4-5.  No disc height loss.  Facet joints are unremarkable.  No bone marrow edema.     SPINAL CANAL: The conus medullaris has a normal appearance and terminates at the L1 level.  Cauda equina nerve roots are unremarkable.  No mass or collection.     PARASPINAL SOFT TISSUES: Small right renal cyst.  Numerous subcentimeter subcutaneous nodules in the lower back bilaterally.     SIGNIFICANT FINDINGS BY LEVEL:     T12-L1: Unremarkable.     L1-2: Unremarkable.     L2-3: Unremarkable.     L3-4: Unremarkable.     L4-5: Mild disc bulge.  Small right foraminal annular fissure.  No canal or foraminal stenosis.     L5-S1: Unremarkable.     Impression:     Disc degeneration at L4-5.  No spinal canal or neural foraminal stenosis.     Numerous subcutaneous nodules in the lower back bilaterally, of uncertain etiology.        Electronically signed by:Lucas Chaves  Date:                                            03/12/2025  Time:                                           09:46    ASSESSMENT: 40 y.o. year old female with pain, consistent with:    Encounter Diagnoses   Name Primary?    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 She had her second child in November of 2024 and has been having worsening left buttock pain since. The pain is worst with walking and at night preventing her from sleeping. LMRI did not reveal significant disc issues. On exam her pain is reproduced over the left GTB and piriformis.        PLAN:  Reviewed MRI  Continue PT/HEP  Continue taking Ibuprofen as  needed.   Recommend avoiding triggers: Repetitive/Heavy Bending/Lifting  Recommend purchase TENS unit  Follow up as needed      Johana Florentino MD  03/17/2025                  [1]   Social History  Socioeconomic History    Marital status:    Tobacco Use    Smoking status: Never    Smokeless tobacco: Never   Substance and Sexual Activity    Alcohol use: Yes    Drug use: Never    Sexual activity: Yes     Partners: Male     Birth control/protection: None   Social History Narrative    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); no children    Regular exercise: pelaton, works with     Outside GYN     Social Drivers of Health     Financial Resource Strain: Low Risk  (5/20/2024)    Overall Financial Resource Strain (CARDIA)     Difficulty of Paying Living Expenses: Not hard at all   Food Insecurity: No Food Insecurity (5/20/2024)    Hunger Vital Sign     Worried About Running Out of Food in the Last Year: Never true     Ran Out of Food in the Last Year: Never true   Transportation Needs: No Transportation Needs (6/16/2023)    PRAPARE - Transportation     Lack of Transportation (Medical): No     Lack of Transportation (Non-Medical): No   Physical Activity: Sufficiently Active (2/5/2025)    Exercise Vital Sign     Days of Exercise per Week: 5 days     Minutes of Exercise per Session: 30 min   Stress: Stress Concern Present (5/20/2024)    [employer] - Occupational Stress Questionnaire     Feeling of Stress : To some extent   Housing Stability: Low Risk  (6/16/2023)    Housing Stability Vital Sign     Unable to Pay for Housing in the Last Year: No     Number of Places Lived in the Last Year: 1     Unstable Housing in the Last Year: No

## 2025-03-27 DIAGNOSIS — N93.9 ABNORMAL UTERINE BLEEDING (AUB): Primary | ICD-10-CM

## 2025-04-01 ENCOUNTER — CLINICAL SUPPORT (OUTPATIENT)
Dept: REHABILITATION | Facility: OTHER | Age: 40
End: 2025-04-01
Attending: STUDENT IN AN ORGANIZED HEALTH CARE EDUCATION/TRAINING PROGRAM
Payer: COMMERCIAL

## 2025-04-01 ENCOUNTER — RESULTS FOLLOW-UP (OUTPATIENT)
Dept: OBSTETRICS AND GYNECOLOGY | Facility: CLINIC | Age: 40
End: 2025-04-01

## 2025-04-01 ENCOUNTER — HOSPITAL ENCOUNTER (OUTPATIENT)
Dept: RADIOLOGY | Facility: OTHER | Age: 40
Discharge: HOME | End: 2025-04-01
Attending: STUDENT IN AN ORGANIZED HEALTH CARE EDUCATION/TRAINING PROGRAM
Payer: COMMERCIAL

## 2025-04-01 DIAGNOSIS — M62.89 PELVIC FLOOR DYSFUNCTION: Primary | ICD-10-CM

## 2025-04-01 DIAGNOSIS — R29.898 HIP WEAKNESS: ICD-10-CM

## 2025-04-01 DIAGNOSIS — N93.9 ABNORMAL UTERINE BLEEDING (AUB): ICD-10-CM

## 2025-04-01 PROCEDURE — 97140 MANUAL THERAPY 1/> REGIONS: CPT

## 2025-04-01 PROCEDURE — 76856 US EXAM PELVIC COMPLETE: CPT | Mod: 26,,, | Performed by: RADIOLOGY

## 2025-04-01 PROCEDURE — 76830 TRANSVAGINAL US NON-OB: CPT | Mod: TC

## 2025-04-01 PROCEDURE — 76830 TRANSVAGINAL US NON-OB: CPT | Mod: 26,,, | Performed by: RADIOLOGY

## 2025-04-01 PROCEDURE — 97530 THERAPEUTIC ACTIVITIES: CPT

## 2025-04-01 NOTE — PROGRESS NOTES
Outpatient Rehab    Physical Therapy Visit    Patient Name: Gisella Smith  MRN: [id number]  YOB: 1985  Encounter Date: 2025    Therapy Diagnosis:   Encounter Diagnoses   Name Primary?    Pelvic floor dysfunction Yes    Hip weakness      Physician: Ketty Winkler MD    Physician Orders: Eval and Treat  Medical Diagnosis:  (spontaneous vaginal delivery)  Urinary incontinence, unspecified type    Visit # / Visits Authorized:  4 / 10  Insurance Authorization Period: 2025 to 2025  Date of Evaluation: 2025  Plan of Care Certification: 2025 to 2025     PT/PTA:     Number of PTA visits since last PT visit:   Time In: 1520   Time Out: 1600  Total Time: 40   Total Billable Time: 40    FOTO:  Intake Score:  %  Survey Score 1:  %  Survey Score 2:  %         Subjective   Significant improvement in pain since previous session. Paused weight lifting due to time commitment, but has continued with pilates 1x/week..  Pain reported as 0/10.      Objective            Treatment:  Manual Therapy  MT 1: Dry needling to B piriformis, gluteus medius, gluteus minimus - 4x 60 mm needle inserted bilaterally in prone position  Therapeutic Activity  TA 1: modification to pilates routine for gentle piriformis mobilizatin and strengthening    Time Entry(in minutes):  Manual Therapy Time Entry: 30  Therapeutic Activity Time Entry: 10    Assessment & Plan   Assessment: Significant improvement in muscle tone and mobility in posterior hip following previous session. Because of this, dry needling continued this session without TENS. Most significant tension noted in gluteus medius bilaterally this session; however, significant relief noted following DN performance. Session concluded with light mobilization; HEP reviewed with focus on improving range of strengthening. Plan to continue progress pending reassessment NV.       Patient will continue to benefit from skilled outpatient physical  therapy to address the deficits listed in the problem list box on initial evaluation, provide pt/family education and to maximize pt's level of independence in the home and community environment.     Patient's spiritual, cultural, and educational needs considered and patient agreeable to plan of care and goals.           Plan: reassess hip strength, pelvic floor strength    Goals:   Active       Long Term Goals        Pt to report being able to sneeze without incontinence demonstrating improved pelvic floor strength and coordination to improve confidence in social situations  (Met)       Start:  02/18/25    Expected End:  04/15/25    Resolved:  04/08/25         Pt to increase strength of pelvic floor muscles to 4/5 to allow for improvement in urinary control with exercise.   (Progressing)       Start:  02/18/25    Expected End:  04/15/25            Pt to increase strength of hip muscles to 4+/5 to allow for improvement in abdominal pressure management with exercise.   (Progressing)       Start:  02/18/25    Expected End:  04/15/25               Short Term Goals       Pt to demonstrate good body mechanics when performing ADLs such as lifting and bending to decrease strain to lumbopelvic structures and reduce risk of further injury.  (Met)       Start:  02/18/25    Expected End:  03/11/25    Resolved:  04/08/25         Pt to be able to perform a 5  second kegel x 10 reps to demonstrate improving strength and endurance needed for continence. (Progressing)       Start:  02/18/25    Expected End:  03/11/25            Pt to increase strength of hip muscles to 4/5 to allow for improvement in lifting mechanics.   (Progressing)       Start:  02/18/25    Expected End:  03/11/25                ALTAGRACIA VO, PT

## 2025-04-30 ENCOUNTER — CLINICAL SUPPORT (OUTPATIENT)
Dept: REHABILITATION | Facility: OTHER | Age: 40
End: 2025-04-30
Attending: STUDENT IN AN ORGANIZED HEALTH CARE EDUCATION/TRAINING PROGRAM
Payer: COMMERCIAL

## 2025-04-30 DIAGNOSIS — M62.89 PELVIC FLOOR DYSFUNCTION: Primary | ICD-10-CM

## 2025-04-30 DIAGNOSIS — R29.898 HIP WEAKNESS: ICD-10-CM

## 2025-04-30 PROCEDURE — 97530 THERAPEUTIC ACTIVITIES: CPT

## 2025-04-30 PROCEDURE — 97140 MANUAL THERAPY 1/> REGIONS: CPT

## 2025-04-30 NOTE — PROGRESS NOTES
Outpatient Rehab    Physical Therapy Visit    Patient Name: Gisella Smith  MRN: [id number]  YOB: 1985  Encounter Date: 2025    Therapy Diagnosis:   Encounter Diagnoses   Name Primary?    Pelvic floor dysfunction Yes    Hip weakness      Physician: Ketty Winkler MD    Physician Orders: Eval and Treat  Medical Diagnosis:  (spontaneous vaginal delivery)  Urinary incontinence, unspecified type    Visit # / Visits Authorized:  5 / 10  Insurance Authorization Period: 2025 to 2025  Date of Evaluation: 2025  Plan of Care Certification: 2025 to 2025     PT/PTA:     Number of PTA visits since last PT visit:   Time In: 0900   Time Out: 0950  Total Time: 50   Total Billable Time: 50    FOTO:  Intake Score:  %  Survey Score 1:  %  Survey Score 2:  %         Subjective   Pain returned over the weekend. Both sides are bothering her, though L is most severe. Numbness and pain in posterior leg have returned on L. This began Thursday following workout, and she feels it was worsened by standing for surgery Friday and walking at [employer] on Saturday..         Objective            Treatment:  Manual Therapy  MT 1: Dry needling to B piriformis, gluteus medius, gluteus minimus - 4x 60 mm needle inserted bilaterally in prone position  Therapeutic Activity  TA 1: modification to pilates, lifting routine for gentle piriformis mobilization and strengthening    Time Entry(in minutes):  Manual Therapy Time Entry: 35  Therapeutic Activity Time Entry: 15    Assessment & Plan   Assessment: Dry needling resumed this session based on positive benefits following previous session. Significant twitch response noted in B piriformis and L gluteus medius. Plan to continued gentle mobility with slightly decreased loading until NV. Based on performance, Gisella would benefit from continued mobility training.       Patient will continue to benefit from skilled outpatient physical therapy to  He was seen by local walk in with a diagnosis of otitis media he would like his ears rechecked again today they are clear address the deficits listed in the problem list box on initial evaluation, provide pt/family education and to maximize pt's level of independence in the home and community environment.     Patient's spiritual, cultural, and educational needs considered and patient agreeable to plan of care and goals.           Plan: continue DN, review modification to lifting, reassess PF    Goals:   Active       Long Term Goals        Pt to report being able to sneeze without incontinence demonstrating improved pelvic floor strength and coordination to improve confidence in social situations  (Met)       Start:  02/18/25    Expected End:  04/15/25    Resolved:  04/08/25         Pt to increase strength of pelvic floor muscles to 4/5 to allow for improvement in urinary control with exercise.   (Progressing)       Start:  02/18/25    Expected End:  04/15/25            Pt to increase strength of hip muscles to 4+/5 to allow for improvement in abdominal pressure management with exercise.   (Progressing)       Start:  02/18/25    Expected End:  04/15/25               Short Term Goals       Pt to demonstrate good body mechanics when performing ADLs such as lifting and bending to decrease strain to lumbopelvic structures and reduce risk of further injury.  (Met)       Start:  02/18/25    Expected End:  03/11/25    Resolved:  04/08/25         Pt to be able to perform a 5  second kegel x 10 reps to demonstrate improving strength and endurance needed for continence. (Progressing)       Start:  02/18/25    Expected End:  03/11/25            Pt to increase strength of hip muscles to 4/5 to allow for improvement in lifting mechanics.   (Progressing)       Start:  02/18/25    Expected End:  03/11/25                ALTAGRACIA VO, PT

## 2025-05-20 ENCOUNTER — PATIENT MESSAGE (OUTPATIENT)
Dept: INTERNAL MEDICINE | Facility: CLINIC | Age: 40
End: 2025-05-20
Payer: COMMERCIAL

## (undated) DEVICE — TROCAR KII FIOS 5MM X 100MM

## (undated) DEVICE — SUT MCRYL PLUS 4-0 PS2 27IN

## (undated) DEVICE — SYR 10CC LUER LOCK

## (undated) DEVICE — GLOVE BIOGEL SKINSENSE PI 6.5

## (undated) DEVICE — SCISSOR 5MMX35CM DIRECT DRIVE

## (undated) DEVICE — SOL NORMAL USPCA 0.9%

## (undated) DEVICE — SOL POVIDONE PREP IODINE 4 OZ

## (undated) DEVICE — CATH URETH MALE/UNIV 16FR 16IN

## (undated) DEVICE — UNDERGLOVE BIOGEL PI SZ 6.5 LF

## (undated) DEVICE — DRESSING LEUKOPLAST FLEX 1X3IN

## (undated) DEVICE — ELECTRODE REM PLYHSV RETURN 9

## (undated) DEVICE — IRRIGATOR ENDOSCOPY DISP.

## (undated) DEVICE — NDL INSUFFLATION VERRES 120MM

## (undated) DEVICE — SUT MONOCYRL 4-0 PS2 UND

## (undated) DEVICE — KIT WING PAD POSITIONING

## (undated) DEVICE — JELLY SURGILUBE 5GR

## (undated) DEVICE — SOL PVP-I SCRUB 7.5% 4OZ

## (undated) DEVICE — VIDEO RENTAL SERVICE

## (undated) DEVICE — BAG TISSUE RETRIEVAL 5MM

## (undated) DEVICE — PACK LAPAROSCOPY BAPTIST

## (undated) DEVICE — SYS SEE SHARP SCP ANTIFG LNG

## (undated) DEVICE — NDL HYPO REG 25G X 1 1/2

## (undated) DEVICE — PENCIL ELECTROSURG HOLST W/BLD

## (undated) DEVICE — SEALER LIGASURE LAP 37CM 5MM

## (undated) DEVICE — SOL IRR SOD CHL .9% POUR